# Patient Record
Sex: MALE | Race: WHITE | NOT HISPANIC OR LATINO | Employment: FULL TIME | URBAN - METROPOLITAN AREA
[De-identification: names, ages, dates, MRNs, and addresses within clinical notes are randomized per-mention and may not be internally consistent; named-entity substitution may affect disease eponyms.]

---

## 2024-07-26 ENCOUNTER — APPOINTMENT (OUTPATIENT)
Dept: GENERAL RADIOLOGY | Facility: HOSPITAL | Age: 54
DRG: 064 | End: 2024-07-26
Payer: COMMERCIAL

## 2024-07-26 ENCOUNTER — APPOINTMENT (OUTPATIENT)
Dept: CT IMAGING | Facility: HOSPITAL | Age: 54
DRG: 064 | End: 2024-07-26
Payer: COMMERCIAL

## 2024-07-26 ENCOUNTER — HOSPITAL ENCOUNTER (INPATIENT)
Facility: HOSPITAL | Age: 54
LOS: 11 days | Discharge: NURSING, REHAB OR LONG-TERM CARE | DRG: 064 | End: 2024-08-06
Attending: EMERGENCY MEDICINE | Admitting: HOSPITALIST
Payer: COMMERCIAL

## 2024-07-26 DIAGNOSIS — I61.0 NONTRAUMATIC SUBCORTICAL HEMORRHAGE OF RIGHT CEREBRAL HEMISPHERE: ICD-10-CM

## 2024-07-26 DIAGNOSIS — R41.4 HEMINEGLECT: ICD-10-CM

## 2024-07-26 DIAGNOSIS — R47.1 DYSARTHRIA: ICD-10-CM

## 2024-07-26 DIAGNOSIS — R53.1 ACUTE LEFT-SIDED WEAKNESS: ICD-10-CM

## 2024-07-26 DIAGNOSIS — R13.12 OROPHARYNGEAL DYSPHAGIA: ICD-10-CM

## 2024-07-26 DIAGNOSIS — I61.9 INTRAPARENCHYMAL HEMORRHAGE OF BRAIN: Primary | ICD-10-CM

## 2024-07-26 DIAGNOSIS — I16.1 HYPERTENSIVE EMERGENCY: ICD-10-CM

## 2024-07-26 DIAGNOSIS — R20.0 LEFT SIDED NUMBNESS: ICD-10-CM

## 2024-07-26 PROBLEM — I16.9 HYPERTENSIVE CRISIS: Status: ACTIVE | Noted: 2024-07-26

## 2024-07-26 LAB
ALT SERPL W P-5'-P-CCNC: 35 U/L (ref 1–41)
AMPHET+METHAMPHET UR QL: NEGATIVE
AMPHETAMINES UR QL: NEGATIVE
APTT PPP: 25.6 SECONDS (ref 22–39)
AST SERPL-CCNC: 28 U/L (ref 1–40)
BARBITURATES UR QL SCN: NEGATIVE
BASOPHILS # BLD AUTO: 0.04 10*3/MM3 (ref 0–0.2)
BASOPHILS NFR BLD AUTO: 0.6 % (ref 0–1.5)
BENZODIAZ UR QL SCN: NEGATIVE
BUN BLDA-MCNC: 17 MG/DL (ref 8–26)
BUPRENORPHINE SERPL-MCNC: NEGATIVE NG/ML
CA-I BLDA-SCNC: 1.1 MMOL/L (ref 1.2–1.32)
CANNABINOIDS SERPL QL: NEGATIVE
CHLORIDE BLDA-SCNC: 102 MMOL/L (ref 98–109)
CO2 BLDA-SCNC: 23 MMOL/L (ref 24–29)
COCAINE UR QL: NEGATIVE
CREAT BLDA-MCNC: 0.9 MG/DL (ref 0.6–1.3)
DEPRECATED RDW RBC AUTO: 41.4 FL (ref 37–54)
EGFRCR SERPLBLD CKD-EPI 2021: 102.1 ML/MIN/1.73
EOSINOPHIL # BLD AUTO: 0.1 10*3/MM3 (ref 0–0.4)
EOSINOPHIL NFR BLD AUTO: 1.5 % (ref 0.3–6.2)
ERYTHROCYTE [DISTWIDTH] IN BLOOD BY AUTOMATED COUNT: 12.7 % (ref 12.3–15.4)
FENTANYL UR-MCNC: NEGATIVE NG/ML
GLUCOSE BLDC GLUCOMTR-MCNC: 124 MG/DL (ref 70–130)
HCT VFR BLD AUTO: 43.2 % (ref 37.5–51)
HCT VFR BLDA CALC: 44 % (ref 38–51)
HGB BLD-MCNC: 15.3 G/DL (ref 13–17.7)
HGB BLDA-MCNC: 15 G/DL (ref 12–17)
HOLD SPECIMEN: NORMAL
IMM GRANULOCYTES # BLD AUTO: 0.01 10*3/MM3 (ref 0–0.05)
IMM GRANULOCYTES NFR BLD AUTO: 0.2 % (ref 0–0.5)
LYMPHOCYTES # BLD AUTO: 1.31 10*3/MM3 (ref 0.7–3.1)
LYMPHOCYTES NFR BLD AUTO: 20.2 % (ref 19.6–45.3)
MCH RBC QN AUTO: 31.2 PG (ref 26.6–33)
MCHC RBC AUTO-ENTMCNC: 35.4 G/DL (ref 31.5–35.7)
MCV RBC AUTO: 88 FL (ref 79–97)
METHADONE UR QL SCN: NEGATIVE
MONOCYTES # BLD AUTO: 0.56 10*3/MM3 (ref 0.1–0.9)
MONOCYTES NFR BLD AUTO: 8.6 % (ref 5–12)
NEUTROPHILS NFR BLD AUTO: 4.48 10*3/MM3 (ref 1.7–7)
NEUTROPHILS NFR BLD AUTO: 68.9 % (ref 42.7–76)
NRBC BLD AUTO-RTO: 0 /100 WBC (ref 0–0.2)
OPIATES UR QL: NEGATIVE
OXYCODONE UR QL SCN: NEGATIVE
PCP UR QL SCN: NEGATIVE
PLATELET # BLD AUTO: 191 10*3/MM3 (ref 140–450)
PMV BLD AUTO: 8.6 FL (ref 6–12)
POTASSIUM BLDA-SCNC: 3.4 MMOL/L (ref 3.5–4.9)
RBC # BLD AUTO: 4.91 10*6/MM3 (ref 4.14–5.8)
SODIUM BLD-SCNC: 137 MMOL/L (ref 138–146)
TRICYCLICS UR QL SCN: NEGATIVE
TROPONIN T SERPL HS-MCNC: 13 NG/L
WBC NRBC COR # BLD AUTO: 6.5 10*3/MM3 (ref 3.4–10.8)
WHOLE BLOOD HOLD COAG: NORMAL
WHOLE BLOOD HOLD SPECIMEN: NORMAL

## 2024-07-26 PROCEDURE — 25810000003 SODIUM CHLORIDE 0.9 % SOLUTION 250 ML FLEX CONT: Performed by: PHYSICIAN ASSISTANT

## 2024-07-26 PROCEDURE — 99284 EMERGENCY DEPT VISIT MOD MDM: CPT | Performed by: PHYSICIAN ASSISTANT

## 2024-07-26 PROCEDURE — 25510000001 IOPAMIDOL PER 1 ML: Performed by: EMERGENCY MEDICINE

## 2024-07-26 PROCEDURE — 4A03X5D MEASUREMENT OF ARTERIAL FLOW, INTRACRANIAL, EXTERNAL APPROACH: ICD-10-PCS | Performed by: HOSPITALIST

## 2024-07-26 PROCEDURE — 80307 DRUG TEST PRSMV CHEM ANLYZR: CPT | Performed by: NEUROLOGICAL SURGERY

## 2024-07-26 PROCEDURE — 80047 BASIC METABLC PNL IONIZED CA: CPT | Performed by: EMERGENCY MEDICINE

## 2024-07-26 PROCEDURE — 85730 THROMBOPLASTIN TIME PARTIAL: CPT | Performed by: EMERGENCY MEDICINE

## 2024-07-26 PROCEDURE — 83880 ASSAY OF NATRIURETIC PEPTIDE: CPT | Performed by: NURSE PRACTITIONER

## 2024-07-26 PROCEDURE — 70496 CT ANGIOGRAPHY HEAD: CPT

## 2024-07-26 PROCEDURE — 84484 ASSAY OF TROPONIN QUANT: CPT | Performed by: EMERGENCY MEDICINE

## 2024-07-26 PROCEDURE — 93005 ELECTROCARDIOGRAM TRACING: CPT | Performed by: EMERGENCY MEDICINE

## 2024-07-26 PROCEDURE — 71045 X-RAY EXAM CHEST 1 VIEW: CPT

## 2024-07-26 PROCEDURE — 99222 1ST HOSP IP/OBS MODERATE 55: CPT

## 2024-07-26 PROCEDURE — 99291 CRITICAL CARE FIRST HOUR: CPT | Performed by: INTERNAL MEDICINE

## 2024-07-26 PROCEDURE — 25010000002 NICARDIPINE 2.5 MG/ML SOLUTION 10 ML VIAL: Performed by: PHYSICIAN ASSISTANT

## 2024-07-26 PROCEDURE — 83930 ASSAY OF BLOOD OSMOLALITY: CPT | Performed by: NURSE PRACTITIONER

## 2024-07-26 PROCEDURE — 25010000002 HYDRALAZINE PER 20 MG: Performed by: INTERNAL MEDICINE

## 2024-07-26 PROCEDURE — 85014 HEMATOCRIT: CPT | Performed by: EMERGENCY MEDICINE

## 2024-07-26 PROCEDURE — 70450 CT HEAD/BRAIN W/O DYE: CPT

## 2024-07-26 PROCEDURE — 84460 ALANINE AMINO (ALT) (SGPT): CPT | Performed by: EMERGENCY MEDICINE

## 2024-07-26 PROCEDURE — 36415 COLL VENOUS BLD VENIPUNCTURE: CPT

## 2024-07-26 PROCEDURE — 99285 EMERGENCY DEPT VISIT HI MDM: CPT

## 2024-07-26 PROCEDURE — 80048 BASIC METABOLIC PNL TOTAL CA: CPT | Performed by: NURSE PRACTITIONER

## 2024-07-26 PROCEDURE — 85025 COMPLETE CBC W/AUTO DIFF WBC: CPT | Performed by: EMERGENCY MEDICINE

## 2024-07-26 PROCEDURE — 25010000002 NICARDIPINE 2.5 MG/ML SOLUTION 10 ML VIAL: Performed by: EMERGENCY MEDICINE

## 2024-07-26 PROCEDURE — 84295 ASSAY OF SERUM SODIUM: CPT | Performed by: NURSE PRACTITIONER

## 2024-07-26 PROCEDURE — 82948 REAGENT STRIP/BLOOD GLUCOSE: CPT

## 2024-07-26 PROCEDURE — 84450 TRANSFERASE (AST) (SGOT): CPT | Performed by: EMERGENCY MEDICINE

## 2024-07-26 PROCEDURE — 70498 CT ANGIOGRAPHY NECK: CPT

## 2024-07-26 PROCEDURE — 25810000003 SODIUM CHLORIDE 0.9 % SOLUTION 250 ML FLEX CONT: Performed by: EMERGENCY MEDICINE

## 2024-07-26 RX ORDER — SODIUM CHLORIDE 9 MG/ML
40 INJECTION, SOLUTION INTRAVENOUS AS NEEDED
Status: DISCONTINUED | OUTPATIENT
Start: 2024-07-26 | End: 2024-08-06 | Stop reason: HOSPADM

## 2024-07-26 RX ORDER — CLONIDINE 0.3 MG/24H
1 PATCH, EXTENDED RELEASE TRANSDERMAL WEEKLY
Status: DISCONTINUED | OUTPATIENT
Start: 2024-07-26 | End: 2024-07-28

## 2024-07-26 RX ORDER — FAMOTIDINE 20 MG/1
20 TABLET, FILM COATED ORAL
Status: DISCONTINUED | OUTPATIENT
Start: 2024-07-26 | End: 2024-07-27

## 2024-07-26 RX ORDER — HYDRALAZINE HYDROCHLORIDE 20 MG/ML
20 INJECTION INTRAMUSCULAR; INTRAVENOUS EVERY 6 HOURS PRN
Status: DISCONTINUED | OUTPATIENT
Start: 2024-07-26 | End: 2024-08-06 | Stop reason: HOSPADM

## 2024-07-26 RX ORDER — SODIUM CHLORIDE 0.9 % (FLUSH) 0.9 %
10 SYRINGE (ML) INJECTION EVERY 12 HOURS SCHEDULED
Status: DISCONTINUED | OUTPATIENT
Start: 2024-07-26 | End: 2024-07-27

## 2024-07-26 RX ORDER — SODIUM CHLORIDE 0.9 % (FLUSH) 0.9 %
10 SYRINGE (ML) INJECTION AS NEEDED
Status: DISCONTINUED | OUTPATIENT
Start: 2024-07-26 | End: 2024-07-27

## 2024-07-26 RX ORDER — LABETALOL HYDROCHLORIDE 5 MG/ML
10 INJECTION, SOLUTION INTRAVENOUS EVERY 4 HOURS PRN
Status: DISCONTINUED | OUTPATIENT
Start: 2024-07-26 | End: 2024-08-06 | Stop reason: HOSPADM

## 2024-07-26 RX ORDER — LABETALOL HYDROCHLORIDE 5 MG/ML
10 INJECTION, SOLUTION INTRAVENOUS ONCE
Status: COMPLETED | OUTPATIENT
Start: 2024-07-26 | End: 2024-07-26

## 2024-07-26 RX ORDER — LABETALOL HYDROCHLORIDE 5 MG/ML
INJECTION, SOLUTION INTRAVENOUS
Status: COMPLETED
Start: 2024-07-26 | End: 2024-07-26

## 2024-07-26 RX ORDER — 3% SODIUM CHLORIDE 3 G/100ML
40 INJECTION, SOLUTION INTRAVENOUS CONTINUOUS
Status: DISCONTINUED | OUTPATIENT
Start: 2024-07-27 | End: 2024-07-27

## 2024-07-26 RX ADMIN — IOPAMIDOL 75 ML: 755 INJECTION, SOLUTION INTRAVENOUS at 17:21

## 2024-07-26 RX ADMIN — NICARDIPINE HYDROCHLORIDE 5 MG/HR: 2.5 INJECTION, SOLUTION INTRAVENOUS at 17:22

## 2024-07-26 RX ADMIN — CLONIDINE 1 PATCH: 0.3 PATCH, EXTENDED RELEASE TRANSDERMAL at 18:42

## 2024-07-26 RX ADMIN — HYDRALAZINE HYDROCHLORIDE 20 MG: 20 INJECTION INTRAMUSCULAR; INTRAVENOUS at 18:01

## 2024-07-26 RX ADMIN — LABETALOL HYDROCHLORIDE 10 MG: 5 INJECTION, SOLUTION INTRAVENOUS at 17:29

## 2024-07-26 RX ADMIN — NITROGLYCERIN 2 INCH: 20 OINTMENT TOPICAL at 20:12

## 2024-07-26 RX ADMIN — NICARDIPINE HYDROCHLORIDE 15 MG/HR: 2.5 INJECTION, SOLUTION INTRAVENOUS at 21:01

## 2024-07-26 RX ADMIN — NICARDIPINE HYDROCHLORIDE 15 MG/HR: 2.5 INJECTION, SOLUTION INTRAVENOUS at 19:09

## 2024-07-26 RX ADMIN — Medication 10 ML: at 21:12

## 2024-07-26 RX ADMIN — Medication 10 MG: at 17:29

## 2024-07-26 NOTE — PLAN OF CARE
Discussed with stroke team   No role for surgery at present given deep seated and location .   Continue bp control and icu/ repeat scans per stroke team     Happy to provide full consult if needed or if changes

## 2024-07-26 NOTE — CONSULTS
Stroke Consult Note    Patient Name: Ron Foster   MRN: [id number]  Age: 53 y.o.  Sex: male  : 1970    Primary Care Physician: No primary care provider on file.  Referring Physician:  Gerald Foss MD    TIME STROKE TEAM CALLED: 1636 EST     TIME PATIENT SEEN: 1645 EST    Race:     Chief Complaint/Reason for Consultation: Left side weakness/ sensory neglect, LFD, Left visual field cut. Slurred Speech     HPI: Ron Galvan is a 52 yo male with PMH of migraines who presented to [facility] ED via EMS after the patient was driving his car home and began having severe left sided weakness and left facial droop. Medical history is deficient as the patient has difficulty communicating currently.  Per EMS he is not on any antiplatelet or anticoagulant medication.  BP at time of arrival 196/112.  NIH on my exam 17 due to a left facial droop, left homonymous hemianopsia with right gaze deviation, significant left-sided weakness to upper and lower extremities, and sensory neglect to upper and lower extremities.  Patient is slightly aphasic with severe dysarthria.  Patient has a tongue deviation to the left.  Emergent CT head revealed a 4 x 3 x 3.5 right basal ganglia hemorrhage.  CT angiogram without evidence of LVO.  Ulcerated plaque observed on left distal common carotid.     ICH 0.  Imaging results discussed with both Dr. Rojo and Inocencio.  No indication for neurosurgical intervention at this time.  Will manage medically.     Last Known Normal Date/Time:  EST     Review of Systems   Unable to perform ROS: Acuity of condition      No past medical history on file.  No past surgical history on file.  No family history on file.     Not on File  Prior to Admission medications    Not on File         BP: ()/()   Arterial Line BP: ()/()   Neurological Exam  Mental Status  Alert. Oriented to person, place and time. Oriented to person, place, and time. Severe dysarthria present. Expressive aphasia present. Able  to name objects. Follows two-step commands.    Cranial Nerves  CN II: Right normal visual field. Left homonymous hemianopsia.  CN III, IV, VI: Abnormal extraocular movements: Right gaze deviation. Normal lids and orbits bilaterally. Pupils equal round and reactive to light bilaterally.  CN V:  Right: Facial sensation is normal.  Left: Diminished sensation of the entire left side of the face.  CN VII:  Right: There is no facial weakness.  Left: There is central facial weakness.  CN XII: Tongue deviates to the left.    Motor  Normal muscle bulk throughout. No fasciculations present. Normal muscle tone. Strength is 5/5 in all four extremities except as noted.  No antigravity strength to left upper or left lower extremity. .    Sensory  Light touch abnormality: Severe neglect to sensation on left side. Pinprick abnormality: Patient withdraws to painful sensation on all extremities.     Coordination  Right: Finger-to-nose normal.Left: Finger-to-nose abnormality: Unable to assess due to weakness.    Gait    Not observed.      Physical Exam  Constitutional:       General: He is in acute distress.   HENT:      Head: Normocephalic and atraumatic.   Eyes:      General: Lids are normal.      Extraocular Movements: EOM normal     Pupils: Pupils are equal, round, and reactive to light.   Cardiovascular:      Rate and Rhythm: Normal rate.   Pulmonary:      Effort: No respiratory distress.      Comments: 95 % on 2L  Abdominal:      General: There is no distension.      Tenderness: There is no guarding.   Musculoskeletal:         General: No signs of injury.      Right lower leg: No edema.      Left lower leg: No edema.   Skin:     General: Skin is warm.      Findings: No bruising.   Neurological:      Mental Status: He is alert and oriented to person, place, and time.      Cranial Nerves: Cranial nerve deficit and dysarthria present.      Sensory: Sensory deficit present.      Motor: Weakness present.       Acute Stroke  Data    Thrombolytic Inclusion / Exclusion Criteria    Time: 16:51 EDT  Person Administering Scale: Saman Zarco PA-C    Inclusion Criteria  [x]   18 years of age or greater   []   Onset of symptoms < 4.5 hours before beginning treatment (stroke onset = time patient was last seen well or without symptoms).   []   Diagnosis of acute ischemic stroke causing measurable disabling deficit (Complete Hemianopia, Any Aphasia, Visual or Sensory Extinction, Any weakness limiting sustained effort against gravity)   []   Any remaining deficit considered potentially disabling in view of patient and practitioner   Exclusion criteria (Do not proceed with Alteplase if any are checked under exclusion criteria)  []   Onset unknown or GREATER than 4.5 hours   [x]   ICH on CT/MRI   []   CT demonstrates hypodensity representing acute or subacute infarct   []   Significant head trauma or prior stroke in the previous 3 months   []   Symptoms suggestive of subarachnoid hemorrhage   []   History of un-ruptured intracranial aneurysm GREATER than 10 mm   []   Recent intracranial or intraspinal surgery within the last 3 months   []   Arterial puncture at a non-compressible site in the previous 7 days   []   Active internal bleeding   []   Acute bleeding tendency   []   Platelet count LESS than 100,000 for known hematological diseases such as leukemia, thrombocytopenia or chronic cirrhosis   []   Current use of anticoagulant with INR GREATER than 1.7 or PT GREATER than 15 seconds, aPTT GREATER than 40 seconds   []   Heparin received within 48 hours, resulting in abnormally elevated aPTT GREATER than upper limit of normal   []   Current use of direct thrombin inhibitors or direct factor Xa inhibitors in the past 48 hours   []   Elevated blood pressure refractory to treatment (systolic GREATER than 185 mm/Hg or diastolic  GREATER than 110 mm/Hg   []   Suspected infective endocarditis and aortic arch dissection   []   Current use of therapeutic  treatment dose of low-molecular-weight heparin (LMWH) within the previous 24 hours   []   Structural GI malignancy or bleed   Relative exclusion for all patients  []   Only minor non-disabling symptoms   []   Pregnancy   []   Seizure at onset with postictal residual neurological impairments   []   Major surgery or previous trauma within past 14 days   []   History of previous spontaneous ICH, intracranial neoplasm, or AV malformation   []   Postpartum (within previous 14 days)   []   Recent GI or urinary tract hemorrhage (within previous 21 days)   []   Recent acute MI (within previous 3 months)   []   History of un-ruptured intracranial aneurysm LESS than 10 mm   []   History of ruptured intracranial aneurysm   []   Blood glucose LESS than 50 mg/dL (2.7 mmol/L)   []   Dural puncture within the last 7 days   []   Known GREATER than 10 cerebral microbleeds   Additional exclusions for patients with symptoms onset between 3 and 4.5 hours.  []   Age > 80.   []   On any anticoagulants regardless of INR  >>> Warfarin (Coumadin), Heparin, Enoxaparin (Lovenox), fondaparinux (Arixtra), bivalirudin (Angiomax), Argatroban, dabigatran (Pradaxa), rivaroxaban (Xarelto), or apixaban (Eliquis)   []   Severe stroke (NIHSS > 25).   []   History of BOTH diabetes and previous ischemic stroke.   []   The risks and benefits have been discussed with the patient or family related to the administration of IV thrombolytic therapy for stroke symptoms.   []   I have discussed and reviewed the patient's case and imaging with the attending prior to IV thrombolytic therapy.   N/A Time IV thrombolytic administered     Hospital Meds:  Scheduled-    Infusions-     PRNs-   sodium chloride    Functional Status Prior to Current Stroke/Bartow Score: 0  ICH Score:  0 Points (GCS 13 to 15)  0 Points (ICH volume < 30 cm3)  0 Points (Intraventricular Extension Absent)   0 Points (Infratentorial Origin - No )  0 Points (Age < 80 years)  The total ICH Score  "for this patient is 0 at 16:56 EDT on 24     NIH Stroke Scale  Time: 16:51 EDT  Person Administering Scale: Saman Zarco PA-C    1a  Level of consciousness: 0=alert; keenly responsive   1b. LOC questions:  0=Answers both questions correctly   1c. LOC commands: 0=Performs both tasks correctly   2.  Best Gaze: 1=partial gaze palsy   3.  Visual: 2=Complete hemianopia   4. Facial Palsy: 2=Partial paralysis (total or near total paralysis of the lower face)   5a.  Motor left arm: 3=No effort against gravity, limb falls   5b.  Motor right arm: 0=No drift, limb holds 90 (or 45) degrees for full 10 seconds   6a. motor left leg: 3=No effort against gravity, limb falls   6b  Motor right le=No drift, limb holds 90 (or 45) degrees for full 10 seconds   7. Limb Ataxia: 0=Absent   8.  Sensory: 2=Severe to total sensory loss; patient is not aware of being touched in face, arm, leg   9. Best Language:  1=Mild to moderate aphasia; some obvious loss of fluency or facility of comprehension without significant limitation on ideas expressed or form of expression.   10. Dysarthria: 2=Severe; patient speech is so slurred as to be unintelligible in the absence of or our of proportion to any dysphagia, or is mute/anarthric   11. Extinction and Inattention: 1=Visual, tactile, auditory, spatial or personal inattention or extinction to bilateral simultaneous stimulation in one of the sensory modalities    Total:   17     CBC w/diff          2024    17:02 2024    17:03   CBC w/Diff   WBC 6.50     RBC 4.91     Hemoglobin 15.3  15.0    Hematocrit 43.2  44    MCV 88.0     MCH 31.2     MCHC 35.4     RDW 12.7     Platelets 191     Neutrophil Rel % 68.9     Immature Granulocyte Rel % 0.2     Lymphocyte Rel % 20.2     Monocyte Rel % 8.6     Eosinophil Rel % 1.5     Basophil Rel % 0.6        Basic Metabolic Panel    Sodium No results found for: \"NA\"   Potassium No results found for: \"K\"   Chloride No results found for: \"CL\" " "  Bicarbonate No results found for: \"PLASMABICARB\"   BUN No results found for: \"BUN\"   Creatinine Creatinine   Date Value Ref Range Status   07/26/2024 0.90 0.60 - 1.30 mg/dL Final      Calcium No results found for: \"CALCIUM\"   Glucose      No components found for: \"GLUCOSE.*\"      XR Chest 1 View    Result Date: 7/26/2024  Impression: No active cardiopulmonary disease Electronically Signed: Bird Rubio  7/26/2024 5:51 PM EDT  Workstation ID: [id number]    CT Angiogram Head w AI Analysis of LVO    Result Date: 7/26/2024  Impression: 1. No evidence of intracranial large vessel arterial occlusion or aneurysm 2. Ulcerated plaque in the left distal common carotid and proximal internal carotid arteries that results in less than 50% luminal diameter stenosis 3. Patent right carotid and bilateral vertebral arteries without significant stenosis or dissection Electronically Signed: Bird Rubio  7/26/2024 5:39 PM EDT  Workstation ID: [id number]    CT Angiogram Neck    Result Date: 7/26/2024  Impression: 1. No evidence of intracranial large vessel arterial occlusion or aneurysm 2. Ulcerated plaque in the left distal common carotid and proximal internal carotid arteries that results in less than 50% luminal diameter stenosis 3. Patent right carotid and bilateral vertebral arteries without significant stenosis or dissection Electronically Signed: Bird Rubio  7/26/2024 5:39 PM EDT  Workstation ID: [id number]    CT Head Without Contrast Stroke Protocol    Result Date: 7/26/2024  Impression: 1. 4 x 3 x 3.5 cm right basal ganglia and periventricular white matter parenchymal hematoma with mass effect as described 2. No evidence of major vascular territory brain ischemia 3. Chronic small vessel ischemic changes Electronically Signed: Bird Rubio  7/26/2024 5:04 PM EDT  Workstation ID: [id number]      Results Reviewed:  I have personally reviewed current lab, radiology, and data and agree with results.    Assessment/Plan:  52 yo male " with PMH of migraines who presented to [facility] ED via EMS. NIH on my exam 17 due to a left facial droop, left homonymous hemianopsia with right gaze deviation, significant left-sided weakness to upper and lower extremities, and sensory neglect to upper and lower extremities.  Patient is slightly aphasic with severe dysarthria.  Patient has a tongue deviation to the left.  Emergent CT head revealed a 4 x 3 x 3.5 right basal ganglia hemorrhage.  CT angiogram without evidence of LVO.  Ulcerated plaque observed on left distal common carotid.     ICH 0.  Imaging results discussed with both Dr. Rojo and Inocencio.  No indication for neurosurgical intervention at this time.  Will manage medically.     Antiplatelet PTA: None  Anticoagulant PTA: None      # right basal ganglia hemorrhage  # Left side weakness/ sensory neglect, LFD, Left visual field cut. Slurred Speech   # HTN  -Hemorrhagic stroke order set in place  -Imaging discussed with Dr. Painter, no indication for current neurosurgical intervention at this time  -Neurochecks per protocol, repeat CT head with any significant neurological  -Repeat CT head in 6 hours per protocol  -PT/OT/SLP to evaluate  -ICU admission for close monitoring  -Strict blood pressure control, SBP less than 140.  Titration with Cardene as needed  -Head of bed 30 to 45 degrees  -A1c and lipid panel with a.m. labs  -DVT prophylaxis with mechanical sequential compression  -Avoid antiplatelet and anticoagulation at this time due to bleed  -TTE pending  -N.p.o. until patient passes dysphagia screening, healthy cardiac diet if screening is passed  -Neurosurgery consulted  -Neurology stroke continue to follow    Plan of care discussed with Dr. Foss, Dr. Rojo, Dr. Painter, and Kane Hoff PA-C.  Please call with any questions or concerns    Saman Zarco PA-C  July 26, 2024  16:51 EDT

## 2024-07-26 NOTE — CONSULTS
Consults    Referring Provider: Pipo MCDERMOTT    No care team member to display    Chief Complaint: Acute left-sided weakness dysarthria   Subjective .     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  Patient was brought in by ambulance.  Patient was worked up for stroke and noted to have a right-sided basal ganglia hemorrhage.  Patient was taken for stroke workup pretty immediately to the CT scanner.    At the time of my evaluation patient was able to tell me time and place but other history is difficult to obtain secondary to dysarthria and patient comprehension.    Upon arrival the patient was noted to have severe hypertension.  Patient been on Cardene for about 5 minutes prior to my examination and blood pressure was still 196/113.  I have discussed adding labetalol on with the pharmacist.    This is a nonsurgical hemorrhage    Review of Systems    History  History of present illness difficult to obtain secondary to patient's cognition   SMOKING STATUS: Non-smoker  Objective     Vital Signs   Heart Rate:  [92] 92  Resp:  [20] 20  BP: (196)/(112-118) 196/113  There is no height or weight on file to calculate BMI.    Physical Exam:     There is no height or weight on file to calculate BMI.    Patient is able to answer simple questions but slow to speak.  Patient is obviously showing extinction/neglect on his left side.  Patient also has no visual threat on the left.  Patient completely flaccid in left upper extremity but will withdraw from pain on the left lower extremity.  Right side is good strength and will follow commands.    Pupils are equally reactive to light tongue protrudes to the left side patient has a significant left-sided facial droop as well.  Hearing is intact.    Results Review:   I reviewed the patient's new imaging results and agree with the interpretation.  Discussed with stroke  team as well as Dr. Foss.  Patient has a nonsurgical basal ganglia hemorrhage on the right with some mild midline shift.  Hemorrhage measures approximately 4 x 3 x 3 cm    Assessment & Plan     Diagnosis:  Basal ganglia hemorrhage on the right  Hypertension    PLAN:    Patient needs strict blood pressure control to get his blood pressures less than 140.  Other than that neurosurgical recommendations are limited.  This is a nonsurgical bleed.    Patient will require ICU admission for neurologic observation.    Stroke workup per Vascular neurology    I discussed the patients findings and my recommendations with patient and consulting provider    Kane Hoff PA-C  07/26/24  17:31 EDT    Time:  60 minutes

## 2024-07-26 NOTE — LETTER
EMS Transport Request  For use at [facility], [place], [place], [place], and [place] only   Patient Name: Ron Cam : 1970   Weight:122 kg (268 lb 11.9 oz) Pick-up Location: [facility] BLS/ALS: BLS/ALS: BLS   Insurance: [employer] BLUE CROSS Auth End Date:    Pre-Cert #: D/C Summary complete:    Destination: Other [place]   Contact Precautions: None   Equipment (O2, Fluids, etc.): None   Arrive By Date/Time: 24 Stretcher/WC: Stretcher   CM Requesting: Dolly Akers RN Ext: [phone]   Notes/Medical Necessity: CVA, Impaired Functional mobility, balance, gait and endurance.     ______________________________________________________________________    *Only 2 patient bags OR 1 carry-on size bag are permitted.  Wheelchairs and walkers CANNOT transported with the patient. Acknowledge: Yes

## 2024-07-26 NOTE — H&P
"  CRITICAL CARE ADMISSION NOTE    Chief Complaint     Nontraumatic intracerebral hemorrhage    History of Present Illness     53-year-old male who denies a history of hypertension developed acute left-sided weakness and dysarthria on 7/26/2024 around 3:30 PM.  No prior similar episodes.    CT scan of the head in the emergency room revealed a basal ganglionic hemorrhage on the right.  Blood pressure was markedly elevated.  He has been started on a Cardene drip and has received some \"as needed\" antihypertensives in the ER and has been admitted to the intensive care unit    The patient is awake and alert.  Appears to respond to questions appropriately.  Indicates that he had hypertension as a child but not as an adult.  He is not supposed to be on any prescription medications.  Denies illicit drug use.  Denies regular alcohol use.  He is a non-smoker    Problem List, Surgical History, Family, Social History, and ROS     Nontraumatic intracerebral hemorrhage    Hypertensive crisis     No past surgical history on file.    No Known Allergies  No current facility-administered medications on file prior to encounter.     No current outpatient medications on file prior to encounter.     MEDICATION LIST AND ALLERGIES REVIEWED.    History reviewed. No pertinent family history.  Social History     Tobacco Use    Smoking status: Never    Smokeless tobacco: Never   Substance Use Topics    Alcohol use: Not Currently    Drug use: Not Currently     Social History     Social History Narrative            No children     FAMILY AND SOCIAL HISTORY REVIEWED.    Review of Systems  AS ABOVE OR ALL OTHER SYSTEMS REVIEWED AND ARE NEGATIVE.    Physical Exam and Clinical Information   /88 (BP Location: Right arm, Patient Position: Lying)   Pulse 94   Temp 97.9 °F (36.6 °C) (Oral)   Resp 14   SpO2 90%   Physical Exam:   GENERAL: Awake, no distress   HEENT: No evidence of trauma.  Sclera nonicteric pupils " "equal   LUNGS: Clear without wheezes or rhonchi   HEART: Regular rate and rhythm without murmurs   GI: Soft, nontender   EXTREMITIES: No clubbing, cyanosis, or edema   NEURO/PSYCH: Awake and alert.  Appears to respond to questions appropriately.  Dysarthria and left-sided facial droop.  Flaccid on the left with some withdrawal to pain in the left lower extremity.    Results from last 7 days   Lab Units 07/26/24  1703 07/26/24  1702   WBC 10*3/mm3  --  6.50   HEMOGLOBIN g/dL  --  15.3   HEMOGLOBIN, POC g/dL 15.0  --    PLATELETS 10*3/mm3  --  191     Results from last 7 days   Lab Units 07/26/24  1703   CREATININE mg/dL 0.90     CrCl cannot be calculated (Unknown ideal weight.).          No results found for: \"LACTATE\"     IMAGES: Chest x-ray reveals low lung volumes no consolidation or effusions    CT angiogram of the neck and head revealed no large vessel occlusion or aneurysm, a less than 50% stenosis of the left distal common carotid with ulcerated plaque.    CT scan of the head revealed a 4 x 3 x 3-1/2 cm right basal ganglionic and periventricular white matter parenchymal hematoma with mass effect    I reviewed the patient's results and images.     Assesment     Active Hospital Problems    Diagnosis     **Nontraumatic intracerebral hemorrhage     Hypertensive crisis      Plan/Recommendations     Admit to the intensive care unit  Blood pressure control  The patient is already been evaluated by neurosurgery  Vascular neurology consultation  Urine drug screen  Nonpharmacologic DVT prophylaxis  Ulcer prophylaxis    Critical Care time spent in direct patient care: 35 minutes (excluding procedure time, if applicable) including high complexity decision making to assess, manipulate, and support vital organ system failure in this individual who has impairment of one or more vital organ systems such that there is a high probability of imminent or life threatening deterioration in the patient’s condition.    SHALINI Farias " MD [facility]  Pulmonary and Critical Care Medicine     CC: No primary care provider on file.

## 2024-07-27 ENCOUNTER — APPOINTMENT (OUTPATIENT)
Dept: MRI IMAGING | Facility: HOSPITAL | Age: 54
DRG: 064 | End: 2024-07-27
Payer: COMMERCIAL

## 2024-07-27 ENCOUNTER — APPOINTMENT (OUTPATIENT)
Dept: CARDIOLOGY | Facility: HOSPITAL | Age: 54
DRG: 064 | End: 2024-07-27
Payer: COMMERCIAL

## 2024-07-27 LAB
ANION GAP SERPL CALCULATED.3IONS-SCNC: 10 MMOL/L (ref 5–15)
ANION GAP SERPL CALCULATED.3IONS-SCNC: 11 MMOL/L (ref 5–15)
ANION GAP SERPL CALCULATED.3IONS-SCNC: 13 MMOL/L (ref 5–15)
ASCENDING AORTA: 3.3 CM
BH CV ECHO MEAS - AO MAX PG: 8.1 MMHG
BH CV ECHO MEAS - AO MEAN PG: 4.5 MMHG
BH CV ECHO MEAS - AO ROOT AREA (BSA CORRECTED): 1.9 CM2
BH CV ECHO MEAS - AO ROOT DIAM: 4.2 CM
BH CV ECHO MEAS - AO V2 MAX: 142 CM/SEC
BH CV ECHO MEAS - AO V2 VTI: 25.9 CM
BH CV ECHO MEAS - AVA(I,D): 3.1 CM2
BH CV ECHO MEAS - EDV(CUBED): 262.1 ML
BH CV ECHO MEAS - EDV(MOD-SP2): 150 ML
BH CV ECHO MEAS - EDV(MOD-SP4): 155 ML
BH CV ECHO MEAS - EF(MOD-BP): 65 %
BH CV ECHO MEAS - EF(MOD-SP2): 73.1 %
BH CV ECHO MEAS - EF(MOD-SP4): 59 %
BH CV ECHO MEAS - ESV(CUBED): 91.1 ML
BH CV ECHO MEAS - ESV(MOD-SP2): 40.3 ML
BH CV ECHO MEAS - ESV(MOD-SP4): 63.6 ML
BH CV ECHO MEAS - FS: 29.7 %
BH CV ECHO MEAS - IVS/LVPW: 1 CM
BH CV ECHO MEAS - IVSD: 1 CM
BH CV ECHO MEAS - LA DIMENSION: 3.7 CM
BH CV ECHO MEAS - LAT PEAK E' VEL: 6.3 CM/SEC
BH CV ECHO MEAS - LV DIASTOLIC VOL/BSA (35-75): 68.6 CM2
BH CV ECHO MEAS - LV MASS(C)D: 275.6 GRAMS
BH CV ECHO MEAS - LV MAX PG: 5.4 MMHG
BH CV ECHO MEAS - LV MEAN PG: 3 MMHG
BH CV ECHO MEAS - LV SYSTOLIC VOL/BSA (12-30): 28.1 CM2
BH CV ECHO MEAS - LV V1 MAX: 116 CM/SEC
BH CV ECHO MEAS - LV V1 VTI: 19.2 CM
BH CV ECHO MEAS - LVIDD: 6.4 CM
BH CV ECHO MEAS - LVIDS: 4.5 CM
BH CV ECHO MEAS - LVOT AREA: 4.2 CM2
BH CV ECHO MEAS - LVOT DIAM: 2.3 CM
BH CV ECHO MEAS - LVPWD: 1 CM
BH CV ECHO MEAS - MED PEAK E' VEL: 6.9 CM/SEC
BH CV ECHO MEAS - MV A MAX VEL: 75.5 CM/SEC
BH CV ECHO MEAS - MV DEC SLOPE: 644 CM/SEC2
BH CV ECHO MEAS - MV DEC TIME: 0.21 SEC
BH CV ECHO MEAS - MV E MAX VEL: 92.8 CM/SEC
BH CV ECHO MEAS - MV E/A: 1.23
BH CV ECHO MEAS - MV MAX PG: 4.1 MMHG
BH CV ECHO MEAS - MV MEAN PG: 2 MMHG
BH CV ECHO MEAS - MV P1/2T: 46.4 MSEC
BH CV ECHO MEAS - MV V2 VTI: 22.9 CM
BH CV ECHO MEAS - MVA(P1/2T): 4.7 CM2
BH CV ECHO MEAS - MVA(VTI): 3.5 CM2
BH CV ECHO MEAS - PA ACC TIME: 0.16 SEC
BH CV ECHO MEAS - PA V2 MAX: 109 CM/SEC
BH CV ECHO MEAS - RAP SYSTOLE: 8 MMHG
BH CV ECHO MEAS - RVSP: 23 MMHG
BH CV ECHO MEAS - SV(LVOT): 79.8 ML
BH CV ECHO MEAS - SV(MOD-SP2): 109.7 ML
BH CV ECHO MEAS - SV(MOD-SP4): 91.4 ML
BH CV ECHO MEAS - SVI(LVOT): 35.3 ML/M2
BH CV ECHO MEAS - SVI(MOD-SP2): 48.5 ML/M2
BH CV ECHO MEAS - SVI(MOD-SP4): 40.4 ML/M2
BH CV ECHO MEAS - TAPSE (>1.6): 1.94 CM
BH CV ECHO MEAS - TR MAX PG: 14.6 MMHG
BH CV ECHO MEAS - TR MAX VEL: 191 CM/SEC
BH CV ECHO MEASUREMENTS AVERAGE E/E' RATIO: 14.06
BH CV ECHO SHUNT ASSESSMENT PERFORMED (HIDDEN SCRIPTING): 1
BH CV XLRA - RV BASE: 3.9 CM
BH CV XLRA - RV LENGTH: 6.4 CM
BH CV XLRA - RV MID: 2.8 CM
BH CV XLRA - TDI S': 17.6 CM/SEC
BUN SERPL-MCNC: 11 MG/DL (ref 6–20)
BUN SERPL-MCNC: 12 MG/DL (ref 6–20)
BUN SERPL-MCNC: 19 MG/DL (ref 6–20)
BUN/CREAT SERPL: 12.9 (ref 7–25)
BUN/CREAT SERPL: 14.8 (ref 7–25)
BUN/CREAT SERPL: 18.3 (ref 7–25)
CALCIUM SPEC-SCNC: 8.5 MG/DL (ref 8.6–10.5)
CALCIUM SPEC-SCNC: 8.7 MG/DL (ref 8.6–10.5)
CALCIUM SPEC-SCNC: 8.7 MG/DL (ref 8.6–10.5)
CHLORIDE SERPL-SCNC: 102 MMOL/L (ref 98–107)
CHLORIDE SERPL-SCNC: 105 MMOL/L (ref 98–107)
CHLORIDE SERPL-SCNC: 106 MMOL/L (ref 98–107)
CHOLEST SERPL-MCNC: 227 MG/DL (ref 0–200)
CO2 SERPL-SCNC: 23 MMOL/L (ref 22–29)
CO2 SERPL-SCNC: 23 MMOL/L (ref 22–29)
CO2 SERPL-SCNC: 24 MMOL/L (ref 22–29)
CREAT SERPL-MCNC: 0.81 MG/DL (ref 0.76–1.27)
CREAT SERPL-MCNC: 0.85 MG/DL (ref 0.76–1.27)
CREAT SERPL-MCNC: 1.04 MG/DL (ref 0.76–1.27)
EGFRCR SERPLBLD CKD-EPI 2021: 103.9 ML/MIN/1.73
EGFRCR SERPLBLD CKD-EPI 2021: 105.4 ML/MIN/1.73
EGFRCR SERPLBLD CKD-EPI 2021: 85.9 ML/MIN/1.73
GLUCOSE BLDC GLUCOMTR-MCNC: 118 MG/DL (ref 70–130)
GLUCOSE BLDC GLUCOMTR-MCNC: 126 MG/DL (ref 70–130)
GLUCOSE SERPL-MCNC: 122 MG/DL (ref 65–99)
GLUCOSE SERPL-MCNC: 127 MG/DL (ref 65–99)
GLUCOSE SERPL-MCNC: 129 MG/DL (ref 65–99)
HBA1C MFR BLD: 5.5 % (ref 4.8–5.6)
HDLC SERPL-MCNC: 58 MG/DL (ref 40–60)
LDLC SERPL CALC-MCNC: 140 MG/DL (ref 0–100)
LDLC/HDLC SERPL: 2.36 {RATIO}
LEFT ATRIUM VOLUME INDEX: 25 ML/M2
LV EF 2D ECHO EST: 65 %
NT-PROBNP SERPL-MCNC: 1437 PG/ML (ref 0–900)
OSMOLALITY SERPL: 289 MOSM/KG (ref 275–295)
OSMOLALITY SERPL: 290 MOSM/KG (ref 275–295)
OSMOLALITY SERPL: 291 MOSM/KG (ref 275–295)
OSMOLALITY SERPL: 292 MOSM/KG (ref 275–295)
OSMOLALITY SERPL: 293 MOSM/KG (ref 275–295)
OSMOLALITY UR: 658 MOSM/KG (ref 300–1100)
POTASSIUM SERPL-SCNC: 3.7 MMOL/L (ref 3.5–5.2)
POTASSIUM SERPL-SCNC: 4 MMOL/L (ref 3.5–5.2)
POTASSIUM SERPL-SCNC: 4.1 MMOL/L (ref 3.5–5.2)
SODIUM SERPL-SCNC: 138 MMOL/L (ref 136–145)
SODIUM SERPL-SCNC: 138 MMOL/L (ref 136–145)
SODIUM SERPL-SCNC: 139 MMOL/L (ref 136–145)
SODIUM SERPL-SCNC: 139 MMOL/L (ref 136–145)
SODIUM SERPL-SCNC: 140 MMOL/L (ref 136–145)
SODIUM SERPL-SCNC: 140 MMOL/L (ref 136–145)
TRIGL SERPL-MCNC: 161 MG/DL (ref 0–150)
VLDLC SERPL-MCNC: 29 MG/DL (ref 5–40)

## 2024-07-27 PROCEDURE — 80061 LIPID PANEL: CPT

## 2024-07-27 PROCEDURE — 82948 REAGENT STRIP/BLOOD GLUCOSE: CPT

## 2024-07-27 PROCEDURE — 05HY33Z INSERTION OF INFUSION DEVICE INTO UPPER VEIN, PERCUTANEOUS APPROACH: ICD-10-PCS | Performed by: HOSPITALIST

## 2024-07-27 PROCEDURE — 83036 HEMOGLOBIN GLYCOSYLATED A1C: CPT

## 2024-07-27 PROCEDURE — 25810000003 SODIUM CHLORIDE 0.9 % SOLUTION 250 ML FLEX CONT: Performed by: PHYSICIAN ASSISTANT

## 2024-07-27 PROCEDURE — 84295 ASSAY OF SERUM SODIUM: CPT | Performed by: NURSE PRACTITIONER

## 2024-07-27 PROCEDURE — 80048 BASIC METABOLIC PNL TOTAL CA: CPT | Performed by: INTERNAL MEDICINE

## 2024-07-27 PROCEDURE — 25810000003 SODIUM CHLORIDE 3 % SOLUTION: Performed by: NURSE PRACTITIONER

## 2024-07-27 PROCEDURE — 83935 ASSAY OF URINE OSMOLALITY: CPT | Performed by: NURSE PRACTITIONER

## 2024-07-27 PROCEDURE — 93306 TTE W/DOPPLER COMPLETE: CPT | Performed by: INTERNAL MEDICINE

## 2024-07-27 PROCEDURE — C1751 CATH, INF, PER/CENT/MIDLINE: HCPCS

## 2024-07-27 PROCEDURE — 92523 SPEECH SOUND LANG COMPREHEN: CPT

## 2024-07-27 PROCEDURE — C1894 INTRO/SHEATH, NON-LASER: HCPCS

## 2024-07-27 PROCEDURE — 93306 TTE W/DOPPLER COMPLETE: CPT

## 2024-07-27 PROCEDURE — 97530 THERAPEUTIC ACTIVITIES: CPT

## 2024-07-27 PROCEDURE — 25810000003 SODIUM CHLORIDE 3 % SOLUTION: Performed by: STUDENT IN AN ORGANIZED HEALTH CARE EDUCATION/TRAINING PROGRAM

## 2024-07-27 PROCEDURE — 83930 ASSAY OF BLOOD OSMOLALITY: CPT | Performed by: NURSE PRACTITIONER

## 2024-07-27 PROCEDURE — 92610 EVALUATE SWALLOWING FUNCTION: CPT

## 2024-07-27 PROCEDURE — 70551 MRI BRAIN STEM W/O DYE: CPT

## 2024-07-27 PROCEDURE — 97166 OT EVAL MOD COMPLEX 45 MIN: CPT

## 2024-07-27 PROCEDURE — 80048 BASIC METABOLIC PNL TOTAL CA: CPT | Performed by: NURSE PRACTITIONER

## 2024-07-27 PROCEDURE — 99233 SBSQ HOSP IP/OBS HIGH 50: CPT | Performed by: INTERNAL MEDICINE

## 2024-07-27 PROCEDURE — 97162 PT EVAL MOD COMPLEX 30 MIN: CPT

## 2024-07-27 PROCEDURE — 97535 SELF CARE MNGMENT TRAINING: CPT

## 2024-07-27 PROCEDURE — 25010000002 NICARDIPINE 2.5 MG/ML SOLUTION 10 ML VIAL: Performed by: PHYSICIAN ASSISTANT

## 2024-07-27 PROCEDURE — 99233 SBSQ HOSP IP/OBS HIGH 50: CPT | Performed by: STUDENT IN AN ORGANIZED HEALTH CARE EDUCATION/TRAINING PROGRAM

## 2024-07-27 RX ORDER — FAMOTIDINE 10 MG/ML
20 INJECTION, SOLUTION INTRAVENOUS EVERY 12 HOURS SCHEDULED
Status: DISCONTINUED | OUTPATIENT
Start: 2024-07-27 | End: 2024-07-28

## 2024-07-27 RX ORDER — IBUPROFEN 200 MG
200 TABLET ORAL EVERY 6 HOURS PRN
COMMUNITY
End: 2024-08-06 | Stop reason: HOSPADM

## 2024-07-27 RX ORDER — 3% SODIUM CHLORIDE 3 G/100ML
45 INJECTION, SOLUTION INTRAVENOUS CONTINUOUS
Status: DISCONTINUED | OUTPATIENT
Start: 2024-07-27 | End: 2024-07-29

## 2024-07-27 RX ORDER — AMLODIPINE BESYLATE 10 MG/1
10 TABLET ORAL
Status: DISCONTINUED | OUTPATIENT
Start: 2024-07-27 | End: 2024-08-01

## 2024-07-27 RX ORDER — SODIUM CHLORIDE 0.9 % (FLUSH) 0.9 %
20 SYRINGE (ML) INJECTION AS NEEDED
Status: DISCONTINUED | OUTPATIENT
Start: 2024-07-27 | End: 2024-07-29

## 2024-07-27 RX ORDER — SODIUM CHLORIDE 0.9 % (FLUSH) 0.9 %
10 SYRINGE (ML) INJECTION AS NEEDED
Status: DISCONTINUED | OUTPATIENT
Start: 2024-07-27 | End: 2024-08-06 | Stop reason: HOSPADM

## 2024-07-27 RX ORDER — SODIUM CHLORIDE 0.9 % (FLUSH) 0.9 %
10 SYRINGE (ML) INJECTION EVERY 12 HOURS SCHEDULED
Status: DISCONTINUED | OUTPATIENT
Start: 2024-07-27 | End: 2024-08-06 | Stop reason: HOSPADM

## 2024-07-27 RX ADMIN — NICARDIPINE HYDROCHLORIDE 15 MG/HR: 2.5 INJECTION, SOLUTION INTRAVENOUS at 22:36

## 2024-07-27 RX ADMIN — FAMOTIDINE 20 MG: 10 INJECTION, SOLUTION INTRAVENOUS at 08:19

## 2024-07-27 RX ADMIN — NITROGLYCERIN 2 INCH: 20 OINTMENT TOPICAL at 12:37

## 2024-07-27 RX ADMIN — Medication 10 ML: at 08:19

## 2024-07-27 RX ADMIN — SODIUM CHLORIDE 40 ML/HR: 3 INJECTION, SOLUTION INTRAVENOUS at 00:34

## 2024-07-27 RX ADMIN — NICARDIPINE HYDROCHLORIDE 12.5 MG/HR: 2.5 INJECTION, SOLUTION INTRAVENOUS at 17:38

## 2024-07-27 RX ADMIN — SODIUM CHLORIDE 45 ML/HR: 3 INJECTION, SOLUTION INTRAVENOUS at 21:02

## 2024-07-27 RX ADMIN — NITROGLYCERIN 2 INCH: 20 OINTMENT TOPICAL at 08:19

## 2024-07-27 RX ADMIN — AMLODIPINE BESYLATE 10 MG: 10 TABLET ORAL at 14:36

## 2024-07-27 RX ADMIN — FAMOTIDINE 20 MG: 10 INJECTION, SOLUTION INTRAVENOUS at 20:59

## 2024-07-27 RX ADMIN — NICARDIPINE HYDROCHLORIDE 5 MG/HR: 2.5 INJECTION, SOLUTION INTRAVENOUS at 00:00

## 2024-07-27 RX ADMIN — Medication 10 ML: at 12:37

## 2024-07-27 RX ADMIN — NICARDIPINE HYDROCHLORIDE 12.5 MG/HR: 2.5 INJECTION, SOLUTION INTRAVENOUS at 14:48

## 2024-07-27 RX ADMIN — NICARDIPINE HYDROCHLORIDE 5 MG/HR: 2.5 INJECTION, SOLUTION INTRAVENOUS at 06:01

## 2024-07-27 RX ADMIN — NITROGLYCERIN 2 INCH: 20 OINTMENT TOPICAL at 17:59

## 2024-07-27 RX ADMIN — NICARDIPINE HYDROCHLORIDE 15 MG/HR: 2.5 INJECTION, SOLUTION INTRAVENOUS at 20:58

## 2024-07-27 RX ADMIN — Medication 10 MG: at 22:36

## 2024-07-27 RX ADMIN — Medication 10 ML: at 21:04

## 2024-07-27 RX ADMIN — NICARDIPINE HYDROCHLORIDE 7.5 MG/HR: 2.5 INJECTION, SOLUTION INTRAVENOUS at 12:19

## 2024-07-27 RX ADMIN — NICARDIPINE HYDROCHLORIDE 15 MG/HR: 2.5 INJECTION, SOLUTION INTRAVENOUS at 19:08

## 2024-07-27 RX ADMIN — SODIUM CHLORIDE 40 ML/HR: 3 INJECTION, SOLUTION INTRAVENOUS at 12:19

## 2024-07-27 RX ADMIN — NICARDIPINE HYDROCHLORIDE 12.5 MG/HR: 2.5 INJECTION, SOLUTION INTRAVENOUS at 14:36

## 2024-07-27 NOTE — PROGRESS NOTES
"Nutrition Services    Patient Name:  Ron Cam  YOB: 1970  MRN: [id number]  Admit Date:  7/26/2024    Patient identified to be at nutrition risk per nurse admission screen based on indicator of \"difficulty chewing/swallowing\". At this time patient has regular textures/thin liquids PO diet ordered and does not currently meet screen criteria for nutrition risk. RD will continue to follow per protocol.    Electronically signed by:  Kaleigh Pulido MS,MARY,BRENDA  07/27/24 15:26 EDT  "

## 2024-07-27 NOTE — PLAN OF CARE
Reviewed CT at 0001. Mild increase in size of hemorrhage. No measuring 4.5 x 2.5 cmx. Patients neuro status unchanged per RN report.     No role for intervention at this time

## 2024-07-27 NOTE — THERAPY EVALUATION
Patient Name: Ron Cam  : 1970    MRN: [id number]                              Today's Date: 2024       Admit Date: 2024    Visit Dx:     ICD-10-CM ICD-9-CM   1. Intraparenchymal hemorrhage of brain  I61.9 431   2. Dysarthria  R47.1 784.51   3. Acute left-sided weakness  R53.1 728.87   4. Left sided numbness  R20.0 782.0   5. Hemineglect  R41.4 781.8   6. Hypertensive emergency  I16.1 401.9     Patient Active Problem List   Diagnosis    Nontraumatic intracerebral hemorrhage    Hypertensive crisis     History reviewed. No pertinent past medical history.  History reviewed. No pertinent surgical history.   General Information       [facility] Name 24 1015          OT Time and Intention    Document Type evaluation  -     Mode of Treatment occupational therapy  -[facility] Name 24 1015          General Information    Patient Profile Reviewed yes  -JR     Prior Level of Function independent:;gait;transfer;bed mobility;ADL's;home management  -     Existing Precautions/Restrictions fall;other (see comments)  L weakness, L yoel-inattention, R gaze preference, pushes to the L  -JR     Barriers to Rehab medically complex  -[facility] Name 24 1015          Living Environment    People in Home spouse  -[facility] Name 24 1015          Home Main Entrance    Number of Stairs, Main Entrance two  -JR       Row Name 24 1015          Stairs Within Home, Primary    Number of Stairs, Within Home, Primary --  second floor, able to stay on 1st floor  -[facility] Name 24 1015          Cognition    Orientation Status (Cognition) oriented x 4  -[facility] Name 24 1015          Safety Issues, Functional Mobility    Safety Issues Affecting Function (Mobility) awareness of need for assistance;insight into deficits/self-awareness;safety precaution awareness;safety precautions follow-through/compliance  -     Impairments Affecting Function (Mobility)  balance;coordination;endurance/activity tolerance;grasp;motor control;motor planning;muscle tone abnormal;visual/perceptual;strength;sensation/sensory awareness;range of motion (ROM);postural/trunk control  -               User Key  (r) = Recorded By, (t) = Taken By, (c) = Cosigned By      Initials Name Provider Type     Sultana Garner OT Occupational Therapist                     Mobility/ADL's       Row Name 07/27/24 1017          Bed Mobility    Bed Mobility supine-sit  -     Supine-Sit [place] (Bed Mobility) maximum assist (25% patient effort);2 person assist;verbal cues  -     Bed Mobility, Safety Issues decreased use of arms for pushing/pulling;decreased use of legs for bridging/pushing;impaired trunk control for bed mobility  -     Assistive Device (Bed Mobility) bed rails;draw sheet;head of bed elevated  -     Comment, (Bed Mobility) Pt required increased time and verbal cues for supine to sit on EOB. Pt with strong pushing to the L, required max verbal and tactile cues for midline posture.  -       Row Name 07/27/24 101          Transfers    Transfers bed-chair transfer  -[facility] Name 07/27/24 1017          Bed-Chair Transfer    Bed-Chair [place] (Transfers) maximum assist (25% patient effort);2 person assist;verbal cues  -     Assistive Device (Bed-Chair Transfers) other (see comments)  B UE support\  -     Comment, (Bed-Chair Transfer) Pt required max verbal/tactile cues as well as B knees blocked and constant cues for steps to the chair.  -       Row Name 07/27/24 1017          Sit-Stand Transfer    Sit-Stand [place] (Transfers) maximum assist (25% patient effort);2 person assist;verbal cues  -[facility] Name 07/27/24 [facility]          Functional Mobility    Functional Mobility- Ind. Level other (see comments)  B UE support  -[facility] Name 07/27/24 101          Activities of Daily Living    BADL Assessment/Intervention lower body dressing  -[facility]  Name 07/27/24 1017          Lower Body Dressing Assessment/Training    Sharp Level (Lower Body Dressing) don;socks;dependent (less than 25% patient effort)  -     Position (Lower Body Dressing) supine  -               User Key  (r) = Recorded By, (t) = Taken By, (c) = Cosigned By      Initials Name Provider Type    JR Sultana Garner, OT Occupational Therapist                   Obj/Interventions       Row Name 07/27/24 1019          Sensory Assessment (Somatosensory)    Sensory Assessment (Somatosensory) UE sensation intact  -     Sensory Assessment L UE light touch sensation/proprioception impaired this date,  -       Row Name 07/27/24 1019          Vision Assessment/Intervention    Visual Processing Deficit yoel-inattention/neglect, left  -     Vision Assessment Comment R gaze preference, impaired tracking to midline, L yoel-inattention, pt able to ID fingers in R visual field only this date  -       Row Name 07/27/24 1019          Range of Motion Comprehensive    General Range of Motion bilateral upper extremity ROM WFL  -[facility] Name 07/27/24 1019          Strength Comprehensive (MMT)    Comment, General Manual Muscle Testing (MMT) Assessment R UE 5/5, L UE 0/5  -       Row Name 07/27/24 1019          Motor Skills    Motor Skills coordination;muscle tone  -     Coordination fine motor deficit;gross motor deficit;left;upper extremity;severe impairment  -     Muscle Tone left;upper extremity(s);flaccidity  -       Row Name 07/27/24 1019          Balance    Balance Assessment sitting static balance  -     Static Sitting Balance maximum assist  -JR     Position, Sitting Balance unsupported  -JR     Static Standing Balance maximum assist;2-person assist  -JR     Position/Device Used, Standing Balance supported  -               User Key  (r) = Recorded By, (t) = Taken By, (c) = Cosigned By      Initials Name Provider Type    JR Sultana Garner, OT Occupational Therapist                    Goals/Plan       Row Name 07/27/24 1025          Bed Mobility Goal 1 (OT)    Activity/Assistive Device (Bed Mobility Goal 1, OT) bed mobility activities, all  -JR     [place] Level/Cues Needed (Bed Mobility Goal 1, OT) moderate assist (50-74% patient effort);verbal cues required  -JR     Time Frame (Bed Mobility Goal 1, OT) short term goal (STG);5 days  -JR     Progress/Outcomes (Bed Mobility Goal 1, OT) new goal  -       Row Name 07/27/24 1025          Strength Goal 1 (OT)    Strength Goal 1 (OT) Pt to increase L UE strength by 1/2 muscle grade to support ADL independence.  -JR     Time Frame (Strength Goal 1, OT) long term goal (LTG);by discharge  -JR     Progress/Outcome (Strength Goal 1, OT) new goal  -       Row Name 07/27/24 1025          Problem Specific Goal 1 (OT)    Problem Specific Goal 1 (OT) Pt to sit EOB x 3 minutes with min A to support ADL independence.  -JR     Time Frame (Problem Specific Goal 1, OT) long term goal (LTG);by discharge  -JR     Progress/Outcome (Problem Specific Goal 1, OT) new goal  -       Row Name 07/27/24 1025          Therapy Assessment/Plan (OT)    Planned Therapy Interventions (OT) activity tolerance training;adaptive equipment training;BADL retraining;cognitive/visual perception retraining;functional balance retraining;occupation/activity based interventions;ROM/therapeutic exercise;strengthening exercise;transfer/mobility retraining;patient/caregiver education/training;neuromuscular control/coordination retraining  -               User Key  (r) = Recorded By, (t) = Taken By, (c) = Cosigned By      Initials Name Provider Type    Sultana King, OT Occupational Therapist                   Clinical Impression       Row Name 07/27/24 1023          Pain Assessment    Pretreatment Pain Rating 0/10 - no pain  -JR     Posttreatment Pain Rating 0/10 - no pain  -JR     Additional Documentation Pain Scale: Word Pre/Post-Treatment (Group)  -       Row Name  "07/27/24 1023          Plan of Care Review    Plan of Care Reviewed With patient  -JR     Outcome Evaluation OT initial eval and expanded chart review completed. Pt presents with multiple comorbidities and decreased strength, balance, activity tolerance, coordination and vision limiting independence with ADL\"s and mobility from baseline status. Recommend continued skilled OT services and transfer to IRF at d/c.  -       Row Name 07/27/24 1023          Therapy Assessment/Plan (OT)    Patient/Family Therapy Goal Statement (OT) go home  -     Rehab Potential (OT) good, to achieve stated therapy goals  -     Criteria for Skilled Therapeutic Interventions Met (OT) yes;meets criteria;skilled treatment is necessary  -     Therapy Frequency (OT) daily  -JR     Predicted Duration of Therapy Intervention (OT) 10 days  -       Row Name 07/27/24 1023          Therapy Plan Review/Discharge Plan (OT)    Anticipated Discharge Disposition (OT) inpatient rehabilitation facility  -       Row Name 07/27/24 1023          Vital Signs    Pre Systolic BP Rehab 145  -JR     Pre Treatment Diastolic BP 85  -JR     Post Systolic BP Rehab 130  -JR     Post Treatment Diastolic BP 72  -JR     Pretreatment Heart Rate (beats/min) 98  -JR     Posttreatment Heart Rate (beats/min) 82  -JR     Pre SpO2 (%) 97  -JR     O2 Delivery Pre Treatment room air  -JR     Post SpO2 (%) 94  -JR     O2 Delivery Post Treatment room air  -JR     Pre Patient Position Supine  -JR     Intra Patient Position Standing  -JR     Post Patient Position Sitting  -       Row Name 07/27/24 1023          Positioning and Restraints    Pre-Treatment Position in bed  -JR     Post Treatment Position chair  -JR     In Chair notified nsg;reclined;call light within reach;encouraged to call for assist;exit alarm on;with family/caregiver;on mechanical lift sling;waffle cushion;LUE elevated  -JR               User Key  (r) = Recorded By, (t) = Taken By, (c) = Cosigned By "      Initials Name Provider Type    JR Sultana Garner, OT Occupational Therapist                   Outcome Measures       Row Name 07/27/24 1026          How much help from another is currently needed...    Putting on and taking off regular lower body clothing? 1  -JR     Bathing (including washing, rinsing, and drying) 2  -JR     Toileting (which includes using toilet bed pan or urinal) 1  -JR     Putting on and taking off regular upper body clothing 2  -JR     Taking care of personal grooming (such as brushing teeth) 2  -JR     Eating meals 2  -JR     AM-PAC 6 Clicks Score (OT) 10  -JR       Row Name 07/27/24 1003 07/27/24 0800       How much help from another person do you currently need...    Turning from your back to your side while in flat bed without using bedrails? 2  -LS 2  -MM    Moving from lying on back to sitting on the side of a flat bed without bedrails? 2  -LS 2  -MM    Moving to and from a bed to a chair (including a wheelchair)? 2  -LS 1  -MM    Standing up from a chair using your arms (e.g., wheelchair, bedside chair)? 2  -LS 1  -MM    Climbing 3-5 steps with a railing? 1  -LS 1  -MM    To walk in hospital room? 1  -LS 1  -MM    AM-PAC 6 Clicks Score (PT) 10  -LS 8  -MM    Highest Level of Mobility Goal 4 --> Transfer to chair/commode  -LS 3 --> Sit at edge of bed  -MM      Row Name 07/27/24 1026 07/27/24 1003       Modified [place] Scale    Pre-Stroke Modified [place] Scale -- 6 - Unable to determine (UTD) from the medical record documentation  -LS    Modified [place] Scale 4 - Moderately severe disability.  Unable to walk without assistance, and unable to attend to own bodily needs without assistance.  -JR 4 - Moderately severe disability.  Unable to walk without assistance, and unable to attend to own bodily needs without assistance.  -      Row Name 07/27/24 1026 07/27/24 1003       Functional Assessment    Outcome Measure Options AM-PAC 6 Clicks Daily Activity (OT);Modified [place]  -JR  AM-PAC 6 Clicks Basic Mobility (PT);Modified [place]  -LS              User Key  (r) = Recorded By, (t) = Taken By, (c) = Cosigned By      Initials Name Provider Type     Sultana Garner, OT Occupational Therapist    Yakelin Szymanski, PT Physical Therapist    Lora Moses, RN Registered Nurse                    Occupational Therapy Education       Title: PT OT SLP Therapies (In Progress)       Topic: Occupational Therapy (In Progress)       Point: ADL training (In Progress)       Description:   Instruct learner(s) on proper safety adaptation and remediation techniques during self care or transfers.   Instruct in proper use of assistive devices.                  Learning Progress Summary             Patient Acceptance, E, NR by  at 7/27/2024 0920    Comment: role of therapy                         Point: Home exercise program (In Progress)       Description:   Instruct learner(s) on appropriate technique for monitoring, assisting and/or progressing therapeutic exercises/activities.                  Learning Progress Summary             Patient Acceptance, E, NR by  at 7/27/2024 0920    Comment: role of therapy                         Point: Precautions (In Progress)       Description:   Instruct learner(s) on prescribed precautions during self-care and functional transfers.                  Learning Progress Summary             Patient Acceptance, E, NR by  at 7/27/2024 0920    Comment: role of therapy                         Point: Body mechanics (In Progress)       Description:   Instruct learner(s) on proper positioning and spine alignment during self-care, functional mobility activities and/or exercises.                  Learning Progress Summary             Patient Acceptance, E, NR by  at 7/27/2024 0920    Comment: role of therapy                                         User Key       Initials Effective Dates Name Provider Type [facility] 02/03/23 -  Sultana Garner OT Occupational  "Therapist OT                  OT Recommendation and Plan  Planned Therapy Interventions (OT): activity tolerance training, adaptive equipment training, BADL retraining, cognitive/visual perception retraining, functional balance retraining, occupation/activity based interventions, ROM/therapeutic exercise, strengthening exercise, transfer/mobility retraining, patient/caregiver education/training, neuromuscular control/coordination retraining  Therapy Frequency (OT): daily  Plan of Care Review  Plan of Care Reviewed With: patient  Outcome Evaluation: OT initial eval and expanded chart review completed. Pt presents with multiple comorbidities and decreased strength, balance, activity tolerance, coordination and vision limiting independence with ADL\"s and mobility from baseline status. Recommend continued skilled OT services and transfer to IRF at d/c.     Time Calculation:   Evaluation Complexity (OT)  Review Occupational Profile/Medical/Therapy History Complexity: expanded/moderate complexity  Assessment, Occupational Performance/Identification of Deficit Complexity: 3-5 performance deficits  Clinical Decision Making Complexity (OT): detailed assessment/moderate complexity  Overall Complexity of Evaluation (OT): moderate complexity     Time Calculation- OT       Row Name 07/27/24 1027             Time Calculation- OT    OT Start Time 0920  -JR      OT Received On 07/27/24  -JR      OT Goal Re-Cert Due Date 08/06/24  -JR         Timed Charges    45251 - OT Self Care/Mgmt Minutes 8  -JR         Untimed Charges    OT Eval/Re-eval Minutes 46  -JR         Total Minutes    Timed Charges Total Minutes 8  -JR      Untimed Charges Total Minutes 46  -JR       Total Minutes 54  -JR                User Key  (r) = Recorded By, (t) = Taken By, (c) = Cosigned By      Initials Name Provider Type    Sultana King OT Occupational Therapist                  Therapy Charges for Today       Code Description Service Date Service " Provider Modifiers Qty    51784296313 HC OT SELF CARE/MGMT/TRAIN EA 15 MIN 7/27/2024 Pascual, Sultana BRAND, OT GO 1    54423063181 HC OT EVAL MOD COMPLEXITY 4 7/27/2024 Pascual, Sultana BRAND OT GO 1                 Sultana GUNDERSON Pascual, OT  7/27/2024

## 2024-07-27 NOTE — PROGRESS NOTES
HOD# : 1    No events last night      Nontraumatic intracerebral hemorrhage    Hypertensive crisis      Temp:  [97.9 °F (36.6 °C)-98.3 °F (36.8 °C)] 98.3 °F (36.8 °C)  Heart Rate:  [] 79  Resp:  [14-20] 16  BP: ()/() 126/75  I/O last 3 completed shifts:  In: 1332.4 [I.V.:1332.4]  Out: 925 [Urine:925]  No intake/output data recorded.  Vital signs were reviewed and documented in the chart      EXAM   Patient is awake and with right sided plegia   Pupils symmetric   Awake and gcs 15 some stuttering speech    Left-sided facial weakness left-sided upper extremity plegia with left lower extremity 1 out of 5 strength      Ct review show expected slight enlargement of the deep seated lesion in the right thalamus. There is no mass effect     Assessment and plan   Right Basal ganglia hemorrhage   2.   HTN     Plan   Non surgical for now given location and risk and stability of exam -if he develops signs of clinical herniation or significant enlargement of hematoma, will reassess urgently  Monitor closely   Repeat scan in am   Wife was not present attempted to phone however no answer

## 2024-07-27 NOTE — PROGRESS NOTES
"Intensive Care Follow-up     Hospital:  LOS: 1 day   Mr. Ron Cam, 53 y.o. male is followed for:   Nontraumatic intracerebral hemorrhage        Subjective     53-year-old male who denies a history of hypertension developed acute left-sided weakness and dysarthria on 7/26/2024 around 3:30 PM.  No prior similar episodes.     CT scan of the head in the emergency room revealed a basal ganglionic hemorrhage on the right.  Blood pressure was markedly elevated.  He has been started on a Cardene drip and has received some \"as needed\" antihypertensives in the ER and has been admitted to the intensive care unit  Interval History:  The chart has been reviewed.  The patient is still having some garbled speech.  He did pass his swallow evaluation and has been taking oral nutrition.  He does remain on a Cardene drip for blood pressure control.    The patient's past medical, surgical and social history were reviewed and updated in Epic as appropriate.        Objective     Infusions:  niCARdipine, 5-15 mg/hr, Last Rate: 7.5 mg/hr (07/27/24 1329)  sodium chloride, 40 mL/hr, Last Rate: 40 mL/hr (07/27/24 1219)  sodium chloride, 45 mL/hr, Last Rate: 45 mL/hr (07/27/24 1327)      Medications:  amLODIPine, 10 mg, Oral, Q24H  cloNIDine, 1 patch, Transdermal, Weekly  famotidine, 20 mg, Intravenous, Q12H  nitroglycerin, 2 inch, Topical, TID - Nitrates  sodium chloride, 10 mL, Intravenous, Q12H  sodium chloride, 10 mL, Intravenous, Q12H        Vital Sign Min/Max for last 24 hours  Temp  Min: 97.8 °F (36.6 °C)  Max: 98.3 °F (36.8 °C)   BP  Min: 99/51  Max: 196/113   Pulse  Min: 71  Max: 118   Resp  Min: 14  Max: 20   SpO2  Min: 90 %  Max: 98 %   No data recorded       Input/Output for last 24 hour shift  07/26 0701 - 07/27 0700  In: 1332.4 [I.V.:1332.4]  Out: 1425 [Urine:1425]      Objective:  General Appearance:  Uncomfortable and in no acute distress.    Vital signs: (most recent): Blood pressure 135/80, pulse 87, temperature 97.8 °F " "(36.6 °C), temperature source Oral, resp. rate 16, height 182.9 cm (72.01\"), weight 104 kg (229 lb 4.5 oz), SpO2 95%.    HEENT: (Left-sided facial droop)    Heart: Normal rate.  Regular rhythm.  S1 normal and S2 normal.    Abdomen: Abdomen is soft and non-distended.  Bowel sounds are normal.   There is no abdominal tenderness.     Extremities: Decreased range of motion.  (Left upper extremity is flaccid.)  Neurological: Patient is alert.  (Expressive aphasia).    Pupils:  Pupils are equal, round, and reactive to light.  Pupils are equal.   Skin:  Warm.                Results from last 7 days   Lab Units 07/26/24  1703 07/26/24  1702   WBC 10*3/mm3  --  6.50   HEMOGLOBIN g/dL  --  15.3   HEMOGLOBIN, POC g/dL 15.0  --    PLATELETS 10*3/mm3  --  191     Results from last 7 days   Lab Units 07/27/24  1230 07/27/24  0843 07/27/24  0600 07/26/24  2354 07/26/24  2354 07/26/24  1703   SODIUM mmol/L 140 139 139   < > 138  138  --    POTASSIUM mmol/L  --  4.0  --   --  3.7  --    CO2 mmol/L  --  23.0  --   --  23.0  --    BUN mg/dL  --  12  --   --  19  --    CREATININE mg/dL  --  0.81  --   --  1.04 0.90   GLUCOSE mg/dL  --  122*  --   --  129*  --     < > = values in this interval not displayed.     Estimated Creatinine Clearance: 131.6 mL/min (by C-G formula based on SCr of 0.81 mg/dL).              I reviewed the patient's results and images.     Assessment & Plan   Impression        Nontraumatic intracerebral hemorrhage    Hypertensive crisis       Plan        Continue with close blood pressure control as before.  We will try to wean the Cardene.  Continue with clonidine and nitroglycerin for now.  Start Norvasc 10 mg by mouth daily.  We will add oral medications as needed today.  Continue with risk stratification.  He will remain in the intensive care unit today for close monitoring.    Plan of care and goals reviewed with mulitdisciplinary/antibiotic stewardship team during rounds.   I discussed the patient's findings " and my recommendations with patient, family, and nursing staff     High level of risk due to:  drug(s) requiring intensive monitoring for toxicity and parenteral controlled substances.        Philip Harper MD, [facility]  Pulmonology and Critical Care Medicine

## 2024-07-27 NOTE — THERAPY EVALUATION
Patient Name: Ron Cam  : 1970    MRN: [id number]                              Today's Date: 2024       Admit Date: 2024    Visit Dx:     ICD-10-CM ICD-9-CM   1. Intraparenchymal hemorrhage of brain  I61.9 431   2. Dysarthria  R47.1 784.51   3. Acute left-sided weakness  R53.1 728.87   4. Left sided numbness  R20.0 782.0   5. Hemineglect  R41.4 781.8   6. Hypertensive emergency  I16.1 401.9     Patient Active Problem List   Diagnosis    Nontraumatic intracerebral hemorrhage    Hypertensive crisis     History reviewed. No pertinent past medical history.  History reviewed. No pertinent surgical history.   General Information       Row Name 2446          Physical Therapy Time and Intention    Document Type evaluation  -     Mode of Treatment physical therapy  -       Row Name 2446          General Information    Patient Profile Reviewed yes  -LS     Prior Level of Function independent:;all household mobility;ADL's  -     Existing Precautions/Restrictions fall;other (see comments)  L weakness, L yoel-inattention, pushes to L, R gaze preference  -     Barriers to Rehab medically complex  -       Row Name 24 0946          Living Environment    People in Home spouse  -       Row Name 24 0946          Home Main Entrance    Number of Stairs, Main Entrance two  -       Row Name 2446          Stairs Within Home, Primary    Stairs, Within Home, Primary able to remain on 1st level  -       Row Name 2446          Cognition    Orientation Status (Cognition) oriented x 4  -LS       Row Name 2446          Safety Issues, Functional Mobility    Safety Issues Affecting Function (Mobility) awareness of need for assistance;insight into deficits/self-awareness  -     Impairments Affecting Function (Mobility) balance;coordination;endurance/activity tolerance;sensation/sensory awareness;strength;motor planning;motor control;postural/trunk  control  -               User Key  (r) = Recorded By, (t) = Taken By, (c) = Cosigned By      Initials Name Provider Type    LS Yakelin Araujo, PT Physical Therapist                   Mobility       Row Name 07/27/24 0952          Bed Mobility    Bed Mobility supine-sit  -LS     Supine-Sit [place] (Bed Mobility) maximum assist (25% patient effort);2 person assist  -     Assistive Device (Bed Mobility) head of bed elevated;bed rails;draw sheet  -LS     Comment, (Bed Mobility) Initial strong pushing to L; verbal/ tactile/ visual cues for achieving and maintaining midline posture.  -       Row Name 07/27/24 0952          Transfers    Comment, (Transfers) BUE support, blocking knees. Pt pushes to L, constant vc's for midline posture and appropriate advancement of LEs for pivot to recliner (towards pt's R side).  -       Row Name 07/27/24 0952          Bed-Chair Transfer    Bed-Chair [place] (Transfers) maximum assist (25% patient effort);2 person assist;verbal cues  -       Row Name 07/27/24 0952          Sit-Stand Transfer    Sit-Stand [place] (Transfers) maximum assist (25% patient effort);2 person assist;verbal cues  -       Row Name 07/27/24 0952          Gait/Stairs (Locomotion)    [place] Level (Gait) unable to assess  -               User Key  (r) = Recorded By, (t) = Taken By, (c) = Cosigned By      Initials Name Provider Type    LS Yakelin Araujo, PT Physical Therapist                   Obj/Interventions       Row Name 07/27/24 0955          Range of Motion Comprehensive    General Range of Motion bilateral lower extremity ROM WFL  -[facility] Name 07/27/24 0955          Strength Comprehensive (MMT)    General Manual Muscle Testing (MMT) Assessment lower extremity strength deficits identified  -     Comment, General Manual Muscle Testing (MMT) Assessment RLE grossly 4-/5; LLE grossly 1/5  -       Row Name 07/27/24 0955          Motor Skills    Motor Skills coordination   -LS     Coordination left;lower extremity;gross motor deficit;severe impairment  -       Row Name 07/27/24 0955          Balance    Balance Assessment sitting static balance;standing static balance  -LS     Static Sitting Balance maximum assist  -LS     Position, Sitting Balance sitting edge of bed  -LS     Static Standing Balance maximum assist;2-person assist;verbal cues  -LS     Position/Device Used, Standing Balance supported  -LS     Balance Interventions sitting;standing;weight shifting activity;trunk training exercise  -LS     Comment, Balance pushing towards L; verbal and tactile cues for achieving midline  -LS       Row Name 07/27/24 0955          Sensory Assessment (Somatosensory)    Sensory Assessment (Somatosensory) left LE  -LS     Left LE Sensory Assessment general sensation;light touch awareness;impaired  -LS               User Key  (r) = Recorded By, (t) = Taken By, (c) = Cosigned By      Initials Name Provider Type    LS Yakelin Araujo, PT Physical Therapist                   Goals/Plan       Row Name 07/27/24 1002          Bed Mobility Goal 1 (PT)    Activity/Assistive Device (Bed Mobility Goal 1, PT) sit to supine/supine to sit  -LS     [place] Level/Cues Needed (Bed Mobility Goal 1, PT) minimum assist (75% or more patient effort)  -LS     Time Frame (Bed Mobility Goal 1, PT) short term goal (STG);1 week  -LS     Progress/Outcomes (Bed Mobility Goal 1, PT) goal ongoing  -       Row Name 07/27/24 1002          Transfer Goal 1 (PT)    Activity/Assistive Device (Transfer Goal 1, PT) sit-to-stand/stand-to-sit  -LS     [place] Level/Cues Needed (Transfer Goal 1, PT) minimum assist (75% or more patient effort)  -LS     Time Frame (Transfer Goal 1, PT) 2 weeks;long term goal (LTG)  -       Row Name 07/27/24 1002          Gait Training Goal 1 (PT)    Activity/Assistive Device (Gait Training Goal 1, PT) gait (walking locomotion);assistive device use  -     [place] Level (Gait  Training Goal 1, PT) moderate assist (50-74% patient effort)  -LS     Distance (Gait Training Goal 1, PT) 10  -LS     Time Frame (Gait Training Goal 1, PT) 2 weeks;long term goal (LTG)  -       Row Name 07/27/24 1002          Therapy Assessment/Plan (PT)    Planned Therapy Interventions (PT) balance training;bed mobility training;gait training;home exercise program;transfer training;strengthening;patient/family education  -               User Key  (r) = Recorded By, (t) = Taken By, (c) = Cosigned By      Initials Name Provider Type    LS Yakelin Araujo, PT Physical Therapist                   Clinical Impression       Row Name 07/27/24 0958          Pain    Pretreatment Pain Rating 0/10 - no pain  -LS     Posttreatment Pain Rating 0/10 - no pain  -       Row Name 07/27/24 0958          Plan of Care Review    Plan of Care Reviewed With patient  -LS     Progress no change  -LS     Outcome Evaluation PT initial evaluation completed. Pt demonstrates L sided weakness, L inattention, and L sensory deficits, impacting independence and balance re: functional mobility and warranting further skilled PT services to promote PLOF. Limited today by pushing to L, but able to perform STS and pivot to recliner with max x2. Recommend IP rehab at d/c.  -       Row Name 07/27/24 0958          Therapy Assessment/Plan (PT)    Patient/Family Therapy Goals Statement (PT) return to PLOF  -LS     Rehab Potential (PT) good, to achieve stated therapy goals  -LS     Criteria for Skilled Interventions Met (PT) yes;skilled treatment is necessary  -LS     Therapy Frequency (PT) daily  -LS     Predicted Duration of Therapy Intervention (PT) 2 wks  -       Row Name 07/27/24 0958          Vital Signs    Pre Systolic BP Rehab 145  -LS     Pre Treatment Diastolic BP 85  -LS     Post Systolic BP Rehab 130  -LS     Post Treatment Diastolic BP 72  -LS     Pretreatment Heart Rate (beats/min) 93  -LS     Posttreatment Heart Rate (beats/min) 80   -LS     Pre SpO2 (%) 96  -LS     O2 Delivery Pre Treatment room air  -LS     Post SpO2 (%) 95  -LS     O2 Delivery Post Treatment room air  -LS     Pre Patient Position Supine  -LS     Intra Patient Position Standing  -LS     Post Patient Position Sitting  -LS       Row Name 07/27/24 0958          Positioning and Restraints    Pre-Treatment Position in bed  -LS     Post Treatment Position chair  -LS     In Chair notified nsg;reclined;call light within reach;encouraged to call for assist;with family/caregiver;waffle cushion;legs elevated;on mechanical lift sling;RUE elevated;LUE elevated  -LS               User Key  (r) = Recorded By, (t) = Taken By, (c) = Cosigned By      Initials Name Provider Type    Yakelin Szymanski, PT Physical Therapist                   Outcome Measures       Row Name 07/27/24 1003 07/27/24 0800       How much help from another person do you currently need...    Turning from your back to your side while in flat bed without using bedrails? 2  -LS 2  -MM    Moving from lying on back to sitting on the side of a flat bed without bedrails? 2  -LS 2  -MM    Moving to and from a bed to a chair (including a wheelchair)? 2  -LS 1  -MM    Standing up from a chair using your arms (e.g., wheelchair, bedside chair)? 2  -LS 1  -MM    Climbing 3-5 steps with a railing? 1  -LS 1  -MM    To walk in hospital room? 1  -LS 1  -MM    AM-PAC 6 Clicks Score (PT) 10  -LS 8  -MM    Highest Level of Mobility Goal 4 --> Transfer to chair/commode  -LS 3 --> Sit at edge of bed  -MM      Row Name 07/27/24 1003          Modified Jani Scale    Pre-Stroke Modified Jani Scale 6 - Unable to determine (UTD) from the medical record documentation  -LS     Modified [place] Scale 4 - Moderately severe disability.  Unable to walk without assistance, and unable to attend to own bodily needs without assistance.  -       Row Name 07/27/24 1003          Functional Assessment    Outcome Measure Options AM-PAC 6 Clicks Basic Mobility  (PT);Modified [place]  -               User Key  (r) = Recorded By, (t) = Taken By, (c) = Cosigned By      Initials Name Provider Type    LS Yakelin Araujo, PT Physical Therapist    Lora Moses, RN Registered Nurse                                 Physical Therapy Education       Title: PT OT SLP Therapies (In Progress)       Topic: Physical Therapy (In Progress)       Point: Mobility training (Done)       Learning Progress Summary             Patient Acceptance, E,D, VU,NR by LS at 7/27/2024 1005   Significant Other Acceptance, E,D, VU,NR by LS at 7/27/2024 1005                         Point: Home exercise program (Not Started)       Learner Progress:  Not documented in this visit.              Point: Body mechanics (Done)       Learning Progress Summary             Patient Acceptance, E,D, VU,NR by LS at 7/27/2024 1005   Significant Other Acceptance, E,D, VU,NR by LS at 7/27/2024 1005                         Point: Precautions (Done)       Learning Progress Summary             Patient Acceptance, E,D, VU,NR by LS at 7/27/2024 1005   Significant Other Acceptance, E,D, VU,NR by  at 7/27/2024 1005                                         User Key       Initials Effective Dates Name Provider Type Discipline     02/03/23 -  Yakelin Araujo, PT Physical Therapist PT                  PT Recommendation and Plan  Planned Therapy Interventions (PT): balance training, bed mobility training, gait training, home exercise program, transfer training, strengthening, patient/family education  Plan of Care Reviewed With: patient  Progress: no change  Outcome Evaluation: PT initial evaluation completed. Pt demonstrates L sided weakness, L inattention, and L sensory deficits, impacting independence and balance re: functional mobility and warranting further skilled PT services to promote PLOF. Limited today by pushing to L, but able to perform STS and pivot to recliner with max x2. Recommend IP rehab at d/c.     Time  Calculation:   PT Evaluation Complexity  History, PT Evaluation Complexity: 3 or more personal factors and/or comorbidities  Examination of Body Systems (PT Eval Complexity): total of 3 or more elements  Clinical Presentation (PT Evaluation Complexity): evolving  Clinical Decision Making (PT Evaluation Complexity): moderate complexity  Overall Complexity (PT Evaluation Complexity): moderate complexity     PT Charges       Row Name 07/27/24 1007             Time Calculation    Start Time 0916  -LS      PT Received On 07/27/24  -LS      PT Goal Re-Cert Due Date 08/06/24  -LS         Timed Charges    45209 - PT Therapeutic Activity Minutes 9  -LS         Untimed Charges    PT Eval/Re-eval Minutes 41  -LS         Total Minutes    Timed Charges Total Minutes 9  -LS      Untimed Charges Total Minutes 41  -LS       Total Minutes 50  -LS                User Key  (r) = Recorded By, (t) = Taken By, (c) = Cosigned By      Initials Name Provider Type    Yakelin Szymanski, PT Physical Therapist                  Therapy Charges for Today       Code Description Service Date Service Provider Modifiers Qty    10205640188 HC PT EVAL MOD COMPLEXITY 3 7/27/2024 Yakelin Araujo, PT GP 1    81831214796 HC PT THERAPEUTIC ACT EA 15 MIN 7/27/2024 Yakelin Araujo, PT GP 1            PT G-Codes  Outcome Measure Options: AM-PAC 6 Clicks Basic Mobility (PT), Modified Jani  AM-PAC 6 Clicks Score (PT): 10  Modified Jani Scale: 4 - Moderately severe disability.  Unable to walk without assistance, and unable to attend to own bodily needs without assistance.  PT Discharge Summary  Anticipated Discharge Disposition (PT): inpatient rehabilitation facility    Yakelin Araujo, PT  7/27/2024

## 2024-07-27 NOTE — PLAN OF CARE
"Goal Outcome Evaluation:  Plan of Care Reviewed With: patient           Outcome Evaluation: OT initial eval and expanded chart review completed. Pt presents with multiple comorbidities and decreased strength, balance, activity tolerance, coordination and vision limiting independence with ADL\"s and mobility from baseline status. Recommend continued skilled OT services and transfer to IRF at d/c.      Anticipated Discharge Disposition (OT): inpatient rehabilitation facility                        "

## 2024-07-27 NOTE — PLAN OF CARE
Problem: Adult Inpatient Plan of Care  Goal: Plan of Care Review  Outcome: Ongoing, Progressing  Flowsheets (Taken 7/27/2024 1247)  Progress: improving  Plan of Care Reviewed With:   patient   spouse   Goal Outcome Evaluation:  Plan of Care Reviewed With: patient, spouse        Progress: improving         Anticipated Discharge Disposition (SLP): inpatient rehabilitation facility    SLP Diagnosis: moderate-severe, dysarthria, cognitive-linguistic disorder (L neglect, cognition intact) (07/27/24 1200)     SLP Swallowing Diagnosis: mild, oral dysphagia, functional pharyngeal phase (07/27/24 1200)           SLP evaluation completed. Will address oral dysphagia, dysarthria, and L neglect. Please see note for further details and recommendations.     0

## 2024-07-27 NOTE — THERAPY EVALUATION
Acute Care - Speech Language Pathology   Swallow Initial Evaluation [facility]     Patient Name: Ron Cam  : 1970  MRN: [id number]  Today's Date: 2024               Admit Date: 2024    Visit Dx:     ICD-10-CM ICD-9-CM   1. Intraparenchymal hemorrhage of brain  I61.9 431   2. Dysarthria  R47.1 784.51   3. Acute left-sided weakness  R53.1 728.87   4. Left sided numbness  R20.0 782.0   5. Hemineglect  R41.4 781.8   6. Hypertensive emergency  I16.1 401.9     Patient Active Problem List   Diagnosis    Nontraumatic intracerebral hemorrhage    Hypertensive crisis     History reviewed. No pertinent past medical history.  History reviewed. No pertinent surgical history.    SLP Recommendation and Plan  SLP Swallowing Diagnosis: mild, oral dysphagia, functional pharyngeal phase (24)  SLP Diet Recommendation: regular textures, thin liquids (24)  Recommended Precautions and Strategies: upright posture during/after eating, check mouth frequently for oral residue/pocketing (24)  SLP Rec. for Method of Medication Administration: meds whole, with puree (24)     Monitor for Signs of Aspiration: notify SLP if any concerns (24)     Swallow Criteria for Skilled Therapeutic Interventions Met: demonstrates skilled criteria (24)  Anticipated Discharge Disposition (SLP): inpatient rehabilitation facility (24)  Rehab Potential/Prognosis, Swallowing: good, to achieve stated therapy goals (24)  Therapy Frequency (Swallow): at least, 5 days per week (24)  Predicted Duration Therapy Intervention (Days): until discharge (24)  Oral Care Recommendations: Oral Care BID/PRN, Toothbrush (24)                                        Plan of Care Reviewed With: patient, spouse  Progress: improving      SWALLOW EVALUATION (Last 72 Hours)       SLP Adult Swallow Evaluation       Row Name 24                    General Information    Current Method of Nutrition NPO  -AW        Prior Level of Function-Communication WFL  -AW        Prior Level of Function-Swallowing no diet consistency restrictions  -AW        Patient's Goals for Discharge patient did not state  -AW        Family Goals for Discharge family did not state  -AW           Oral Motor Structure and Function    Secretion Management WNL/WFL  -AW        Volitional Swallow WFL  -AW        Volitional Cough WFL  -AW           General Eating/Swallowing Observations    Respiratory Support Currently in Use room air  -AW        Eating/Swallowing Skills self-fed;fed by SLP  -AW        Positioning During Eating upright 90 degree;upright in bed  -AW        Utensils Used spoon;straw;cup  -AW        Consistencies Trialed regular textures;thin liquids;pureed;ice chips  -AW           Respiratory    Respiratory Status WFL  -AW           Clinical Swallow Eval    Oral Prep Phase impaired  -AW        Oral Transit WFL  -AW        Oral Residue WFL  -AW        Pharyngeal Phase WFL  -AW        Esophageal Phase unremarkable  -AW        Clinical Swallow Evaluation Summary CSE complete. Pt with left labial droop and anterior loss on left side with liquids by cup. Exh much better control with straw. No overt s/s with mulitple straw drinks of thin, bite of puree, or cracker. Took tongue and swept left side of mouth to remove food without any prompting. Pt may have regular diet and thin liquids. Use straw for better control and will work on lip seal in therapy. Contnue to check for pocketing, although pt did not show any signs of this during eval. Meds whole with puree for now.  -AW           Oral Prep Concerns    Oral Prep Concerns anterior loss  -AW        Anterior Loss thin  -AW           SLP Evaluation Clinical Impression    SLP Swallowing Diagnosis mild;oral dysphagia;functional pharyngeal phase  -AW        Functional Impact other  drinking liquids impacted due to lip seal  -AW         Rehab Potential/Prognosis, Swallowing good, to achieve stated therapy goals  -AW        Swallow Criteria for Skilled Therapeutic Interventions Met demonstrates skilled criteria  -AW           Recommendations    Therapy Frequency (Swallow) at least;5 days per week  -AW        SLP Diet Recommendation regular textures;thin liquids  -AW        Recommended Precautions and Strategies upright posture during/after eating;check mouth frequently for oral residue/pocketing  -AW        Oral Care Recommendations Oral Care BID/PRN;Toothbrush  -AW        SLP Rec. for Method of Medication Administration meds whole;with puree  -AW        Monitor for Signs of Aspiration notify SLP if any concerns  -AW           Swallow Goals (SLP)    Swallow STGs labial strengthening goal selection (SLP);diet tolerance goal selection (SLP);swallow compensatory strategies goal selection (SLP)  -AW        Swallow Compensatory Strategies Goal Selection (SLP) swallow compensatory strategies, SLP goal 1  -AW           (STG) Swallow Compensatory Strategies Goal 1 (SLP)    Activity (Swallow Compensatory Strategies/Techniques Goal 1, SLP) compensatory strategies;food/liquid placed on stronger right side  use tongue sweep to check for pocketing on L  -AW        [place]/Accuracy (Swallow Compensatory Strategies/Techniques Goal 1, SLP) independently (over 90% accuracy)  -AW        Time Frame (Swallow Compensatory Strategies/Techniques Goal 1, SLP) short term goal (STG);1 week  -AW                  User Key  (r) = Recorded By, (t) = Taken By, (c) = Cosigned By      Initials Name Effective Dates    AW Aneta Cedeno, MS CCC-SLP 02/03/23 -                     EDUCATION  The patient has been educated in the following areas:   Cognitive Impairment.        SLP GOALS       Row Name 07/27/24 1200             (LTG) Patient will demonstrate functional swallow for    Diet Texture (Demonstrate functional swallow) regular textures  -AW      Liquid viscosity  (Demonstrate functional swallow) thin liquids  -AW      [place] (Demonstrate functional swallow) independently (over 90% accuracy)  -AW      Time Frame (Demonstrate functional swallow) 1 week  -AW         (STG) Labial Strengthening Goal 1 (SLP)    Activity (Labial Strengthening Goal 1, SLP) increase labial tone  -AW      Increase Labial Tone labial resistance exercises;swallow trials  -AW      [place]/Accuracy (Labial Strengthening Goal 1, SLP) independently (over 90% accuracy)  -AW      Time Frame (Labial Strengthening Goal 1, SLP) short term goal (STG);1 week  -AW         (STG) Swallow Compensatory Strategies Goal 1 (SLP)    Activity (Swallow Compensatory Strategies/Techniques Goal 1, SLP) compensatory strategies;food/liquid placed on stronger right side  use tongue sweep to check for pocketing on L  -AW      [place]/Accuracy (Swallow Compensatory Strategies/Techniques Goal 1, SLP) independently (over 90% accuracy)  -AW      Time Frame (Swallow Compensatory Strategies/Techniques Goal 1, SLP) short term goal (STG);1 week  -AW         Patient will demonstrate functional speech skills for return to discharge environment    [place] with minimal cues  -AW      Time frame by discharge  -AW         SLP Diagnostic Treatment     Patient will participate in further assessment in the following areas higher-level cognitive-linguistic  -AW      Time Frame (Diagnostic) short term goal (STG);1 week  -AW         Articulation Goal 1 (SLP)    Improve Articulation Goal 1 (SLP) by over-articulating at phrase level;90%;with minimal cues (75-90%)  -AW      Time Frame (Articulation Goal 1, SLP) short term goal (STG);1 week  -AW         Right Hemisphere Function Goal 1 (SLP)    Improve Right Hemisphere Function Through Goal 1 (SLP) demonstrate awareness of communication partner in left visual field;complete visuo-spatial activities (visual closure, trail making, mazes;80%;with minimal cues (75-90%)  -AW      Time  Frame (Right Hemisphere Function Goal 1, SLP) 1 week  -                User Key  (r) = Recorded By, (t) = Taken By, (c) = Cosigned By      Initials Name Provider Type    Aneta Babb MS CCC-SLP Speech and Language Pathologist                         Time Calculation:    Time Calculation- SLP       Row Name 24 1248             Time Calculation- SLP    SLP Start Time 1138  -AW      SLP Stop Time 1248  -AW      SLP Time Calculation (min) 70 min  -AW      SLP Received On 24  -                User Key  (r) = Recorded By, (t) = Taken By, (c) = Cosigned By      Initials Name Provider Type    Aneta Babb, MS CCC-SLP Speech and Language Pathologist                             Aneta Cedeno MS CCC-SLP  2024   and [facility] - Speech Language Pathology Initial Evaluation  [facility]     Patient Name: Ron Cam  : 1970  MRN: [id number]  Today's Date: 2024               Admit Date: 2024     Visit Dx:    ICD-10-CM ICD-9-CM   1. Intraparenchymal hemorrhage of brain  I61.9 431   2. Dysarthria  R47.1 784.51   3. Acute left-sided weakness  R53.1 728.87   4. Left sided numbness  R20.0 782.0   5. Hemineglect  R41.4 781.8   6. Hypertensive emergency  I16.1 401.9     Patient Active Problem List   Diagnosis    Nontraumatic intracerebral hemorrhage    Hypertensive crisis     History reviewed. No pertinent past medical history.  History reviewed. No pertinent surgical history.    SLP Recommendation and Plan  SLP Diagnosis: moderate-severe, dysarthria, cognitive-linguistic disorder (L neglect, cognition intact) (24 1200)        Monitor for Signs of Aspiration: notify SLP if any concerns (24 1200)  Swallow Criteria for Skilled Therapeutic Interventions Met: demonstrates skilled criteria (24 1200)  SLC Criteria for Skilled Therapy Interventions Met: yes (24 1200)  Anticipated Discharge Disposition (SLP): inpatient rehabilitation facility (24 1200)      Therapy Frequency (Swallow): at least, 5 days per week (07/27/24 1200)  Therapy Frequency (SLP SLC): at least, 5 days per week (07/27/24 1200)  Predicted Duration Therapy Intervention (Days): until discharge (07/27/24 1200)  Oral Care Recommendations: Oral Care BID/PRN, Toothbrush (07/27/24 1200)                          Plan of Care Reviewed With: patient, spouse (07/27/24 1247)  Progress: improving (07/27/24 1247)      SLP EVALUATION (Last 72 Hours)       SLP SLC Evaluation       Row Name 07/27/24 1200                   Communication Assessment/Intervention    Document Type evaluation  -AW        Subjective Information no complaints  -AW        Patient Observations alert;cooperative  -AW        Patient/Family/Caregiver Comments/Observations wife and in laws present  -AW        Patient Effort good  -AW        Symptoms Noted During/After Treatment none  -AW        Oral Care teeth brushed - regular toothbrush  -AW           General Information    Patient Profile Reviewed yes  -AW        Pertinent History Of Current Problem R basal ganglia hemorrhage  -AW        Precautions/Limitations, Vision WFL  -AW        Precautions/Limitations, Hearing WFL  -AW        Prior Level of Function-Communication WFL  -AW        Plans/Goals Discussed with patient;family;agreed upon  -AW        Barriers to Rehab none identified  -AW        Patient's Goals for Discharge patient did not state  -AW        Family Goals for Discharge family did not state  -AW           Pain Scale: Numbers Pre/Post-Treatment    Pretreatment Pain Rating 0/10 - no pain  -AW        Posttreatment Pain Rating 0/10 - no pain  -AW           Comprehension Assessment/Intervention    Comprehension Assessment/Intervention Auditory Comprehension  -AW           Auditory Comprehension Assessment/Intervention    Auditory Comprehension (Communication) WFL  -AW        Able to Identify Objects/Pictures (Communication) WFL  -AW        Answers Questions (Communication)  yes/no;wh questions;personal;simple;WFL  -AW        Able to Follow Commands (Communication) multi-step;WFL  -AW        Narrative Discourse conversational level;WFL  -AW           Expression Assessment/Intervention    Expression Assessment/Intervention verbal expression  -AW           Verbal Expression Assessment/Intervention    Verbal Expression WFL  -AW        Automatic Speech (Communication) WFL  -AW        Repetition WFL  -AW        Spontaneous/Functional Words simple;WFL  -AW        Sentence Formulation simple;WFL  -AW        Conversational Discourse/Fluency WFL  -AW           Oral Motor Structure and Function    Oral Motor Structure and Function moderate impairment  -AW        Dentition Assessment natural, present and adequate  -AW        Mucosal Quality moist, healthy  -AW           Oral Musculature and Cranial Nerve Assessment    Oral Motor General Assessment oral labial or buccal impairment;lingual impairment  -AW        Oral Labial or Buccal Impairment, Detail, Cranial Nerve VII (Facial): left labial droop  -AW        Lingual Impairment, Detail. Cranial Nerves IX, XII (Glossopharyngeal and Hypoglossal) reduced strength left  -AW           Motor Speech Assessment/Intervention    Motor Speech Function moderate impairment  -AW        Characteristics Consistent with Dysarthria decreased articulation  -AW        Characteristics Consistent with Apraxia distortions  -AW        Speech intelligibility 60%;in connected speech  -AW           Cognitive Assessment Intervention- SLP    Cognitive Function (Cognition) moderate impairment  -AW        Orientation Status (Cognition) awareness of basic personal information;person;place;time;situation;WFL  -AW        Memory (Cognitive) simple;WFL  -AW        Attention (Cognitive) sustained;WFL  -AW        Right Hemisphere Function left neglect;moderate impairment;severe impairment  -AW           SLP Evaluation Clinical Impressions    SLP Diagnosis  moderate-severe;dysarthria;cognitive-linguistic disorder  L neglect, cognition intact  -AW        Rehab Potential/Prognosis good  -AW        SLC Criteria for Skilled Therapy Interventions Met yes  -AW        Functional Impact functional impact in social situations;functional impact in ADLs  -AW           Recommendations    Therapy Frequency (SLP SLC) at least;5 days per week  -AW        Predicted Duration Therapy Intervention (Days) until discharge  -AW        Anticipated Discharge Disposition (SLP) inpatient rehabilitation facility  -AW           Communication Treatment Objective and Progress Goals (SLP)    SLC LTGs Patient will demonstrate functional speech skills for return to discharge environment  -AW        Diagnostic Treatment Objective Diagnostic Treatment Objectives (Group)  -AW        Motor Speech/Dysarthria Treatment Objectives Motor Speech/Dysarthria Treatment Objectives (Group)  -AW        Cognitive Linguistic Treatment Objectives Cognitive Linguistic Treatment Objectives (Group)  -AW           Patient will demonstrate functional speech skills for return to discharge environment    [place] with minimal cues  -AW        Time frame by discharge  -AW           Diagnostic Treatment Objectives    Diagnostic Treatment Objective Selection Diagnostic Treatment Objectives  -AW           SLP Diagnostic Treatment     Time Frame (Diagnostic) short term goal (STG);1 week  -AW           Motor Speech/Dysarthria Treatment Objectives    Articulation Selection articulation, SLP goal 1  -AW           Articulation Goal 1 (SLP)    Improve Articulation Goal 1 (SLP) by over-articulating at phrase level;90%;with minimal cues (75-90%)  -AW        Time Frame (Articulation Goal 1, SLP) short term goal (STG);1 week  -AW           Cognitive Linguistic Treatment Objectives    Right Hemisphere Function Selection right hemisphere function, SLP goal 1  -AW           Right Hemisphere Function Goal 1 (SLP)    Improve Right  Hemisphere Function Through Goal 1 (SLP) demonstrate awareness of communication partner in left visual field;complete visuo-spatial activities (visual closure, trail making, mazes;80%;with minimal cues (75-90%)  -AW        Time Frame (Right Hemisphere Function Goal 1, SLP) 1 week  -AW                  User Key  (r) = Recorded By, (t) = Taken By, (c) = Cosigned By      Initials Name Effective Dates    AW Aneta Cedeno, MS CCC-SLP 02/03/23 -                        EDUCATION  The patient has been educated in the following areas:     Dysphagia (Swallowing Impairment).           SLP GOALS       Row Name 07/27/24 1200             (LTG) Patient will demonstrate functional swallow for    Diet Texture (Demonstrate functional swallow) regular textures  -AW      Liquid viscosity (Demonstrate functional swallow) thin liquids  -AW      [place] (Demonstrate functional swallow) independently (over 90% accuracy)  -AW      Time Frame (Demonstrate functional swallow) 1 week  -AW         (STG) Labial Strengthening Goal 1 (SLP)    Activity (Labial Strengthening Goal 1, SLP) increase labial tone  -AW      Increase Labial Tone labial resistance exercises;swallow trials  -AW      [place]/Accuracy (Labial Strengthening Goal 1, SLP) independently (over 90% accuracy)  -AW      Time Frame (Labial Strengthening Goal 1, SLP) short term goal (STG);1 week  -AW         (STG) Swallow Compensatory Strategies Goal 1 (SLP)    Activity (Swallow Compensatory Strategies/Techniques Goal 1, SLP) compensatory strategies;food/liquid placed on stronger right side  use tongue sweep to check for pocketing on L  -AW      [place]/Accuracy (Swallow Compensatory Strategies/Techniques Goal 1, SLP) independently (over 90% accuracy)  -AW      Time Frame (Swallow Compensatory Strategies/Techniques Goal 1, SLP) short term goal (STG);1 week  -AW         Patient will demonstrate functional speech skills for return to discharge environment    [place]  with minimal cues  -AW      Time frame by discharge  -AW         SLP Diagnostic Treatment     Patient will participate in further assessment in the following areas higher-level cognitive-linguistic  -AW      Time Frame (Diagnostic) short term goal (STG);1 week  -AW         Articulation Goal 1 (SLP)    Improve Articulation Goal 1 (SLP) by over-articulating at phrase level;90%;with minimal cues (75-90%)  -AW      Time Frame (Articulation Goal 1, SLP) short term goal (STG);1 week  -AW         Right Hemisphere Function Goal 1 (SLP)    Improve Right Hemisphere Function Through Goal 1 (SLP) demonstrate awareness of communication partner in left visual field;complete visuo-spatial activities (visual closure, trail making, mazes;80%;with minimal cues (75-90%)  -AW      Time Frame (Right Hemisphere Function Goal 1, SLP) 1 week  -AW                User Key  (r) = Recorded By, (t) = Taken By, (c) = Cosigned By      Initials Name Provider Type    Aneta Babb MS CCC-SLP Speech and Language Pathologist                              Time Calculation:      Time Calculation- SLP       Row Name 07/27/24 1248             Time Calculation- SLP    SLP Start Time 1138  -AW      SLP Stop Time 1248  -AW      SLP Time Calculation (min) 70 min  -AW      SLP Received On 07/27/24  -AW                User Key  (r) = Recorded By, (t) = Taken By, (c) = Cosigned By      Initials Name Provider Type    Aneta Babb MS CCC-SLP Speech and Language Pathologist                                   Aneta Cedeno MS CCC-SLP  7/27/2024

## 2024-07-27 NOTE — PROGRESS NOTES
Stroke Neurology Progress Note     Subjective     This patient was seen in follow-up for: right basal ganglia hemorrhage  Present for the encounter were: self, patient, patient's wife    Subjective:  My first encounter with the patient.  Interval increase in bleed size.  All questions and concerns were answered.    Objective      Temp:  [97.8 °F (36.6 °C)-98.3 °F (36.8 °C)] 97.8 °F (36.6 °C)  Heart Rate:  [] 71  Resp:  [14-20] 16  BP: ()/() 114/67            Objective    Physical Exam:  General Appearance: Alert  HEENT: anicteric sclera, no scleral injection  Lungs: respirations appear comfortable, no obvious increased work of breathing  Extremities: No cyanosis or fingernail clubbing   Skin: No rashes in exposed skin areas     Neurological Examination:   Mental status: Alert and oriented.  Severe dysarthria. Able to name and repeat.  Cranial Nerves: Visual fields intact. Extraocular movements intact with no nystagmus.  Right gaze deviation.  Left facial droop.  Sensory: Normal sensory exam to light touch.  Motor: Normal tone.  Left upper extremity neglect.  Strength:  LUE: 0/5 biceps, triceps,   LLE: Able to wiggle toes  RUE: 5/5 biceps, triceps,   RLE:  5/5 hip flexion/extension      Labs:    Lab Results   Component Value Date    HGBA1C 5.50 07/27/2024      Lab Results   Component Value Date    CHOL 227 (H) 07/27/2024    TRIG 161 (H) 07/27/2024    HDL 58 07/27/2024     (H) 07/27/2024       Lab Results   Component Value Date    WBC 6.50 07/26/2024    HGB 15.0 07/26/2024    HCT 44 07/26/2024    MCV 88.0 07/26/2024     07/26/2024     Lab Results   Component Value Date    GLUCOSE 122 (H) 07/27/2024    BUN 12 07/27/2024    CREATININE 0.81 07/27/2024    BCR 14.8 07/27/2024    CO2 23.0 07/27/2024    CALCIUM 8.7 07/27/2024    AST 28 07/26/2024    ALT 35 07/26/2024       Results from last 7 days   Lab Units 07/27/24  0843 07/27/24  0600 07/26/24  2354 07/26/24  1703 07/26/24  1702    SODIUM mmol/L 139 139 138  138  --   --    POTASSIUM mmol/L 4.0  --  3.7  --   --    CHLORIDE mmol/L 105  --  102  --   --    CO2 mmol/L 23.0  --  23.0  --   --    BUN mg/dL 12  --  19  --   --    CREATININE mg/dL 0.81  --  1.04 0.90  --    CALCIUM mg/dL 8.7  --  8.5*  --   --    ALT (SGPT) U/L  --   --   --   --  35   AST (SGOT) U/L  --   --   --   --  28   GLUCOSE mg/dL 122*  --  129*  --   --        Results Review:      All brain images and reports were personally reviewed and I agree with the interpretations except as noted below.    CT Head Without Contrast 7/26/2024  Impression: Interval increase in intraparenchymal hemorrhage centered in the right basal ganglia with mild increase in mass effect on the adjacent lateral ventricles and minimal change of the basilar and suprasellar cisterns.     CT Angiogram Head and Neck 7/26/2024  Impression: 1. No evidence of intracranial large vessel arterial occlusion or aneurysm 2. Ulcerated plaque in the left distal common carotid and proximal internal carotid arteries that results in less than 50% luminal diameter stenosis 3. Patent right carotid and bilateral vertebral arteries without significant stenosis or dissection.    CT Head Without Contrast 7/26/2024  Impression: 1. 4 x 3 x 3.5 cm right basal ganglia and periventricular white matter parenchymal hematoma with mass effect as described 2. No evidence of major vascular territory brain ischemia 3. Chronic small vessel ischemic changes.          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 on admission   # Extracranial atherosclerotic disease     -Repeat CT head 6/28 at 0500  -Hold antiplatelet medication   -Serum Na 140  -Continue 3% NS rate 45 cc/hour  -SBP < 140  -PT OT recommend IRF  -DVT prophylaxis: SCDs only given hemorrhage  -TTE pending  -Neurosurgery evaluated, non-surgical  -Follow-up in stroke clinic       Patient education: call 911 or present to  emergency department with any stroke symptom, including unilateral face, arm, or leg weakness, numbness, or paresthesias, unilateral facial droop, speech deficits, dizziness with nausea, vomiting, nystagmus, and incoordination, visual deficits, or severe onset headache.    Stroke neurology will follow.  Please call with questions.     Griselda Rojo MD  [facility] [place]  07/27/24  10:37 EDT

## 2024-07-27 NOTE — PLAN OF CARE
Goal Outcome Evaluation:         No neuro changes during shift. Alert & oriented. Dysarthria & slight expressive aphasia. Follows commands. Very little movement on left side. Absent sensation on left side. Left visual field cut/neglect. Pt worked with PT/OT. Passed bedside swallow exam per SLP. Remains on hypertonic fluids. Remains on cardene to keep SBP <140. MRI done. Pt's wife at bedside & updated throughout shift.                 Problem: Skin Injury Risk Increased  Goal: Skin Health and Integrity  Outcome: Ongoing, Progressing  Intervention: Optimize Skin Protection  Recent Flowsheet Documentation  Taken 7/27/2024 1800 by Lora Christopher RN  Pressure Reduction Techniques:   weight shift assistance provided   pressure points protected  Head of Bed (HOB) Positioning: HOB at 30 degrees  Pressure Reduction Devices:   specialty bed utilized   positioning supports utilized  Skin Protection:   adhesive use limited   incontinence pads utilized   tubing/devices free from skin contact   skin-to-device areas padded  Taken 7/27/2024 1600 by Lora Christopher RN  Pressure Reduction Techniques:   weight shift assistance provided   pressure points protected   heels elevated off bed  Head of Bed (HOB) Positioning: HOB at 30 degrees  Pressure Reduction Devices:   specialty bed utilized   positioning supports utilized  Skin Protection:   adhesive use limited   incontinence pads utilized   tubing/devices free from skin contact   skin-to-device areas padded  Taken 7/27/2024 1400 by Lora Christopher RN  Pressure Reduction Techniques:   frequent weight shift encouraged   pressure points protected   heels elevated off bed  Head of Bed (HOB) Positioning: HOB at 45 degrees  Pressure Reduction Devices:   specialty bed utilized   positioning supports utilized  Skin Protection:   adhesive use limited   incontinence pads utilized   tubing/devices free from skin contact   skin-to-device areas padded  Taken 7/27/2024 1200 by Ashwin  Lora GASTELUM RN  Pressure Reduction Techniques:   weight shift assistance provided   pressure points protected   heels elevated off bed  Head of Bed (HOB) Positioning: HOB at 30 degrees  Pressure Reduction Devices:   specialty bed utilized   positioning supports utilized  Skin Protection:   adhesive use limited   incontinence pads utilized   tubing/devices free from skin contact   skin-to-device areas padded  Taken 7/27/2024 1000 by Lora Christopher RN  Pressure Reduction Techniques:   weight shift assistance provided   pressure points protected   heels elevated off bed  Pressure Reduction Devices:   chair cushion utilized   feet on footrest/footstool   positioning supports utilized  Skin Protection:   adhesive use limited   incontinence pads utilized   tubing/devices free from skin contact   skin-to-device areas padded  Taken 7/27/2024 0800 by Lora Christopher RN  Pressure Reduction Techniques:   weight shift assistance provided   pressure points protected   heels elevated off bed  Head of Bed (HOB) Positioning: HOB at 30 degrees  Pressure Reduction Devices:   specialty bed utilized   positioning supports utilized  Skin Protection:   adhesive use limited   incontinence pads utilized   tubing/devices free from skin contact   skin-to-device areas padded     Problem: Adult Inpatient Plan of Care  Goal: Plan of Care Review  Outcome: Ongoing, Progressing  Goal: Patient-Specific Goal (Individualized)  Outcome: Ongoing, Progressing  Goal: Absence of Hospital-Acquired Illness or Injury  Outcome: Ongoing, Progressing  Intervention: Identify and Manage Fall Risk  Recent Flowsheet Documentation  Taken 7/27/2024 1800 by Lora Christopher RN  Safety Promotion/Fall Prevention: safety round/check completed  Taken 7/27/2024 1600 by Lora Christopher RN  Safety Promotion/Fall Prevention: safety round/check completed  Taken 7/27/2024 1400 by Lora Christopher RN  Safety Promotion/Fall Prevention: safety round/check completed  Taken  7/27/2024 1200 by Lora Christopher RN  Safety Promotion/Fall Prevention: safety round/check completed  Taken 7/27/2024 1000 by Lora Christopher RN  Safety Promotion/Fall Prevention: safety round/check completed  Taken 7/27/2024 0800 by Lora Christopher RN  Safety Promotion/Fall Prevention: safety round/check completed  Intervention: Prevent Skin Injury  Recent Flowsheet Documentation  Taken 7/27/2024 1800 by Lora Christopher RN  Body Position:   log-rolled   turned   weight shifting  Skin Protection:   adhesive use limited   incontinence pads utilized   tubing/devices free from skin contact   skin-to-device areas padded  Taken 7/27/2024 1600 by Lora Christopher RN  Body Position:   log-rolled   weight shifting  Skin Protection:   adhesive use limited   incontinence pads utilized   tubing/devices free from skin contact   skin-to-device areas padded  Taken 7/27/2024 1400 by Lora Christopher RN  Body Position:   weight shifting   position changed independently  Skin Protection:   adhesive use limited   incontinence pads utilized   tubing/devices free from skin contact   skin-to-device areas padded  Taken 7/27/2024 1200 by Lora Christopher RN  Body Position:   weight shifting   supine  Skin Protection:   adhesive use limited   incontinence pads utilized   tubing/devices free from skin contact   skin-to-device areas padded  Taken 7/27/2024 1000 by Lora Christopher RN  Body Position: (moved from bed to chair) other (see comments)  Skin Protection:   adhesive use limited   incontinence pads utilized   tubing/devices free from skin contact   skin-to-device areas padded  Taken 7/27/2024 0800 by Lora Christopher RN  Body Position:   log-rolled   turned   left  Skin Protection:   adhesive use limited   incontinence pads utilized   tubing/devices free from skin contact   skin-to-device areas padded  Intervention: Prevent and Manage VTE (Venous Thromboembolism) Risk  Recent Flowsheet Documentation  Taken 7/27/2024 1100  by Lora Christopher RN  Activity Management: back to bed  Taken 7/27/2024 1000 by Lora Christopher RN  Activity Management: (per PT/OT) up in chair  Taken 7/27/2024 0800 by Lora Christopher RN  VTE Prevention/Management: patient refused intervention  Goal: Optimal Comfort and Wellbeing  Outcome: Ongoing, Progressing  Intervention: Provide Person-Centered Care  Recent Flowsheet Documentation  Taken 7/27/2024 1800 by Lora Christopher RN  Trust Relationship/Rapport:   care explained   emotional support provided   questions answered  Taken 7/27/2024 1600 by Lora Christopher RN  Trust Relationship/Rapport:   care explained   emotional support provided   questions answered  Taken 7/27/2024 1400 by Lora Christopher RN  Trust Relationship/Rapport:   care explained   emotional support provided   questions answered  Taken 7/27/2024 1200 by Lora Christopher RN  Trust Relationship/Rapport:   care explained   emotional support provided   questions answered  Taken 7/27/2024 1000 by Lora Christopher RN  Trust Relationship/Rapport: care explained  Taken 7/27/2024 0800 by Lora Christopher RN  Trust Relationship/Rapport:   care explained   questions answered  Goal: Readiness for Transition of Care  Outcome: Ongoing, Progressing     Problem: Adjustment to Illness (Stroke, Hemorrhagic)  Goal: Optimal Coping  Outcome: Ongoing, Progressing  Intervention: Support Psychosocial Response to Stroke  Recent Flowsheet Documentation  Taken 7/27/2024 1800 by Lora Christopher RN  Family/Support System Care: support provided  Taken 7/27/2024 1600 by Lora Christopher RN  Family/Support System Care: support provided  Taken 7/27/2024 1400 by Lora Christopher RN  Family/Support System Care: support provided  Taken 7/27/2024 1200 by Lora Christopher RN  Family/Support System Care: support provided  Taken 7/27/2024 1000 by Lora Christopher RN  Family/Support System Care: support provided  Taken 7/27/2024 0800 by Lora Christopher,  RN  Family/Support System Care: support provided     Problem: Bowel Elimination Impaired (Stroke, Hemorrhagic)  Goal: Effective Bowel Elimination  Outcome: Ongoing, Progressing     Problem: Cerebral Tissue Perfusion (Stroke, Hemorrhagic)  Goal: Optimal Cerebral Tissue Perfusion  Outcome: Ongoing, Progressing     Problem: Cognitive Impairment (Stroke, Hemorrhagic)  Goal: Optimal Cognitive Function  Outcome: Ongoing, Progressing     Problem: Communication Impairment (Stroke, Hemorrhagic)  Goal: Effective Communication Skills  Outcome: Ongoing, Progressing     Problem: Functional Ability Impaired (Stroke, Hemorrhagic)  Goal: Optimal Functional Ability  Outcome: Ongoing, Progressing  Intervention: Optimize Functional Ability  Recent Flowsheet Documentation  Taken 7/27/2024 1100 by Lora Christopher RN  Activity Management: back to bed  Taken 7/27/2024 1000 by Lora Christopher RN  Activity Management: (per PT/OT) up in chair     Problem: Pain (Stroke, Hemorrhagic)  Goal: Acceptable Pain Control  Outcome: Ongoing, Progressing     Problem: Respiratory Compromise (Stroke, Hemorrhagic)  Goal: Effective Oxygenation and Ventilation  Outcome: Ongoing, Progressing  Intervention: Optimize Oxygenation and Ventilation  Recent Flowsheet Documentation  Taken 7/27/2024 1800 by Lora Christopher RN  Head of Bed (HOB) Positioning: HOB at 30 degrees  Taken 7/27/2024 1600 by Lora Christopher RN  Head of Bed (HOB) Positioning: HOB at 30 degrees  Taken 7/27/2024 1400 by Lora Christopher RN  Head of Bed (HOB) Positioning: HOB at 45 degrees  Taken 7/27/2024 1200 by Lora Christopher RN  Head of Bed (HOB) Positioning: HOB at 30 degrees  Taken 7/27/2024 0800 by Lora Christopher RN  Head of Bed (HOB) Positioning: HOB at 30 degrees     Problem: Sensorimotor Impairment (Stroke, Hemorrhagic)  Goal: Improved Sensorimotor Function  Outcome: Ongoing, Progressing  Intervention: Optimize Range of Motion, Motor Control and Function  Recent Flowsheet  Documentation  Taken 7/27/2024 1800 by Lora Christopher RN  Positioning/Transfer Devices: pillows  Taken 7/27/2024 1600 by Lora Christopher RN  Positioning/Transfer Devices: pillows  Taken 7/27/2024 1400 by Lora Christopher RN  Positioning/Transfer Devices: pillows  Taken 7/27/2024 1200 by Lora Christopher RN  Positioning/Transfer Devices: pillows  Taken 7/27/2024 1000 by Lora Christopher RN  Positioning/Transfer Devices: pillows  Taken 7/27/2024 0800 by Lora Christopher RN  Positioning/Transfer Devices:   pillows   wedge  Intervention: Optimize Sensory and Perceptual Ability  Recent Flowsheet Documentation  Taken 7/27/2024 1800 by Lora Christopher RN  Pressure Reduction Techniques:   weight shift assistance provided   pressure points protected  Pressure Reduction Devices:   specialty bed utilized   positioning supports utilized  Taken 7/27/2024 1600 by Lora Christopher RN  Pressure Reduction Techniques:   weight shift assistance provided   pressure points protected   heels elevated off bed  Pressure Reduction Devices:   specialty bed utilized   positioning supports utilized  Taken 7/27/2024 1400 by Lora Christopher RN  Pressure Reduction Techniques:   frequent weight shift encouraged   pressure points protected   heels elevated off bed  Pressure Reduction Devices:   specialty bed utilized   positioning supports utilized  Taken 7/27/2024 1200 by Lora Christopher RN  Pressure Reduction Techniques:   weight shift assistance provided   pressure points protected   heels elevated off bed  Pressure Reduction Devices:   specialty bed utilized   positioning supports utilized  Taken 7/27/2024 1000 by Lora Christopher RN  Pressure Reduction Techniques:   weight shift assistance provided   pressure points protected   heels elevated off bed  Pressure Reduction Devices:   chair cushion utilized   feet on footrest/footstool   positioning supports utilized  Taken 7/27/2024 0800 by Lora Christopher RN  Pressure  Reduction Techniques:   weight shift assistance provided   pressure points protected   heels elevated off bed  Pressure Reduction Devices:   specialty bed utilized   positioning supports utilized     Problem: Swallowing Impairment (Stroke, Hemorrhagic)  Goal: Optimal Eating and Swallowing Without Aspiration  Outcome: Ongoing, Progressing     Problem: Urinary Elimination Impaired (Stroke, Hemorrhagic)  Goal: Effective Urinary Elimination  Outcome: Ongoing, Progressing

## 2024-07-27 NOTE — PLAN OF CARE
Goal Outcome Evaluation:  Plan of Care Reviewed With: patient        Progress: no change  Outcome Evaluation: PT initial evaluation completed. Pt demonstrates L sided weakness, L inattention, and L sensory deficits, impacting independence and balance re: functional mobility and warranting further skilled PT services to promote PLOF. Limited today by pushing to L, but able to perform STS and pivot to recliner with max x2. Recommend IP rehab at d/c.      Anticipated Discharge Disposition (PT): inpatient rehabilitation facility

## 2024-07-28 ENCOUNTER — APPOINTMENT (OUTPATIENT)
Dept: CT IMAGING | Facility: HOSPITAL | Age: 54
DRG: 064 | End: 2024-07-28
Payer: COMMERCIAL

## 2024-07-28 LAB
ANION GAP SERPL CALCULATED.3IONS-SCNC: 11 MMOL/L (ref 5–15)
ANION GAP SERPL CALCULATED.3IONS-SCNC: 13 MMOL/L (ref 5–15)
BASOPHILS # BLD AUTO: 0.03 10*3/MM3 (ref 0–0.2)
BASOPHILS NFR BLD AUTO: 0.4 % (ref 0–1.5)
BUN SERPL-MCNC: 11 MG/DL (ref 6–20)
BUN SERPL-MCNC: 8 MG/DL (ref 6–20)
BUN/CREAT SERPL: 11.1 (ref 7–25)
BUN/CREAT SERPL: 9.3 (ref 7–25)
CALCIUM SPEC-SCNC: 8.2 MG/DL (ref 8.6–10.5)
CALCIUM SPEC-SCNC: 8.3 MG/DL (ref 8.6–10.5)
CHLORIDE SERPL-SCNC: 107 MMOL/L (ref 98–107)
CHLORIDE SERPL-SCNC: 107 MMOL/L (ref 98–107)
CO2 SERPL-SCNC: 19 MMOL/L (ref 22–29)
CO2 SERPL-SCNC: 21 MMOL/L (ref 22–29)
CREAT SERPL-MCNC: 0.86 MG/DL (ref 0.76–1.27)
CREAT SERPL-MCNC: 0.99 MG/DL (ref 0.76–1.27)
DEPRECATED RDW RBC AUTO: 46.5 FL (ref 37–54)
EGFRCR SERPLBLD CKD-EPI 2021: 103.5 ML/MIN/1.73
EGFRCR SERPLBLD CKD-EPI 2021: 91.1 ML/MIN/1.73
EOSINOPHIL # BLD AUTO: 0.06 10*3/MM3 (ref 0–0.4)
EOSINOPHIL NFR BLD AUTO: 0.7 % (ref 0.3–6.2)
ERYTHROCYTE [DISTWIDTH] IN BLOOD BY AUTOMATED COUNT: 13.7 % (ref 12.3–15.4)
GLUCOSE SERPL-MCNC: 122 MG/DL (ref 65–99)
GLUCOSE SERPL-MCNC: 134 MG/DL (ref 65–99)
HCT VFR BLD AUTO: 39.4 % (ref 37.5–51)
HGB BLD-MCNC: 13.1 G/DL (ref 13–17.7)
IMM GRANULOCYTES # BLD AUTO: 0.02 10*3/MM3 (ref 0–0.05)
IMM GRANULOCYTES NFR BLD AUTO: 0.2 % (ref 0–0.5)
LYMPHOCYTES # BLD AUTO: 1.34 10*3/MM3 (ref 0.7–3.1)
LYMPHOCYTES NFR BLD AUTO: 16.3 % (ref 19.6–45.3)
MCH RBC QN AUTO: 31 PG (ref 26.6–33)
MCHC RBC AUTO-ENTMCNC: 33.2 G/DL (ref 31.5–35.7)
MCV RBC AUTO: 93.4 FL (ref 79–97)
MONOCYTES # BLD AUTO: 0.65 10*3/MM3 (ref 0.1–0.9)
MONOCYTES NFR BLD AUTO: 7.9 % (ref 5–12)
NEUTROPHILS NFR BLD AUTO: 6.11 10*3/MM3 (ref 1.7–7)
NEUTROPHILS NFR BLD AUTO: 74.5 % (ref 42.7–76)
NRBC BLD AUTO-RTO: 0 /100 WBC (ref 0–0.2)
OSMOLALITY SERPL: 291 MOSM/KG (ref 275–295)
OSMOLALITY SERPL: 291 MOSM/KG (ref 275–295)
OSMOLALITY SERPL: 292 MOSM/KG (ref 275–295)
OSMOLALITY SERPL: 335 MOSM/KG (ref 275–295)
PLATELET # BLD AUTO: 172 10*3/MM3 (ref 140–450)
PMV BLD AUTO: 9.2 FL (ref 6–12)
POTASSIUM SERPL-SCNC: 3.9 MMOL/L (ref 3.5–5.2)
POTASSIUM SERPL-SCNC: 4 MMOL/L (ref 3.5–5.2)
RBC # BLD AUTO: 4.22 10*6/MM3 (ref 4.14–5.8)
SODIUM SERPL-SCNC: 138 MMOL/L (ref 136–145)
SODIUM SERPL-SCNC: 139 MMOL/L (ref 136–145)
SODIUM SERPL-SCNC: 139 MMOL/L (ref 136–145)
SODIUM SERPL-SCNC: 140 MMOL/L (ref 136–145)
SODIUM SERPL-SCNC: 167 MMOL/L (ref 136–145)
WBC NRBC COR # BLD AUTO: 8.21 10*3/MM3 (ref 3.4–10.8)

## 2024-07-28 PROCEDURE — 80048 BASIC METABOLIC PNL TOTAL CA: CPT | Performed by: INTERNAL MEDICINE

## 2024-07-28 PROCEDURE — 99232 SBSQ HOSP IP/OBS MODERATE 35: CPT | Performed by: INTERNAL MEDICINE

## 2024-07-28 PROCEDURE — 84295 ASSAY OF SERUM SODIUM: CPT | Performed by: NURSE PRACTITIONER

## 2024-07-28 PROCEDURE — 99231 SBSQ HOSP IP/OBS SF/LOW 25: CPT | Performed by: PHYSICIAN ASSISTANT

## 2024-07-28 PROCEDURE — 25010000002 NICARDIPINE 2.5 MG/ML SOLUTION 10 ML VIAL: Performed by: PHYSICIAN ASSISTANT

## 2024-07-28 PROCEDURE — 25810000003 SODIUM CHLORIDE 0.9 % SOLUTION 250 ML FLEX CONT: Performed by: INTERNAL MEDICINE

## 2024-07-28 PROCEDURE — 70450 CT HEAD/BRAIN W/O DYE: CPT

## 2024-07-28 PROCEDURE — 25010000002 HYDRALAZINE PER 20 MG: Performed by: INTERNAL MEDICINE

## 2024-07-28 PROCEDURE — 25810000003 SODIUM CHLORIDE 0.9 % SOLUTION 250 ML FLEX CONT: Performed by: PHYSICIAN ASSISTANT

## 2024-07-28 PROCEDURE — 99233 SBSQ HOSP IP/OBS HIGH 50: CPT | Performed by: STUDENT IN AN ORGANIZED HEALTH CARE EDUCATION/TRAINING PROGRAM

## 2024-07-28 PROCEDURE — 83930 ASSAY OF BLOOD OSMOLALITY: CPT | Performed by: NURSE PRACTITIONER

## 2024-07-28 PROCEDURE — 84295 ASSAY OF SERUM SODIUM: CPT | Performed by: INTERNAL MEDICINE

## 2024-07-28 PROCEDURE — 25810000003 SODIUM CHLORIDE 3 % SOLUTION: Performed by: STUDENT IN AN ORGANIZED HEALTH CARE EDUCATION/TRAINING PROGRAM

## 2024-07-28 PROCEDURE — 85025 COMPLETE CBC W/AUTO DIFF WBC: CPT | Performed by: INTERNAL MEDICINE

## 2024-07-28 PROCEDURE — 25010000002 NICARDIPINE 2.5 MG/ML SOLUTION 10 ML VIAL: Performed by: INTERNAL MEDICINE

## 2024-07-28 RX ORDER — HYDRALAZINE HYDROCHLORIDE 10 MG/1
10 TABLET, FILM COATED ORAL EVERY 8 HOURS SCHEDULED
Status: DISCONTINUED | OUTPATIENT
Start: 2024-07-28 | End: 2024-07-29

## 2024-07-28 RX ORDER — ATORVASTATIN CALCIUM 40 MG/1
80 TABLET, FILM COATED ORAL NIGHTLY
Status: DISCONTINUED | OUTPATIENT
Start: 2024-07-28 | End: 2024-08-01

## 2024-07-28 RX ORDER — CLONIDINE 0.3 MG/24H
1 PATCH, EXTENDED RELEASE TRANSDERMAL WEEKLY
Status: DISCONTINUED | OUTPATIENT
Start: 2024-07-28 | End: 2024-07-31

## 2024-07-28 RX ORDER — FAMOTIDINE 20 MG/1
20 TABLET, FILM COATED ORAL
Status: DISCONTINUED | OUTPATIENT
Start: 2024-07-28 | End: 2024-08-01

## 2024-07-28 RX ADMIN — AMLODIPINE BESYLATE 10 MG: 10 TABLET ORAL at 08:51

## 2024-07-28 RX ADMIN — SODIUM CHLORIDE 45 ML/HR: 3 INJECTION, SOLUTION INTRAVENOUS at 08:54

## 2024-07-28 RX ADMIN — FAMOTIDINE 20 MG: 10 INJECTION, SOLUTION INTRAVENOUS at 08:51

## 2024-07-28 RX ADMIN — NICARDIPINE HYDROCHLORIDE 10 MG/HR: 2.5 INJECTION, SOLUTION INTRAVENOUS at 05:16

## 2024-07-28 RX ADMIN — NICARDIPINE HYDROCHLORIDE 15 MG/HR: 2.5 INJECTION, SOLUTION INTRAVENOUS at 00:16

## 2024-07-28 RX ADMIN — FAMOTIDINE 20 MG: 20 TABLET, FILM COATED ORAL at 17:39

## 2024-07-28 RX ADMIN — NITROGLYCERIN 2 INCH: 20 OINTMENT TOPICAL at 17:30

## 2024-07-28 RX ADMIN — Medication 10 ML: at 08:51

## 2024-07-28 RX ADMIN — SODIUM CHLORIDE 12.5 MG/HR: 9 INJECTION, SOLUTION INTRAVENOUS at 23:45

## 2024-07-28 RX ADMIN — ATORVASTATIN CALCIUM 80 MG: 40 TABLET, FILM COATED ORAL at 20:03

## 2024-07-28 RX ADMIN — CLONIDINE 1 PATCH: 0.3 PATCH, EXTENDED RELEASE TRANSDERMAL at 09:00

## 2024-07-28 RX ADMIN — HYDRALAZINE HYDROCHLORIDE 10 MG: 10 TABLET ORAL at 22:01

## 2024-07-28 RX ADMIN — NITROGLYCERIN 2 INCH: 20 OINTMENT TOPICAL at 08:51

## 2024-07-28 RX ADMIN — HYDRALAZINE HYDROCHLORIDE 20 MG: 20 INJECTION INTRAMUSCULAR; INTRAVENOUS at 17:30

## 2024-07-28 RX ADMIN — Medication 10 ML: at 20:05

## 2024-07-28 RX ADMIN — SODIUM CHLORIDE 15 MG/HR: 9 INJECTION, SOLUTION INTRAVENOUS at 16:05

## 2024-07-28 RX ADMIN — Medication 10 MG: at 13:07

## 2024-07-28 RX ADMIN — HYDRALAZINE HYDROCHLORIDE 10 MG: 10 TABLET ORAL at 16:05

## 2024-07-28 RX ADMIN — NICARDIPINE HYDROCHLORIDE 10 MG/HR: 2.5 INJECTION, SOLUTION INTRAVENOUS at 02:12

## 2024-07-28 RX ADMIN — SODIUM CHLORIDE 45 ML/HR: 3 INJECTION, SOLUTION INTRAVENOUS at 20:24

## 2024-07-28 RX ADMIN — SODIUM CHLORIDE 15 MG/HR: 9 INJECTION, SOLUTION INTRAVENOUS at 19:53

## 2024-07-28 RX ADMIN — NICARDIPINE HYDROCHLORIDE 10 MG/HR: 2.5 INJECTION, SOLUTION INTRAVENOUS at 08:36

## 2024-07-28 RX ADMIN — NICARDIPINE HYDROCHLORIDE 15 MG/HR: 2.5 INJECTION, SOLUTION INTRAVENOUS at 10:49

## 2024-07-28 RX ADMIN — SODIUM CHLORIDE 15 MG/HR: 9 INJECTION, SOLUTION INTRAVENOUS at 12:47

## 2024-07-28 NOTE — PROGRESS NOTES
HOD# : 2    No events last night  Patient showing vast improvements.  Wife is at bedside noting that he is cutting up with the nurses.    Patient's gaze past midline has improved quite a bit but he still has a loss of visual field on his left side.  Patient still neglecting his left upper extremity but is able to move his left lower extremity with reasonable strength to command.  That is a big improvement.    CT of the head today is stable compared to the CT yesterday    Nontraumatic intracerebral hemorrhage    Hypertensive crisis      Temp:  [98.4 °F (36.9 °C)-99.3 °F (37.4 °C)] 98.6 °F (37 °C)  Heart Rate:  [] 87  Resp:  [16-20] 18  BP: (107-159)/() 133/79  I/O last 3 completed shifts:  In: 3808.4 [P.O.:600; I.V.:3208.4]  Out: 3625 [Urine:3625]  I/O this shift:  In: 2793.3 [P.O.:460; I.V.:2333.3]  Out: -   Vital signs were reviewed and documented in the chart      EXAM   Body mass index is 31.09 kg/m².      Patient appeared in good neurologic function with normal comprehension   Speech has improved.  Patient still with left-sided facial droop left upper extremity flaccid left lower extremity shows improvement of coordination and strength.    Right side is normal.  Patient following all with the conversation and speaking with reasonable articulation but still with some stuttered speech    DIAGNOSIS  Right thalamic hemorrhage  \Left-sided weakness      PLAN    Continue to monitor neurologic exam.  If declining reasonable to repeat CT scan but at this point hopefully he will not show any signs of rebleeding.    Hopefully no involvement with neurosurgery at this point.           detailed exam

## 2024-07-28 NOTE — PLAN OF CARE
Goal Outcome Evaluation:      NIHSS 20. No neuro changes this shift. Remains on room air. Has been on max dose of cardene all shift & prns given to keep SBP <140. Remains on hypertonic fluids. Pt up in chair for most of afternoon/evening. Poor appetite. Pt seems withdrawn today, frustrated with deficits. Wife at bedside & updated throughout shift.                  Problem: Skin Injury Risk Increased  Goal: Skin Health and Integrity  Outcome: Ongoing, Progressing  Intervention: Optimize Skin Protection  Recent Flowsheet Documentation  Taken 7/28/2024 1600 by Lora Christopher RN  Pressure Reduction Techniques:   weight shift assistance provided   pressure points protected  Pressure Reduction Devices:   chair cushion utilized   feet on footrest/footstool   positioning supports utilized  Skin Protection:   adhesive use limited   incontinence pads utilized   tubing/devices free from skin contact   skin-to-device areas padded  Taken 7/28/2024 1400 by Lora Christopher RN  Pressure Reduction Techniques:   frequent weight shift encouraged   weight shift assistance provided   pressure points protected   heels elevated off bed  Pressure Reduction Devices:   chair cushion utilized   positioning supports utilized   feet on footrest/footstool  Skin Protection:   adhesive use limited   incontinence pads utilized   tubing/devices free from skin contact   skin-to-device areas padded  Taken 7/28/2024 1200 by Lora Christopher RN  Pressure Reduction Techniques:   weight shift assistance provided   pressure points protected   heels elevated off bed  Head of Bed (HOB) Positioning: HOB at 30 degrees  Pressure Reduction Devices:   specialty bed utilized   positioning supports utilized   foam padding utilized  Skin Protection:   adhesive use limited   incontinence pads utilized   tubing/devices free from skin contact   skin-to-device areas padded  Taken 7/28/2024 1000 by Lora Christopher RN  Pressure Reduction Techniques:   frequent  weight shift encouraged   heels elevated off bed   pressure points protected  Head of Bed (HOB) Positioning: HOB at 20-30 degrees  Pressure Reduction Devices:   specialty bed utilized   positioning supports utilized   foam padding utilized  Skin Protection:   adhesive use limited   incontinence pads utilized   tubing/devices free from skin contact   skin-to-device areas padded  Taken 7/28/2024 0800 by Lora Christopher RN  Pressure Reduction Techniques:   weight shift assistance provided   pressure points protected   heels elevated off bed  Head of Bed (HOB) Positioning: HOB at 20-30 degrees  Pressure Reduction Devices:   specialty bed utilized   positioning supports utilized  Skin Protection:   adhesive use limited   incontinence pads utilized   tubing/devices free from skin contact   skin-to-device areas padded     Problem: Adult Inpatient Plan of Care  Goal: Plan of Care Review  Outcome: Ongoing, Progressing  Goal: Patient-Specific Goal (Individualized)  Outcome: Ongoing, Progressing  Goal: Absence of Hospital-Acquired Illness or Injury  Outcome: Ongoing, Progressing  Intervention: Identify and Manage Fall Risk  Recent Flowsheet Documentation  Taken 7/28/2024 1600 by Lora Christopher RN  Safety Promotion/Fall Prevention: safety round/check completed  Taken 7/28/2024 1400 by Lora Christopher RN  Safety Promotion/Fall Prevention: safety round/check completed  Taken 7/28/2024 1200 by Lora Christopher RN  Safety Promotion/Fall Prevention: safety round/check completed  Taken 7/28/2024 1000 by Lora Christopher RN  Safety Promotion/Fall Prevention: safety round/check completed  Taken 7/28/2024 0800 by Lora Christopher RN  Safety Promotion/Fall Prevention: safety round/check completed  Intervention: Prevent Skin Injury  Recent Flowsheet Documentation  Taken 7/28/2024 1600 by Lora Christopher RN  Body Position: weight shifting  Skin Protection:   adhesive use limited   incontinence pads utilized   tubing/devices  free from skin contact   skin-to-device areas padded  Taken 7/28/2024 1400 by Lora Christopher RN  Body Position: (moved from bed to chair) weight shifting  Skin Protection:   adhesive use limited   incontinence pads utilized   tubing/devices free from skin contact   skin-to-device areas padded  Taken 7/28/2024 1200 by Lora Christopher RN  Body Position:   turned   left  Skin Protection:   adhesive use limited   incontinence pads utilized   tubing/devices free from skin contact   skin-to-device areas padded  Taken 7/28/2024 1000 by Lora Christopher RN  Body Position:   position changed independently   weight shifting   patient/family refused  Skin Protection:   adhesive use limited   incontinence pads utilized   tubing/devices free from skin contact   skin-to-device areas padded  Taken 7/28/2024 0800 by Lora Christopher RN  Body Position:   turned   right  Skin Protection:   adhesive use limited   incontinence pads utilized   tubing/devices free from skin contact   skin-to-device areas padded  Intervention: Prevent and Manage VTE (Venous Thromboembolism) Risk  Recent Flowsheet Documentation  Taken 7/28/2024 1600 by Lora Christopher RN  Activity Management: up in chair  Taken 7/28/2024 1400 by Lora Christopher RN  Activity Management: up in chair  Taken 7/28/2024 0800 by Lora Christopher RN  Activity Management: activity encouraged  VTE Prevention/Management: patient refused intervention  Goal: Optimal Comfort and Wellbeing  Outcome: Ongoing, Progressing  Intervention: Provide Person-Centered Care  Recent Flowsheet Documentation  Taken 7/28/2024 1600 by Lora Christopher RN  Trust Relationship/Rapport:   care explained   questions answered   reassurance provided   thoughts/feelings acknowledged  Taken 7/28/2024 1400 by Lora Christopher RN  Trust Relationship/Rapport:   care explained   questions answered  Taken 7/28/2024 1200 by Lora Christopher RN  Trust Relationship/Rapport:   care explained   emotional  support provided   thoughts/feelings acknowledged  Taken 7/28/2024 1000 by Lora Christopher RN  Trust Relationship/Rapport:   care explained   emotional support provided   reassurance provided   thoughts/feelings acknowledged  Taken 7/28/2024 0800 by Lora Christopher RN  Trust Relationship/Rapport:   care explained   emotional support provided   thoughts/feelings acknowledged  Goal: Readiness for Transition of Care  Outcome: Ongoing, Progressing     Problem: Adjustment to Illness (Stroke, Hemorrhagic)  Goal: Optimal Coping  Outcome: Ongoing, Progressing  Intervention: Support Psychosocial Response to Stroke  Recent Flowsheet Documentation  Taken 7/28/2024 1600 by Lora Christopher, RN  Family/Support System Care: support provided  Taken 7/28/2024 1400 by Lora Christopher, RN  Family/Support System Care: support provided  Taken 7/28/2024 1200 by Lora Christopher, RN  Family/Support System Care: support provided  Taken 7/28/2024 1000 by Lora Christopher, RN  Family/Support System Care: support provided  Taken 7/28/2024 0800 by Lora Christopher, RN  Family/Support System Care: support provided     Problem: Bowel Elimination Impaired (Stroke, Hemorrhagic)  Goal: Effective Bowel Elimination  Outcome: Ongoing, Progressing     Problem: Cerebral Tissue Perfusion (Stroke, Hemorrhagic)  Goal: Optimal Cerebral Tissue Perfusion  Outcome: Ongoing, Progressing     Problem: Cognitive Impairment (Stroke, Hemorrhagic)  Goal: Optimal Cognitive Function  Outcome: Ongoing, Progressing     Problem: Communication Impairment (Stroke, Hemorrhagic)  Goal: Effective Communication Skills  Outcome: Ongoing, Progressing     Problem: Functional Ability Impaired (Stroke, Hemorrhagic)  Goal: Optimal Functional Ability  Outcome: Ongoing, Progressing  Intervention: Optimize Functional Ability  Recent Flowsheet Documentation  Taken 7/28/2024 1600 by Lora Christopher RN  Activity Management: up in chair  Taken 7/28/2024 1400 by Lora Christopher  RN  Activity Management: up in chair  Taken 7/28/2024 0800 by Lora Christopher RN  Activity Management: activity encouraged     Problem: Pain (Stroke, Hemorrhagic)  Goal: Acceptable Pain Control  Outcome: Ongoing, Progressing     Problem: Respiratory Compromise (Stroke, Hemorrhagic)  Goal: Effective Oxygenation and Ventilation  Outcome: Ongoing, Progressing  Intervention: Optimize Oxygenation and Ventilation  Recent Flowsheet Documentation  Taken 7/28/2024 1200 by Lora Christopher RN  Head of Bed (HOB) Positioning: HOB at 30 degrees  Taken 7/28/2024 1000 by Lora Christopher RN  Head of Bed (HOB) Positioning: HOB at 20-30 degrees  Taken 7/28/2024 0800 by Lora Christopher RN  Head of Bed (HOB) Positioning: HOB at 20-30 degrees     Problem: Sensorimotor Impairment (Stroke, Hemorrhagic)  Goal: Improved Sensorimotor Function  Outcome: Ongoing, Progressing  Intervention: Optimize Range of Motion, Motor Control and Function  Recent Flowsheet Documentation  Taken 7/28/2024 1600 by Lora Christopher RN  Positioning/Transfer Devices: pillows  Taken 7/28/2024 1400 by Lora Christopher RN  Positioning/Transfer Devices: pillows  Taken 7/28/2024 1200 by Lora Christopher RN  Positioning/Transfer Devices: pillows  Taken 7/28/2024 1000 by Lora Christopher RN  Positioning/Transfer Devices: pillows  Taken 7/28/2024 0800 by Lora Christopher RN  Positioning/Transfer Devices:   pillows   wedge  Intervention: Optimize Sensory and Perceptual Ability  Recent Flowsheet Documentation  Taken 7/28/2024 1600 by Lora Christopher RN  Pressure Reduction Techniques:   weight shift assistance provided   pressure points protected  Pressure Reduction Devices:   chair cushion utilized   feet on footrest/footstool   positioning supports utilized  Taken 7/28/2024 1400 by Lora Christopher RN  Pressure Reduction Techniques:   frequent weight shift encouraged   weight shift assistance provided   pressure points protected   heels elevated off  bed  Pressure Reduction Devices:   chair cushion utilized   positioning supports utilized   feet on footrest/footstool  Taken 7/28/2024 1200 by Lora Christopher RN  Pressure Reduction Techniques:   weight shift assistance provided   pressure points protected   heels elevated off bed  Pressure Reduction Devices:   specialty bed utilized   positioning supports utilized   foam padding utilized  Taken 7/28/2024 1000 by Lora Christopher RN  Pressure Reduction Techniques:   frequent weight shift encouraged   heels elevated off bed   pressure points protected  Pressure Reduction Devices:   specialty bed utilized   positioning supports utilized   foam padding utilized  Taken 7/28/2024 0800 by Lora Christopher RN  Pressure Reduction Techniques:   weight shift assistance provided   pressure points protected   heels elevated off bed  Pressure Reduction Devices:   specialty bed utilized   positioning supports utilized     Problem: Swallowing Impairment (Stroke, Hemorrhagic)  Goal: Optimal Eating and Swallowing Without Aspiration  Outcome: Ongoing, Progressing     Problem: Urinary Elimination Impaired (Stroke, Hemorrhagic)  Goal: Effective Urinary Elimination  Outcome: Ongoing, Progressing     Problem: Fall Injury Risk  Goal: Absence of Fall and Fall-Related Injury  Outcome: Ongoing, Progressing  Intervention: Identify and Manage Contributors  Recent Flowsheet Documentation  Taken 7/28/2024 0800 by Lora Christopher RN  Medication Review/Management: medications reviewed  Intervention: Promote Injury-Free Environment  Recent Flowsheet Documentation  Taken 7/28/2024 1600 by Lora Christopher RN  Safety Promotion/Fall Prevention: safety round/check completed  Taken 7/28/2024 1400 by Lora Christopher RN  Safety Promotion/Fall Prevention: safety round/check completed  Taken 7/28/2024 1200 by Lora Christopher RN  Safety Promotion/Fall Prevention: safety round/check completed  Taken 7/28/2024 1000 by Lora Christopher RN  Safety  Promotion/Fall Prevention: safety round/check completed  Taken 7/28/2024 0800 by Lora Christopher, RN  Safety Promotion/Fall Prevention: safety round/check completed

## 2024-07-28 NOTE — PROGRESS NOTES
"Intensive Care Follow-up     Hospital:  LOS: 2 days   Mr. Ron Cam, 53 y.o. male is followed for:   Nontraumatic intracerebral hemorrhage        Subjective     53-year-old male who denies a history of hypertension developed acute left-sided weakness and dysarthria on 7/26/2024 around 3:30 PM.  No prior similar episodes.     CT scan of the head in the emergency room revealed a basal ganglionic hemorrhage on the right.  Blood pressure was markedly elevated.  He has been started on a Cardene drip and has received some \"as needed\" antihypertensives in the ER and has been admitted to the intensive care unit  Interval History:  the chart has been reviewed.  The patient has done well overnight.  He does remain on quite a bit of Cardene for blood pressure control.    The patient's past medical, surgical and social history were reviewed and updated in Epic as appropriate.        Objective     Infusions:  niCARdipine, 5-15 mg/hr, Last Rate: 15 mg/hr (07/28/24 1247)  sodium chloride, 45 mL/hr, Last Rate: 45 mL/hr (07/28/24 0854)      Medications:  amLODIPine, 10 mg, Oral, Q24H  atorvastatin, 80 mg, Oral, Nightly  cloNIDine, 1 patch, Transdermal, Weekly  famotidine, 20 mg, Oral, BID AC  nitroglycerin, 2 inch, Topical, TID - Nitrates  sodium chloride, 10 mL, Intravenous, Q12H        Vital Sign Min/Max for last 24 hours  Temp  Min: 98.4 °F (36.9 °C)  Max: 99.3 °F (37.4 °C)   BP  Min: 107/68  Max: 159/91   Pulse  Min: 74  Max: 109   Resp  Min: 16  Max: 20   SpO2  Min: 89 %  Max: 98 %   No data recorded       Input/Output for last 24 hour shift  07/27 0701 - 07/28 0700  In: 2476 [P.O.:600; I.V.:1876]  Out: 2200 [Urine:2200]      Objective:  General Appearance:  Uncomfortable and in no acute distress.    Vital signs: (most recent): Blood pressure 133/79, pulse 87, temperature 98.6 °F (37 °C), temperature source Axillary, resp. rate 18, height 182.9 cm (72.01\"), weight 104 kg (229 lb 4.5 oz), SpO2 90%.    Heart: Normal rate.  " Regular rhythm.  S1 normal and S2 normal.    Abdomen: Abdomen is soft and non-distended.  Bowel sounds are normal.   There is no abdominal tenderness.     Extremities: Decreased range of motion.  (Left upper extremity is flaccid.)  Neurological: Patient is alert.  (Expressive aphasia).    Pupils:  Pupils are equal, round, and reactive to light.  Pupils are equal.   Skin:  Warm.                Results from last 7 days   Lab Units 07/28/24  0515 07/26/24  1703 07/26/24  1702   WBC 10*3/mm3 8.21  --  6.50   HEMOGLOBIN g/dL 13.1  --  15.3   HEMOGLOBIN, POC g/dL  --  15.0  --    PLATELETS 10*3/mm3 172  --  191     Results from last 7 days   Lab Units 07/28/24  1210 07/28/24  0515 07/28/24  0119 07/28/24  0016 07/27/24  1758 07/27/24  1230 07/27/24  0843   SODIUM mmol/L 140 139 138   < > 140   < > 139   POTASSIUM mmol/L  --  4.0  --   --  4.1  --  4.0   CO2 mmol/L  --  19.0*  --   --  24.0  --  23.0   BUN mg/dL  --  11  --   --  11  --  12   CREATININE mg/dL  --  0.99  --   --  0.85  --  0.81   GLUCOSE mg/dL  --  122*  --   --  127*  --  122*    < > = values in this interval not displayed.     Estimated Creatinine Clearance: 107.7 mL/min (by C-G formula based on SCr of 0.99 mg/dL).              I reviewed the patient's results and images.     Assessment & Plan   Impression        Nontraumatic intracerebral hemorrhage    Hypertensive crisis       Plan        Wean Cardene as able.  We will go ahead and enhance blood pressure control with further oral medications.  Continue with close neurological exams.  3% saline per neurology.  Maintain ICU status for now.    Plan of care and goals reviewed with mulitdisciplinary/antibiotic stewardship team during rounds.   I discussed the patient's findings and my recommendations with patient and nursing staff          Philip Harper MD, [facility]  Pulmonology and Critical Care Medicine

## 2024-07-28 NOTE — PLAN OF CARE
Problem: Skin Injury Risk Increased  Goal: Skin Health and Integrity  Outcome: Ongoing, Progressing  Intervention: Optimize Skin Protection  Description: Perform a full pressure injury risk assessment, as indicated by screening, upon admission to care unit.  Reassess skin (injury risk, full inspection) frequently (e.g., scheduled interval, with change in condition) to provide optimal early detection and prevention.  Maintain adequate tissue perfusion (e.g., encourage fluid balance; avoid crossing legs, constrictive clothing or devices) to promote tissue oxygenation.  Maintain head of bed at lowest degree of elevation tolerated, considering medical condition and other restrictions.  Avoid positioning onto an area that remains reddened.  Minimize incontinence and moisture (e.g., toileting schedule; moisture-wicking pad, diaper or incontinence collection device; skin moisture barrier).  Cleanse skin promptly and gently when soiled utilizing a pH-balanced cleanser.  Relieve and redistribute pressure (e.g., scheduled position changes, weight shifts, use of support surface, medical device repositioning, protective dressing application, use of positioning device, microclimate control, use of pressure-injury-monitor  Encourage increased activity, such as sitting in a chair at the bedside or early mobilization, when able to tolerate.  Recent Flowsheet Documentation  Taken 7/28/2024 0600 by Jayjay Sykes RN  Head of Bed (HOB) Positioning: HOB at 30 degrees  Taken 7/28/2024 0400 by Jayjay Sykes RN  Head of Bed (HOB) Positioning: HOB at 30 degrees  Taken 7/28/2024 0200 by Jayjay Sykes RN  Head of Bed (HOB) Positioning: HOB at 30 degrees  Taken 7/28/2024 0000 by Jayjay Sykes, BHARATHI  Pressure Reduction Techniques: weight shift assistance provided  Head of Bed (HOB) Positioning: HOB at 30 degrees  Pressure Reduction Devices: positioning supports utilized  Skin Protection: tubing/devices free from skin  contact  Taken 7/27/2024 2200 by Jayjay Sykes, RN  Head of Bed (HOB) Positioning: HOB at 30 degrees  Taken 7/27/2024 2000 by Jayjay Sykes RN  Pressure Reduction Techniques: weight shift assistance provided  Head of Bed (HOB) Positioning: HOB at 30 degrees  Pressure Reduction Devices: positioning supports utilized  Skin Protection: tubing/devices free from skin contact     Problem: Adult Inpatient Plan of Care  Goal: Plan of Care Review  Outcome: Ongoing, Progressing  Goal: Patient-Specific Goal (Individualized)  Outcome: Ongoing, Progressing  Goal: Absence of Hospital-Acquired Illness or Injury  Outcome: Ongoing, Progressing  Intervention: Identify and Manage Fall Risk  Description: Perform standard risk assessment on admission using a validated tool or comprehensive approach appropriate to the patient; reassess fall risk frequently, with change in status or transfer to another level of care.  Communicate fall injury risk to interprofessional healthcare team.  Determine need for increased observation, equipment and environmental modification, such as low bed, signage and supportive, nonskid footwear.  Adjust safety measures to individual developmental age, stage and identified risk factors.  Reinforce the importance of safety and physical activity with patient and family.  Perform regular intentional rounding to assess need for position change, pain assessment and personal needs, including assistance with toileting.  Recent Flowsheet Documentation  Taken 7/28/2024 0600 by Jayjay Sykes RN  Safety Promotion/Fall Prevention: safety round/check completed  Taken 7/28/2024 0400 by Jayjay Sykes RN  Safety Promotion/Fall Prevention: safety round/check completed  Taken 7/28/2024 0200 by Jayjay Sykes, RN  Safety Promotion/Fall Prevention: safety round/check completed  Taken 7/28/2024 0000 by Jayjay Sykes RN  Safety Promotion/Fall Prevention: safety round/check completed  Taken 7/27/2024 2200 by  Sykes, Pamula M, RN  Safety Promotion/Fall Prevention: safety round/check completed  Taken 7/27/2024 2000 by Jayjay Sykes RN  Safety Promotion/Fall Prevention: safety round/check completed  Intervention: Prevent Skin Injury  Description: Perform a screening for skin injury risk, such as pressure or moisture associated skin damage on admission and at regular intervals throughout hospital stay.  Keep all areas of skin (especially folds) clean and dry.  Maintain adequate skin hydration.  Relieve and redistribute pressure and protect bony prominences; implement measures based on patient-specific risk factors.  Match turning and repositioning schedule to clinical condition.  Encourage weight shift frequently; assist with reposition if unable to complete independently.  Float heels off bed; avoid pressure on the Achilles tendon.  Keep skin free from extended contact with medical devices.  Encourage functional activity and mobility, as early as tolerated.  Use aids (e.g., slide boards, mechanical lift) during transfer.  Recent Flowsheet Documentation  Taken 7/28/2024 0600 by Jayjay Sykes RN  Body Position:   weight shifting   tilted  Taken 7/28/2024 0400 by Jayjay Sykes RN  Body Position:   weight shifting   tilted  Taken 7/28/2024 0200 by Jayjay Sykes RN  Body Position:   weight shifting   tilted  Taken 7/28/2024 0000 by Jayjay Sykes RN  Body Position:   weight shifting   tilted  Skin Protection: tubing/devices free from skin contact  Taken 7/27/2024 2200 by Jayjay Sykes RN  Body Position:   weight shifting   tilted  Taken 7/27/2024 2000 by Jayjay Sykes RN  Body Position:   weight shifting   tilted  Skin Protection: tubing/devices free from skin contact  Intervention: Prevent and Manage VTE (Venous Thromboembolism) Risk  Description: Assess for VTE (venous thromboembolism) risk.  Encourage and assist with early ambulation.  Initiate and maintain compression or other therapy, as  indicated, based on identified risk in accordance with organizational protocol and provider order.  Encourage both active and passive leg exercises while in bed, if unable to ambulate.  Recent Flowsheet Documentation  Taken 7/28/2024 0600 by Jayjay Sykes RN  Activity Management: bedrest  Taken 7/28/2024 0400 by Jayjay Sykes, RN  Activity Management: bedrest  Taken 7/28/2024 0200 by Jayjay Sykes RN  Activity Management: bedrest  Taken 7/28/2024 0000 by Jayjay Sykes, RN  Activity Management: bedrest  VTE Prevention/Management: patient refused intervention  Taken 7/27/2024 2200 by Jayjay Sykes, RN  Activity Management: bedrest  Taken 7/27/2024 2000 by Jayjay Sykes RN  Activity Management: bedrest  VTE Prevention/Management: patient refused intervention  Goal: Optimal Comfort and Wellbeing  Outcome: Ongoing, Progressing  Intervention: Provide Person-Centered Care  Description: Use a family-focused approach to care.  Develop trust and rapport by proactively providing information, encouraging questions, addressing concerns and offering reassurance.  Acknowledge emotional response to hospitalization.  Recognize and utilize personal coping strategies.  Honor spiritual and cultural preferences.  Recent Flowsheet Documentation  Taken 7/27/2024 2000 by Jayjay Sykes, RN  Trust Relationship/Rapport:   care explained   emotional support provided   thoughts/feelings acknowledged   reassurance provided  Goal: Readiness for Transition of Care  Outcome: Ongoing, Progressing     Problem: Adjustment to Illness (Stroke, Hemorrhagic)  Goal: Optimal Coping  Outcome: Ongoing, Progressing  Intervention: Support Psychosocial Response to Stroke  Description: Acknowledge, normalize, validate intensity and complexity of response to the sudden impact of stroke, resulting impairments and uncertainty in recovery.  Encourage verbalization of feelings regarding current situation; provide active and empathic listening;  be sensitive to nonverbal communication.  Engage in early, proactive and ongoing discussion about goals of care and what matters most to them; facilitate shared decision-making.  Assess and monitor perspective on quality of life, personal and family wellbeing; consider palliative care approach to enhance symptom relief and quality of life.  Empower patient and support system to ask questions and be actively involved in decision-making.  Acknowledge and validate significance of lifestyle changes and expectations (e.g., roles, identity, rehabilitation, medication regimen, diet, exercise).  Recognize current coping strategies and assist in developing new strategies (e.g., mindfulness, relaxation, optimism).  Assess and monitor for signs and symptoms of poststroke depression and anxiety; consider brief psychologic intervention, such as motivational interviewing or problem-solving. Refer for comprehensive evaluation and treatment if symptoms persist.  Recent Flowsheet Documentation  Taken 7/27/2024 2000 by Jayjay Sykes, RN  Family/Support System Care: support provided     Problem: Bowel Elimination Impaired (Stroke, Hemorrhagic)  Goal: Effective Bowel Elimination  Outcome: Ongoing, Progressing     Problem: Cerebral Tissue Perfusion (Stroke, Hemorrhagic)  Goal: Optimal Cerebral Tissue Perfusion  Outcome: Ongoing, Progressing  Intervention: Protect and Optimize Cerebral Perfusion  Description: Closely monitor neurologic status to prevent or minimize further neurologic damage.  Implement hemodynamic, neurologic and cardiac monitoring to guide care, based on individual targeted parameters.  Anticipate treatment to mitigate coagulopathy due to current history of disease, as well as anticoagulant or antiplatelet use.  Reassess blood pressure frequently; manage hypertension or hypotension to attain recommended goal based on stroke treatment.  Anticipate fluid and pharmacologic therapy (e.g., osmotherapy, diuretic,  analgesic) to improve cerebral perfusion and intracranial pressure goals; titrate to target threshold and patient response; avoid fluid overload.  Manage fever; maintain normothermia to preserve cerebral metabolism.  Position head of bed to maintain cerebral perfusion; place head in midline position, as tolerated, to enhance venous flow.  Minimize activity that increases intrathoracic or intraabdominal pressure resulting in decreased venous outflow (e.g., hip flexion, Valsalva maneuver, coughing, vomiting).  Avoid hypoglycemia; maintain glycemic control.  Minimize stimulation; provide a quiet and calm environment.  Provide seizure surveillance; if seizure occurs, maintain safety, provide ordered therapy and postseizure care.  Anticipate procedure or surgery to restore blood flow and treat hemorrhage, such as aneurysm coiling, clipping, extraventricular drain (if subarachnoid hemorrhage), intraventricular catheter, extraventricular drain, craniotomy or craniectomy (if intr  Maintain external ventricular drain, when present (e.g., zeroed, closed/intermittent drainage, open/continuous drainage); monitor cerebrospinal fluid characteristics.  Anticipate treatment for vasospasm or delayed cerebral ischemia with subarachnoid hemorrhage (fluids, calcium channel blocker, vasopressor).  Recent Flowsheet Documentation  Taken 7/27/2024 2000 by Jayjay Sykes, RN  Cerebral Perfusion Promotion: blood pressure monitored     Problem: Cognitive Impairment (Stroke, Hemorrhagic)  Goal: Optimal Cognitive Function  Outcome: Ongoing, Progressing     Problem: Communication Impairment (Stroke, Hemorrhagic)  Goal: Effective Communication Skills  Outcome: Ongoing, Progressing     Problem: Functional Ability Impaired (Stroke, Hemorrhagic)  Goal: Optimal Functional Ability  Outcome: Ongoing, Progressing  Intervention: Optimize Functional Ability  Description: Evaluate and retrain in functional mobility and ADLs (activities of daily living)  based on ability and tolerance; provide level of assistance and supervision needed for safety.  Initiate sitting and standing when able; evaluate and address balance, postural control and fall risk.  Instruct in mobility and activities of daily living techniques with task-specific training individualized to patient needs and discharge disposition; promote highest level of independence and safety.  Pace, coordinate and organize care schedule and activity per patient preference, priorities and tolerance; train in energy-conservation techniques.  Provide repetitive, mobility-task training for gait disturbances; consider ankle foot orthosis or functional electrical stimulation if foot drop is present.  Consider multimodal therapeutic interventions, such as virtual reality, graded motor imagery/mirror therapy, robotic or treadmill training and FES (functional electrical stimulation).  Promote use of recommended adaptive equipment, assistive devices or orthoses, such as a cane or walker.  Maintain patient’s preferred routines and habits; respect privacy and personal space.  Provide a safe, barrier-free, uncluttered environment that promotes optimal level of function.  Recent Flowsheet Documentation  Taken 7/28/2024 0600 by Jayjay Sykes RN  Activity Management: bedrest  Taken 7/28/2024 0400 by Jayjay Sykes RN  Activity Management: bedrest  Taken 7/28/2024 0200 by Jayjay Sykes RN  Activity Management: bedrest  Taken 7/28/2024 0000 by Jayjay Sykes RN  Activity Management: bedrest  Taken 7/27/2024 2200 by Jayjay Sykes RN  Activity Management: bedrest  Taken 7/27/2024 2000 by Jayjay Sykes RN  Activity Management: bedrest     Problem: Pain (Stroke, Hemorrhagic)  Goal: Acceptable Pain Control  Outcome: Ongoing, Progressing     Problem: Respiratory Compromise (Stroke, Hemorrhagic)  Goal: Effective Oxygenation and Ventilation  Outcome: Ongoing, Progressing  Intervention: Optimize Oxygenation and  Ventilation  Description: Assess and monitor airway, breathing and circulation; maintain close surveillance for deterioration.  Maintain normal PaCO2 (partial pressure of carbon dioxide) to minimize risk of increased intracranial pressure and cerebral ischemia.  Maintain patent airway; position to minimize risk of obstruction, aspiration and ventilation-perfusion mismatch.  Provide oxygen therapy judiciously to avoid hyperoxemia; adjust to achieve oxygenation goal.  Encourage pulmonary hygiene and lung expansion therapy, such as deep breathing, suction and ambulation to minimize respiratory complication risk.  Consider inspiratory muscle training to decrease the risk of pulmonary complications.  Recognize risk for obstructive sleep apnea; anticipate the need for continuous pulse oximetry and noninvasive positive pressure ventilation.  Anticipate the need for intubation and mechanical ventilation for airway protection and respiratory support.  Recent Flowsheet Documentation  Taken 7/28/2024 0600 by Jayjay Sykes RN  Head of Bed (HOB) Positioning: HOB at 30 degrees  Taken 7/28/2024 0400 by Jayjay Sykes RN  Head of Bed (HOB) Positioning: HOB at 30 degrees  Taken 7/28/2024 0200 by Jayjay Sykes RN  Head of Bed (HOB) Positioning: HOB at 30 degrees  Taken 7/28/2024 0000 by Jayjay Sykes RN  Head of Bed (HOB) Positioning: HOB at 30 degrees  Taken 7/27/2024 2200 by Jayjay Sykes RN  Head of Bed (HOB) Positioning: HOB at 30 degrees  Taken 7/27/2024 2000 by Jayjay Sykes RN  Head of Bed (HOB) Positioning: HOB at 30 degrees     Problem: Sensorimotor Impairment (Stroke, Hemorrhagic)  Goal: Improved Sensorimotor Function  Outcome: Ongoing, Progressing  Intervention: Optimize Range of Motion, Motor Control and Function  Description: Evaluate passive and active range of motion, motor control, coordination and muscle tone.  Implement multimodal therapeutic positioning and ROM (range of motion) interventions  to minimize contracture risk and improve ROM (range of motion); ensure edema is managed.  Prevent and address shoulder pain and subluxation with interventions, such as careful positioning and handling techniques, ROM (range of motion) in protected ranges, electrical stimulation, functional orthosis and taping techniques.  Involve patient and family/caregiver in treatment planning and implementation.  Incorporate individualized, repetitive treatment activities that are meaningful and goal-directed.  Consider treatment interventions to improve motor control and function, such as constraint-induced motor therapy or modified version, virtual reality or F.E.S. (functional electrical stimulation).  If spasticity develops, quantify with a validated scale and implement interventions, such as positioning, range of motion and stretching.  Recent Flowsheet Documentation  Taken 7/28/2024 0600 by Jayjay Sykes RN  Positioning/Transfer Devices:   pillows   in use  Taken 7/28/2024 0400 by Jayjay Sykes RN  Positioning/Transfer Devices:   pillows   in use  Taken 7/28/2024 0200 by Jayjay Sykes RN  Positioning/Transfer Devices:   pillows   in use  Taken 7/28/2024 0000 by Jayjay Sykes RN  Positioning/Transfer Devices:   pillows   in use  Taken 7/27/2024 2200 by Jayjay Sykes RN  Positioning/Transfer Devices:   pillows   in use  Taken 7/27/2024 2000 by Jayjay Sykes RN  Positioning/Transfer Devices:   pillows   in use  Intervention: Optimize Sensory and Perceptual Ability  Description: Assess visual and perceptual status and implement intervention to address impairments.  Evaluate somatosensory status to determine type and extent of impairment; consider somatosensory retraining to improve sensory discrimination.  Provide interventions for hemispatial neglect, such as training in scanning of visual field.  Encourage use of vision to compensate for sensory loss, such as frequent visual scanning.  Identify and  address any hearing or visual acuity impairments.  Recent Flowsheet Documentation  Taken 7/28/2024 0000 by Jayjay Sykes, RN  Pressure Reduction Techniques: weight shift assistance provided  Pressure Reduction Devices: positioning supports utilized  Taken 7/27/2024 2000 by Jayjay Sykes, RN  Pressure Reduction Techniques: weight shift assistance provided  Pressure Reduction Devices: positioning supports utilized     Problem: Swallowing Impairment (Stroke, Hemorrhagic)  Goal: Optimal Eating and Swallowing Without Aspiration  Outcome: Ongoing, Progressing     Problem: Urinary Elimination Impaired (Stroke, Hemorrhagic)  Goal: Effective Urinary Elimination  Outcome: Ongoing, Progressing   Goal Outcome Evaluation:

## 2024-07-28 NOTE — PROGRESS NOTES
Stroke Neurology Progress Note     Subjective     This patient was seen in follow-up for: right basal ganglia hemorrhage  Present for the encounter were: self, patient, patient's wife    Subjective:  No acute events overnight.  CT head stable.  Patient continues on 3%.  Neurologic exam stable.    Objective      Temp:  [97.8 °F (36.6 °C)-99.3 °F (37.4 °C)] 98.6 °F (37 °C)  Heart Rate:  [] 99  Resp:  [16-20] 16  BP: (107-157)/() 144/75            Objective    Physical Exam:  General Appearance: Alert  HEENT: anicteric sclera, no scleral injection  Lungs: respirations appear comfortable, no obvious increased work of breathing  Extremities: No cyanosis or fingernail clubbing   Skin: No rashes in exposed skin areas     Neurological Examination:   Mental status: Alert and oriented.  Moderate dysarthria. Able to name and repeat.  Cranial Nerves: Visual fields intact. Extraocular movements intact with no nystagmus. Left facial droop.  Sensory: Decreased sensation to light touch in left upper and lower extremity.  Motor: Normal tone.  Left upper extremity neglect.  Strength:  LUE: 1/5 shoulder abduction, 0/5 biceps, 0/5 triceps, 0/5 wrist flexion and extension, 0/5  LLE: Able to wiggle toes, 1/5 foot flexion and extension  RUE: 5/5 biceps, triceps,     RLE:  5/5 hip flexion/extension      Labs:    Lab Results   Component Value Date    HGBA1C 5.50 07/27/2024      Lab Results   Component Value Date    CHOL 227 (H) 07/27/2024    TRIG 161 (H) 07/27/2024    HDL 58 07/27/2024     (H) 07/27/2024       Lab Results   Component Value Date    WBC 8.21 07/28/2024    HGB 13.1 07/28/2024    HCT 39.4 07/28/2024    MCV 93.4 07/28/2024     07/28/2024     Lab Results   Component Value Date    GLUCOSE 122 (H) 07/28/2024    BUN 11 07/28/2024    CREATININE 0.99 07/28/2024    BCR 11.1 07/28/2024    CO2 19.0 (L) 07/28/2024    CALCIUM 8.3 (L) 07/28/2024    AST 28 07/26/2024    ALT 35 07/26/2024       Results from  last 7 days   Lab Units 07/28/24  0515 07/28/24  0119 07/28/24  0016 07/27/24  1758 07/27/24  1230 07/27/24  0843 07/26/24  1703 07/26/24  1702   SODIUM mmol/L 139 138 167* 140   < > 139   < >  --    POTASSIUM mmol/L 4.0  --   --  4.1  --  4.0   < >  --    CHLORIDE mmol/L 107  --   --  106  --  105   < >  --    CO2 mmol/L 19.0*  --   --  24.0  --  23.0   < >  --    BUN mg/dL 11  --   --  11  --  12   < >  --    CREATININE mg/dL 0.99  --   --  0.85  --  0.81   < >  --    CALCIUM mg/dL 8.3*  --   --  8.7  --  8.7   < >  --    ALT (SGPT) U/L  --   --   --   --   --   --   --  35   AST (SGOT) U/L  --   --   --   --   --   --   --  28   GLUCOSE mg/dL 122*  --   --  127*  --  122*   < >  --     < > = values in this interval not displayed.       Results Review:      All brain images and reports were personally reviewed and I agree with the interpretations except as noted below.    CT head 7/28/2024  Impression: 1. Stable size of right basal ganglia intraparenchymal hemorrhage with increased surrounding vasogenic edema and slightly increased mass effect with 4 mm leftward shift of midline structures. 2. Stable mild chronic small vessel ischemic change and chronic lacunar infarct in the left basal ganglia.      CT Head Without Contrast 7/26/2024  Impression: Interval increase in intraparenchymal hemorrhage centered in the right basal ganglia with mild increase in mass effect on the adjacent lateral ventricles and minimal change of the basilar and suprasellar cisterns.     CT Angiogram Head and Neck 7/26/2024  Impression: 1. No evidence of intracranial large vessel arterial occlusion or aneurysm 2. Ulcerated plaque in the left distal common carotid and proximal internal carotid arteries that results in less than 50% luminal diameter stenosis 3. Patent right carotid and bilateral vertebral arteries without significant stenosis or dissection.    CT Head Without Contrast 7/26/2024  Impression: 1. 4 x 3 x 3.5 cm right basal  ganglia and periventricular white matter parenchymal hematoma with mass effect as described 2. No evidence of major vascular territory brain ischemia 3. Chronic small vessel ischemic changes.    Transthoracic echocardiogram 7/26/2024  Impression:    Left ventricular systolic function is normal. Estimated left ventricular EF = 65%    The cardiac valves are anatomically and functionally normal.    Saline test results are negative.          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 on admission      -Repeat CT head 6/29 at 0500  -Hold antiplatelet medication  -Serum Na 139  -Continue 3% NS rate 45 cc/hour, anticipate discontinue tomorrow if repeat CT head is stable  -SBP < 140  -PT OT recommend IRF  -DVT prophylaxis: SCD, plan to start enoxaparin 7/29  -Neurosurgery evaluated, non-surgical  -Follow-up in stroke clinic     # Extracranial atherosclerotic disease    -Eventually, we will need to treat extracranial atherosclerotic disease with aspirin but would wait at least 2 weeks post hemorrhage    -Continue atorvastatin 80 mg daily      Patient education: call 911 or present to emergency department with any stroke symptom, including unilateral face, arm, or leg weakness, numbness, or paresthesias, unilateral facial droop, speech deficits, dizziness with nausea, vomiting, nystagmus, and incoordination, visual deficits, or severe onset headache.    Stroke neurology will follow.  Please call with questions.     Griselda Rojo MD  [facility] [place] [facility]  07/28/24  11:13 EDT

## 2024-07-29 ENCOUNTER — APPOINTMENT (OUTPATIENT)
Dept: CT IMAGING | Facility: HOSPITAL | Age: 54
DRG: 064 | End: 2024-07-29
Payer: COMMERCIAL

## 2024-07-29 LAB
ANION GAP SERPL CALCULATED.3IONS-SCNC: 11 MMOL/L (ref 5–15)
ANION GAP SERPL CALCULATED.3IONS-SCNC: 9 MMOL/L (ref 5–15)
BUN SERPL-MCNC: 11 MG/DL (ref 6–20)
BUN SERPL-MCNC: 9 MG/DL (ref 6–20)
BUN/CREAT SERPL: 11.7 (ref 7–25)
BUN/CREAT SERPL: 9.6 (ref 7–25)
CALCIUM SPEC-SCNC: 8.1 MG/DL (ref 8.6–10.5)
CALCIUM SPEC-SCNC: 8.4 MG/DL (ref 8.6–10.5)
CHLORIDE SERPL-SCNC: 108 MMOL/L (ref 98–107)
CHLORIDE SERPL-SCNC: 110 MMOL/L (ref 98–107)
CO2 SERPL-SCNC: 21 MMOL/L (ref 22–29)
CO2 SERPL-SCNC: 21 MMOL/L (ref 22–29)
CREAT SERPL-MCNC: 0.94 MG/DL (ref 0.76–1.27)
CREAT SERPL-MCNC: 0.94 MG/DL (ref 0.76–1.27)
EGFRCR SERPLBLD CKD-EPI 2021: 96.9 ML/MIN/1.73
EGFRCR SERPLBLD CKD-EPI 2021: 96.9 ML/MIN/1.73
GLUCOSE SERPL-MCNC: 140 MG/DL (ref 65–99)
GLUCOSE SERPL-MCNC: 142 MG/DL (ref 65–99)
OSMOLALITY SERPL: 290 MOSM/KG (ref 275–295)
OSMOLALITY SERPL: 291 MOSM/KG (ref 275–295)
OSMOLALITY SERPL: 292 MOSM/KG (ref 275–295)
OSMOLALITY SERPL: 293 MOSM/KG (ref 275–295)
POTASSIUM SERPL-SCNC: 3.4 MMOL/L (ref 3.5–5.2)
POTASSIUM SERPL-SCNC: 3.6 MMOL/L (ref 3.5–5.2)
SODIUM SERPL-SCNC: 139 MMOL/L (ref 136–145)
SODIUM SERPL-SCNC: 139 MMOL/L (ref 136–145)
SODIUM SERPL-SCNC: 140 MMOL/L (ref 136–145)

## 2024-07-29 PROCEDURE — 84295 ASSAY OF SERUM SODIUM: CPT | Performed by: NURSE PRACTITIONER

## 2024-07-29 PROCEDURE — 97535 SELF CARE MNGMENT TRAINING: CPT

## 2024-07-29 PROCEDURE — 80048 BASIC METABOLIC PNL TOTAL CA: CPT | Performed by: INTERNAL MEDICINE

## 2024-07-29 PROCEDURE — 83930 ASSAY OF BLOOD OSMOLALITY: CPT | Performed by: NURSE PRACTITIONER

## 2024-07-29 PROCEDURE — 25010000002 ENOXAPARIN PER 10 MG

## 2024-07-29 PROCEDURE — 97530 THERAPEUTIC ACTIVITIES: CPT

## 2024-07-29 PROCEDURE — 70450 CT HEAD/BRAIN W/O DYE: CPT

## 2024-07-29 PROCEDURE — 25810000003 SODIUM CHLORIDE 0.9 % SOLUTION 250 ML FLEX CONT: Performed by: INTERNAL MEDICINE

## 2024-07-29 PROCEDURE — 99232 SBSQ HOSP IP/OBS MODERATE 35: CPT | Performed by: INTERNAL MEDICINE

## 2024-07-29 PROCEDURE — 25010000002 NICARDIPINE 2.5 MG/ML SOLUTION 10 ML VIAL: Performed by: INTERNAL MEDICINE

## 2024-07-29 PROCEDURE — 99233 SBSQ HOSP IP/OBS HIGH 50: CPT | Performed by: STUDENT IN AN ORGANIZED HEALTH CARE EDUCATION/TRAINING PROGRAM

## 2024-07-29 RX ORDER — POTASSIUM CHLORIDE 1.5 G/1.58G
40 POWDER, FOR SOLUTION ORAL EVERY 4 HOURS
Status: COMPLETED | OUTPATIENT
Start: 2024-07-29 | End: 2024-07-29

## 2024-07-29 RX ORDER — HYDRALAZINE HYDROCHLORIDE 25 MG/1
25 TABLET, FILM COATED ORAL EVERY 8 HOURS SCHEDULED
Status: DISCONTINUED | OUTPATIENT
Start: 2024-07-29 | End: 2024-07-30

## 2024-07-29 RX ORDER — ENOXAPARIN SODIUM 100 MG/ML
40 INJECTION SUBCUTANEOUS
Status: DISCONTINUED | OUTPATIENT
Start: 2024-07-29 | End: 2024-08-06 | Stop reason: HOSPADM

## 2024-07-29 RX ADMIN — SODIUM CHLORIDE 15 MG/HR: 9 INJECTION, SOLUTION INTRAVENOUS at 07:08

## 2024-07-29 RX ADMIN — NITROGLYCERIN 2 INCH: 20 OINTMENT TOPICAL at 09:13

## 2024-07-29 RX ADMIN — HYDRALAZINE HYDROCHLORIDE 10 MG: 10 TABLET ORAL at 05:20

## 2024-07-29 RX ADMIN — FAMOTIDINE 20 MG: 20 TABLET, FILM COATED ORAL at 17:39

## 2024-07-29 RX ADMIN — Medication 10 ML: at 20:40

## 2024-07-29 RX ADMIN — POTASSIUM CHLORIDE FOR ORAL SOLUTION 40 MEQ: 1.5 POWDER, FOR SOLUTION ORAL at 22:22

## 2024-07-29 RX ADMIN — SODIUM CHLORIDE 15 MG/HR: 9 INJECTION, SOLUTION INTRAVENOUS at 03:29

## 2024-07-29 RX ADMIN — Medication 12.5 MG: at 20:40

## 2024-07-29 RX ADMIN — SODIUM CHLORIDE 15 MG/HR: 9 INJECTION, SOLUTION INTRAVENOUS at 17:55

## 2024-07-29 RX ADMIN — Medication 10 MG: at 02:21

## 2024-07-29 RX ADMIN — SODIUM CHLORIDE 15 MG/HR: 9 INJECTION, SOLUTION INTRAVENOUS at 10:55

## 2024-07-29 RX ADMIN — AMLODIPINE BESYLATE 10 MG: 10 TABLET ORAL at 09:13

## 2024-07-29 RX ADMIN — ENOXAPARIN SODIUM 40 MG: 100 INJECTION SUBCUTANEOUS at 09:13

## 2024-07-29 RX ADMIN — ATORVASTATIN CALCIUM 80 MG: 40 TABLET, FILM COATED ORAL at 20:40

## 2024-07-29 RX ADMIN — POTASSIUM CHLORIDE FOR ORAL SOLUTION 40 MEQ: 1.5 POWDER, FOR SOLUTION ORAL at 20:39

## 2024-07-29 RX ADMIN — Medication 12.5 MG: at 10:54

## 2024-07-29 RX ADMIN — SODIUM CHLORIDE 12.5 MG/HR: 9 INJECTION, SOLUTION INTRAVENOUS at 14:14

## 2024-07-29 RX ADMIN — Medication 10 ML: at 09:15

## 2024-07-29 RX ADMIN — FAMOTIDINE 20 MG: 20 TABLET, FILM COATED ORAL at 09:13

## 2024-07-29 RX ADMIN — HYDRALAZINE HYDROCHLORIDE 25 MG: 25 TABLET ORAL at 14:14

## 2024-07-29 RX ADMIN — HYDRALAZINE HYDROCHLORIDE 25 MG: 25 TABLET ORAL at 22:22

## 2024-07-29 NOTE — PLAN OF CARE
Goal Outcome Evaluation:  Plan of Care Reviewed With: patient, spouse        Progress: improving  Outcome Evaluation: Pt is Max Ax2 for STS t/f and Dep for LB ADL performance. Pt presents w/ significant deficits in L sided weakness, neglect, and proprioceptive deficits requiring MAX VCs for attention/awareness to L side throughout. Recommend cont skilled IPOT POC to promote return to PLOF. Recommend pt DC to IP rehab.      Anticipated Discharge Disposition (OT): inpatient rehabilitation facility

## 2024-07-29 NOTE — THERAPY TREATMENT NOTE
Patient Name: Ron Cam  : 1970    MRN: [id number]                              Today's Date: 2024       Admit Date: 2024    Visit Dx:     ICD-10-CM ICD-9-CM   1. Intraparenchymal hemorrhage of brain  I61.9 431   2. Dysarthria  R47.1 784.51   3. Acute left-sided weakness  R53.1 728.87   4. Left sided numbness  R20.0 782.0   5. Hemineglect  R41.4 781.8   6. Hypertensive emergency  I16.1 401.9   7. Nontraumatic subcortical hemorrhage of right cerebral hemisphere  I61.0 431     Patient Active Problem List   Diagnosis    Nontraumatic intracerebral hemorrhage    Hypertensive crisis     History reviewed. No pertinent past medical history.  History reviewed. No pertinent surgical history.   General Information       Row Name 24 1237          OT Time and Intention    Document Type therapy note (daily note)  -CS     Mode of Treatment occupational therapy;co-treatment  -CS       Row Name 24 1237          General Information    Existing Precautions/Restrictions fall;other (see comments)  L sided weakness/neglect/proprioception deficits  -CS     Barriers to Rehab medically complex;cognitive status;visual deficit  -CS       Row Name 24 1237          Cognition    Orientation Status (Cognition) oriented x 4  -CS       Row Name 24 1237          Safety Issues, Functional Mobility    Impairments Affecting Function (Mobility) balance;coordination;endurance/activity tolerance;grasp;motor control;motor planning;muscle tone abnormal;visual/perceptual;strength;sensation/sensory awareness;range of motion (ROM);postural/trunk control;cognition  -CS     Cognitive Impairments, Mobility Safety/Performance attention;insight into deficits/self-awareness;sequencing abilities;awareness, need for assistance;safety precaution follow-through;safety precaution awareness  -CS               User Key  (r) = Recorded By, (t) = Taken By, (c) = Cosigned By      Initials Name Provider Type    CS Odalis Foss,  OT Occupational Therapist                     Mobility/ADL's       Row Name 07/29/24 1238          Bed Mobility    Bed Mobility supine-sit;sit-supine  -CS     Supine-Sit [place] (Bed Mobility) maximum assist (25% patient effort);2 person assist;verbal cues  -CS     Sit-Supine [place] (Bed Mobility) maximum assist (25% patient effort);2 person assist;verbal cues  -CS     Assistive Device (Bed Mobility) bed rails;draw sheet  -CS       Row Name 07/29/24 1238          Transfers    Transfers bed-chair transfer;sit-stand transfer;stand-sit transfer  -CS       Row Name 07/29/24 1238          Bed-Chair Transfer    Bed-Chair [place] (Transfers) dependent (less than 25% patient effort);2 person assist;verbal cues  -CS     Assistive Device (Bed-Chair Transfers) lift device  -CS     Comment, (Bed-Chair Transfer) BUE support w/ noted B knee buckling and significant L lateral lean  -CS       Row Name 07/29/24 1238          Sit-Stand Transfer    Sit-Stand [place] (Transfers) maximum assist (25% patient effort);2 person assist;verbal cues  -CS       Row Name 07/29/24 1238          Stand-Sit Transfer    Stand-Sit [place] (Transfers) maximum assist (25% patient effort);2 person assist;verbal cues  -CS       Row Name 07/29/24 1238          Activities of Daily Living    BADL Assessment/Intervention grooming  -CS       Row Name 07/29/24 1238          Lower Body Dressing Assessment/Training    [place] Level (Lower Body Dressing) don;socks;dependent (less than 25% patient effort)  -CS     Position (Lower Body Dressing) supine  -CS       Row Name 07/29/24 1238          Grooming Assessment/Training    [place] Level (Grooming) wash face, hands;set up  -CS     Position (Grooming) supported sitting  -CS               User Key  (r) = Recorded By, (t) = Taken By, (c) = Cosigned By      Initials Name Provider Type    CS Odalis Foss OT Occupational Therapist                   Obj/Interventions       Row Name  07/29/24 1238          Balance    Balance Assessment sitting static balance;sitting dynamic balance;standing static balance;standing dynamic balance  -CS     Static Sitting Balance maximum assist  -CS     Dynamic Sitting Balance maximum assist  -CS     Position, Sitting Balance sitting edge of bed  -CS     Static Standing Balance maximum assist;2-person assist  -CS     Dynamic Standing Balance maximum assist;2-person assist  -CS     Position/Device Used, Standing Balance supported  -CS     Balance Interventions sitting;standing;static;dynamic;dynamic reaching;weight shifting activity;occupation based/functional task  -CS     Comment, Balance significant L lateral lean in sitting, progressed to brief moments of Min A for RUE weight shifting/weight bearing  -CS               User Key  (r) = Recorded By, (t) = Taken By, (c) = Cosigned By      Initials Name Provider Type    CS Odalis Foss OT Occupational Therapist                   Goals/Plan    No documentation.                  Clinical Impression       Row Name 07/29/24 1239          Pain Assessment    Pain Intervention(s) Repositioned;Ambulation/increased activity  -     Additional Documentation Pain Scale: FACES Pre/Post-Treatment (Group)  -       Row Name 07/29/24 1239          Pain Scale: FACES Pre/Post-Treatment    Pain: FACES Scale, Pretreatment 2-->hurts little bit  -CS     Posttreatment Pain Rating 2-->hurts little bit  -CS     Pain Location - back  -CS     Pre/Posttreatment Pain Comment tolerated  -       Row Name 07/29/24 1239          Plan of Care Review    Plan of Care Reviewed With patient;spouse  -     Progress improving  -     Outcome Evaluation Pt is Max Ax2 for STS t/f and Dep for LB ADL performance. Pt presents w/ significant deficits in L sided weakness, neglect, and proprioceptive deficits requiring MAX VCs for attention/awareness to L side throughout. Recommend cont skilled IPOT POC to promote return to PLOF. Recommend pt DC to IP  rehab.  -CS       Row Name 07/29/24 1239          Therapy Plan Review/Discharge Plan (OT)    Anticipated Discharge Disposition (OT) inpatient rehabilitation facility  -CS       Row Name 07/29/24 1239          Vital Signs    Pre Systolic BP Rehab 136  -CS     Pre Treatment Diastolic BP 80  -CS     Post Systolic BP Rehab 137  -CS     Post Treatment Diastolic BP 78  -CS     Pretreatment Heart Rate (beats/min) 98  -CS     Posttreatment Heart Rate (beats/min) 96  -CS     Pre SpO2 (%) 93  -CS     O2 Delivery Pre Treatment room air  -CS     Post SpO2 (%) 92  -CS     O2 Delivery Post Treatment room air  -CS     Pre Patient Position Supine  -CS     Intra Patient Position Standing  -CS     Post Patient Position Sitting  -CS       Row Name 07/29/24 1239          Positioning and Restraints    Pre-Treatment Position in bed  -CS     Post Treatment Position chair  -CS     In Chair notified nsg;reclined;call light within reach;encouraged to call for assist;exit alarm on;RUE elevated;LUE elevated;waffle cushion;on mechanical lift sling;legs elevated;heels elevated;with family/caregiver  -CS               User Key  (r) = Recorded By, (t) = Taken By, (c) = Cosigned By      Initials Name Provider Type    CS Odalis Foss, OT Occupational Therapist                   Outcome Measures       Row Name 07/29/24 1241          How much help from another is currently needed...    Putting on and taking off regular lower body clothing? 1  -CS     Bathing (including washing, rinsing, and drying) 2  -CS     Toileting (which includes using toilet bed pan or urinal) 1  -CS     Putting on and taking off regular upper body clothing 2  -CS     Taking care of personal grooming (such as brushing teeth) 2  -CS     Eating meals 2  -CS     AM-PAC 6 Clicks Score (OT) 10  -CS       Row Name 07/29/24 0800          How much help from another person do you currently need...    Turning from your back to your side while in flat bed without using bedrails? 2  -OS      Moving from lying on back to sitting on the side of a flat bed without bedrails? 2  -OS     Moving to and from a bed to a chair (including a wheelchair)? 2  -OS     Standing up from a chair using your arms (e.g., wheelchair, bedside chair)? 2  -OS     Climbing 3-5 steps with a railing? 1  -OS     To walk in hospital room? 1  -OS     AM-PAC 6 Clicks Score (PT) 10  -OS     Highest Level of Mobility Goal 4 --> Transfer to chair/commode  -OS       Row Name 07/29/24 1241          Modified Newberry Scale    Modified Jani Scale 4 - Moderately severe disability.  Unable to walk without assistance, and unable to attend to own bodily needs without assistance.  -CS       Row Name 07/29/24 1241          Functional Assessment    Outcome Measure Options AM-PAC 6 Clicks Daily Activity (OT)  -               User Key  (r) = Recorded By, (t) = Taken By, (c) = Cosigned By      Initials Name Provider Type    OS Nilton Lucio RN Registered Nurse    Odalis Estrada OT Occupational Therapist                    Occupational Therapy Education       Title: PT OT SLP Therapies (In Progress)       Topic: Occupational Therapy (In Progress)       Point: ADL training (In Progress)       Description:   Instruct learner(s) on proper safety adaptation and remediation techniques during self care or transfers.   Instruct in proper use of assistive devices.                  Learning Progress Summary             Patient Acceptance, E, NR by AIDAN at 7/29/2024 1242    Acceptance, E, NR by  at 7/27/2024 0920    Comment: role of therapy                         Point: Home exercise program (In Progress)       Description:   Instruct learner(s) on appropriate technique for monitoring, assisting and/or progressing therapeutic exercises/activities.                  Learning Progress Summary             Patient Acceptance, E, NR by AIDAN at 7/29/2024 1242    Acceptance, E, NR by  at 7/27/2024 0920    Comment: role of therapy                          Point: Precautions (In Progress)       Description:   Instruct learner(s) on prescribed precautions during self-care and functional transfers.                  Learning Progress Summary             Patient Acceptance, E, NR by  at 7/29/2024 1242    Acceptance, E, NR by  at 7/27/2024 0920    Comment: role of therapy                         Point: Body mechanics (In Progress)       Description:   Instruct learner(s) on proper positioning and spine alignment during self-care, functional mobility activities and/or exercises.                  Learning Progress Summary             Patient Acceptance, E, NR by  at 7/29/2024 1242    Acceptance, E, NR by  at 7/27/2024 0920    Comment: role of therapy                                         User Key       Initials Effective Dates Name Provider Type Discipline     02/03/23 -  Sultana Garner, OT Occupational Therapist OT     09/02/21 -  Odalis Foss, OT Occupational Therapist OT                  OT Recommendation and Plan     Plan of Care Review  Plan of Care Reviewed With: patient, spouse  Progress: improving  Outcome Evaluation: Pt is Max Ax2 for STS t/f and Dep for LB ADL performance. Pt presents w/ significant deficits in L sided weakness, neglect, and proprioceptive deficits requiring MAX VCs for attention/awareness to L side throughout. Recommend cont skilled IPOT POC to promote return to PLOF. Recommend pt DC to IP rehab.     Time Calculation:         Time Calculation- OT       Row Name 07/29/24 1242             Time Calculation- OT    OT Start Time 0925  -CS      OT Received On 07/29/24  -CS      OT Goal Re-Cert Due Date 08/06/24  -CS         Timed Charges    18916 -  OT Neuromuscular Reeducation Minutes 4  -CS      40729 - OT Therapeutic Activity Minutes 5  -CS      23489 - OT Self Care/Mgmt Minutes 6  -CS         Total Minutes    Timed Charges Total Minutes 15  -CS       Total Minutes 15  -CS                User Key  (r) = Recorded By, (t) = Taken By,  (c) = Cosigned By      Initials Name Provider Type    CS Odalis Foss, KEE Occupational Therapist                  Therapy Charges for Today       Code Description Service Date Service Provider Modifiers Qty    [id number] HC OT SELF CARE/MGMT/TRAIN EA 15 MIN 7/29/2024 Odalis Foss OT GO 1                 Odalis Foss OT  7/29/2024

## 2024-07-29 NOTE — PAYOR COMM NOTE
"Sherine Cam (53 y.o. Male)       Date of Birth   1970    Social Security Number       Address   [address] [place] [address]    Home Phone   [phone]    MRN   [id number]       Gnosticism   None    Marital Status                               Admission Date   7/26/24    Admission Type   Emergency    Admitting Provider   Trevor Rosa MD    Attending Provider   Philip Harper MD    Department, Room/Bed   [address] ICU, N229/1       Discharge Date       Discharge Disposition       Discharge Destination                                 Attending Provider: Philip Harper MD    Allergies: No Known Allergies    Isolation: None   Infection: None   Code Status: Not on file    Ht: 182.9 cm (72.01\")   Wt: 115 kg (253 lb 8.5 oz)    Admission Cmt: None   Principal Problem: Nontraumatic intracerebral hemorrhage [I61.9]                   Active Insurance as of 7/26/2024       Primary Coverage       Payor Plan Insurance Group Employer/Plan Group    [employer] BLUE CROSS [employer] BLUE CROSS BLUE [employer] PPO [id number]       Payor Plan Address Payor Plan Phone Number Payor Plan Fax Number Effective Dates    [address] [phone]  11/1/2023 - None Entered    [place] [id number]         Subscriber Name Subscriber Birth Date Member ID       SHERINE CAM 1970 [id number]                     Emergency Contacts        (Rel.) Home Phone Work Phone Mobile Phone    WALKER CAM (Spouse) [phone] -- [phone]                 History & Physical        Trevor Rosa MD at 07/26/24 1813            CRITICAL CARE ADMISSION NOTE    Chief Complaint     Nontraumatic intracerebral hemorrhage    History of Present Illness     53-year-old male who denies a history of hypertension developed acute left-sided weakness and dysarthria on 7/26/2024 around 3:30 PM.  No prior similar episodes.    CT scan of the head in the emergency room revealed a basal " "ganglionic hemorrhage on the right.  Blood pressure was markedly elevated.  He has been started on a Cardene drip and has received some \"as needed\" antihypertensives in the ER and has been admitted to the intensive care unit    The patient is awake and alert.  Appears to respond to questions appropriately.  Indicates that he had hypertension as a child but not as an adult.  He is not supposed to be on any prescription medications.  Denies illicit drug use.  Denies regular alcohol use.  He is a non-smoker    Problem List, Surgical History, Family, Social History, and ROS     Nontraumatic intracerebral hemorrhage    Hypertensive crisis     No past surgical history on file.    No Known Allergies  No current facility-administered medications on file prior to encounter.     No current outpatient medications on file prior to encounter.     MEDICATION LIST AND ALLERGIES REVIEWED.    History reviewed. No pertinent family history.  Social History     Tobacco Use    Smoking status: Never    Smokeless tobacco: Never   Substance Use Topics    Alcohol use: Not Currently    Drug use: Not Currently     Social History     Social History Narrative            No children     FAMILY AND SOCIAL HISTORY REVIEWED.    Review of Systems  AS ABOVE OR ALL OTHER SYSTEMS REVIEWED AND ARE NEGATIVE.    Physical Exam and Clinical Information   /88 (BP Location: Right arm, Patient Position: Lying)   Pulse 94   Temp 97.9 °F (36.6 °C) (Oral)   Resp 14   SpO2 90%   Physical Exam:   GENERAL: Awake, no distress   HEENT: No evidence of trauma.  Sclera nonicteric pupils equal   LUNGS: Clear without wheezes or rhonchi   HEART: Regular rate and rhythm without murmurs   GI: Soft, nontender   EXTREMITIES: No clubbing, cyanosis, or edema   NEURO/PSYCH: Awake and alert.  Appears to respond to questions appropriately.  Dysarthria and left-sided facial droop.  Flaccid on the left with some withdrawal to pain in the left lower " "extremity.    Results from last 7 days   Lab Units 07/26/24  1703 07/26/24  1702   WBC 10*3/mm3  --  6.50   HEMOGLOBIN g/dL  --  15.3   HEMOGLOBIN, POC g/dL 15.0  --    PLATELETS 10*3/mm3  --  191     Results from last 7 days   Lab Units 07/26/24  1703   CREATININE mg/dL 0.90     CrCl cannot be calculated (Unknown ideal weight.).          No results found for: \"LACTATE\"     IMAGES: Chest x-ray reveals low lung volumes no consolidation or effusions    CT angiogram of the neck and head revealed no large vessel occlusion or aneurysm, a less than 50% stenosis of the left distal common carotid with ulcerated plaque.    CT scan of the head revealed a 4 x 3 x 3-1/2 cm right basal ganglionic and periventricular white matter parenchymal hematoma with mass effect    I reviewed the patient's results and images.     Assesment     Active Hospital Problems    Diagnosis     **Nontraumatic intracerebral hemorrhage     Hypertensive crisis      Plan/Recommendations     Admit to the intensive care unit  Blood pressure control  The patient is already been evaluated by neurosurgery  Vascular neurology consultation  Urine drug screen  Nonpharmacologic DVT prophylaxis  Ulcer prophylaxis    Critical Care time spent in direct patient care: 35 minutes (excluding procedure time, if applicable) including high complexity decision making to assess, manipulate, and support vital organ system failure in this individual who has impairment of one or more vital organ systems such that there is a high probability of imminent or life threatening deterioration in the patient’s condition.    SHALINI Rosa MD  Pulmonary and Critical Care Medicine     CC: No primary care provider on file.    Electronically signed by Trevor Rosa MD at 07/26/24 4049       Lab Results (all)       Procedure Component Value Units Date/Time    Basic Metabolic Panel [[id number]]  (Abnormal) Collected: 07/29/24 0344    Specimen: Blood Updated: 07/29/24 0412     " Glucose 142 mg/dL      BUN 9 mg/dL      Creatinine 0.94 mg/dL      Sodium 140 mmol/L      Potassium 3.4 mmol/L      Chloride 110 mmol/L      CO2 21.0 mmol/L      Calcium 8.1 mg/dL      BUN/Creatinine Ratio 9.6     Anion Gap 9.0 mmol/L      eGFR 96.9 mL/min/1.73     Narrative:      GFR Normal >60  Chronic Kidney Disease <60  Kidney Failure <15      Osmolality, Serum [[id number]]  (Normal) Collected: 07/29/24 0344    Specimen: Blood Updated: 07/29/24 0412     Osmolality 293 mOsm/kg     Osmolality, Serum [[id number]]  (Normal) Collected: 07/29/24 0003    Specimen: Blood Updated: 07/29/24 0034     Osmolality 291 mOsm/kg     Sodium [[id number]]  (Normal) Collected: 07/29/24 0003    Specimen: Blood Updated: 07/29/24 0025     Sodium 139 mmol/L     Basic Metabolic Panel [[id number]]  (Abnormal) Collected: 07/28/24 1736    Specimen: Blood Updated: 07/28/24 1913     Glucose 134 mg/dL      BUN 8 mg/dL      Creatinine 0.86 mg/dL      Sodium 139 mmol/L      Potassium 3.9 mmol/L      Comment: Slight hemolysis detected by analyzer. Result may be falsely elevated.        Chloride 107 mmol/L      CO2 21.0 mmol/L      Calcium 8.2 mg/dL      BUN/Creatinine Ratio 9.3     Anion Gap 11.0 mmol/L      eGFR 103.5 mL/min/1.73     Narrative:      GFR Normal >60  Chronic Kidney Disease <60  Kidney Failure <15      Osmolality, Serum [[id number]]  (Normal) Collected: 07/28/24 1736    Specimen: Blood Updated: 07/28/24 1859     Osmolality 291 mOsm/kg     Sodium [[id number]]  (Normal) Collected: 07/28/24 1210    Specimen: Blood Updated: 07/28/24 1302     Sodium 140 mmol/L     Osmolality, Serum [[id number]]  (Normal) Collected: 07/28/24 1210    Specimen: Blood Updated: 07/28/24 1251     Osmolality 292 mOsm/kg     Osmolality, Serum [[id number]]  (Normal) Collected: 07/28/24 0515    Specimen: Blood Updated: 07/28/24 0632     Osmolality 291 mOsm/kg     Basic Metabolic Panel [[id number]]  (Abnormal) Collected: 07/28/24 0515    Specimen: Blood Updated: 07/28/24  0631     Glucose 122 mg/dL      BUN 11 mg/dL      Creatinine 0.99 mg/dL      Sodium 139 mmol/L      Potassium 4.0 mmol/L      Comment: Slight hemolysis detected by analyzer. Result may be falsely elevated.        Chloride 107 mmol/L      CO2 19.0 mmol/L      Calcium 8.3 mg/dL      BUN/Creatinine Ratio 11.1     Anion Gap 13.0 mmol/L      eGFR 91.1 mL/min/1.73     Narrative:      GFR Normal >60  Chronic Kidney Disease <60  Kidney Failure <15      CBC & Differential [[id number]]  (Abnormal) Collected: 07/28/24 0515    Specimen: Blood Updated: 07/28/24 0535    Narrative:      The following orders were created for panel order CBC & Differential.  Procedure                               Abnormality         Status                     ---------                               -----------         ------                     CBC Auto Differential[[id number]]        Abnormal            Final result                 Please view results for these tests on the individual orders.    CBC Auto Differential [[id number]]  (Abnormal) Collected: 07/28/24 0515    Specimen: Blood Updated: 07/28/24 0535     WBC 8.21 10*3/mm3      RBC 4.22 10*6/mm3      Hemoglobin 13.1 g/dL      Hematocrit 39.4 %      MCV 93.4 fL      MCH 31.0 pg      MCHC 33.2 g/dL      RDW 13.7 %      RDW-SD 46.5 fl      MPV 9.2 fL      Platelets 172 10*3/mm3      Neutrophil % 74.5 %      Lymphocyte % 16.3 %      Monocyte % 7.9 %      Eosinophil % 0.7 %      Basophil % 0.4 %      Immature Grans % 0.2 %      Neutrophils, Absolute 6.11 10*3/mm3      Lymphocytes, Absolute 1.34 10*3/mm3      Monocytes, Absolute 0.65 10*3/mm3      Eosinophils, Absolute 0.06 10*3/mm3      Basophils, Absolute 0.03 10*3/mm3      Immature Grans, Absolute 0.02 10*3/mm3      nRBC 0.0 /100 WBC     Sodium [[id number]]  (Normal) Collected: 07/28/24 0119    Specimen: Blood Updated: 07/28/24 0155     Sodium 138 mmol/L     Osmolality, Serum [[id number]]  (Abnormal) Collected: 07/28/24 0016    Specimen: Blood  Updated: 07/28/24 0124     Osmolality 335 mOsm/kg     Sodium [[id number]]  (Abnormal) Collected: 07/28/24 0016    Specimen: Blood Updated: 07/28/24 0113     Sodium 167 mmol/L     Basic Metabolic Panel [[id number]]  (Abnormal) Collected: 07/27/24 1758    Specimen: Blood Updated: 07/27/24 1838     Glucose 127 mg/dL      BUN 11 mg/dL      Creatinine 0.85 mg/dL      Sodium 140 mmol/L      Potassium 4.1 mmol/L      Comment: Slight hemolysis detected by analyzer. Result may be falsely elevated.        Chloride 106 mmol/L      CO2 24.0 mmol/L      Calcium 8.7 mg/dL      BUN/Creatinine Ratio 12.9     Anion Gap 10.0 mmol/L      eGFR 103.9 mL/min/1.73     Narrative:      GFR Normal >60  Chronic Kidney Disease <60  Kidney Failure <15      Osmolality, Serum [[id number]]  (Normal) Collected: 07/27/24 1758    Specimen: Blood Updated: 07/27/24 1826     Osmolality 293 mOsm/kg     Sodium [[id number]]  (Normal) Collected: 07/27/24 1230    Specimen: Blood Updated: 07/27/24 1301     Sodium 140 mmol/L     Osmolality, Serum [[id number]]  (Normal) Collected: 07/27/24 1230    Specimen: Blood Updated: 07/27/24 1255     Osmolality 289 mOsm/kg     Osmolality, Urine - Urine, Clean Catch [[id number]]  (Normal) Collected: 07/27/24 0243    Specimen: Urine, Clean Catch Updated: 07/27/24 0957     Osmolality, Urine 658 mOsm/kg     Osmolality, Serum [[id number]]  (Normal) Collected: 07/27/24 0843    Specimen: Blood Updated: 07/27/24 0950     Osmolality 290 mOsm/kg     Basic Metabolic Panel [[id number]]  (Abnormal) Collected: 07/27/24 0843    Specimen: Blood Updated: 07/27/24 0911     Glucose 122 mg/dL      BUN 12 mg/dL      Creatinine 0.81 mg/dL      Sodium 139 mmol/L      Potassium 4.0 mmol/L      Comment: Slight hemolysis detected by analyzer. Result may be falsely elevated.        Chloride 105 mmol/L      CO2 23.0 mmol/L      Calcium 8.7 mg/dL      BUN/Creatinine Ratio 14.8     Anion Gap 11.0 mmol/L      eGFR 105.4 mL/min/1.73     Narrative:      GFR  Normal >60  Chronic Kidney Disease <60  Kidney Failure <15      Osmolality, Serum [[id number]]  (Normal) Collected: 07/27/24 0600    Specimen: Blood Updated: 07/27/24 0852     Osmolality 291 mOsm/kg     Osmolality, Serum [[id number]]  (Normal) Collected: 07/26/24 2354    Specimen: Blood Updated: 07/27/24 0848     Osmolality 292 mOsm/kg     Lipid Panel [[id number]]  (Abnormal) Collected: 07/27/24 0600    Specimen: Blood Updated: 07/27/24 0709     Total Cholesterol 227 mg/dL      Triglycerides 161 mg/dL      HDL Cholesterol 58 mg/dL      LDL Cholesterol  140 mg/dL      VLDL Cholesterol 29 mg/dL      LDL/HDL Ratio 2.36    Narrative:      Cholesterol Reference Ranges  (U.S. Department of Health and Human Services ATP III Classifications)    Desirable          <200 mg/dL  Borderline High    200-239 mg/dL  High Risk          >240 mg/dL      Triglyceride Reference Ranges  (U.S. Department of Health and Human Services ATP III Classifications)    Normal           <150 mg/dL  Borderline High  150-199 mg/dL  High             200-499 mg/dL  Very High        >500 mg/dL    HDL Reference Ranges  (U.S. Department of Health and Human Services ATP III Classifications)    Low     <40 mg/dl (major risk factor for CHD)  High    >60 mg/dl ('negative' risk factor for CHD)        LDL Reference Ranges  (U.S. Department of Health and Human Services ATP III Classifications)    Optimal          <100 mg/dL  Near Optimal     100-129 mg/dL  Borderline High  130-159 mg/dL  High             160-189 mg/dL  Very High        >189 mg/dL    Sodium [[id number]]  (Normal) Collected: 07/27/24 0600    Specimen: Blood Updated: 07/27/24 0709     Sodium 139 mmol/L     Hemoglobin A1c [[id number]]  (Normal) Collected: 07/27/24 0600    Specimen: Blood Updated: 07/27/24 0650     Hemoglobin A1C 5.50 %     Narrative:      Hemoglobin A1C Ranges:    Increased Risk for Diabetes  5.7% to 6.4%  Diabetes                     >= 6.5%  Diabetic Goal                < 7.0%    POC  Glucose Once [[id number]]  (Normal) Collected: 07/27/24 0539    Specimen: Blood Updated: 07/27/24 0548     Glucose 118 mg/dL     Basic Metabolic Panel [[id number]]  (Abnormal) Collected: 07/26/24 2354    Specimen: Blood Updated: 07/27/24 0025     Glucose 129 mg/dL      BUN 19 mg/dL      Creatinine 1.04 mg/dL      Sodium 138 mmol/L      Potassium 3.7 mmol/L      Comment: Slight hemolysis detected by analyzer. Result may be falsely elevated.        Chloride 102 mmol/L      CO2 23.0 mmol/L      Calcium 8.5 mg/dL      BUN/Creatinine Ratio 18.3     Anion Gap 13.0 mmol/L      eGFR 85.9 mL/min/1.73     Narrative:      GFR Normal >60  Chronic Kidney Disease <60  Kidney Failure <15      BNP [[id number]]  (Abnormal) Collected: 07/26/24 2354    Specimen: Blood Updated: 07/27/24 0023     proBNP 1,437.0 pg/mL     Narrative:      This assay is used as an aid in the diagnosis of individuals suspected of having heart failure. It can be used as an aid in the diagnosis of acute decompensated heart failure (ADHF) in patients presenting with signs and symptoms of ADHF to the emergency department (ED). In addition, NT-proBNP of <300 pg/mL indicates ADHF is not likely.    Age Range Result Interpretation  NT-proBNP Concentration (pg/mL:      <50             Positive            >450                   Gray                 300-450                    Negative             <300    50-75           Positive            >900                  Gray                300-900                  Negative            <300      >75             Positive            >1800                  Gray                300-1800                  Negative            <300    Sodium [[id number]]  (Normal) Collected: 07/26/24 2354    Specimen: Blood Updated: 07/27/24 0018     Sodium 138 mmol/L     POC Glucose Once [[id number]]  (Normal) Collected: 07/26/24 2343    Specimen: Blood Updated: 07/27/24 0001     Glucose 126 mg/dL     Fentanyl, Urine - Urine, Clean Catch [[id number]]   (Normal) Collected: 07/26/24 1950    Specimen: Urine, Clean Catch Updated: 07/26/24 2029     Fentanyl, Urine Negative    Narrative:      Negative Threshold:      Fentanyl 5 ng/mL     The normal value for the drug tested is negative. This report includes final unconfirmed screening results to be used for medical treatment purposes only. Unconfirmed results must not be used for non-medical purposes such as employment or legal testing. Clinical consideration should be applied to any drug of abuse test, particularly when unconfirmed results are used.           Urine Drug Screen - Urine, Clean Catch [[id number]]  (Normal) Collected: 07/26/24 1950    Specimen: Urine, Clean Catch Updated: 07/26/24 2009     THC, Screen, Urine Negative     Phencyclidine (PCP), Urine Negative     Cocaine Screen, Urine Negative     Methamphetamine, Ur Negative     Opiate Screen Negative     Amphetamine Screen, Urine Negative     Benzodiazepine Screen, Urine Negative     Tricyclic Antidepressants Screen Negative     Methadone Screen, Urine Negative     Barbiturates Screen, Urine Negative     Oxycodone Screen, Urine Negative     Buprenorphine, Screen, Urine Negative    Narrative:      Cutoff For Drugs Screened:    Amphetamines               500 ng/ml  Barbiturates               200 ng/ml  Benzodiazepines            150 ng/ml  Cocaine                    150 ng/ml  Methadone                  200 ng/ml  Opiates                    100 ng/ml  Phencyclidine               25 ng/ml  THC                         50 ng/ml  Methamphetamine            500 ng/ml  Tricyclic Antidepressants  300 ng/ml  Oxycodone                  100 ng/ml  Buprenorphine               10 ng/ml    The normal value for all drugs tested is negative. This report includes unconfirmed screening results, with the cutoff values listed, to be used for medical treatment purposes only.  Unconfirmed results must not be used for non-medical purposes such as employment or legal testing.   Clinical consideration should be applied to any drug of abuse test, particularly when unconfirmed results are used.      AST [[id number]]  (Normal) Collected: 07/26/24 1702    Specimen: Blood Updated: 07/26/24 1816     AST (SGOT) 28 U/L     Single High Sensitivity Troponin T [[id number]]  (Normal) Collected: 07/26/24 1702    Specimen: Blood Updated: 07/26/24 1743     HS Troponin T 13 ng/L     Narrative:      High Sensitive Troponin T Reference Range:  <14.0 ng/L- Negative Female for AMI  <22.0 ng/L- Negative Male for AMI  >=14 - Abnormal Female indicating possible myocardial injury.  >=22 - Abnormal Male indicating possible myocardial injury.   Clinicians would have to utilize clinical acumen, EKG, Troponin, and serial changes to determine if it is an Acute Myocardial Infarction or myocardial injury due to an underlying chronic condition.         ALT [[id number]]  (Normal) Collected: 07/26/24 1702    Specimen: Blood Updated: 07/26/24 1743     ALT (SGPT) 35 U/L     aPTT [[id number]]  (Normal) Collected: 07/26/24 1702    Specimen: Blood Updated: 07/26/24 1733     PTT 25.6 seconds     Narrative:      PTT = The equivalent PTT values for the therapeutic range of heparin levels at 0.3 to 0.5 U/ml are 60 to 70 seconds.    POC CHEM 8 [[id number]]  (Abnormal) Collected: 07/26/24 1703    Specimen: Blood Updated: 07/26/24 1715     Glucose 124 mg/dL      BUN 17 mg/dL      Creatinine 0.90 mg/dL      Sodium 137 mmol/L      POC Potassium 3.4 mmol/L      Chloride 102 mmol/L      Total CO2 23 mmol/L      Hemoglobin 15.0 g/dL      Comment: Serial Number: [id number]:  [id number]        Hematocrit 44 %      Ionized Calcium 1.10 mmol/L      eGFR 102.1 mL/min/1.73     [place] Draw [[id number]] Collected: 07/26/24 1702    Specimen: Blood Updated: 07/26/24 1715    Narrative:      The following orders were created for panel order [place] Draw.  Procedure                               Abnormality         Status                     ---------                                -----------         ------                     Green Top (Gel)[[id number]]                                  Final result               Lavender Top[[id number]]                                     Final result               Gold Top - SST[[id number]]                                   Final result               Gray Top[[id number]]                                         Final result               Light Blue Top[[id number]]                                   Final result                 Please view results for these tests on the individual orders.    Green Top (Gel) [[id number]] Collected: 07/26/24 1702    Specimen: Blood Updated: 07/26/24 1715     Extra Tube Hold for add-ons.     Comment: Auto resulted.       Lavender Top [[id number]] Collected: 07/26/24 1702    Specimen: Blood Updated: 07/26/24 1715     Extra Tube hold for add-on     Comment: Auto resulted       Gold Top - SST [[id number]] Collected: 07/26/24 1702    Specimen: Blood Updated: 07/26/24 1715     Extra Tube Hold for add-ons.     Comment: Auto resulted.       Gray Top [[id number]] Collected: 07/26/24 1702    Specimen: Blood Updated: 07/26/24 1715     Extra Tube Hold for add-ons.     Comment: Auto resulted.       Light Blue Top [[id number]] Collected: 07/26/24 1702    Specimen: Blood Updated: 07/26/24 1715     Extra Tube Hold for add-ons.     Comment: Auto resulted       CBC & Differential [[id number]]  (Normal) Collected: 07/26/24 1702    Specimen: Blood Updated: 07/26/24 1713    Narrative:      The following orders were created for panel order CBC & Differential.  Procedure                               Abnormality         Status                     ---------                               -----------         ------                     CBC Auto Differential[[id number]]        Normal              Final result                 Please view results for these tests on the individual orders.    CBC Auto Differential [[id number]]  (Normal) Collected:  07/26/24 1702    Specimen: Blood Updated: 07/26/24 1713     WBC 6.50 10*3/mm3      RBC 4.91 10*6/mm3      Hemoglobin 15.3 g/dL      Hematocrit 43.2 %      MCV 88.0 fL      MCH 31.2 pg      MCHC 35.4 g/dL      RDW 12.7 %      RDW-SD 41.4 fl      MPV 8.6 fL      Platelets 191 10*3/mm3      Neutrophil % 68.9 %      Lymphocyte % 20.2 %      Monocyte % 8.6 %      Eosinophil % 1.5 %      Basophil % 0.6 %      Immature Grans % 0.2 %      Neutrophils, Absolute 4.48 10*3/mm3      Lymphocytes, Absolute 1.31 10*3/mm3      Monocytes, Absolute 0.56 10*3/mm3      Eosinophils, Absolute 0.10 10*3/mm3      Basophils, Absolute 0.04 10*3/mm3      Immature Grans, Absolute 0.01 10*3/mm3      nRBC 0.0 /100 WBC           Imaging Results (All)       Procedure Component Value Units Date/Time    CT Head Without Contrast [[id number]] Collected: 07/29/24 0532     Updated: 07/29/24 0537    Narrative:      CT HEAD WO CONTRAST    Date of Exam: 7/29/2024 4:28 AM EDT    Indication: stability. Intracranial hemorrhage    Comparison: None available.    Technique: Axial CT images were obtained of the head without contrast administration.  Automated exposure control and iterative construction methods were used.      Findings:  Stable right basal ganglia hemorrhage measuring 51 x 29 mm in the axial plane. Stable surrounding region of cerebral edema. There is persistent mass effect on the right cerebral hemisphere with 4 mm leftward shift of midline structures. Stable patchy   white matter hypoattenuation otherwise. No new hemorrhage. No new hypoattenuating lesions. Stable chronic lacunar infarct in the right central liz. Orbits, calvarium, paranasal sinuses, mastoids and superficial soft tissues appear unremarkable.      Impression:      Impression:  1.Stable right basal ganglia hemorrhage with surrounding edema and mass effect.  2.Stable mild chronic small vessel ischemic change and chronic lacunar infarct in the right central  liz.        Electronically Signed: Tony Infante MD    7/29/2024 5:34 AM EDT    Workstation ID: [id number]    CT Head Without Contrast [[id number]] Collected: 07/28/24 0749     Updated: 07/28/24 0756    Narrative:      CT HEAD WO CONTRAST    Date of Exam: 7/28/2024 5:00 AM EDT    Indication: Intraparenchymal hemorrhage.    Comparison: 7/26/2024.    Technique: Axial CT images were obtained of the head without contrast administration.  Automated exposure control and iterative construction methods were used.      Findings:  There is a stable intraparenchymal hemorrhage in the lateral aspect of the right basal ganglia measuring up to 4.9 x 3.2 cm in the axial plane with surrounding vasogenic edema. The degree of edema appears increased from comparison CT. There is mass   effect with leftward shift of midline structures up to 4 mm, previously 2 mm. There is a chronic lacunar infarct in the left basal ganglia extending to the corona radiata. There is stable underlying mild patchy white matter hypoattenuation otherwise. No   new hemorrhage. No new hypoattenuating lesions in the brain. Orbits are unremarkable. The calvarium is intact. Superficial soft tissues are unremarkable. The sinuses and mastoids are clear.      Impression:      Impression:  1.Stable size of right basal ganglia intraparenchymal hemorrhage with increased surrounding vasogenic edema and slightly increased mass effect with 4 mm leftward shift of midline structures.  2.Stable mild chronic small vessel ischemic change and chronic lacunar infarct in the left basal ganglia.        Electronically Signed: Tony Infante MD    7/28/2024 7:52 AM EDT    Workstation ID: [id number]    MRI Brain Without Contrast [[id number]] Collected: 07/27/24 1709     Updated: 07/27/24 1716    Narrative:      MRI BRAIN WO CONTRAST    Date of Exam: 7/27/2024 4:48 PM EDT    Indication: right basal ganglia hemorrhage.     Comparison: 7/26/2024    Technique:  Routine  multiplanar/multisequence sequence images of the brain were obtained without contrast administration.    Findings: Stable right basal ganglia hemorrhage with surrounding edema. No underlying infarct. No evidence of a mass but this could be obscured by blood and edema. The hemorrhage measures 4.7 cm x 3 cm which is essentially unchanged compared with the   prior study. Localized surrounding mass effect with effacement of the right lateral ventricle and third ventricle but no midline shift. Extensive chronic small vessel/microangiopathic ischemic changes. Old pontine infarct. No extra-axial mass or   collection.    Orbital and parable soft tissues are normal. The paranasal sinuses are clear. The mastoid air cells are aerated.      Impression:      Stable right basal ganglia hemorrhage.        Electronically Signed: Roberto Mackey MD    7/27/2024 5:13 PM EDT    Workstation ID: [id number]    CT Head Without Contrast [[id number]] Collected: 07/26/24 2257     Updated: 07/26/24 2308    Narrative:      CT HEAD WO CONTRAST    Date of Exam: 7/26/2024 10:52 PM EDT    Indication: Stroke, follow up  ICH 6 hour post scan.    Comparison: CT same day    Technique: Axial CT images were obtained of the head without contrast administration.  Automated exposure control and iterative construction methods were used.      Findings:  Study is limited by motion.    Interval increase in size of parenchymal hemorrhage measuring approximately 2.5 x 4.5 cm (image 35 axial series) and 4 cm (image 54 coronal imaging). Previous measurements on similar slice selection measured 2.0 x 3.6 x 3.2 cm. Associated vasogenic edema   and sulcal effacement noted. Mass effect on the adjacent lateral ventricles noted. No definite additional areas of acute hemorrhage noted. There is some calcification within the left basal ganglia.    Approximately 4 mm right to left midline shift. Mild mass effect on the basilar and suprasellar cisterns.    White matter changes  compatible small vessel ischemic disease in this age group noted    Bones and soft tissues appear stable. Sinuses are patent.      Impression:      Impression:  Interval increase in intraparenchymal hemorrhage centered in the right basal ganglia with mild increase in mass effect on the adjacent lateral ventricles and minimal change of the basilar and suprasellar cisterns.    Findings called to the stroke coordinator at the time of interpretation        Electronically Signed: Saman Momin MD    7/26/2024 11:05 PM EDT    Workstation ID: [id number]    XR Chest 1 View [[id number]] Collected: 07/26/24 1750     Updated: 07/26/24 1754    Narrative:      XR CHEST 1 VW    Date of Exam: 7/26/2024 5:29 PM EDT    Indication: Acute Stroke Protocol (onset < 12 hrs)    Comparison: None available.    Findings:  Incomplete inspiration. Heart size and pulmonary vasculature within normal limits. Lungs clear. Costophrenic angles sharp      Impression:      Impression:  No active cardiopulmonary disease      Electronically Signed: Bird Rubio    7/26/2024 5:51 PM EDT    Workstation ID: [id number]    CT Angiogram Head w AI Analysis of LVO [[id number]] Collected: 07/26/24 1726     Updated: 07/26/24 1742    Narrative:      CT ANGIOGRAM NECK, CT ANGIOGRAM HEAD W AI ANALYSIS OF LVO    Date of Exam: 7/26/2024 4:56 PM EDT    Indication: Neuro Deficit, acute, Stroke suspected  Neuro deficit, acute stroke suspected.    Comparison: None available.    Technique: CTA of the neck was performed before and after the uneventful intravenous administration of 75 cc Isovue-370. Reconstructed coronal and sagittal images were also obtained. In addition, a 3-D volume rendered image was created for   interpretation. Automated exposure control and iterative reconstruction methods were used.    Findings:  Vascular: Normal arch anatomy. Right common carotid artery patent without stenosis. Cervical and intracranial right internal carotid artery patent without  significant stenosis or dissection. Mostly noncalcified plaque present in the distal left common   and proximal left internal carotid artery, containing a fairly large plaque ulcer. Plaque results in less than 50% luminal diameter stenosis of the origin of the internal carotid artery. Remainder of the cervical and intracranial left internal carotid   artery are patent without significant stenosis or dissection. Both vertebral arteries have subclavian origins. Both vertebral arteries are patent without significant stenosis or dissection. V4 segment of the right vertebral artery distal to the PICA   takeoff is relatively small, representing anatomic variation. Proximal anterior, middle, and posterior cerebral arteries are patent. Peripheral cerebral branches are grossly symmetric. No aneurysms are demonstrated    Extravascular: No contrast-enhancing intracranial abnormality. Right basal ganglia and periventricular white matter parenchymal hematoma noted, with mass effect as discussed in the report for head CT performed earlier. No acute soft tissue neck   abnormality. No acute abnormality of the upper thorax      Impression:      Impression:    1. No evidence of intracranial large vessel arterial occlusion or aneurysm  2. Ulcerated plaque in the left distal common carotid and proximal internal carotid arteries that results in less than 50% luminal diameter stenosis  3. Patent right carotid and bilateral vertebral arteries without significant stenosis or dissection      Electronically Signed: Bird Rubio    7/26/2024 5:39 PM EDT    Workstation ID: [id number]    CT Angiogram Neck [[id number]] Collected: 07/26/24 1726     Updated: 07/26/24 1742    Narrative:      CT ANGIOGRAM NECK, CT ANGIOGRAM HEAD W AI ANALYSIS OF LVO    Date of Exam: 7/26/2024 4:56 PM EDT    Indication: Neuro Deficit, acute, Stroke suspected  Neuro deficit, acute stroke suspected.    Comparison: None available.    Technique: CTA of the neck was  performed before and after the uneventful intravenous administration of 75 cc Isovue-370. Reconstructed coronal and sagittal images were also obtained. In addition, a 3-D volume rendered image was created for   interpretation. Automated exposure control and iterative reconstruction methods were used.    Findings:  Vascular: Normal arch anatomy. Right common carotid artery patent without stenosis. Cervical and intracranial right internal carotid artery patent without significant stenosis or dissection. Mostly noncalcified plaque present in the distal left common   and proximal left internal carotid artery, containing a fairly large plaque ulcer. Plaque results in less than 50% luminal diameter stenosis of the origin of the internal carotid artery. Remainder of the cervical and intracranial left internal carotid   artery are patent without significant stenosis or dissection. Both vertebral arteries have subclavian origins. Both vertebral arteries are patent without significant stenosis or dissection. V4 segment of the right vertebral artery distal to the PICA   takeoff is relatively small, representing anatomic variation. Proximal anterior, middle, and posterior cerebral arteries are patent. Peripheral cerebral branches are grossly symmetric. No aneurysms are demonstrated    Extravascular: No contrast-enhancing intracranial abnormality. Right basal ganglia and periventricular white matter parenchymal hematoma noted, with mass effect as discussed in the report for head CT performed earlier. No acute soft tissue neck   abnormality. No acute abnormality of the upper thorax      Impression:      Impression:    1. No evidence of intracranial large vessel arterial occlusion or aneurysm  2. Ulcerated plaque in the left distal common carotid and proximal internal carotid arteries that results in less than 50% luminal diameter stenosis  3. Patent right carotid and bilateral vertebral arteries without significant stenosis or  dissection      Electronically Signed: Bird Brandtnes    7/26/2024 5:39 PM EDT    Workstation ID: [id number]    CT Head Without Contrast Stroke Protocol [[id number]] Collected: 07/26/24 1657     Updated: 07/26/24 1722    Narrative:      CT HEAD WO CONTRAST STROKE PROTOCOL    Date of Exam: 7/26/2024 4:50 PM EDT    Indication: Neuro deficit, acute, stroke suspected  Neuro Deficit, acute, Stroke suspected.    Comparison: None available.    Technique: Axial CT images were obtained of the head without contrast administration.  Reconstructed coronal images were also obtained. Automated exposure control and iterative construction methods were used.    Scan Time: 4:55 p.m.  Results discussed with the stroke team navigator at 5:04 p.m.      Findings:  There is an acute intraparenchymal hematoma in the right basal ganglia and periventricular white matter. The hematoma measures 4 x 3 cm in axial dimension and 3.5 cm in craniocaudal dimension, and is associated with mild vasogenic edema. There is some   mass effect on the right lateral ventricle, and there is 4 mm right to left shift. There are scattered low-attenuation foci in the supratentorial white matter and basal ganglia that are likely chronic microvascular ischemic changes. There is an old   pontine lacunar infarct. There is no loss of gray-white differentiation.      Impression:      Impression:    1. 4 x 3 x 3.5 cm right basal ganglia and periventricular white matter parenchymal hematoma with mass effect as described  2. No evidence of major vascular territory brain ischemia  3. Chronic small vessel ischemic changes        Electronically Signed: Bird Joanne    7/26/2024 5:04 PM EDT    Workstation ID: [id number]             Physician Progress Notes (all)        Philip Harper MD at 07/29/24 1134            Intensive Care Follow-up     Hospital:  LOS: 3 days   Mr. Ron Cam, 53 y.o. male is followed for:   Nontraumatic intracerebral hemorrhage     Subjective  "  Subjective     53-year-old male who denies a history of hypertension developed acute left-sided weakness and dysarthria on 7/26/2024 around 3:30 PM.  No prior similar episodes.     CT scan of the head in the emergency room revealed a basal ganglionic hemorrhage on the right.  Blood pressure was markedly elevated.  He has been started on a Cardene drip and has received some \"as needed\" antihypertensives in the ER and has been admitted to the intensive care unit  Interval History:  Chart is been reviewed.  The patient has remained afebrile overnight.  He continues to have accelerated hypertension and is requiring Cardene infusion.  No report of headache or visual change.  Remains weak in the left side.    The patient's past medical, surgical and social history were reviewed and updated in Epic as appropriate.     Objective   Objective     Infusions:  niCARdipine, 5-15 mg/hr, Last Rate: 15 mg/hr (07/29/24 1055)      Medications:  amLODIPine, 10 mg, Oral, Q24H  atorvastatin, 80 mg, Oral, Nightly  cloNIDine, 1 patch, Transdermal, Weekly  enoxaparin, 40 mg, Subcutaneous, Q24H  famotidine, 20 mg, Oral, BID AC  hydrALAZINE, 25 mg, Oral, Q8H  metoprolol tartrate, 12.5 mg, Oral, Q12H  sodium chloride, 10 mL, Intravenous, Q12H        Vital Sign Min/Max for last 24 hours  Temp  Min: 98.4 °F (36.9 °C)  Max: 100 °F (37.8 °C)   BP  Min: 121/106  Max: 159/91   Pulse  Min: 85  Max: 114   Resp  Min: 16  Max: 18   SpO2  Min: 89 %  Max: 97 %   Flow (L/min)  Min: 2  Max: 2       Input/Output for last 24 hour shift  07/28 0701 - 07/29 0700  In: 5570.3 [P.O.:922; I.V.:4648.3]  Out: 1800 [Urine:1800]      Objective:  General Appearance:  Uncomfortable and in no acute distress.    Vital signs: (most recent): Blood pressure 137/78, pulse 98, temperature 98.8 °F (37.1 °C), temperature source Oral, resp. rate 18, height 182.9 cm (72.01\"), weight 115 kg (253 lb 8.5 oz), SpO2 92%.    HEENT: Normal HEENT exam.    Heart: Normal rate.  Regular " rhythm.  S1 normal and S2 normal.    Abdomen: Abdomen is soft and non-distended.  Bowel sounds are normal.   There is no abdominal tenderness.     Extremities: Decreased range of motion.  (Left upper extremity is flaccid.)  Neurological: Patient is alert.  (Mild expressive aphasia).    Pupils:  Pupils are equal, round, and reactive to light.  Pupils are equal.   Skin:  Warm.                Results from last 7 days   Lab Units 07/28/24  0515 07/26/24  1703 07/26/24  1702   WBC 10*3/mm3 8.21  --  6.50   HEMOGLOBIN g/dL 13.1  --  15.3   HEMOGLOBIN, POC g/dL  --  15.0  --    PLATELETS 10*3/mm3 172  --  191     Results from last 7 days   Lab Units 07/29/24  0344 07/29/24  0003 07/28/24  1736 07/28/24  1210 07/28/24  0515   SODIUM mmol/L 140 139 139   < > 139   POTASSIUM mmol/L 3.4*  --  3.9  --  4.0   CO2 mmol/L 21.0*  --  21.0*  --  19.0*   BUN mg/dL 9  --  8  --  11   CREATININE mg/dL 0.94  --  0.86  --  0.99   GLUCOSE mg/dL 142*  --  134*  --  122*    < > = values in this interval not displayed.     Estimated Creatinine Clearance: 119 mL/min (by C-G formula based on SCr of 0.94 mg/dL).            I reviewed the patient's results and images.     Assessment & Plan   Impression        Nontraumatic intracerebral hemorrhage    Hypertensive crisis       Plan        Wean Cardene as able.  Will go ahead and increase the dose of hydralazine and we will start Lopressor twice daily.  Stop nitroglycerin paste for now.  We will titrate these upwards as necessary to maintain blood pressure control.  Continue with physical and Occupational Therapy.  The patient is going to require rehabilitation.  3% saline has been turned off.  If we are able to get him off of his Cardene drip, we will plan to transition him to telemetry.    Plan of care and goals reviewed with mulitdisciplinary/antibiotic stewardship team during rounds.   I discussed the patient's findings and my recommendations with patient and nursing staff         Philip  MD Ler[place], [facility]  Pulmonology and Critical Care Medicine       Electronically signed by Philip Harper MD at 07/29/24 7916       Griselda Rojo MD at 07/29/24 0623          Stroke Neurology Progress Note     Subjective     This patient was seen in follow-up for: right basal ganglia hemorrhage  Present for the encounter were: self, patient, patient's wife    Subjective:  No acute events overnight.  CT head stable.  3% NS discontinued.  Neurologic exam stable.    Objective      Temp:  [98.4 °F (36.9 °C)-100 °F (37.8 °C)] 99.4 °F (37.4 °C)  Heart Rate:  [] 88  Resp:  [16-20] 18  BP: (121-159)/() 134/78            Objective    Physical Exam:  General Appearance: Alert  HEENT: anicteric sclera, no scleral injection  Lungs: respirations appear comfortable, no obvious increased work of breathing  Extremities: No cyanosis or fingernail clubbing   Skin: No rashes in exposed skin areas     Neurological Examination:   Mental status: Alert and oriented.  Moderate dysarthria. Able to name and repeat.  Cranial Nerves: Visual fields intact. Extraocular movements intact with no nystagmus. Left facial droop.  Sensory: Decreased sensation to light touch in left upper and lower extremity.  Motor: Normal tone.  Left upper extremity neglect.  Strength:  LUE: 1/5 shoulder abduction, 0/5 biceps, 0/5 triceps, 0/5 wrist flexion and extension, 0/5   LLE: Able to wiggle toes, 1/5 foot flexion and extension  RUE: 5/5 biceps, triceps,     RLE:  5/5 hip flexion/extension      Labs:    Lab Results   Component Value Date    HGBA1C 5.50 07/27/2024      Lab Results   Component Value Date    CHOL 227 (H) 07/27/2024    TRIG 161 (H) 07/27/2024    HDL 58 07/27/2024     (H) 07/27/2024       Lab Results   Component Value Date    WBC 8.21 07/28/2024    HGB 13.1 07/28/2024    HCT 39.4 07/28/2024    MCV 93.4 07/28/2024     07/28/2024     Lab Results   Component Value Date    GLUCOSE 142 (H) 07/29/2024    BUN 9  07/29/2024    CREATININE 0.94 07/29/2024    BCR 9.6 07/29/2024    CO2 21.0 (L) 07/29/2024    CALCIUM 8.1 (L) 07/29/2024    AST 28 07/26/2024    ALT 35 07/26/2024       Results from last 7 days   Lab Units 07/29/24  0344 07/29/24  0003 07/28/24  1736 07/28/24  1210 07/28/24  0515 07/26/24  1703 07/26/24  1702   SODIUM mmol/L 140 139 139   < > 139   < >  --    POTASSIUM mmol/L 3.4*  --  3.9  --  4.0   < >  --    CHLORIDE mmol/L 110*  --  107  --  107   < >  --    CO2 mmol/L 21.0*  --  21.0*  --  19.0*   < >  --    BUN mg/dL 9  --  8  --  11   < >  --    CREATININE mg/dL 0.94  --  0.86  --  0.99   < >  --    CALCIUM mg/dL 8.1*  --  8.2*  --  8.3*   < >  --    ALT (SGPT) U/L  --   --   --   --   --   --  35   AST (SGOT) U/L  --   --   --   --   --   --  28   GLUCOSE mg/dL 142*  --  134*  --  122*   < >  --     < > = values in this interval not displayed.       Results Review:      All brain images and reports were personally reviewed and I agree with the interpretations except as noted below.    CT head 7/29/2024  Impression: 1. Stable right basal ganglia hemorrhage with surrounding edema and mass effect. 2. Stable mild chronic small vessel ischemic change and chronic lacunar infarct in the right central liz.    CT head 7/28/2024  Impression: 1. Stable size of right basal ganglia intraparenchymal hemorrhage with increased surrounding vasogenic edema and slightly increased mass effect with 4 mm leftward shift of midline structures. 2. Stable mild chronic small vessel ischemic change and chronic lacunar infarct in the left basal ganglia.      CT Head Without Contrast 7/26/2024  Impression: Interval increase in intraparenchymal hemorrhage centered in the right basal ganglia with mild increase in mass effect on the adjacent lateral ventricles and minimal change of the basilar and suprasellar cisterns.     CT Angiogram Head and Neck 7/26/2024  Impression: 1. No evidence of intracranial large vessel arterial occlusion or  aneurysm 2. Ulcerated plaque in the left distal common carotid and proximal internal carotid arteries that results in less than 50% luminal diameter stenosis 3. Patent right carotid and bilateral vertebral arteries without significant stenosis or dissection.    CT Head Without Contrast 7/26/2024  Impression: 1. 4 x 3 x 3.5 cm right basal ganglia and periventricular white matter parenchymal hematoma with mass effect as described 2. No evidence of major vascular territory brain ischemia 3. Chronic small vessel ischemic changes.    Transthoracic echocardiogram 7/26/2024  Impression:    Left ventricular systolic function is normal. Estimated left ventricular EF = 65%    The cardiac valves are anatomically and functionally normal.    Saline test results are negative.         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 on admission      -Repeat CT head as needed for neurochange  -Hold antiplatelet medication  -Serum Na 140  -Discontinue 3% NS   -SBP < 140  -PT OT recommend IRF  -DVT prophylaxis: SCD, enoxaparin   -Neurosurgery evaluated, non-surgical  -Follow-up in stroke clinic     # Extracranial atherosclerotic disease    -Eventually, we will need to treat extracranial atherosclerotic disease with aspirin but would wait at least 2 weeks post hemorrhage (8/9/2024)    -Continue atorvastatin 80 mg daily      Patient education: call 911 or present to emergency department with any stroke symptom, including unilateral face, arm, or leg weakness, numbness, or paresthesias, unilateral facial droop, speech deficits, dizziness with nausea, vomiting, nystagmus, and incoordination, visual deficits, or severe onset headache.    Stroke neurology will follow.  Anticipate soon discharge to IRF.  Please call with questions.     Griselda Rojo MD  [facility] [place] STROKE NEURO  07/29/24  06:24 EDT        Electronically signed by Griselda Rojo MD at 07/29/24 0820       Philip Harper  "MD at 07/28/24 1443            Intensive Care Follow-up     Hospital:  LOS: 2 days   Mr. Ron Cam, 53 y.o. male is followed for:   Nontraumatic intracerebral hemorrhage     Subjective   Subjective     53-year-old male who denies a history of hypertension developed acute left-sided weakness and dysarthria on 7/26/2024 around 3:30 PM.  No prior similar episodes.     CT scan of the head in the emergency room revealed a basal ganglionic hemorrhage on the right.  Blood pressure was markedly elevated.  He has been started on a Cardene drip and has received some \"as needed\" antihypertensives in the ER and has been admitted to the intensive care unit  Interval History:  the chart has been reviewed.  The patient has done well overnight.  He does remain on quite a bit of Cardene for blood pressure control.    The patient's past medical, surgical and social history were reviewed and updated in Epic as appropriate.     Objective   Objective     Infusions:  niCARdipine, 5-15 mg/hr, Last Rate: 15 mg/hr (07/28/24 1247)  sodium chloride, 45 mL/hr, Last Rate: 45 mL/hr (07/28/24 0854)      Medications:  amLODIPine, 10 mg, Oral, Q24H  atorvastatin, 80 mg, Oral, Nightly  cloNIDine, 1 patch, Transdermal, Weekly  famotidine, 20 mg, Oral, BID AC  nitroglycerin, 2 inch, Topical, TID - Nitrates  sodium chloride, 10 mL, Intravenous, Q12H        Vital Sign Min/Max for last 24 hours  Temp  Min: 98.4 °F (36.9 °C)  Max: 99.3 °F (37.4 °C)   BP  Min: 107/68  Max: 159/91   Pulse  Min: 74  Max: 109   Resp  Min: 16  Max: 20   SpO2  Min: 89 %  Max: 98 %   No data recorded       Input/Output for last 24 hour shift  07/27 0701 - 07/28 0700  In: 2476 [P.O.:600; I.V.:1876]  Out: 2200 [Urine:2200]      Objective:  General Appearance:  Uncomfortable and in no acute distress.    Vital signs: (most recent): Blood pressure 133/79, pulse 87, temperature 98.6 °F (37 °C), temperature source Axillary, resp. rate 18, height 182.9 cm (72.01\"), weight 104 kg " (229 lb 4.5 oz), SpO2 90%.    Heart: Normal rate.  Regular rhythm.  S1 normal and S2 normal.    Abdomen: Abdomen is soft and non-distended.  Bowel sounds are normal.   There is no abdominal tenderness.     Extremities: Decreased range of motion.  (Left upper extremity is flaccid.)  Neurological: Patient is alert.  (Expressive aphasia).    Pupils:  Pupils are equal, round, and reactive to light.  Pupils are equal.   Skin:  Warm.                Results from last 7 days   Lab Units 07/28/24  0515 07/26/24  1703 07/26/24  1702   WBC 10*3/mm3 8.21  --  6.50   HEMOGLOBIN g/dL 13.1  --  15.3   HEMOGLOBIN, POC g/dL  --  15.0  --    PLATELETS 10*3/mm3 172  --  191     Results from last 7 days   Lab Units 07/28/24  1210 07/28/24  0515 07/28/24  0119 07/28/24  0016 07/27/24  1758 07/27/24  1230 07/27/24  0843   SODIUM mmol/L 140 139 138   < > 140   < > 139   POTASSIUM mmol/L  --  4.0  --   --  4.1  --  4.0   CO2 mmol/L  --  19.0*  --   --  24.0  --  23.0   BUN mg/dL  --  11  --   --  11  --  12   CREATININE mg/dL  --  0.99  --   --  0.85  --  0.81   GLUCOSE mg/dL  --  122*  --   --  127*  --  122*    < > = values in this interval not displayed.     Estimated Creatinine Clearance: 107.7 mL/min (by C-G formula based on SCr of 0.99 mg/dL).              I reviewed the patient's results and images.     Assessment & Plan   Impression        Nontraumatic intracerebral hemorrhage    Hypertensive crisis       Plan        Wean Cardene as able.  We will go ahead and enhance blood pressure control with further oral medications.  Continue with close neurological exams.  3% saline per neurology.  Maintain ICU status for now.    Plan of care and goals reviewed with mulitdisciplinary/antibiotic stewardship team during rounds.   I discussed the patient's findings and my recommendations with patient and nursing staff          Philip Harper MD, [facility]  Pulmonology and Critical Care Medicine       Electronically signed by Philip Harper  MD at 07/28/24 1447       Kane Hoff PA-C at 07/28/24 1355          HOD# : 2    No events last night  Patient showing vast improvements.  Wife is at bedside noting that he is cutting up with the nurses.    Patient's gaze past midline has improved quite a bit but he still has a loss of visual field on his left side.  Patient still neglecting his left upper extremity but is able to move his left lower extremity with reasonable strength to command.  That is a big improvement.    CT of the head today is stable compared to the CT yesterday    Nontraumatic intracerebral hemorrhage    Hypertensive crisis      Temp:  [98.4 °F (36.9 °C)-99.3 °F (37.4 °C)] 98.6 °F (37 °C)  Heart Rate:  [] 87  Resp:  [16-20] 18  BP: (107-159)/() 133/79  I/O last 3 completed shifts:  In: 3808.4 [P.O.:600; I.V.:3208.4]  Out: 3625 [Urine:3625]  I/O this shift:  In: 2793.3 [P.O.:460; I.V.:2333.3]  Out: -   Vital signs were reviewed and documented in the chart      EXAM   Body mass index is 31.09 kg/m².      Patient appeared in good neurologic function with normal comprehension   Speech has improved.  Patient still with left-sided facial droop left upper extremity flaccid left lower extremity shows improvement of coordination and strength.    Right side is normal.  Patient following all with the conversation and speaking with reasonable articulation but still with some stuttered speech    DIAGNOSIS  Right thalamic hemorrhage  \Left-sided weakness      PLAN    Continue to monitor neurologic exam.  If declining reasonable to repeat CT scan but at this point hopefully he will not show any signs of rebleeding.    Hopefully no involvement with neurosurgery at this point.            Electronically signed by Kane Hoff PA-C at 07/28/24 8426       Griselda Rojo MD at 07/28/24 1112          Stroke Neurology Progress Note     Subjective     This patient was seen in follow-up for: right basal ganglia hemorrhage  Present for the  encounter were: self, patient, patient's wife    Subjective:  No acute events overnight.  CT head stable.  Patient continues on 3%.  Neurologic exam stable.    Objective      Temp:  [97.8 °F (36.6 °C)-99.3 °F (37.4 °C)] 98.6 °F (37 °C)  Heart Rate:  [] 99  Resp:  [16-20] 16  BP: (107-157)/() 144/75            Objective    Physical Exam:  General Appearance: Alert  HEENT: anicteric sclera, no scleral injection  Lungs: respirations appear comfortable, no obvious increased work of breathing  Extremities: No cyanosis or fingernail clubbing   Skin: No rashes in exposed skin areas     Neurological Examination:   Mental status: Alert and oriented.  Moderate dysarthria. Able to name and repeat.  Cranial Nerves: Visual fields intact. Extraocular movements intact with no nystagmus. Left facial droop.  Sensory: Decreased sensation to light touch in left upper and lower extremity.  Motor: Normal tone.  Left upper extremity neglect.  Strength:  LUE: 1/5 shoulder abduction, 0/5 biceps, 0/5 triceps, 0/5 wrist flexion and extension, 0/5  LLE: Able to wiggle toes, 1/5 foot flexion and extension  RUE: 5/5 biceps, triceps,     RLE:  5/5 hip flexion/extension      Labs:    Lab Results   Component Value Date    HGBA1C 5.50 07/27/2024      Lab Results   Component Value Date    CHOL 227 (H) 07/27/2024    TRIG 161 (H) 07/27/2024    HDL 58 07/27/2024     (H) 07/27/2024       Lab Results   Component Value Date    WBC 8.21 07/28/2024    HGB 13.1 07/28/2024    HCT 39.4 07/28/2024    MCV 93.4 07/28/2024     07/28/2024     Lab Results   Component Value Date    GLUCOSE 122 (H) 07/28/2024    BUN 11 07/28/2024    CREATININE 0.99 07/28/2024    BCR 11.1 07/28/2024    CO2 19.0 (L) 07/28/2024    CALCIUM 8.3 (L) 07/28/2024    AST 28 07/26/2024    ALT 35 07/26/2024       Results from last 7 days   Lab Units 07/28/24  0515 07/28/24  0119 07/28/24  0016 07/27/24  1758 07/27/24  1230 07/27/24  0843 07/26/24  1703  07/26/24  1702   SODIUM mmol/L 139 138 167* 140   < > 139   < >  --    POTASSIUM mmol/L 4.0  --   --  4.1  --  4.0   < >  --    CHLORIDE mmol/L 107  --   --  106  --  105   < >  --    CO2 mmol/L 19.0*  --   --  24.0  --  23.0   < >  --    BUN mg/dL 11  --   --  11  --  12   < >  --    CREATININE mg/dL 0.99  --   --  0.85  --  0.81   < >  --    CALCIUM mg/dL 8.3*  --   --  8.7  --  8.7   < >  --    ALT (SGPT) U/L  --   --   --   --   --   --   --  35   AST (SGOT) U/L  --   --   --   --   --   --   --  28   GLUCOSE mg/dL 122*  --   --  127*  --  122*   < >  --     < > = values in this interval not displayed.       Results Review:      All brain images and reports were personally reviewed and I agree with the interpretations except as noted below.    CT head 7/28/2024  Impression: 1. Stable size of right basal ganglia intraparenchymal hemorrhage with increased surrounding vasogenic edema and slightly increased mass effect with 4 mm leftward shift of midline structures. 2. Stable mild chronic small vessel ischemic change and chronic lacunar infarct in the left basal ganglia.      CT Head Without Contrast 7/26/2024  Impression: Interval increase in intraparenchymal hemorrhage centered in the right basal ganglia with mild increase in mass effect on the adjacent lateral ventricles and minimal change of the basilar and suprasellar cisterns.     CT Angiogram Head and Neck 7/26/2024  Impression: 1. No evidence of intracranial large vessel arterial occlusion or aneurysm 2. Ulcerated plaque in the left distal common carotid and proximal internal carotid arteries that results in less than 50% luminal diameter stenosis 3. Patent right carotid and bilateral vertebral arteries without significant stenosis or dissection.    CT Head Without Contrast 7/26/2024  Impression: 1. 4 x 3 x 3.5 cm right basal ganglia and periventricular white matter parenchymal hematoma with mass effect as described 2. No evidence of major vascular territory  brain ischemia 3. Chronic small vessel ischemic changes.    Transthoracic echocardiogram 7/26/2024  Impression:    Left ventricular systolic function is normal. Estimated left ventricular EF = 65%    The cardiac valves are anatomically and functionally normal.    Saline test results are negative.         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 on admission      -Repeat CT head 6/29 at 0500  -Hold antiplatelet medication  -Serum Na 139  -Continue 3% NS rate 45 cc/hour, anticipate discontinue tomorrow if repeat CT head is stable  -SBP < 140  -PT OT recommend IRF  -DVT prophylaxis: SCD, plan to start enoxaparin 7/29  -Neurosurgery evaluated, non-surgical  -Follow-up in stroke clinic     # Extracranial atherosclerotic disease    -Eventually, we will need to treat extracranial atherosclerotic disease with aspirin but would wait at least 2 weeks post hemorrhage    -Continue atorvastatin 80 mg daily      Patient education: call 911 or present to emergency department with any stroke symptom, including unilateral face, arm, or leg weakness, numbness, or paresthesias, unilateral facial droop, speech deficits, dizziness with nausea, vomiting, nystagmus, and incoordination, visual deficits, or severe onset headache.    Stroke neurology will follow.  Please call with questions.     Griselda Rojo MD  [facility] [place] STROKE NEURO  07/28/24  11:13 EDT        Electronically signed by Griselda Rojo MD at 07/28/24 1121       Philip Harper MD at 07/27/24 1401            Intensive Care Follow-up     Hospital:  LOS: 1 day   Mr. Ron Cam, 53 y.o. male is followed for:   Nontraumatic intracerebral hemorrhage     Subjective   Subjective     53-year-old male who denies a history of hypertension developed acute left-sided weakness and dysarthria on 7/26/2024 around 3:30 PM.  No prior similar episodes.     CT scan of the head in the emergency room revealed a basal ganglionic  "hemorrhage on the right.  Blood pressure was markedly elevated.  He has been started on a Cardene drip and has received some \"as needed\" antihypertensives in the ER and has been admitted to the intensive care unit  Interval History:  The chart has been reviewed.  The patient is still having some garbled speech.  He did pass his swallow evaluation and has been taking oral nutrition.  He does remain on a Cardene drip for blood pressure control.    The patient's past medical, surgical and social history were reviewed and updated in Epic as appropriate.     Objective   Objective     Infusions:  niCARdipine, 5-15 mg/hr, Last Rate: 7.5 mg/hr (07/27/24 1329)  sodium chloride, 40 mL/hr, Last Rate: 40 mL/hr (07/27/24 1219)  sodium chloride, 45 mL/hr, Last Rate: 45 mL/hr (07/27/24 1327)      Medications:  amLODIPine, 10 mg, Oral, Q24H  cloNIDine, 1 patch, Transdermal, Weekly  famotidine, 20 mg, Intravenous, Q12H  nitroglycerin, 2 inch, Topical, TID - Nitrates  sodium chloride, 10 mL, Intravenous, Q12H  sodium chloride, 10 mL, Intravenous, Q12H        Vital Sign Min/Max for last 24 hours  Temp  Min: 97.8 °F (36.6 °C)  Max: 98.3 °F (36.8 °C)   BP  Min: 99/51  Max: 196/113   Pulse  Min: 71  Max: 118   Resp  Min: 14  Max: 20   SpO2  Min: 90 %  Max: 98 %   No data recorded       Input/Output for last 24 hour shift  07/26 0701 - 07/27 0700  In: 1332.4 [I.V.:1332.4]  Out: 1425 [Urine:1425]      Objective:  General Appearance:  Uncomfortable and in no acute distress.    Vital signs: (most recent): Blood pressure 135/80, pulse 87, temperature 97.8 °F (36.6 °C), temperature source Oral, resp. rate 16, height 182.9 cm (72.01\"), weight 104 kg (229 lb 4.5 oz), SpO2 95%.    HEENT: (Left-sided facial droop)    Heart: Normal rate.  Regular rhythm.  S1 normal and S2 normal.    Abdomen: Abdomen is soft and non-distended.  Bowel sounds are normal.   There is no abdominal tenderness.     Extremities: Decreased range of motion.  (Left upper " extremity is flaccid.)  Neurological: Patient is alert.  (Expressive aphasia).    Pupils:  Pupils are equal, round, and reactive to light.  Pupils are equal.   Skin:  Warm.                Results from last 7 days   Lab Units 07/26/24  1703 07/26/24  1702   WBC 10*3/mm3  --  6.50   HEMOGLOBIN g/dL  --  15.3   HEMOGLOBIN, POC g/dL 15.0  --    PLATELETS 10*3/mm3  --  191     Results from last 7 days   Lab Units 07/27/24  1230 07/27/24  0843 07/27/24  0600 07/26/24  2354 07/26/24  2354 07/26/24  1703   SODIUM mmol/L 140 139 139   < > 138  138  --    POTASSIUM mmol/L  --  4.0  --   --  3.7  --    CO2 mmol/L  --  23.0  --   --  23.0  --    BUN mg/dL  --  12  --   --  19  --    CREATININE mg/dL  --  0.81  --   --  1.04 0.90   GLUCOSE mg/dL  --  122*  --   --  129*  --     < > = values in this interval not displayed.     Estimated Creatinine Clearance: 131.6 mL/min (by C-G formula based on SCr of 0.81 mg/dL).              I reviewed the patient's results and images.     Assessment & Plan   Impression        Nontraumatic intracerebral hemorrhage    Hypertensive crisis       Plan        Continue with close blood pressure control as before.  We will try to wean the Cardene.  Continue with clonidine and nitroglycerin for now.  Start Norvasc 10 mg by mouth daily.  We will add oral medications as needed today.  Continue with risk stratification.  He will remain in the intensive care unit today for close monitoring.    Plan of care and goals reviewed with mulitdisciplinary/antibiotic stewardship team during rounds.   I discussed the patient's findings and my recommendations with patient, family, and nursing staff     High level of risk due to:  drug(s) requiring intensive monitoring for toxicity and parenteral controlled substances.        Philip Harper MD, [facility]  Pulmonology and Critical Care Medicine       Electronically signed by Philip Harper MD at 07/27/24 9227       Griselda Rojo MD at 07/27/24 1037           Stroke Neurology Progress Note     Subjective     This patient was seen in follow-up for: right basal ganglia hemorrhage  Present for the encounter were: self, patient, patient's wife    Subjective:  My first encounter with the patient.  Interval increase in bleed size.  All questions and concerns were answered.    Objective      Temp:  [97.8 °F (36.6 °C)-98.3 °F (36.8 °C)] 97.8 °F (36.6 °C)  Heart Rate:  [] 71  Resp:  [14-20] 16  BP: ()/() 114/67            Objective    Physical Exam:  General Appearance: Alert  HEENT: anicteric sclera, no scleral injection  Lungs: respirations appear comfortable, no obvious increased work of breathing  Extremities: No cyanosis or fingernail clubbing   Skin: No rashes in exposed skin areas     Neurological Examination:   Mental status: Alert and oriented.  Severe dysarthria. Able to name and repeat.  Cranial Nerves: Visual fields intact. Extraocular movements intact with no nystagmus.  Right gaze deviation.  Left facial droop.  Sensory: Normal sensory exam to light touch.  Motor: Normal tone.  Left upper extremity neglect.  Strength:  LUE: 0/5 biceps, triceps,   LLE: Able to wiggle toes  RUE: 5/5 biceps, triceps,   RLE:  5/5 hip flexion/extension      Labs:    Lab Results   Component Value Date    HGBA1C 5.50 07/27/2024      Lab Results   Component Value Date    CHOL 227 (H) 07/27/2024    TRIG 161 (H) 07/27/2024    HDL 58 07/27/2024     (H) 07/27/2024       Lab Results   Component Value Date    WBC 6.50 07/26/2024    HGB 15.0 07/26/2024    HCT 44 07/26/2024    MCV 88.0 07/26/2024     07/26/2024     Lab Results   Component Value Date    GLUCOSE 122 (H) 07/27/2024    BUN 12 07/27/2024    CREATININE 0.81 07/27/2024    BCR 14.8 07/27/2024    CO2 23.0 07/27/2024    CALCIUM 8.7 07/27/2024    AST 28 07/26/2024    ALT 35 07/26/2024       Results from last 7 days   Lab Units 07/27/24  0843 07/27/24  0600 07/26/24  2354 07/26/24  1703 07/26/24  1702    SODIUM mmol/L 139 139 138  138  --   --    POTASSIUM mmol/L 4.0  --  3.7  --   --    CHLORIDE mmol/L 105  --  102  --   --    CO2 mmol/L 23.0  --  23.0  --   --    BUN mg/dL 12  --  19  --   --    CREATININE mg/dL 0.81  --  1.04 0.90  --    CALCIUM mg/dL 8.7  --  8.5*  --   --    ALT (SGPT) U/L  --   --   --   --  35   AST (SGOT) U/L  --   --   --   --  28   GLUCOSE mg/dL 122*  --  129*  --   --        Results Review:      All brain images and reports were personally reviewed and I agree with the interpretations except as noted below.    CT Head Without Contrast 7/26/2024  Impression: Interval increase in intraparenchymal hemorrhage centered in the right basal ganglia with mild increase in mass effect on the adjacent lateral ventricles and minimal change of the basilar and suprasellar cisterns.     CT Angiogram Head and Neck 7/26/2024  Impression: 1. No evidence of intracranial large vessel arterial occlusion or aneurysm 2. Ulcerated plaque in the left distal common carotid and proximal internal carotid arteries that results in less than 50% luminal diameter stenosis 3. Patent right carotid and bilateral vertebral arteries without significant stenosis or dissection.    CT Head Without Contrast 7/26/2024  Impression: 1. 4 x 3 x 3.5 cm right basal ganglia and periventricular white matter parenchymal hematoma with mass effect as described 2. No evidence of major vascular territory brain ischemia 3. Chronic small vessel ischemic changes.         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 on admission   # Extracranial atherosclerotic disease     -Repeat CT head 6/28 at 0500  -Hold antiplatelet medication   -Serum Na 140  -Continue 3% NS rate 45 cc/hour  -SBP < 140  -PT OT recommend IRF  -DVT prophylaxis: SCDs only given hemorrhage  -TTE pending  -Neurosurgery evaluated, non-surgical  -Follow-up in stroke clinic       Patient education: call 911 or present to emergency  department with any stroke symptom, including unilateral face, arm, or leg weakness, numbness, or paresthesias, unilateral facial droop, speech deficits, dizziness with nausea, vomiting, nystagmus, and incoordination, visual deficits, or severe onset headache.    Stroke neurology will follow.  Please call with questions.     Griselda Rojo MD  [facility] [place] STROKE NEURO  07/27/24  10:37 EDT        Electronically signed by Griselda Rojo MD at 07/27/24 1316       Isac Painter MD at 07/27/24 0885          HOD# : 1    No events last night      Nontraumatic intracerebral hemorrhage    Hypertensive crisis      Temp:  [97.9 °F (36.6 °C)-98.3 °F (36.8 °C)] 98.3 °F (36.8 °C)  Heart Rate:  [] 79  Resp:  [14-20] 16  BP: ()/() 126/75  I/O last 3 completed shifts:  In: 1332.4 [I.V.:1332.4]  Out: 925 [Urine:925]  No intake/output data recorded.  Vital signs were reviewed and documented in the chart      EXAM   Patient is awake and with right sided plegia   Pupils symmetric   Awake and gcs 15 some stuttering speech    Left-sided facial weakness left-sided upper extremity plegia with left lower extremity 1 out of 5 strength      Ct review show expected slight enlargement of the deep seated lesion in the right thalamus. There is no mass effect     Assessment and plan   Right Basal ganglia hemorrhage   2.   HTN     Plan   Non surgical for now given location and risk and stability of exam -if he develops signs of clinical herniation or significant enlargement of hematoma, will reassess urgently  Monitor closely   Repeat scan in am   Wife was not present attempted to phone however no answer              Electronically signed by Isac Painter MD at 07/27/24 1144          Consult Notes (all)        Kane Hoff PA-C at 07/26/24 2033       Attestation signed by Isac Painter MD at 07/27/24 0810    I have reviewed this documentation and agree.    I reviewed yesterday with both the stroke team and  EB the treatment plan and agree with non surgical care. The                  Consults    Referring Provider: Pipo MCDERMOTT    No care team member to display    Chief Complaint: Acute left-sided weakness dysarthria   Subjective .     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  Patient was brought in by ambulance.  Patient was worked up for stroke and noted to have a right-sided basal ganglia hemorrhage.  Patient was taken for stroke workup pretty immediately to the CT scanner.    At the time of my evaluation patient was able to tell me time and place but other history is difficult to obtain secondary to dysarthria and patient comprehension.    Upon arrival the patient was noted to have severe hypertension.  Patient been on Cardene for about 5 minutes prior to my examination and blood pressure was still 196/113.  I have discussed adding labetalol on with the pharmacist.    This is a nonsurgical hemorrhage    Review of Systems    History  History of present illness difficult to obtain secondary to patient's cognition   SMOKING STATUS: Non-smoker  Objective     Vital Signs   Heart Rate:  [92] 92  Resp:  [20] 20  BP: (196)/(112-118) 196/113  There is no height or weight on file to calculate BMI.    Physical Exam:     There is no height or weight on file to calculate BMI.    Patient is able to answer simple questions but slow to speak.  Patient is obviously showing extinction/neglect on his left side.  Patient also has no visual threat on the left.  Patient completely flaccid in left upper extremity but will withdraw from pain on the left lower extremity.  Right side is good strength and will follow commands.    Pupils are equally reactive to light tongue protrudes to the left side patient has a significant left-sided facial droop as well.  Hearing is intact.    Results Review:   I reviewed the patient's new  imaging results and agree with the interpretation.  Discussed with stroke team as well as Dr. Foss.  Patient has a nonsurgical basal ganglia hemorrhage on the right with some mild midline shift.  Hemorrhage measures approximately 4 x 3 x 3 cm    Assessment & Plan     Diagnosis:  Basal ganglia hemorrhage on the right  Hypertension    PLAN:    Patient needs strict blood pressure control to get his blood pressures less than 140.  Other than that neurosurgical recommendations are limited.  This is a nonsurgical bleed.    Patient will require ICU admission for neurologic observation.    Stroke workup per Vascular neurology    I discussed the patients findings and my recommendations with patient and consulting provider    Kane Hoff PA-C  24  17:31 EDT    Time:  60 minutes            Electronically signed by Isac Painter MD at 24 0834       Saman Zarco PA-C at 24 1651        Consult Orders    1. Inpatient Neurology Consult Stroke [[id number]] ordered by Gerald Foss MD at 24 1650              Attestation signed by Griselda Rojo MD at 24 1352    Please see my documentation from 24.                Stroke Consult Note    Patient Name: Ron Foster   MRN: [id number]  Age: 53 y.o.  Sex: male  : 1970    Primary Care Physician: No primary care provider on file.  Referring Physician:  Gerald Foss MD    TIME STROKE TEAM CALLED: 1636 EST     TIME PATIENT SEEN: 1645 EST    Race:     Chief Complaint/Reason for Consultation: Left side weakness/ sensory neglect, LFD, Left visual field cut. Slurred Speech     HPI: Ron Galvan is a 54 yo male with PMH of migraines who presented to [facility] ED via EMS after the patient was driving his car home and began having severe left sided weakness and left facial droop. Medical history is deficient as the patient has difficulty communicating currently.  Per EMS he is not on any antiplatelet or anticoagulant  medication.  BP at time of arrival 196/112.  NIH on my exam 17 due to a left facial droop, left homonymous hemianopsia with right gaze deviation, significant left-sided weakness to upper and lower extremities, and sensory neglect to upper and lower extremities.  Patient is slightly aphasic with severe dysarthria.  Patient has a tongue deviation to the left.  Emergent CT head revealed a 4 x 3 x 3.5 right basal ganglia hemorrhage.  CT angiogram without evidence of LVO.  Ulcerated plaque observed on left distal common carotid.     ICH 0.  Imaging results discussed with both Dr. Rojo and Inocencio.  No indication for neurosurgical intervention at this time.  Will manage medically.     Last Known Normal Date/Time: 1545 EST     Review of Systems   Unable to perform ROS: Acuity of condition      No past medical history on file.  No past surgical history on file.  No family history on file.     Not on File  Prior to Admission medications    Not on File         BP: ()/()   Arterial Line BP: ()/()   Neurological Exam  Mental Status  Alert. Oriented to person, place and time. Oriented to person, place, and time. Severe dysarthria present. Expressive aphasia present. Able to name objects. Follows two-step commands.    Cranial Nerves  CN II: Right normal visual field. Left homonymous hemianopsia.  CN III, IV, VI: Abnormal extraocular movements: Right gaze deviation. Normal lids and orbits bilaterally. Pupils equal round and reactive to light bilaterally.  CN V:  Right: Facial sensation is normal.  Left: Diminished sensation of the entire left side of the face.  CN VII:  Right: There is no facial weakness.  Left: There is central facial weakness.  CN XII: Tongue deviates to the left.    Motor  Normal muscle bulk throughout. No fasciculations present. Normal muscle tone. Strength is 5/5 in all four extremities except as noted.  No antigravity strength to left upper or left lower extremity. .    Sensory  Light touch abnormality:  Severe neglect to sensation on left side. Pinprick abnormality: Patient withdraws to painful sensation on all extremities.     Coordination  Right: Finger-to-nose normal.Left: Finger-to-nose abnormality: Unable to assess due to weakness.    Gait    Not observed.      Physical Exam  Constitutional:       General: He is in acute distress.   HENT:      Head: Normocephalic and atraumatic.   Eyes:      General: Lids are normal.      Extraocular Movements: EOM normal     Pupils: Pupils are equal, round, and reactive to light.   Cardiovascular:      Rate and Rhythm: Normal rate.   Pulmonary:      Effort: No respiratory distress.      Comments: 95 % on 2L  Abdominal:      General: There is no distension.      Tenderness: There is no guarding.   Musculoskeletal:         General: No signs of injury.      Right lower leg: No edema.      Left lower leg: No edema.   Skin:     General: Skin is warm.      Findings: No bruising.   Neurological:      Mental Status: He is alert and oriented to person, place, and time.      Cranial Nerves: Cranial nerve deficit and dysarthria present.      Sensory: Sensory deficit present.      Motor: Weakness present.       Acute Stroke Data    Thrombolytic Inclusion / Exclusion Criteria    Time: 16:51 EDT  Person Administering Scale: Saman Zarco PA-C    Inclusion Criteria  [x]   18 years of age or greater   []   Onset of symptoms < 4.5 hours before beginning treatment (stroke onset = time patient was last seen well or without symptoms).   []   Diagnosis of acute ischemic stroke causing measurable disabling deficit (Complete Hemianopia, Any Aphasia, Visual or Sensory Extinction, Any weakness limiting sustained effort against gravity)   []   Any remaining deficit considered potentially disabling in view of patient and practitioner   Exclusion criteria (Do not proceed with Alteplase if any are checked under exclusion criteria)  []   Onset unknown or GREATER than 4.5 hours   [x]   ICH on CT/MRI   []    CT demonstrates hypodensity representing acute or subacute infarct   []   Significant head trauma or prior stroke in the previous 3 months   []   Symptoms suggestive of subarachnoid hemorrhage   []   History of un-ruptured intracranial aneurysm GREATER than 10 mm   []   Recent intracranial or intraspinal surgery within the last 3 months   []   Arterial puncture at a non-compressible site in the previous 7 days   []   Active internal bleeding   []   Acute bleeding tendency   []   Platelet count LESS than 100,000 for known hematological diseases such as leukemia, thrombocytopenia or chronic cirrhosis   []   Current use of anticoagulant with INR GREATER than 1.7 or PT GREATER than 15 seconds, aPTT GREATER than 40 seconds   []   Heparin received within 48 hours, resulting in abnormally elevated aPTT GREATER than upper limit of normal   []   Current use of direct thrombin inhibitors or direct factor Xa inhibitors in the past 48 hours   []   Elevated blood pressure refractory to treatment (systolic GREATER than 185 mm/Hg or diastolic  GREATER than 110 mm/Hg   []   Suspected infective endocarditis and aortic arch dissection   []   Current use of therapeutic treatment dose of low-molecular-weight heparin (LMWH) within the previous 24 hours   []   Structural GI malignancy or bleed   Relative exclusion for all patients  []   Only minor non-disabling symptoms   []   Pregnancy   []   Seizure at onset with postictal residual neurological impairments   []   Major surgery or previous trauma within past 14 days   []   History of previous spontaneous ICH, intracranial neoplasm, or AV malformation   []   Postpartum (within previous 14 days)   []   Recent GI or urinary tract hemorrhage (within previous 21 days)   []   Recent acute MI (within previous 3 months)   []   History of un-ruptured intracranial aneurysm LESS than 10 mm   []   History of ruptured intracranial aneurysm   []   Blood glucose LESS than 50 mg/dL (2.7 mmol/L)   []    Dural puncture within the last 7 days   []   Known GREATER than 10 cerebral microbleeds   Additional exclusions for patients with symptoms onset between 3 and 4.5 hours.  []   Age > 80.   []   On any anticoagulants regardless of INR  >>> Warfarin (Coumadin), Heparin, Enoxaparin (Lovenox), fondaparinux (Arixtra), bivalirudin (Angiomax), Argatroban, dabigatran (Pradaxa), rivaroxaban (Xarelto), or apixaban (Eliquis)   []   Severe stroke (NIHSS > 25).   []   History of BOTH diabetes and previous ischemic stroke.   []   The risks and benefits have been discussed with the patient or family related to the administration of IV thrombolytic therapy for stroke symptoms.   []   I have discussed and reviewed the patient's case and imaging with the attending prior to IV thrombolytic therapy.   N/A Time IV thrombolytic administered     Hospital Meds:  Scheduled-    Infusions-     PRNs-   sodium chloride    Functional Status Prior to Current Stroke/Moody Score: 0  ICH Score:  0 Points (GCS 13 to 15)  0 Points (ICH volume < 30 cm3)  0 Points (Intraventricular Extension Absent)   0 Points (Infratentorial Origin - No )  0 Points (Age < 80 years)  The total ICH Score for this patient is 0 at 16:56 EDT on 24     NIH Stroke Scale  Time: 16:51 EDT  Person Administering Scale: Saman Zarco PA-C    1a  Level of consciousness: 0=alert; keenly responsive   1b. LOC questions:  0=Answers both questions correctly   1c. LOC commands: 0=Performs both tasks correctly   2.  Best Gaze: 1=partial gaze palsy   3.  Visual: 2=Complete hemianopia   4. Facial Palsy: 2=Partial paralysis (total or near total paralysis of the lower face)   5a.  Motor left arm: 3=No effort against gravity, limb falls   5b.  Motor right arm: 0=No drift, limb holds 90 (or 45) degrees for full 10 seconds   6a. motor left leg: 3=No effort against gravity, limb falls   6b  Motor right le=No drift, limb holds 90 (or 45) degrees for full 10 seconds   7. Limb Ataxia:  "0=Absent   8.  Sensory: 2=Severe to total sensory loss; patient is not aware of being touched in face, arm, leg   9. Best Language:  1=Mild to moderate aphasia; some obvious loss of fluency or facility of comprehension without significant limitation on ideas expressed or form of expression.   10. Dysarthria: 2=Severe; patient speech is so slurred as to be unintelligible in the absence of or our of proportion to any dysphagia, or is mute/anarthric   11. Extinction and Inattention: 1=Visual, tactile, auditory, spatial or personal inattention or extinction to bilateral simultaneous stimulation in one of the sensory modalities    Total:   17     CBC w/diff          7/26/2024    17:02 7/26/2024    17:03   CBC w/Diff   WBC 6.50     RBC 4.91     Hemoglobin 15.3  15.0    Hematocrit 43.2  44    MCV 88.0     MCH 31.2     MCHC 35.4     RDW 12.7     Platelets 191     Neutrophil Rel % 68.9     Immature Granulocyte Rel % 0.2     Lymphocyte Rel % 20.2     Monocyte Rel % 8.6     Eosinophil Rel % 1.5     Basophil Rel % 0.6        Basic Metabolic Panel    Sodium No results found for: \"NA\"   Potassium No results found for: \"K\"   Chloride No results found for: \"CL\"   Bicarbonate No results found for: \"PLASMABICARB\"   BUN No results found for: \"BUN\"   Creatinine Creatinine   Date Value Ref Range Status   07/26/2024 0.90 0.60 - 1.30 mg/dL Final      Calcium No results found for: \"CALCIUM\"   Glucose      No components found for: \"GLUCOSE.*\"      XR Chest 1 View    Result Date: 7/26/2024  Impression: No active cardiopulmonary disease Electronically Signed: Bird Rubio  7/26/2024 5:51 PM EDT  Workstation ID: [id number]    CT Angiogram Head w AI Analysis of LVO    Result Date: 7/26/2024  Impression: 1. No evidence of intracranial large vessel arterial occlusion or aneurysm 2. Ulcerated plaque in the left distal common carotid and proximal internal carotid arteries that results in less than 50% luminal diameter stenosis 3. Patent right " carotid and bilateral vertebral arteries without significant stenosis or dissection Electronically Signed: iBrd Rubio  7/26/2024 5:39 PM EDT  Workstation ID: [id number]    CT Angiogram Neck    Result Date: 7/26/2024  Impression: 1. No evidence of intracranial large vessel arterial occlusion or aneurysm 2. Ulcerated plaque in the left distal common carotid and proximal internal carotid arteries that results in less than 50% luminal diameter stenosis 3. Patent right carotid and bilateral vertebral arteries without significant stenosis or dissection Electronically Signed: Bird Brandtnes  7/26/2024 5:39 PM EDT  Workstation ID: [id number]    CT Head Without Contrast Stroke Protocol    Result Date: 7/26/2024  Impression: 1. 4 x 3 x 3.5 cm right basal ganglia and periventricular white matter parenchymal hematoma with mass effect as described 2. No evidence of major vascular territory brain ischemia 3. Chronic small vessel ischemic changes Electronically Signed: Bird Brandtnes  7/26/2024 5:04 PM EDT  Workstation ID: [id number]      Results Reviewed:  I have personally reviewed current lab, radiology, and data and agree with results.    Assessment/Plan:  52 yo male with PMH of migraines who presented to [facility] ED via EMS. NIH on my exam 17 due to a left facial droop, left homonymous hemianopsia with right gaze deviation, significant left-sided weakness to upper and lower extremities, and sensory neglect to upper and lower extremities.  Patient is slightly aphasic with severe dysarthria.  Patient has a tongue deviation to the left.  Emergent CT head revealed a 4 x 3 x 3.5 right basal ganglia hemorrhage.  CT angiogram without evidence of LVO.  Ulcerated plaque observed on left distal common carotid.     ICH 0.  Imaging results discussed with both Dr. Rojo and Inocencio.  No indication for neurosurgical intervention at this time.  Will manage medically.     Antiplatelet PTA: None  Anticoagulant PTA: None      # right basal ganglia  hemorrhage  # Left side weakness/ sensory neglect, LFD, Left visual field cut. Slurred Speech   # HTN  -Hemorrhagic stroke order set in place  -Imaging discussed with Dr. Painter, no indication for current neurosurgical intervention at this time  -Neurochecks per protocol, repeat CT head with any significant neurological  -Repeat CT head in 6 hours per protocol  -PT/OT/SLP to evaluate  -ICU admission for close monitoring  -Strict blood pressure control, SBP less than 140.  Titration with Cardene as needed  -Head of bed 30 to 45 degrees  -A1c and lipid panel with a.m. labs  -DVT prophylaxis with mechanical sequential compression  -Avoid antiplatelet and anticoagulation at this time due to bleed  -TTE pending  -N.p.o. until patient passes dysphagia screening, healthy cardiac diet if screening is passed  -Neurosurgery consulted  -Neurology stroke continue to follow    Plan of care discussed with Dr. Foss, Dr. Rojo, Dr. Painter, and Kane Hoff PA-C.  Please call with any questions or concerns    Saman Zarco PA-C  July 26, 2024  16:51 EDT     Electronically signed by Griselda Rojo MD at 07/28/24 3123

## 2024-07-29 NOTE — PLAN OF CARE
Goal Outcome Evaluation:  Plan of Care Reviewed With: patient, spouse        Progress: improving  Outcome Evaluation: Pt continues to require maxAx2 for mobility this date. In addition pt demonstrates difficulty determining midline and demonstrates a significant L sided trunk lean this date during sitting and standing. D/c recommendation of IP rehab remains appropriate at this time.      Anticipated Discharge Disposition (PT): inpatient rehabilitation facility

## 2024-07-29 NOTE — THERAPY TREATMENT NOTE
Patient Name: Ron Cam  : 1970    MRN: [id number]                              Today's Date: 2024       Admit Date: 2024    Visit Dx:     ICD-10-CM ICD-9-CM   1. Intraparenchymal hemorrhage of brain  I61.9 431   2. Dysarthria  R47.1 784.51   3. Acute left-sided weakness  R53.1 728.87   4. Left sided numbness  R20.0 782.0   5. Hemineglect  R41.4 781.8   6. Hypertensive emergency  I16.1 401.9   7. Nontraumatic subcortical hemorrhage of right cerebral hemisphere  I61.0 431     Patient Active Problem List   Diagnosis    Nontraumatic intracerebral hemorrhage    Hypertensive crisis     History reviewed. No pertinent past medical history.  History reviewed. No pertinent surgical history.   General Information       Row Name 24 1439          Physical Therapy Time and Intention    Document Type therapy note (daily note)  -     Mode of Treatment physical therapy  -       Row Name 24 1439          General Information    Existing Precautions/Restrictions fall;other (see comments)  L sided perceptual deficits, L sided inattention, L sided weakness  -     Barriers to Rehab medically complex;previous functional deficit;visual deficit  -       Row Name 24 1439          Cognition    Orientation Status (Cognition) oriented x 4  -       Row Name 24 1439          Safety Issues, Functional Mobility    Safety Issues Affecting Function (Mobility) awareness of need for assistance;impulsivity;insight into deficits/self-awareness  -     Impairments Affecting Function (Mobility) balance;coordination;endurance/activity tolerance;grasp;motor control;motor planning;muscle tone abnormal;postural/trunk control;sensation/sensory awareness;strength;visual/perceptual;range of motion (ROM)  -     Cognitive Impairments, Mobility Safety/Performance --  -               User Key  (r) = Recorded By, (t) = Taken By, (c) = Cosigned By      Initials Name Provider Type    AC Bel Gates, PT  Physical Therapist                   Mobility       Row Name 07/29/24 1528          Bed Mobility    Bed Mobility supine-sit  -AC     Supine-Sit [place] (Bed Mobility) maximum assist (25% patient effort);2 person assist  -AC     Comment, (Bed Mobility) Pt transitioned to sitting EOB with VC and maxAx2 this date. Pt required max cues for midline positoining while sitting EOB and maxA for static seated balance. External cues were utilized to promote upright sitting however pt unable to find midline due to significant perceptual deficits.  -       Row Name 07/29/24 1528          Bed-Chair Transfer    Bed-Chair [place] (Transfers) dependent (less than 25% patient effort)  -     Assistive Device (Bed-Chair Transfers) lift device  -       Row Name 07/29/24 1528          Sit-Stand Transfer    Sit-Stand [place] (Transfers) maximum assist (25% patient effort);2 person assist  -AC     Comment, (Sit-Stand Transfer) Pt completed 2 STS transfers this date with maxAx2. Pt demonstrated significant lean to L side in standing and was unable to complete weight shift.  -       Row Name 07/29/24 1528          Gait/Stairs (Locomotion)    Dimock Level (Gait) unable to assess  -               User Key  (r) = Recorded By, (t) = Taken By, (c) = Cosigned By      Initials Name Provider Type    AC Bel Gates PT Physical Therapist                   Obj/Interventions       Row Name 07/29/24 1441          Balance    Balance Assessment sitting static balance;sitting dynamic balance;sit to stand dynamic balance;standing static balance;standing dynamic balance  -     Static Sitting Balance verbal cues;maximum assist  -AC     Dynamic Sitting Balance maximum assist;verbal cues  -     Position, Sitting Balance sitting edge of bed  -     Sit to Stand Dynamic Balance maximum assist;2-person assist  -AC     Static Standing Balance maximum assist;2-person assist  -AC     Dynamic Standing Balance maximum assist;2-person  assist  -AC     Position/Device Used, Standing Balance supported  -     Balance Interventions sitting;standing;sit to stand;static;dynamic  -     Comment, Balance Significant L trunk lean noted in sitting and standing  -               User Key  (r) = Recorded By, (t) = Taken By, (c) = Cosigned By      Initials Name Provider Type    AC Bel Gates, PT Physical Therapist                   Goals/Plan    No documentation.                  Clinical Impression       Row Name 07/29/24 1444          Pain    Pretreatment Pain Rating 0/10 - no pain  -AC     Posttreatment Pain Rating 0/10 - no pain  -     Pain Intervention(s) Repositioned  -       Row Name 07/29/24 1444          Plan of Care Review    Plan of Care Reviewed With patient;spouse  -     Progress improving  -     Outcome Evaluation Pt continues to require maxAx2 for mobility this date. In addition pt demonstrates difficulty determining midline and demonstrates a significant L sided trunk lean this date during sitting and standing. D/c recommendation of IP rehab remains appropriate at this time.  -       Row Name 07/29/24 1444          Therapy Assessment/Plan (PT)    Rehab Potential (PT) good, to achieve stated therapy goals  -     Criteria for Skilled Interventions Met (PT) yes  -AC     Therapy Frequency (PT) daily  -     Predicted Duration of Therapy Intervention (PT) 10 days  -       Row Name 07/29/24 1444          Vital Signs    Post Systolic BP Rehab 142  -AC     Post Treatment Diastolic BP 86  -AC     Posttreatment Heart Rate (beats/min) 98  -AC     O2 Delivery Pre Treatment room air  -AC     O2 Delivery Intra Treatment room air  -AC     Post SpO2 (%) 93  -AC     O2 Delivery Post Treatment room air  -AC     Pre Patient Position Supine  -AC     Intra Patient Position Standing  -AC     Post Patient Position Sitting  -       Row Name 07/29/24 1444          Positioning and Restraints    Pre-Treatment Position in bed  -AC     Post Treatment  Position chair  -AC     In Chair waffle cushion;legs elevated;call light within reach;encouraged to call for assist;exit alarm on;with family/caregiver;notified nsg;sitting  -AC               User Key  (r) = Recorded By, (t) = Taken By, (c) = Cosigned By      Initials Name Provider Type    AC Bel Gates PT Physical Therapist                   Outcome Measures       Row Name 07/29/24 1450 07/29/24 0800       How much help from another person do you currently need...    Turning from your back to your side while in flat bed without using bedrails? 3  -AC 2  -OS    Moving from lying on back to sitting on the side of a flat bed without bedrails? 2  -AC 2  -OS    Moving to and from a bed to a chair (including a wheelchair)? 1  -AC 2  -OS    Standing up from a chair using your arms (e.g., wheelchair, bedside chair)? 2  -AC 2  -OS    Climbing 3-5 steps with a railing? 1  -AC 1  -OS    To walk in hospital room? 1  -AC 1  -OS    AM-PAC 6 Clicks Score (PT) 10  -AC 10  -OS    Highest Level of Mobility Goal 4 --> Transfer to chair/commode  -AC 4 --> Transfer to chair/commode  -OS      Row Name 07/29/24 1241          Modified Jani Scale    Modified Jani Scale 4 - Moderately severe disability.  Unable to walk without assistance, and unable to attend to own bodily needs without assistance.  -CS       Row Name 07/29/24 1450 07/29/24 1241       Functional Assessment    Outcome Measure Options AM-PAC 6 Clicks Basic Mobility (PT)  -AC AM-PAC 6 Clicks Daily Activity (OT)  -CS              User Key  (r) = Recorded By, (t) = Taken By, (c) = Cosigned By      Initials Name Provider Type    OS Nilton Lucio RN Registered Nurse    Odalis Estrada, OT Occupational Therapist    Bel Mena PT Physical Therapist                                 Physical Therapy Education       Title: PT OT SLP Therapies (In Progress)       Topic: Physical Therapy (Done)       Point: Mobility training (Done)       Learning Progress Summary              Patient Acceptance, E, VU,NR by AC at 7/29/2024 1453    Acceptance, E,D, VU,NR by LS at 7/27/2024 1005   Family Acceptance, E, VU,NR by AC at 7/29/2024 1453   Significant Other Acceptance, E,D, VU,NR by LS at 7/27/2024 1005                         Point: Home exercise program (Done)       Learning Progress Summary             Patient Acceptance, E, VU,NR by AC at 7/29/2024 1453   Family Acceptance, E, VU,NR by AC at 7/29/2024 1453                         Point: Body mechanics (Done)       Learning Progress Summary             Patient Acceptance, E, VU,NR by AC at 7/29/2024 1453    Acceptance, E,D, VU,NR by LS at 7/27/2024 1005   Family Acceptance, E, VU,NR by AC at 7/29/2024 1453   Significant Other Acceptance, E,D, VU,NR by LS at 7/27/2024 1005                         Point: Precautions (Done)       Learning Progress Summary             Patient Acceptance, E, VU,NR by AC at 7/29/2024 1453    Acceptance, E,D, VU,NR by LS at 7/27/2024 1005   Family Acceptance, E, VU,NR by AC at 7/29/2024 1453   Significant Other Acceptance, E,D, VU,NR by LS at 7/27/2024 1005                                         User Key       Initials Effective Dates Name Provider Type Discipline     02/03/23 -  Yakelin Araujo, PT Physical Therapist PT     07/11/24 -  Bel Gates, PT Physical Therapist PT                  PT Recommendation and Plan     Plan of Care Reviewed With: patient, spouse  Progress: improving  Outcome Evaluation: Pt continues to require maxAx2 for mobility this date. In addition pt demonstrates difficulty determining midline and demonstrates a significant L sided trunk lean this date during sitting and standing. D/c recommendation of IP rehab remains appropriate at this time.     Time Calculation:         PT Charges       Row Name 07/29/24 1454             Time Calculation    Start Time 0945  -AC      Stop Time 1000  -AC      Time Calculation (min) 15 min  -AC      PT Received On 07/29/24  -AC         Time  Calculation- PT    Total Timed Code Minutes- PT 15 minute(s)  -AC         Timed Charges    92894 - PT Therapeutic Activity Minutes 15  -AC         Total Minutes    Timed Charges Total Minutes 15  -AC       Total Minutes 15  -AC                User Key  (r) = Recorded By, (t) = Taken By, (c) = Cosigned By      Initials Name Provider Type    AC Bel Gates, PT Physical Therapist                  Therapy Charges for Today       Code Description Service Date Service Provider Modifiers Qty    [id number] HC PT THERAPEUTIC ACT EA 15 MIN 7/29/2024 Bel Gates PT GP 1            PT G-Codes  Outcome Measure Options: AM-PAC 6 Clicks Basic Mobility (PT)  AM-PAC 6 Clicks Score (PT): 10  AM-PAC 6 Clicks Score (OT): 10  Modified Roundup Scale: 4 - Moderately severe disability.  Unable to walk without assistance, and unable to attend to own bodily needs without assistance.  PT Discharge Summary  Anticipated Discharge Disposition (PT): inpatient rehabilitation facility    Bel Gates PT  7/29/2024

## 2024-07-29 NOTE — PROGRESS NOTES
Stroke Neurology Progress Note     Subjective     This patient was seen in follow-up for: right basal ganglia hemorrhage  Present for the encounter were: self, patient, patient's wife    Subjective:  No acute events overnight.  CT head stable.  3% NS discontinued.  Neurologic exam stable.    Objective      Temp:  [98.4 °F (36.9 °C)-100 °F (37.8 °C)] 99.4 °F (37.4 °C)  Heart Rate:  [] 88  Resp:  [16-20] 18  BP: (121-159)/() 134/78            Objective    Physical Exam:  General Appearance: Alert  HEENT: anicteric sclera, no scleral injection  Lungs: respirations appear comfortable, no obvious increased work of breathing  Extremities: No cyanosis or fingernail clubbing   Skin: No rashes in exposed skin areas     Neurological Examination:   Mental status: Alert and oriented.  Moderate dysarthria. Able to name and repeat.  Cranial Nerves: Visual fields intact. Extraocular movements intact with no nystagmus. Left facial droop.  Sensory: Decreased sensation to light touch in left upper and lower extremity.  Motor: Normal tone.  Left upper extremity neglect.  Strength:  LUE: 1/5 shoulder abduction, 0/5 biceps, 0/5 triceps, 0/5 wrist flexion and extension, 0/5   LLE: Able to wiggle toes, 1/5 foot flexion and extension  RUE: 5/5 biceps, triceps,     RLE:  5/5 hip flexion/extension      Labs:    Lab Results   Component Value Date    HGBA1C 5.50 07/27/2024      Lab Results   Component Value Date    CHOL 227 (H) 07/27/2024    TRIG 161 (H) 07/27/2024    HDL 58 07/27/2024     (H) 07/27/2024       Lab Results   Component Value Date    WBC 8.21 07/28/2024    HGB 13.1 07/28/2024    HCT 39.4 07/28/2024    MCV 93.4 07/28/2024     07/28/2024     Lab Results   Component Value Date    GLUCOSE 142 (H) 07/29/2024    BUN 9 07/29/2024    CREATININE 0.94 07/29/2024    BCR 9.6 07/29/2024    CO2 21.0 (L) 07/29/2024    CALCIUM 8.1 (L) 07/29/2024    AST 28 07/26/2024    ALT 35 07/26/2024       Results from last 7  days   Lab Units 07/29/24  0344 07/29/24  0003 07/28/24  1736 07/28/24  1210 07/28/24  0515 07/26/24  1703 07/26/24  1702   SODIUM mmol/L 140 139 139   < > 139   < >  --    POTASSIUM mmol/L 3.4*  --  3.9  --  4.0   < >  --    CHLORIDE mmol/L 110*  --  107  --  107   < >  --    CO2 mmol/L 21.0*  --  21.0*  --  19.0*   < >  --    BUN mg/dL 9  --  8  --  11   < >  --    CREATININE mg/dL 0.94  --  0.86  --  0.99   < >  --    CALCIUM mg/dL 8.1*  --  8.2*  --  8.3*   < >  --    ALT (SGPT) U/L  --   --   --   --   --   --  35   AST (SGOT) U/L  --   --   --   --   --   --  28   GLUCOSE mg/dL 142*  --  134*  --  122*   < >  --     < > = values in this interval not displayed.       Results Review:      All brain images and reports were personally reviewed and I agree with the interpretations except as noted below.    CT head 7/29/2024  Impression: 1. Stable right basal ganglia hemorrhage with surrounding edema and mass effect. 2. Stable mild chronic small vessel ischemic change and chronic lacunar infarct in the right central liz.    CT head 7/28/2024  Impression: 1. Stable size of right basal ganglia intraparenchymal hemorrhage with increased surrounding vasogenic edema and slightly increased mass effect with 4 mm leftward shift of midline structures. 2. Stable mild chronic small vessel ischemic change and chronic lacunar infarct in the left basal ganglia.      CT Head Without Contrast 7/26/2024  Impression: Interval increase in intraparenchymal hemorrhage centered in the right basal ganglia with mild increase in mass effect on the adjacent lateral ventricles and minimal change of the basilar and suprasellar cisterns.     CT Angiogram Head and Neck 7/26/2024  Impression: 1. No evidence of intracranial large vessel arterial occlusion or aneurysm 2. Ulcerated plaque in the left distal common carotid and proximal internal carotid arteries that results in less than 50% luminal diameter stenosis 3. Patent right carotid and  bilateral vertebral arteries without significant stenosis or dissection.    CT Head Without Contrast 7/26/2024  Impression: 1. 4 x 3 x 3.5 cm right basal ganglia and periventricular white matter parenchymal hematoma with mass effect as described 2. No evidence of major vascular territory brain ischemia 3. Chronic small vessel ischemic changes.    Transthoracic echocardiogram 7/26/2024  Impression:    Left ventricular systolic function is normal. Estimated left ventricular EF = 65%    The cardiac valves are anatomically and functionally normal.    Saline test results are negative.          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 on admission      -Repeat CT head as needed for neurochange  -Hold antiplatelet medication  -Serum Na 140  -Discontinue 3% NS   -SBP < 140  -PT OT recommend IRF  -DVT prophylaxis: SCD, enoxaparin   -Neurosurgery evaluated, non-surgical  -Follow-up in stroke clinic     # Extracranial atherosclerotic disease    -Eventually, we will need to treat extracranial atherosclerotic disease with aspirin but would wait at least 2 weeks post hemorrhage (8/9/2024)    -Continue atorvastatin 80 mg daily      Patient education: call 911 or present to emergency department with any stroke symptom, including unilateral face, arm, or leg weakness, numbness, or paresthesias, unilateral facial droop, speech deficits, dizziness with nausea, vomiting, nystagmus, and incoordination, visual deficits, or severe onset headache.    Stroke neurology will follow.  Anticipate soon discharge to IRF.  Please call with questions.     Griselda Rojo MD  [facility] STROKE NEURO  07/29/24  06:24 EDT

## 2024-07-29 NOTE — CONSULTS
Diabetes Education    Patient Name:  Ron Cam  YOB: 1970  MRN: [id number]  Admit Date:  7/26/2024    Order criteria not met for diabetes education consult. Current A1c is 5.5, noted during chart review. Pt has no history of DM and no home meds for DM. Thank you.      Electronically signed by:  Sarah Novoa RN  07/29/24 09:45 EDT

## 2024-07-29 NOTE — PROGRESS NOTES
"Intensive Care Follow-up     Hospital:  LOS: 3 days   Mr. Ron Cam, 53 y.o. male is followed for:   Nontraumatic intracerebral hemorrhage        Subjective     53-year-old male who denies a history of hypertension developed acute left-sided weakness and dysarthria on 7/26/2024 around 3:30 PM.  No prior similar episodes.     CT scan of the head in the emergency room revealed a basal ganglionic hemorrhage on the right.  Blood pressure was markedly elevated.  He has been started on a Cardene drip and has received some \"as needed\" antihypertensives in the ER and has been admitted to the intensive care unit  Interval History:  Chart is been reviewed.  The patient has remained afebrile overnight.  He continues to have accelerated hypertension and is requiring Cardene infusion.  No report of headache or visual change.  Remains weak in the left side.    The patient's past medical, surgical and social history were reviewed and updated in Epic as appropriate.        Objective     Infusions:  niCARdipine, 5-15 mg/hr, Last Rate: 15 mg/hr (07/29/24 1055)      Medications:  amLODIPine, 10 mg, Oral, Q24H  atorvastatin, 80 mg, Oral, Nightly  cloNIDine, 1 patch, Transdermal, Weekly  enoxaparin, 40 mg, Subcutaneous, Q24H  famotidine, 20 mg, Oral, BID AC  hydrALAZINE, 25 mg, Oral, Q8H  metoprolol tartrate, 12.5 mg, Oral, Q12H  sodium chloride, 10 mL, Intravenous, Q12H        Vital Sign Min/Max for last 24 hours  Temp  Min: 98.4 °F (36.9 °C)  Max: 100 °F (37.8 °C)   BP  Min: 121/106  Max: 159/91   Pulse  Min: 85  Max: 114   Resp  Min: 16  Max: 18   SpO2  Min: 89 %  Max: 97 %   Flow (L/min)  Min: 2  Max: 2       Input/Output for last 24 hour shift  07/28 0701 - 07/29 0700  In: 5570.3 [P.O.:922; I.V.:4648.3]  Out: 1800 [Urine:1800]      Objective:  General Appearance:  Uncomfortable and in no acute distress.    Vital signs: (most recent): Blood pressure 137/78, pulse 98, temperature 98.8 °F (37.1 °C), temperature source Oral, resp. " "rate 18, height 182.9 cm (72.01\"), weight 115 kg (253 lb 8.5 oz), SpO2 92%.    HEENT: Normal HEENT exam.    Heart: Normal rate.  Regular rhythm.  S1 normal and S2 normal.    Abdomen: Abdomen is soft and non-distended.  Bowel sounds are normal.   There is no abdominal tenderness.     Extremities: Decreased range of motion.  (Left upper extremity is flaccid.)  Neurological: Patient is alert.  (Mild expressive aphasia).    Pupils:  Pupils are equal, round, and reactive to light.  Pupils are equal.   Skin:  Warm.                Results from last 7 days   Lab Units 07/28/24  0515 07/26/24  1703 07/26/24  1702   WBC 10*3/mm3 8.21  --  6.50   HEMOGLOBIN g/dL 13.1  --  15.3   HEMOGLOBIN, POC g/dL  --  15.0  --    PLATELETS 10*3/mm3 172  --  191     Results from last 7 days   Lab Units 07/29/24  0344 07/29/24  0003 07/28/24  1736 07/28/24  1210 07/28/24  0515   SODIUM mmol/L 140 139 139   < > 139   POTASSIUM mmol/L 3.4*  --  3.9  --  4.0   CO2 mmol/L 21.0*  --  21.0*  --  19.0*   BUN mg/dL 9  --  8  --  11   CREATININE mg/dL 0.94  --  0.86  --  0.99   GLUCOSE mg/dL 142*  --  134*  --  122*    < > = values in this interval not displayed.     Estimated Creatinine Clearance: 119 mL/min (by C-G formula based on SCr of 0.94 mg/dL).            I reviewed the patient's results and images.     Assessment & Plan   Impression        Nontraumatic intracerebral hemorrhage    Hypertensive crisis       Plan        Wean Cardene as able.  Will go ahead and increase the dose of hydralazine and we will start Lopressor twice daily.  Stop nitroglycerin paste for now.  We will titrate these upwards as necessary to maintain blood pressure control.  Continue with physical and Occupational Therapy.  The patient is going to require rehabilitation.  3% saline has been turned off.  If we are able to get him off of his Cardene drip, we will plan to transition him to telemetry.    Plan of care and goals reviewed with mulitdisciplinary/antibiotic " stewardship team during rounds.   I discussed the patient's findings and my recommendations with patient and nursing staff         Philip Harper MD, [facility]  Pulmonology and Critical Care Medicine

## 2024-07-29 NOTE — CASE MANAGEMENT/SOCIAL WORK
Discharge Planning Assessment  [facility]     Patient Name: Ron Cam  MRN: [id number]  Today's Date: 7/29/2024    Admit Date: 7/26/2024    Plan: [facility] acute   Discharge Needs Assessment       Row Name 07/29/24 1158       Living Environment    People in Home spouse    Name(s) of People in Home Cholette, spouse    Potentially Unsafe Housing Conditions unable to assess    In the past 12 months has the electric, gas, oil, or water company threatened to shut off services in your home? No    Primary Care Provided by self    Provides Primary Care For no one    Family Caregiver if Needed spouse    Family Caregiver Names Cholette, spouse    Quality of Family Relationships helpful;involved;supportive       Resource/Environmental Concerns    Resource/Environmental Concerns none    Transportation Concerns none       Transportation Needs    In the past 12 months, has lack of transportation kept you from medical appointments or from getting medications? no    In the past 12 months, has lack of transportation kept you from meetings, work, or from getting things needed for daily living? No       Food Insecurity    Within the past 12 months, you worried that your food would run out before you got the money to buy more. Never true    Within the past 12 months, the food you bought just didn't last and you didn't have money to get more. Never true       Transition Planning    Patient/Family Anticipates Transition to inpatient rehabilitation facility    Patient/Family Anticipated Services at Transition     Transportation Anticipated family or friend will provide       Discharge Needs Assessment    Equipment Currently Used at Home none    Concerns to be Addressed discharge planning                   Discharge Plan       Row Name 07/29/24 1200       Plan    Plan [facility] acute    Patient/Family in Agreement with Plan yes    Plan Comments Spoke with patient's spouse at bedside to initiate discharge planning, patient sleeping.   Confirmed their residence in [place]; patient has no PCP; insurance is [employer].  Patient is reported to be independent with ADLs and mobility; no DME or HH.  Discharge goal is acute rehab at [facility], per family choice.  Referral given to April at [facility].  Spouse given the [facility] hub number to assist with choice of PCP.   CM will continue to follow.                  Continued Care and Services - Admitted Since 7/26/2024    No active coordination exists for this encounter.       Expected Discharge Date and Time       Expected Discharge Date Expected Discharge Time    Jul 31, 2024            Demographic Summary       Row Name 07/29/24 1149       General Information    Referral Source admission list    Reason for Consult discharge planning    Preferred Language English       Contact Information    Permission Granted to Share Info With                    Functional Status       Row Name 07/29/24 1157       Functional Status    Usual Activity Tolerance good       Physical Activity    On average, how many days per week do you engage in moderate to strenuous exercise (like a brisk walk)? 0 days    On average, how many minutes do you engage in exercise at this level? 0 min    Number of minutes of exercise per week 0       Functional Status, IADL    Medications independent    Meal Preparation independent    Housekeeping independent    Laundry independent    Shopping independent                   Psychosocial    No documentation.                  Abuse/Neglect    No documentation.                  Legal    No documentation.                  Substance Abuse    No documentation.                  Patient Forms    No documentation.                     Ilsa Chatterjee RN

## 2024-07-30 ENCOUNTER — APPOINTMENT (OUTPATIENT)
Dept: CT IMAGING | Facility: HOSPITAL | Age: 54
DRG: 064 | End: 2024-07-30
Payer: COMMERCIAL

## 2024-07-30 LAB
ANION GAP SERPL CALCULATED.3IONS-SCNC: 14 MMOL/L (ref 5–15)
BASOPHILS # BLD AUTO: 0.05 10*3/MM3 (ref 0–0.2)
BASOPHILS NFR BLD AUTO: 0.5 % (ref 0–1.5)
BUN SERPL-MCNC: 13 MG/DL (ref 6–20)
BUN/CREAT SERPL: 14.1 (ref 7–25)
CALCIUM SPEC-SCNC: 8.5 MG/DL (ref 8.6–10.5)
CHLORIDE SERPL-SCNC: 105 MMOL/L (ref 98–107)
CO2 SERPL-SCNC: 19 MMOL/L (ref 22–29)
CREAT SERPL-MCNC: 0.92 MG/DL (ref 0.76–1.27)
DEPRECATED RDW RBC AUTO: 44.9 FL (ref 37–54)
EGFRCR SERPLBLD CKD-EPI 2021: 99.5 ML/MIN/1.73
EOSINOPHIL # BLD AUTO: 0.16 10*3/MM3 (ref 0–0.4)
EOSINOPHIL NFR BLD AUTO: 1.6 % (ref 0.3–6.2)
ERYTHROCYTE [DISTWIDTH] IN BLOOD BY AUTOMATED COUNT: 13.2 % (ref 12.3–15.4)
GLUCOSE SERPL-MCNC: 119 MG/DL (ref 65–99)
HCT VFR BLD AUTO: 37.1 % (ref 37.5–51)
HGB BLD-MCNC: 12.5 G/DL (ref 13–17.7)
IMM GRANULOCYTES # BLD AUTO: 0.04 10*3/MM3 (ref 0–0.05)
IMM GRANULOCYTES NFR BLD AUTO: 0.4 % (ref 0–0.5)
LYMPHOCYTES # BLD AUTO: 1.33 10*3/MM3 (ref 0.7–3.1)
LYMPHOCYTES NFR BLD AUTO: 13 % (ref 19.6–45.3)
MCH RBC QN AUTO: 31.3 PG (ref 26.6–33)
MCHC RBC AUTO-ENTMCNC: 33.7 G/DL (ref 31.5–35.7)
MCV RBC AUTO: 93 FL (ref 79–97)
MONOCYTES # BLD AUTO: 0.99 10*3/MM3 (ref 0.1–0.9)
MONOCYTES NFR BLD AUTO: 9.7 % (ref 5–12)
NEUTROPHILS NFR BLD AUTO: 7.66 10*3/MM3 (ref 1.7–7)
NEUTROPHILS NFR BLD AUTO: 74.8 % (ref 42.7–76)
NRBC BLD AUTO-RTO: 0 /100 WBC (ref 0–0.2)
OSMOLALITY SERPL: 287 MOSM/KG (ref 275–295)
OSMOLALITY SERPL: 288 MOSM/KG (ref 275–295)
PLATELET # BLD AUTO: 167 10*3/MM3 (ref 140–450)
PMV BLD AUTO: 9.3 FL (ref 6–12)
POTASSIUM SERPL-SCNC: 3.8 MMOL/L (ref 3.5–5.2)
POTASSIUM SERPL-SCNC: 3.8 MMOL/L (ref 3.5–5.2)
RBC # BLD AUTO: 3.99 10*6/MM3 (ref 4.14–5.8)
SODIUM SERPL-SCNC: 138 MMOL/L (ref 136–145)
SODIUM SERPL-SCNC: 138 MMOL/L (ref 136–145)
WBC NRBC COR # BLD AUTO: 10.23 10*3/MM3 (ref 3.4–10.8)

## 2024-07-30 PROCEDURE — 25010000002 HYDRALAZINE PER 20 MG: Performed by: INTERNAL MEDICINE

## 2024-07-30 PROCEDURE — 25010000002 NICARDIPINE 2.5 MG/ML SOLUTION 10 ML VIAL: Performed by: INTERNAL MEDICINE

## 2024-07-30 PROCEDURE — 83930 ASSAY OF BLOOD OSMOLALITY: CPT | Performed by: NURSE PRACTITIONER

## 2024-07-30 PROCEDURE — 84132 ASSAY OF SERUM POTASSIUM: CPT | Performed by: INTERNAL MEDICINE

## 2024-07-30 PROCEDURE — 80048 BASIC METABOLIC PNL TOTAL CA: CPT | Performed by: INTERNAL MEDICINE

## 2024-07-30 PROCEDURE — 70450 CT HEAD/BRAIN W/O DYE: CPT

## 2024-07-30 PROCEDURE — 84295 ASSAY OF SERUM SODIUM: CPT | Performed by: INTERNAL MEDICINE

## 2024-07-30 PROCEDURE — 25810000003 SODIUM CHLORIDE 0.9 % SOLUTION 250 ML FLEX CONT: Performed by: INTERNAL MEDICINE

## 2024-07-30 PROCEDURE — 93005 ELECTROCARDIOGRAM TRACING: CPT | Performed by: INTERNAL MEDICINE

## 2024-07-30 PROCEDURE — 93010 ELECTROCARDIOGRAM REPORT: CPT | Performed by: INTERNAL MEDICINE

## 2024-07-30 PROCEDURE — 99232 SBSQ HOSP IP/OBS MODERATE 35: CPT | Performed by: INTERNAL MEDICINE

## 2024-07-30 PROCEDURE — 85025 COMPLETE CBC W/AUTO DIFF WBC: CPT | Performed by: INTERNAL MEDICINE

## 2024-07-30 PROCEDURE — 25010000002 ENOXAPARIN PER 10 MG

## 2024-07-30 PROCEDURE — 25010000002 HYDRALAZINE PER 20 MG

## 2024-07-30 PROCEDURE — 99232 SBSQ HOSP IP/OBS MODERATE 35: CPT | Performed by: NURSE PRACTITIONER

## 2024-07-30 RX ORDER — POLYETHYLENE GLYCOL 3350 17 G/17G
17 POWDER, FOR SOLUTION ORAL DAILY PRN
Status: DISCONTINUED | OUTPATIENT
Start: 2024-07-30 | End: 2024-08-01

## 2024-07-30 RX ORDER — BISACODYL 5 MG/1
5 TABLET, DELAYED RELEASE ORAL DAILY PRN
Status: DISCONTINUED | OUTPATIENT
Start: 2024-07-30 | End: 2024-08-01

## 2024-07-30 RX ORDER — BISACODYL 10 MG
10 SUPPOSITORY, RECTAL RECTAL DAILY PRN
Status: DISCONTINUED | OUTPATIENT
Start: 2024-07-30 | End: 2024-08-01

## 2024-07-30 RX ORDER — HYDRALAZINE HYDROCHLORIDE 50 MG/1
100 TABLET, FILM COATED ORAL EVERY 8 HOURS SCHEDULED
Status: DISCONTINUED | OUTPATIENT
Start: 2024-07-30 | End: 2024-08-01

## 2024-07-30 RX ORDER — HYDRALAZINE HYDROCHLORIDE 50 MG/1
50 TABLET, FILM COATED ORAL EVERY 8 HOURS SCHEDULED
Status: DISCONTINUED | OUTPATIENT
Start: 2024-07-30 | End: 2024-07-30

## 2024-07-30 RX ORDER — HYDRALAZINE HYDROCHLORIDE 20 MG/ML
20 INJECTION INTRAMUSCULAR; INTRAVENOUS ONCE
Status: COMPLETED | OUTPATIENT
Start: 2024-07-30 | End: 2024-07-30

## 2024-07-30 RX ORDER — AMOXICILLIN 250 MG
2 CAPSULE ORAL 2 TIMES DAILY
Status: DISCONTINUED | OUTPATIENT
Start: 2024-07-30 | End: 2024-08-01

## 2024-07-30 RX ADMIN — ATORVASTATIN CALCIUM 80 MG: 40 TABLET, FILM COATED ORAL at 20:15

## 2024-07-30 RX ADMIN — FAMOTIDINE 20 MG: 20 TABLET, FILM COATED ORAL at 18:34

## 2024-07-30 RX ADMIN — SENNOSIDES AND DOCUSATE SODIUM 2 TABLET: 50; 8.6 TABLET ORAL at 20:15

## 2024-07-30 RX ADMIN — ENOXAPARIN SODIUM 40 MG: 100 INJECTION SUBCUTANEOUS at 08:02

## 2024-07-30 RX ADMIN — HYDRALAZINE HYDROCHLORIDE 50 MG: 50 TABLET ORAL at 10:15

## 2024-07-30 RX ADMIN — AMLODIPINE BESYLATE 10 MG: 10 TABLET ORAL at 10:15

## 2024-07-30 RX ADMIN — METOPROLOL TARTRATE 25 MG: 25 TABLET, FILM COATED ORAL at 20:15

## 2024-07-30 RX ADMIN — Medication 10 MG: at 03:25

## 2024-07-30 RX ADMIN — SODIUM CHLORIDE 12.5 MG/HR: 9 INJECTION, SOLUTION INTRAVENOUS at 10:15

## 2024-07-30 RX ADMIN — SODIUM CHLORIDE 7.5 MG/HR: 9 INJECTION, SOLUTION INTRAVENOUS at 17:06

## 2024-07-30 RX ADMIN — SENNOSIDES AND DOCUSATE SODIUM 2 TABLET: 50; 8.6 TABLET ORAL at 08:02

## 2024-07-30 RX ADMIN — HYDRALAZINE HYDROCHLORIDE 100 MG: 50 TABLET ORAL at 21:13

## 2024-07-30 RX ADMIN — SODIUM CHLORIDE 15 MG/HR: 9 INJECTION, SOLUTION INTRAVENOUS at 02:37

## 2024-07-30 RX ADMIN — BISACODYL 5 MG: 5 TABLET, COATED ORAL at 05:45

## 2024-07-30 RX ADMIN — METOPROLOL TARTRATE 25 MG: 25 TABLET, FILM COATED ORAL at 10:15

## 2024-07-30 RX ADMIN — SODIUM CHLORIDE 15 MG/HR: 9 INJECTION, SOLUTION INTRAVENOUS at 21:13

## 2024-07-30 RX ADMIN — HYDRALAZINE HYDROCHLORIDE 20 MG: 20 INJECTION INTRAMUSCULAR; INTRAVENOUS at 18:34

## 2024-07-30 RX ADMIN — SODIUM CHLORIDE 15 MG/HR: 9 INJECTION, SOLUTION INTRAVENOUS at 06:45

## 2024-07-30 RX ADMIN — HYDRALAZINE HYDROCHLORIDE 25 MG: 25 TABLET ORAL at 05:12

## 2024-07-30 RX ADMIN — FAMOTIDINE 20 MG: 20 TABLET, FILM COATED ORAL at 08:02

## 2024-07-30 RX ADMIN — Medication 10 ML: at 08:02

## 2024-07-30 RX ADMIN — Medication 10 ML: at 20:16

## 2024-07-30 RX ADMIN — HYDRALAZINE HYDROCHLORIDE 20 MG: 20 INJECTION INTRAMUSCULAR; INTRAVENOUS at 20:34

## 2024-07-30 NOTE — PROGRESS NOTES
Stroke Neurology Progress Note     Subjective     This patient was seen in follow-up for: right basal ganglia hemorrhage  Present for the encounter were: self, patient, patient's wife    Subjective:  Sitting up in bed eating breakfast.  Continues with LFD, left-sided hemiparesis, left tactile neglect.    Objective      Temp:  [98.4 °F (36.9 °C)-99.8 °F (37.7 °C)] 98.4 °F (36.9 °C)  Heart Rate:  [] 84  Resp:  [14-18] 18  BP: (111-151)/(64-98) 135/85            Objective    Physical Exam  Constitutional:       General: He is not in acute distress.  HENT:      Head: Normocephalic and atraumatic.   Eyes:      Extraocular Movements: Extraocular movements intact.      Pupils: Pupils are equal, round, and reactive to light.   Cardiovascular:      Rate and Rhythm: Normal rate and regular rhythm.   Pulmonary:      Effort: Pulmonary effort is normal. No respiratory distress.   Musculoskeletal:         General: Swelling present.      Cervical back: Normal range of motion and neck supple.      Comments: Edema noted in LUE   Skin:     General: Skin is warm and dry.   Neurological:      Mental Status: He is alert.      Cranial Nerves: Cranial nerve deficit present.      Sensory: Sensory deficit present.      Motor: Weakness present.      Comments: Alert and oriented to person, knows he is in the hospital, moderate dysarthria, LFD, does not blink to visual threat on the left peripheral fields, left-sided hemiparesis (able to wiggle toes on the left foot), LUE tactile and visual neglect           Labs:    Lab Results   Component Value Date    HGBA1C 5.50 07/27/2024      Lab Results   Component Value Date    CHOL 227 (H) 07/27/2024    TRIG 161 (H) 07/27/2024    HDL 58 07/27/2024     (H) 07/27/2024       Lab Results   Component Value Date    WBC 10.23 07/30/2024    HGB 12.5 (L) 07/30/2024    HCT 37.1 (L) 07/30/2024    MCV 93.0 07/30/2024     07/30/2024     Lab Results   Component Value Date    GLUCOSE 140 (H)  07/29/2024    BUN 11 07/29/2024    CREATININE 0.94 07/29/2024    BCR 11.7 07/29/2024    CO2 21.0 (L) 07/29/2024    CALCIUM 8.4 (L) 07/29/2024    AST 28 07/26/2024    ALT 35 07/26/2024       Results from last 7 days   Lab Units 07/30/24  0505 07/30/24  0047 07/29/24  1738 07/29/24  1408 07/29/24  0344 07/29/24  0003 07/28/24  1736 07/26/24  1703 07/26/24  1702   SODIUM mmol/L  --  138 140  139 140 140   < > 139   < >  --    POTASSIUM mmol/L 3.8  --  3.6  --  3.4*  --  3.9   < >  --    CHLORIDE mmol/L  --   --  108*  --  110*  --  107   < >  --    CO2 mmol/L  --   --  21.0*  --  21.0*  --  21.0*   < >  --    BUN mg/dL  --   --  11  --  9  --  8   < >  --    CREATININE mg/dL  --   --  0.94  --  0.94  --  0.86   < >  --    CALCIUM mg/dL  --   --  8.4*  --  8.1*  --  8.2*   < >  --    ALT (SGPT) U/L  --   --   --   --   --   --   --   --  35   AST (SGOT) U/L  --   --   --   --   --   --   --   --  28   GLUCOSE mg/dL  --   --  140*  --  142*  --  134*   < >  --     < > = values in this interval not displayed.       Results Review:      All brain images and reports were personally reviewed and I agree with the interpretations except as noted below.    CT head 7/29/2024  Impression: 1. Stable right basal ganglia hemorrhage with surrounding edema and mass effect. 2. Stable mild chronic small vessel ischemic change and chronic lacunar infarct in the right central liz.    CT head 7/28/2024  Impression: 1. Stable size of right basal ganglia intraparenchymal hemorrhage with increased surrounding vasogenic edema and slightly increased mass effect with 4 mm leftward shift of midline structures. 2. Stable mild chronic small vessel ischemic change and chronic lacunar infarct in the left basal ganglia.        CT Head Without Contrast 7/26/2024  Impression: Interval increase in intraparenchymal hemorrhage centered in the right basal ganglia with mild increase in mass effect on the adjacent lateral ventricles and minimal change of  the basilar and suprasellar cisterns.     CT Angiogram Head and Neck 7/26/2024  Impression: 1. No evidence of intracranial large vessel arterial occlusion or aneurysm 2. Ulcerated plaque in the left distal common carotid and proximal internal carotid arteries that results in less than 50% luminal diameter stenosis 3. Patent right carotid and bilateral vertebral arteries without significant stenosis or dissection.    CT Head Without Contrast 7/26/2024  Impression: 1. 4 x 3 x 3.5 cm right basal ganglia and periventricular white matter parenchymal hematoma with mass effect as described 2. No evidence of major vascular territory brain ischemia 3. Chronic small vessel ischemic changes.      Transthoracic echocardiogram 7/26/2024  Impression:    Left ventricular systolic function is normal. Estimated left ventricular EF = 65%    The cardiac valves are anatomically and functionally normal.    Saline test results are negative.          Assessment/Plan     Assessment:    # Right basal ganglia hemorrhage:   -Likely hypertensive in etiology. /112 on admission   -CTH in the am  -Repeat CT head as needed for any change in neurologic exam  -Hold antiplatelet medication  -Serum Na 138  -Neuro exam is stable  -SBP < 140.  Currently maxed on Cardene.  Intensivist adding additional oral agents.  Management per primary team.  -PT OT recommend IRF  -DVT prophylaxis: SCD, enoxaparin   -Neurosurgery evaluated, non-surgical  -Follow-up in stroke clinic in 4 weeks after DC  -Recommend keeping the patient in ICU for now for close blood pressure monitoring    # Extracranial atherosclerotic disease  -Eventually, we will need to treat extracranial atherosclerotic disease with aspirin but would wait at least 2 weeks post hemorrhage (8/9/2024)    -Continue atorvastatin 80 mg daily ()      Patient education: call 911 or present to emergency department with any stroke symptom, including unilateral face, arm, or leg weakness, numbness,  or paresthesias, unilateral facial droop, speech deficits, dizziness with nausea, vomiting, nystagmus, and incoordination, visual deficits, or severe onset headache.    Plan discussed with Dr. Williamson, the patient, and nursing staff.  Stroke neurology will continue to follow.  Please call with any questions or concerns.    REENA Baker, AGACNP-BC  [facility] [place]  07/30/24  07:12 EDT

## 2024-07-30 NOTE — PLAN OF CARE
Goal Outcome Evaluation:    NIHSS 17. No neuro changes on shift. CT in AM.    Blood pressure medications increased. See GIOVANNI Zhang still infusing.    Rehab hopefully soon.

## 2024-07-30 NOTE — PROGRESS NOTES
"Intensive Care Follow-up     Hospital:  LOS: 4 days   Mr. Ron Cam, 53 y.o. male is followed for:   Nontraumatic intracerebral hemorrhage        Subjective     53-year-old male who denies a history of hypertension developed acute left-sided weakness and dysarthria on 7/26/2024 around 3:30 PM.  No prior similar episodes.     CT scan of the head in the emergency room revealed a basal ganglionic hemorrhage on the right.  Blood pressure was markedly elevated.  He has been started on a Cardene drip and has received some \"as needed\" antihypertensives in the ER and has been admitted to the intensive care unit  Interval History:  The chart is been reviewed.  The patient remains with mild expressive aphasia.  He is currently on maximum Cardene despite oral medications.  Blood pressure goal remains 140 mmHg and lower.    The patient's past medical, surgical and social history were reviewed and updated in Epic as appropriate.        Objective     Infusions:  niCARdipine, 5-15 mg/hr, Last Rate: 5 mg/hr (07/30/24 1112)      Medications:  amLODIPine, 10 mg, Oral, Q24H  atorvastatin, 80 mg, Oral, Nightly  cloNIDine, 1 patch, Transdermal, Weekly  enoxaparin, 40 mg, Subcutaneous, Q24H  famotidine, 20 mg, Oral, BID AC  hydrALAZINE, 50 mg, Oral, Q8H  metoprolol tartrate, 25 mg, Oral, Q12H  senna-docusate sodium, 2 tablet, Oral, BID  sodium chloride, 10 mL, Intravenous, Q12H        Vital Sign Min/Max for last 24 hours  Temp  Min: 98.1 °F (36.7 °C)  Max: 99.8 °F (37.7 °C)   BP  Min: 111/68  Max: 153/94   Pulse  Min: 76  Max: 106   Resp  Min: 14  Max: 18   SpO2  Min: 90 %  Max: 96 %   Flow (L/min)  Min: 2  Max: 2       Input/Output for last 24 hour shift  07/29 0701 - 07/30 0700  In: 2907 [P.O.:1175; I.V.:1732]  Out: 1550 [Urine:1550]      Objective:  General Appearance:  Uncomfortable, in no acute distress and well-appearing.    Vital signs: (most recent): Blood pressure 119/68, pulse 76, temperature 98.1 °F (36.7 °C), temperature " "source Axillary, resp. rate 18, height 182.9 cm (72.01\"), weight 120 kg (264 lb 8.8 oz), SpO2 94%.    HEENT: Normal HEENT exam.    Heart: Normal rate.  Regular rhythm.  S1 normal and S2 normal.    Abdomen: Abdomen is soft and non-distended.  Bowel sounds are normal.   There is no abdominal tenderness.     Extremities: Decreased range of motion.  (Left upper extremity weak.  Approximately 2 out of 5 strength.)  Neurological: Patient is alert.  (Mild expressive aphasia).    Pupils:  Pupils are equal, round, and reactive to light.  Pupils are equal.   Skin:  Warm.                Results from last 7 days   Lab Units 07/30/24  0505 07/28/24  0515 07/26/24  1703 07/26/24  1702   WBC 10*3/mm3 10.23 8.21  --  6.50   HEMOGLOBIN g/dL 12.5* 13.1  --  15.3   HEMOGLOBIN, POC g/dL  --   --  15.0  --    PLATELETS 10*3/mm3 167 172  --  191     Results from last 7 days   Lab Units 07/30/24  0505 07/30/24  0047 07/29/24  1738 07/29/24  1408 07/29/24  0344   SODIUM mmol/L 138 138 140  139   < > 140   POTASSIUM mmol/L 3.8  3.8  --  3.6  --  3.4*   CO2 mmol/L 19.0*  --  21.0*  --  21.0*   BUN mg/dL 13  --  11  --  9   CREATININE mg/dL 0.92  --  0.94  --  0.94   GLUCOSE mg/dL 119*  --  140*  --  142*    < > = values in this interval not displayed.     Estimated Creatinine Clearance: 124.2 mL/min (by C-G formula based on SCr of 0.92 mg/dL).          I reviewed the patient's results and images.     Assessment & Plan   Impression        Nontraumatic intracerebral hemorrhage    Hypertensive crisis       Plan        Continue to wean Cardene.  We will go ahead and increase his oral coverage including increasing the dose of hydralazine as well as Lopressor.  We may need to add further medicines later today.  Another consideration would be switching from transcutaneous to oral clonidine.  He will be transition to the floor when blood pressure is under better control.  Continue with physical and Occupational Therapy.  Eventual rehab placement " for recovery.    Plan of care and goals reviewed with mulitdisciplinary/antibiotic stewardship team during rounds.   I discussed the patient's findings and my recommendations with patient and nursing staff       Philip Harper MD, [facility]  Pulmonology and Critical Care Medicine

## 2024-07-30 NOTE — PROGRESS NOTES
HOD# : 4    No events last night  Resting quietly when I entered the room but awoke easily to my voice.  Patient is tracking across midline but still showing signs of visual deficit on the left as well as neglect of the left upper extremity.  Left upper extremity still flaccid with prominent left facial droop.     Speech still slurred but he is able to communicate effectively    Nontraumatic intracerebral hemorrhage    Hypertensive crisis      Temp:  [98.1 °F (36.7 °C)-99.8 °F (37.7 °C)] 98.1 °F (36.7 °C)  Heart Rate:  [] 81  Resp:  [14-18] 18  BP: (111-153)/(64-98) 137/77  I/O last 3 completed shifts:  In: 4517 [P.O.:1175; I.V.:3342]  Out: 2975 [Urine:2975]  I/O this shift:  In: 625 [P.O.:355; I.V.:270]  Out: -   Vital signs were reviewed and documented in the chart      EXAM   Body mass index is 35.88 kg/m².      Left upper extremity flaccid left-sided facial droop  Extinction/neglect of the left upper extremity  Moves right side easily to command needs some encouragement to move his left leg  Speech is slurred but is able to communicate name and place    DIAGNOSIS  ICH      PLAN    Continue to monitor.  Hopefully no intervention will be required with neurosurgery.  Patient will continue on with vascular neurology at this point.  CT scan shows improvement of the acute blood but does show swelling and evolving hemorrhage.    Patient will need follow-up outpatient with neurosurgery in about 1 months time

## 2024-07-31 LAB
ANION GAP SERPL CALCULATED.3IONS-SCNC: 14 MMOL/L (ref 5–15)
BASOPHILS # BLD AUTO: 0.03 10*3/MM3 (ref 0–0.2)
BASOPHILS NFR BLD AUTO: 0.3 % (ref 0–1.5)
BUN SERPL-MCNC: 17 MG/DL (ref 6–20)
BUN/CREAT SERPL: 20 (ref 7–25)
CALCIUM SPEC-SCNC: 8.7 MG/DL (ref 8.6–10.5)
CHLORIDE SERPL-SCNC: 98 MMOL/L (ref 98–107)
CO2 SERPL-SCNC: 21 MMOL/L (ref 22–29)
CREAT SERPL-MCNC: 0.85 MG/DL (ref 0.76–1.27)
DEPRECATED RDW RBC AUTO: 42 FL (ref 37–54)
EGFRCR SERPLBLD CKD-EPI 2021: 103.9 ML/MIN/1.73
EOSINOPHIL # BLD AUTO: 0.02 10*3/MM3 (ref 0–0.4)
EOSINOPHIL NFR BLD AUTO: 0.2 % (ref 0.3–6.2)
ERYTHROCYTE [DISTWIDTH] IN BLOOD BY AUTOMATED COUNT: 12.8 % (ref 12.3–15.4)
GLUCOSE BLDC GLUCOMTR-MCNC: 133 MG/DL (ref 70–130)
GLUCOSE SERPL-MCNC: 155 MG/DL (ref 65–99)
HCT VFR BLD AUTO: 38.3 % (ref 37.5–51)
HGB BLD-MCNC: 13.5 G/DL (ref 13–17.7)
IMM GRANULOCYTES # BLD AUTO: 0.03 10*3/MM3 (ref 0–0.05)
IMM GRANULOCYTES NFR BLD AUTO: 0.3 % (ref 0–0.5)
LYMPHOCYTES # BLD AUTO: 0.71 10*3/MM3 (ref 0.7–3.1)
LYMPHOCYTES NFR BLD AUTO: 6.6 % (ref 19.6–45.3)
MAGNESIUM SERPL-MCNC: 1.9 MG/DL (ref 1.6–2.6)
MCH RBC QN AUTO: 31.6 PG (ref 26.6–33)
MCHC RBC AUTO-ENTMCNC: 35.2 G/DL (ref 31.5–35.7)
MCV RBC AUTO: 89.7 FL (ref 79–97)
MONOCYTES # BLD AUTO: 0.85 10*3/MM3 (ref 0.1–0.9)
MONOCYTES NFR BLD AUTO: 7.9 % (ref 5–12)
NEUTROPHILS NFR BLD AUTO: 84.7 % (ref 42.7–76)
NEUTROPHILS NFR BLD AUTO: 9.11 10*3/MM3 (ref 1.7–7)
NRBC BLD AUTO-RTO: 0 /100 WBC (ref 0–0.2)
PLATELET # BLD AUTO: 197 10*3/MM3 (ref 140–450)
PMV BLD AUTO: 9.7 FL (ref 6–12)
POTASSIUM SERPL-SCNC: 3.9 MMOL/L (ref 3.5–5.2)
QT INTERVAL: 392 MS
QTC INTERVAL: 469 MS
RBC # BLD AUTO: 4.27 10*6/MM3 (ref 4.14–5.8)
SODIUM SERPL-SCNC: 129 MMOL/L (ref 136–145)
SODIUM SERPL-SCNC: 133 MMOL/L (ref 136–145)
WBC NRBC COR # BLD AUTO: 10.75 10*3/MM3 (ref 3.4–10.8)

## 2024-07-31 PROCEDURE — 80048 BASIC METABOLIC PNL TOTAL CA: CPT | Performed by: INTERNAL MEDICINE

## 2024-07-31 PROCEDURE — 99232 SBSQ HOSP IP/OBS MODERATE 35: CPT | Performed by: INTERNAL MEDICINE

## 2024-07-31 PROCEDURE — 84295 ASSAY OF SERUM SODIUM: CPT

## 2024-07-31 PROCEDURE — 83735 ASSAY OF MAGNESIUM: CPT | Performed by: INTERNAL MEDICINE

## 2024-07-31 PROCEDURE — 99232 SBSQ HOSP IP/OBS MODERATE 35: CPT

## 2024-07-31 PROCEDURE — 82948 REAGENT STRIP/BLOOD GLUCOSE: CPT

## 2024-07-31 PROCEDURE — 25810000003 SODIUM CHLORIDE 0.9 % SOLUTION 250 ML FLEX CONT: Performed by: INTERNAL MEDICINE

## 2024-07-31 PROCEDURE — 25010000002 ENOXAPARIN PER 10 MG

## 2024-07-31 PROCEDURE — 25010000002 ONDANSETRON PER 1 MG

## 2024-07-31 PROCEDURE — 85025 COMPLETE CBC W/AUTO DIFF WBC: CPT | Performed by: INTERNAL MEDICINE

## 2024-07-31 PROCEDURE — 25010000002 NICARDIPINE 2.5 MG/ML SOLUTION 10 ML VIAL: Performed by: INTERNAL MEDICINE

## 2024-07-31 RX ORDER — METOPROLOL TARTRATE 50 MG/1
50 TABLET, FILM COATED ORAL EVERY 12 HOURS SCHEDULED
Status: DISCONTINUED | OUTPATIENT
Start: 2024-07-31 | End: 2024-08-01

## 2024-07-31 RX ORDER — ONDANSETRON 2 MG/ML
4 INJECTION INTRAMUSCULAR; INTRAVENOUS EVERY 6 HOURS PRN
Status: DISCONTINUED | OUTPATIENT
Start: 2024-07-31 | End: 2024-08-06 | Stop reason: HOSPADM

## 2024-07-31 RX ORDER — LISINOPRIL 5 MG/1
5 TABLET ORAL
Status: DISCONTINUED | OUTPATIENT
Start: 2024-07-31 | End: 2024-08-01

## 2024-07-31 RX ORDER — MAGNESIUM CARB/ALUMINUM HYDROX 105-160MG
296 TABLET,CHEWABLE ORAL ONCE
Status: COMPLETED | OUTPATIENT
Start: 2024-07-31 | End: 2024-07-31

## 2024-07-31 RX ORDER — CALCIUM CARBONATE 500 MG/1
2 TABLET, CHEWABLE ORAL 3 TIMES DAILY PRN
Status: DISCONTINUED | OUTPATIENT
Start: 2024-07-31 | End: 2024-08-01

## 2024-07-31 RX ORDER — MECLIZINE HYDROCHLORIDE 25 MG/1
25 TABLET ORAL ONCE
Status: COMPLETED | OUTPATIENT
Start: 2024-07-31 | End: 2024-07-31

## 2024-07-31 RX ORDER — SCOLOPAMINE TRANSDERMAL SYSTEM 1 MG/1
1 PATCH, EXTENDED RELEASE TRANSDERMAL ONCE
Status: COMPLETED | OUTPATIENT
Start: 2024-07-31 | End: 2024-08-03

## 2024-07-31 RX ADMIN — SODIUM CHLORIDE 12.5 MG/HR: 9 INJECTION, SOLUTION INTRAVENOUS at 12:35

## 2024-07-31 RX ADMIN — LISINOPRIL 5 MG: 5 TABLET ORAL at 10:04

## 2024-07-31 RX ADMIN — Medication 10 ML: at 20:51

## 2024-07-31 RX ADMIN — MECLIZINE HYDROCHLORIDE 25 MG: 25 TABLET ORAL at 02:34

## 2024-07-31 RX ADMIN — AMLODIPINE BESYLATE 10 MG: 10 TABLET ORAL at 08:39

## 2024-07-31 RX ADMIN — SENNOSIDES AND DOCUSATE SODIUM 2 TABLET: 50; 8.6 TABLET ORAL at 08:39

## 2024-07-31 RX ADMIN — POLYETHYLENE GLYCOL 3350 17 G: 17 POWDER, FOR SOLUTION ORAL at 02:34

## 2024-07-31 RX ADMIN — HYDRALAZINE HYDROCHLORIDE 100 MG: 50 TABLET ORAL at 05:21

## 2024-07-31 RX ADMIN — SODIUM CHLORIDE 15 MG/HR: 9 INJECTION, SOLUTION INTRAVENOUS at 01:09

## 2024-07-31 RX ADMIN — SODIUM CHLORIDE 15 MG/HR: 9 INJECTION, SOLUTION INTRAVENOUS at 22:20

## 2024-07-31 RX ADMIN — CLONIDINE HYDROCHLORIDE 0.3 MG: 0.2 TABLET ORAL at 20:50

## 2024-07-31 RX ADMIN — ENOXAPARIN SODIUM 40 MG: 100 INJECTION SUBCUTANEOUS at 08:39

## 2024-07-31 RX ADMIN — FAMOTIDINE 20 MG: 20 TABLET, FILM COATED ORAL at 18:04

## 2024-07-31 RX ADMIN — SODIUM CHLORIDE 15 MG/HR: 9 INJECTION, SOLUTION INTRAVENOUS at 04:32

## 2024-07-31 RX ADMIN — HYDRALAZINE HYDROCHLORIDE 100 MG: 50 TABLET ORAL at 14:24

## 2024-07-31 RX ADMIN — CLONIDINE HYDROCHLORIDE 0.3 MG: 0.2 TABLET ORAL at 10:03

## 2024-07-31 RX ADMIN — Medication 10 ML: at 08:40

## 2024-07-31 RX ADMIN — CALCIUM CARBONATE (ANTACID) CHEW TAB 500 MG 2 TABLET: 500 CHEW TAB at 22:19

## 2024-07-31 RX ADMIN — BISACODYL 10 MG: 10 SUPPOSITORY RECTAL at 08:39

## 2024-07-31 RX ADMIN — HYDRALAZINE HYDROCHLORIDE 100 MG: 50 TABLET ORAL at 20:52

## 2024-07-31 RX ADMIN — SODIUM CHLORIDE 15 MG/HR: 9 INJECTION, SOLUTION INTRAVENOUS at 08:21

## 2024-07-31 RX ADMIN — METOPROLOL TARTRATE 50 MG: 50 TABLET, FILM COATED ORAL at 20:51

## 2024-07-31 RX ADMIN — ATORVASTATIN CALCIUM 80 MG: 40 TABLET, FILM COATED ORAL at 20:50

## 2024-07-31 RX ADMIN — FAMOTIDINE 20 MG: 20 TABLET, FILM COATED ORAL at 08:39

## 2024-07-31 RX ADMIN — Medication 10 MG: at 05:48

## 2024-07-31 RX ADMIN — Medication 10 MG: at 11:48

## 2024-07-31 RX ADMIN — SODIUM CHLORIDE 15 MG/HR: 9 INJECTION, SOLUTION INTRAVENOUS at 18:17

## 2024-07-31 RX ADMIN — METOPROLOL TARTRATE 25 MG: 25 TABLET, FILM COATED ORAL at 08:40

## 2024-07-31 RX ADMIN — MAGNESIUM CITRATE 145 ML: 1.75 LIQUID ORAL at 11:43

## 2024-07-31 RX ADMIN — SCOPOLAMINE 1 PATCH: 1.5 PATCH, EXTENDED RELEASE TRANSDERMAL at 05:14

## 2024-07-31 RX ADMIN — METOPROLOL TARTRATE 25 MG: 25 TABLET, FILM COATED ORAL at 10:04

## 2024-07-31 RX ADMIN — ONDANSETRON 4 MG: 2 INJECTION INTRAMUSCULAR; INTRAVENOUS at 11:43

## 2024-07-31 NOTE — PAYOR COMM NOTE
"[facility]   phone: [phone]  fax: [phone]    Clinical update    Sherine Cam (53 y.o. Male)       Date of Birth   1970    Social Security Number       Address   [address] [place] [address]    Home Phone   [phone]    MRN   [id number]       Roman [employer]   None    Marital Status                               Admission Date   7/26/24    Admission Type   Emergency    Admitting Provider   Trevor Rosa MD    Attending Provider   Philip Harper MD    Department, Room/Bed   [facility] 2B ICU, N229/1       Discharge Date       Discharge Disposition       Discharge Destination                                 Attending Provider: Philip Harper MD    Allergies: No Known Allergies    Isolation: None   Infection: None   Code Status: Not on file    Ht: 182.9 cm (72.01\")   Wt: 122 kg (268 lb 11.9 oz)    Admission Cmt: None   Principal Problem: Nontraumatic intracerebral hemorrhage [I61.9]                   Active Insurance as of 7/26/2024       Primary Coverage       Payor Plan Insurance Group Employer/Plan Group    [employer] BLUE [employer] BLUE SHIELD PPO [id number]       Payor Plan Address Payor Plan Phone Number Payor Plan Fax Number Effective Dates    [address] [phone]  11/1/2023 - None Entered    [place] [address]         Subscriber Name Subscriber Birth Date Member ID       SHERINE CAM 1970 [id number]                     Emergency Contacts        (Rel.) Home Phone Work Phone Mobile Phone    WALKER CAM (Spouse) [phone] -- [phone]              Lab Results (last 24 hours)       Procedure Component Value Units Date/Time    Basic Metabolic Panel [[id number]]  (Abnormal) Collected: 07/31/24 0529    Specimen: Blood Updated: 07/31/24 0646     Glucose 155 mg/dL      BUN 17 mg/dL      Creatinine 0.85 mg/dL      Sodium 133 mmol/L      Potassium 3.9 mmol/L      Comment: Slight hemolysis detected by " analyzer. Result may be falsely elevated.        Chloride 98 mmol/L      CO2 21.0 mmol/L      Calcium 8.7 mg/dL      BUN/Creatinine Ratio 20.0     Anion Gap 14.0 mmol/L      eGFR 103.9 mL/min/1.73     Narrative:      GFR Normal >60  Chronic Kidney Disease <60  Kidney Failure <15      Magnesium [[id number]]  (Normal) Collected: 07/31/24 0529    Specimen: Blood Updated: 07/31/24 0646     Magnesium 1.9 mg/dL     CBC & Differential [[id number]]  (Abnormal) Collected: 07/31/24 0529    Specimen: Blood Updated: 07/31/24 0609    Narrative:      The following orders were created for panel order CBC & Differential.  Procedure                               Abnormality         Status                     ---------                               -----------         ------                     CBC Auto Differential[[id number]]        Abnormal            Final result                 Please view results for these tests on the individual orders.    CBC Auto Differential [[id number]]  (Abnormal) Collected: 07/31/24 0529    Specimen: Blood Updated: 07/31/24 0609     WBC 10.75 10*3/mm3      RBC 4.27 10*6/mm3      Hemoglobin 13.5 g/dL      Hematocrit 38.3 %      MCV 89.7 fL      MCH 31.6 pg      MCHC 35.2 g/dL      RDW 12.8 %      RDW-SD 42.0 fl      MPV 9.7 fL      Platelets 197 10*3/mm3      Neutrophil % 84.7 %      Lymphocyte % 6.6 %      Monocyte % 7.9 %      Eosinophil % 0.2 %      Basophil % 0.3 %      Immature Grans % 0.3 %      Neutrophils, Absolute 9.11 10*3/mm3      Lymphocytes, Absolute 0.71 10*3/mm3      Monocytes, Absolute 0.85 10*3/mm3      Eosinophils, Absolute 0.02 10*3/mm3      Basophils, Absolute 0.03 10*3/mm3      Immature Grans, Absolute 0.03 10*3/mm3      nRBC 0.0 /100 WBC     POC Glucose Once [[id number]]  (Abnormal) Collected: 07/31/24 0005    Specimen: Blood Updated: 07/31/24 0007     Glucose 133 mg/dL           Imaging Results (Last 24 Hours)       Procedure Component Value Units Date/Time    CT Head Without Contrast  [[id number]] Collected: 07/30/24 2351     Updated: 07/30/24 2356    Narrative:      CT HEAD WO CONTRAST    Date of Exam: 7/30/2024 11:43 PM EDT    Indication: Stroke follow up.    Comparison: 7/29/2024.    Technique: Axial CT images were obtained of the head without contrast administration.  Automated exposure control and iterative construction methods were used.      Findings:  There is redemonstration of the right basal ganglia/right temporal lobe intraparenchymal hemorrhage. The hyperdense portion of the hematoma measures 53 x 30 mm, not significantly changed. There is a surrounding region of cerebral edema that extends into   the right temporal lobe, right basal ganglia and right frontoparietal lobes. There is stable mass effect with up to 4 mm leftward shift of midline. There is partial effacement of the right lateral ventricle. No acute hydrocephalus or trapped ventricle.   No new hemorrhage. Mild stable patchy white matter hypoattenuation otherwise. The cerebellum is unremarkable. Brainstem appears intact. Orbits are within normal limits. The calvarium appears intact. The sinuses and mastoids appear clear.      Impression:      Impression:  1.Stable right basal ganglia/right temporal lobe intraparenchymal hemorrhage with surrounding cerebral edema and mass effect. No new hemorrhage.  2.Mild chronic small vessel ischemic change.        Electronically Signed: Tony Infante MD    7/30/2024 11:53 PM EDT    Workstation ID: [id number]             Physician Progress Notes (last 24 hours)        Debbie Rios APRN at 07/31/24 0918          Stroke Neurology Progress Note     Subjective     This patient was seen in follow-up for: right basal ganglia hemorrhage    Subjective:  Patient had episode of dizziness, nausea, and diaphoresis while having BM; symptoms have currently resolved.  SBP is currently in the 130s, on nicardipine.  Nursing attempting to wean.  Patient states he is ready to go to rehab to get  stronger.      Objective      Temp:  [97.9 °F (36.6 °C)-98.1 °F (36.7 °C)] 97.9 °F (36.6 °C)  Heart Rate:  [] 87  Resp:  [16-20] 20  BP: (119-156)/(67-99) 141/80            Objective    Physical Exam  Vitals and nursing note reviewed.   Constitutional:       General: He is not in acute distress.     Appearance: He is not ill-appearing.   HENT:      Head: Normocephalic and atraumatic.      Mouth/Throat:      Mouth: Mucous membranes are moist.   Eyes:      Extraocular Movements: Extraocular movements intact.      Pupils: Pupils are equal, round, and reactive to light.   Cardiovascular:      Rate and Rhythm: Normal rate and regular rhythm.   Pulmonary:      Effort: Pulmonary effort is normal. No respiratory distress.      Comments: On room air  Musculoskeletal:      Right lower leg: No edema.      Left lower leg: No edema.   Skin:     General: Skin is warm and dry.   Neurological:      Mental Status: He is alert and oriented to person, place, and time.      Cranial Nerves: Cranial nerve deficit and dysarthria present.      Sensory: Sensory deficit present.      Motor: Weakness present.   Psychiatric:         Attention and Perception: Attention normal.         Mood and Affect: Affect is flat.         Speech: Speech is slurred.         Behavior: Behavior is withdrawn. Behavior is cooperative.         Cognition and Memory: Cognition and memory normal.         Neurological Exam  Mental Status  Alert. Oriented to person, place, time and situation. Oriented to person, place, and time. Memory is normal. Moderate dysarthria present. Language is fluent with no aphasia. Attention and concentration are normal.    Cranial Nerves  CN II: Right normal visual field. Left homonymous hemianopsia.  CN III, IV, VI: Extraocular movements intact bilaterally. Pupils equal round and reactive to light bilaterally.  CN V:  Left: Diminished sensation of the entire left side of the face.  CN VII:  Left: There is central facial  weakness.  CN VIII: Hearing appears to be intact bilaterally.  CN XI:  Right: Sternocleidomastoid strength is normal.  Left: Absent.  CN XII: Tongue midline without atrophy or fasciculations.    Motor  Normal muscle bulk throughout. Decreased muscle tone.  LUE with 0/5 strength  LLE with 2/5 strength  RUE/RLE with 5/5 strength.    Sensory  Light touch abnormality: Left upper and lower extremity.  Left-sided hemispatial neglect: Extinction to double simultaneous stimulation of the left arm and leg.    Coordination  Right: No obvious dysmetria noted. No obvious dysmetria noted.Left: Unable to test 2/2 weakness. Unable to test 2/2 weakness.    Gait    Not observed.      Results Review:      All brain images and reports were personally reviewed and I agree with the interpretations except as noted below.    CT head 7/29/2024  Impression: 1. Stable right basal ganglia hemorrhage with surrounding edema and mass effect. 2. Stable mild chronic small vessel ischemic change and chronic lacunar infarct in the right central liz.    CT head 7/28/2024  Impression: 1. Stable size of right basal ganglia intraparenchymal hemorrhage with increased surrounding vasogenic edema and slightly increased mass effect with 4 mm leftward shift of midline structures. 2. Stable mild chronic small vessel ischemic change and chronic lacunar infarct in the left basal ganglia.        CT Head Without Contrast 7/26/2024  Impression: Interval increase in intraparenchymal hemorrhage centered in the right basal ganglia with mild increase in mass effect on the adjacent lateral ventricles and minimal change of the basilar and suprasellar cisterns.     CT Angiogram Head and Neck 7/26/2024  Impression: 1. No evidence of intracranial large vessel arterial occlusion or aneurysm 2. Ulcerated plaque in the left distal common carotid and proximal internal carotid arteries that results in less than 50% luminal diameter stenosis 3. Patent right carotid and  bilateral vertebral arteries without significant stenosis or dissection.    CT Head Without Contrast 7/26/2024  Impression: 1. 4 x 3 x 3.5 cm right basal ganglia and periventricular white matter parenchymal hematoma with mass effect as described 2. No evidence of major vascular territory brain ischemia 3. Chronic small vessel ischemic changes.    CT Head Without Contrast    Result Date: 7/30/2024  Impression: 1.Stable right basal ganglia/right temporal lobe intraparenchymal hemorrhage with surrounding cerebral edema and mass effect. No new hemorrhage. 2.Mild chronic small vessel ischemic change. Electronically Signed: Tony Infante MD  7/30/2024 11:53 PM EDT  Workstation ID: [id number]     Transthoracic echocardiogram 7/26/2024  Impression:    Left ventricular systolic function is normal. Estimated left ventricular EF = 65%    The cardiac valves are anatomically and functionally normal.    Saline test results are negative.      A1c 5.50    WBC 10.75  H/H 13.5/38.5  Platelets 197  Glucose 155  Cretinine 0.85, BUN 17  Na+ 133       Assessment/Plan     Assessment:    This is a 54 yo male with PMH of migraines who presented to [facility] ED via EMS on 7/26/2024 with complaints of left sided weakness, sensory loss, dysarthria and visual disturbance;  NIH noted to be 17.  CT head without contrast revealed a 4 x 3 x 3.5 right basal ganglia hemorrhage.  CT angiogram without evidence of LVO.  Ulcerated plaque observed within the left distal common carotid.  He was not a candidate for IV thrombolytic therapy given presence of ICH and was not deemed a candidate for neurosurgical intervention.  He was admitted to the neurological ICU for further monitoring and work-up.     Antiplatelet PTA: None  Anticoagulation PTA: None    # Right basal ganglia hemorrhage, etiology likely hypertensive  -Hemorrhagic stroke order set is currently in place  -ICH score 0   -/112 on admission   -Neurosurgery evaluated, non-surgical  -CTH last  evening stable appearance of hemorrhage with insignificant edema  -Hold all antiplatelet medications  -Serum Na 133, recheck at 1500  -PT OT recommend IRF, CM following for DC needs  -OK for enoxaparin for DVT prophylaxis   -Follow-up in stroke clinic in 4 weeks after DC    # Extracranial atherosclerotic disease  -Eventually, we will need to treat extracranial atherosclerotic disease with aspirin but would wait at least 2 weeks post-hemorrhage (8/9/2024)      # Essential hypertension, new diagnosis  -SBP < 140.  Wean Cardene as tolerated.    -Oral antihypertensive medications added 7/30.    -Management per primary team.    # Hyperlipidemia  -LDL on admission 140, goal <70 for secondary stroke prevention  -Continue atorvastatin 80 mg daily ()    Plan discussed with Dr. Williamson, the patient, patient's family, and primary team.  Stroke neurology will continue to follow.  OK to be transferred to floor when clear from ICU team. Please call with any questions or concerns.    REENA Ibrahim, AGACNP-[facility] STROKE NEURO  07/31/24  09:18 EDT        Electronically signed by Debbie Rios APRN at 07/31/24 1038       Kane Hoff PA-C at 07/30/24 1305       Attestation signed by Isac Painter MD at 07/30/24 1545    I have reviewed this documentation and agree.                  HOD# : 4    No events last night  Resting quietly when I entered the room but awoke easily to my voice.  Patient is tracking across midline but still showing signs of visual deficit on the left as well as neglect of the left upper extremity.  Left upper extremity still flaccid with prominent left facial droop.     Speech still slurred but he is able to communicate effectively    Nontraumatic intracerebral hemorrhage    Hypertensive crisis      Temp:  [98.1 °F (36.7 °C)-99.8 °F (37.7 °C)] 98.1 °F (36.7 °C)  Heart Rate:  [] 81  Resp:  [14-18] 18  BP: (111-153)/(64-98) 137/77  I/O last 3 completed  "shifts:  In: 4517 [P.O.:1175; I.V.:3342]  Out: 2975 [Urine:2975]  I/O this shift:  In: 625 [P.O.:355; I.V.:270]  Out: -   Vital signs were reviewed and documented in the chart      EXAM   Body mass index is 35.88 kg/m².      Left upper extremity flaccid left-sided facial droop  Extinction/neglect of the left upper extremity  Moves right side easily to command needs some encouragement to move his left leg  Speech is slurred but is able to communicate name and place    DIAGNOSIS  ICH      PLAN    Continue to monitor.  Hopefully no intervention will be required with neurosurgery.  Patient will continue on with vascular neurology at this point.  CT scan shows improvement of the acute blood but does show swelling and evolving hemorrhage.    Patient will need follow-up outpatient with neurosurgery in about 1 months time            Electronically signed by Isac Painter MD at 07/30/24 9890       Philip Harper MD at 07/30/24 1141            Intensive Care Follow-up     Hospital:  LOS: 4 days   Mr. Ron Cam, 53 y.o. male is followed for:   Nontraumatic intracerebral hemorrhage     Subjective   Subjective     53-year-old male who denies a history of hypertension developed acute left-sided weakness and dysarthria on 7/26/2024 around 3:30 PM.  No prior similar episodes.     CT scan of the head in the emergency room revealed a basal ganglionic hemorrhage on the right.  Blood pressure was markedly elevated.  He has been started on a Cardene drip and has received some \"as needed\" antihypertensives in the ER and has been admitted to the intensive care unit  Interval History:  The chart is been reviewed.  The patient remains with mild expressive aphasia.  He is currently on maximum Cardene despite oral medications.  Blood pressure goal remains 140 mmHg and lower.    The patient's past medical, surgical and social history were reviewed and updated in Epic as appropriate.     Objective   Objective " "    Infusions:  niCARdipine, 5-15 mg/hr, Last Rate: 5 mg/hr (07/30/24 1112)      Medications:  amLODIPine, 10 mg, Oral, Q24H  atorvastatin, 80 mg, Oral, Nightly  cloNIDine, 1 patch, Transdermal, Weekly  enoxaparin, 40 mg, Subcutaneous, Q24H  famotidine, 20 mg, Oral, BID AC  hydrALAZINE, 50 mg, Oral, Q8H  metoprolol tartrate, 25 mg, Oral, Q12H  senna-docusate sodium, 2 tablet, Oral, BID  sodium chloride, 10 mL, Intravenous, Q12H        Vital Sign Min/Max for last 24 hours  Temp  Min: 98.1 °F (36.7 °C)  Max: 99.8 °F (37.7 °C)   BP  Min: 111/68  Max: 153/94   Pulse  Min: 76  Max: 106   Resp  Min: 14  Max: 18   SpO2  Min: 90 %  Max: 96 %   Flow (L/min)  Min: 2  Max: 2       Input/Output for last 24 hour shift  07/29 0701 - 07/30 0700  In: 2907 [P.O.:1175; I.V.:1732]  Out: 1550 [Urine:1550]      Objective:  General Appearance:  Uncomfortable, in no acute distress and well-appearing.    Vital signs: (most recent): Blood pressure 119/68, pulse 76, temperature 98.1 °F (36.7 °C), temperature source Axillary, resp. rate 18, height 182.9 cm (72.01\"), weight 120 kg (264 lb 8.8 oz), SpO2 94%.    HEENT: Normal HEENT exam.    Heart: Normal rate.  Regular rhythm.  S1 normal and S2 normal.    Abdomen: Abdomen is soft and non-distended.  Bowel sounds are normal.   There is no abdominal tenderness.     Extremities: Decreased range of motion.  (Left upper extremity weak.  Approximately 2 out of 5 strength.)  Neurological: Patient is alert.  (Mild expressive aphasia).    Pupils:  Pupils are equal, round, and reactive to light.  Pupils are equal.   Skin:  Warm.                Results from last 7 days   Lab Units 07/30/24  0505 07/28/24  0515 07/26/24  1703 07/26/24  1702   WBC 10*3/mm3 10.23 8.21  --  6.50   HEMOGLOBIN g/dL 12.5* 13.1  --  15.3   HEMOGLOBIN, POC g/dL  --   --  15.0  --    PLATELETS 10*3/mm3 167 172  --  191     Results from last 7 days   Lab Units 07/30/24  0505 07/30/24  0047 07/29/24  1738 07/29/24  1408 07/29/24  0344 "   SODIUM mmol/L 138 138 140  139   < > 140   POTASSIUM mmol/L 3.8  3.8  --  3.6  --  3.4*   CO2 mmol/L 19.0*  --  21.0*  --  21.0*   BUN mg/dL 13  --  11  --  9   CREATININE mg/dL 0.92  --  0.94  --  0.94   GLUCOSE mg/dL 119*  --  140*  --  142*    < > = values in this interval not displayed.     Estimated Creatinine Clearance: 124.2 mL/min (by C-G formula based on SCr of 0.92 mg/dL).          I reviewed the patient's results and images.     Assessment & Plan   Impression        Nontraumatic intracerebral hemorrhage    Hypertensive crisis       Plan        Continue to wean Cardene.  We will go ahead and increase his oral coverage including increasing the dose of hydralazine as well as Lopressor.  We may need to add further medicines later today.  Another consideration would be switching from transcutaneous to oral clonidine.  He will be transition to the floor when blood pressure is under better control.  Continue with physical and Occupational Therapy.  Eventual rehab placement for recovery.    Plan of care and goals reviewed with mulitdisciplinary/antibiotic stewardship team during rounds.   I discussed the patient's findings and my recommendations with patient and nursing staff       Philip Harper MD, [facility]  Pulmonology and Critical Care Medicine       Electronically signed by Philip Harper MD at 07/30/24 1145          Consult Notes (last 24 hours)        Miranda Parsons APRN at 07/31/24 0244          Primary RN called to notify that patient has a new onsets of dizziness that started initially when he was trying to have bowel movements, associated with nausea, diaphoresis and feeling hot.  Stat CT head without contrast was ordered, and did not show any changes from prior CT head, stable right basal ganglia bleed not, no new hemorrhage or any other abnormality.  Patient was seen and examined in his room, wife and primary RN present during the encounter therapy.  No new neurological symptoms per  my exam patient has left hemiplegic and Zaheer sensory deficit, neglect, bilateral left hemianopsia, left facial droop.  No nystagmus, mild right gaze preference, however able to cross midline.  Patient tells me that his dizziness very prominent with moving his head which it sounds peripheral however it could correlate to a new problem within his posterior circulation.  Discussed patient's case with Dr. Delacruz, recommended to order scopolamine patch for the patient for his dizziness and nausea.  And do MRI without contrast thin slices within the brainstem to evaluate for any posterior circulation issues.  As patient has cortical signs, that does not match with his soft cortical bleed.  Patient's blood pressure were elevated earlier this shift patient received multiple doses of hydralazine which could be associated with possible headache and nausea, and some tachycardia.    ROS: Patient reports dizziness, nausea, intermittent diaphoresis and feeling hot.  Negative for chest pain, shortness of air, no fever.    Objective:  Vitals:    07/31/24 0115   BP: 139/87   Pulse: 82   Resp:    Temp:    SpO2: 92%     General no acute distress  Respiratory patient is breathing comfortable on 2 L nasal cannula satting above 92%  Cards patient is normal sinus rhythm on the monitor heart rate 80-92.  MSK: Moves right side upper and lower extremity spontaneously, all extremity warm and well-perfused.  Neuro: Patient alert and oriented x 3, follow commands, left hemiparesis, neglect, sensory deficits, HH, right gaze preference can cross midline, no nystagmus.  Interval:  (return from CT)  1a. Level of Consciousness: 1-->Not alert, but arousable by minor stimulation to obey, answer, or respond  1b. LOC Questions: 0-->Answers both questions correctly  1c. LOC Commands: 0-->Performs both tasks correctly  2. Best Gaze: 1-->Partial gaze palsy, gaze is abnormal in one or both eyes, but forced deviation or total gaze paresis is not present  3.  Visual: 1-->Partial hemianopia  4. Facial Palsy: 2-->Partial paralysis (total or near-total paralysis of lower face)  5a. Motor Arm, Left: 3-->No effort against gravity, limb falls  5b. Motor Arm, Right: 0-->No drift, limb holds 90 (or 45) degrees for full 10 secs  6a. Motor Leg, Left: 3-->No effort against gravity, leg falls to bed immediately  6b. Motor Leg, Right: 0-->No drift, leg holds 30 degree position for full 5 secs  7. Limb Ataxia: 0-->Absent  8. Sensory: 2-->Severe to total sensory loss, patient is not aware of being touched in the face, arm, and leg  9. Best Language: 0-->No aphasia, normal  10. Dysarthria: 2-->Severe dysarthria, patients speech is so slurred as to be unintelligible in the absence of or out of proportion to any dysphasia, or is mute/anarthric  11. Extinction and Inattention (formerly Neglect): 2-->Profound yoel-inattention/extinction more than 1 modality    Total (NIH Stroke Scale): 17   CT Head Without Contrast    Result Date: 7/30/2024  Impression: 1.Stable right basal ganglia/right temporal lobe intraparenchymal hemorrhage with surrounding cerebral edema and mass effect. No new hemorrhage. 2.Mild chronic small vessel ischemic change. Electronically Signed: Tony Infante MD  7/30/2024 11:53 PM EDT  Workstation ID: [id number]    CT Head Without Contrast    Result Date: 7/29/2024  Impression: 1.Stable right basal ganglia hemorrhage with surrounding edema and mass effect. 2.Stable mild chronic small vessel ischemic change and chronic lacunar infarct in the right central liz. Electronically Signed: Tony Infante MD  7/29/2024 5:34 AM EDT  Workstation ID: [id number]   MRI Brain Without Contrast    Result Date: 7/27/2024  MRI BRAIN WO CONTRAST Date of Exam: 7/27/2024 4:48 PM EDT Indication: right basal ganglia hemorrhage.  Comparison: 7/26/2024 Technique:  Routine multiplanar/multisequence sequence images of the brain were obtained without contrast administration. Findings: Stable right basal  ganglia hemorrhage with surrounding edema. No underlying infarct. No evidence of a mass but this could be obscured by blood and edema. The hemorrhage measures 4.7 cm x 3 cm which is essentially unchanged compared with the prior study. Localized surrounding mass effect with effacement of the right lateral ventricle and third ventricle but no midline shift. Extensive chronic small vessel/microangiopathic ischemic changes. Old pontine infarct. No extra-axial mass or collection. Orbital and parable soft tissues are normal. The paranasal sinuses are clear. The mastoid air cells are aerated.     Impression: Stable right basal ganglia hemorrhage. Electronically Signed: Roberto Mackey MD  7/27/2024 5:13 PM EDT  Workstation ID: [id number]        Assessment and plan                  7/30/24 7/29/24       right basal ganglia hemorrhage  Chronic right pontine infarct  # Left side weakness/ sensory neglect, LFD, Left visual field cut. Slurred Speech   # HTN  -Continue hemorrhagic order set.  -Repeat CT head stable bleed right basal ganglia no new bleeds, no new abnormality.  -Per Dr. Delacruz recommendation order scopolamine patch, repeat MRI to rule out any new posterior circulation.  Scheduled at 8 AM, however patient noted to have old pontine infarct which could correlate to his symptoms as a stroke recrudescence.  -Systolic blood pressure of less than 140 Cardene drip to maintain goal.  -Will order scopolamine patch for dizziness and nausea.  -Neurology stroke will continue to monitor.        Electronically signed by Miranda Parsons APRN at 07/31/24 [id number]

## 2024-07-31 NOTE — CASE MANAGEMENT/SOCIAL WORK
Continued Stay Note  [facility]     Patient Name: Ron Cam  MRN: [id number]  Today's Date: 7/31/2024    Admit Date: 7/26/2024    Plan: [facility] acute   Discharge Plan       Row Name 07/31/24 1242       Plan    Plan [facility] acute    Plan Comments Discussed patient in MDR. Patient remains on Cardene gtt, plan to wean as able and adjust other BP meds.  Discharge plan is [facility]; April has referral and is following in [facility].   will continue to follow.           Final Discharge Disposition Code 62 - inpatient rehab facility                   Discharge Codes    No documentation.                       Ilsa Chatterjee RN

## 2024-07-31 NOTE — PROGRESS NOTES
HOD# : 5    No events last night  Patient nauseous today.  Patient has not had a bowel movement since he has been hospitalized at least not 1 of significance.    Patient is normally regular goer.    Neurologic exam but maintains this pain status.  Right upper extremity with good strength right lower extremity with good strength a lot of coaching required on left lower extremity left upper extremity flaccid.    Left-sided facial droop still prominent    Nontraumatic intracerebral hemorrhage    Hypertensive crisis      Temp:  [97.8 °F (36.6 °C)-98.1 °F (36.7 °C)] 97.8 °F (36.6 °C)  Heart Rate:  [] 90  Resp:  [16-20] 20  BP: (120-163)/(72-99) 155/91  I/O last 3 completed shifts:  In: 4103.1 [P.O.:917; I.V.:3186.1]  Out: 2650 [Urine:2650]  I/O this shift:  In: 263.5 [I.V.:263.5]  Out: -   Vital signs were reviewed and documented in the chart      EXAM   Body mass index is 36.45 kg/m².    Pupils equal reactive to light   left-sided facial droop   left upper extremity flaccid        DIAGNOSIS  ICH      PLAN    Need to work on his bowel management.  Patient is having nausea I would assume from that because his CT scan has been stable x 3.    If he shows signs of neurologic deterioration CT scan of the head would be reasonable

## 2024-07-31 NOTE — PROGRESS NOTES
"Intensive Care Follow-up     Hospital:  LOS: 5 days   Mr. Ron Cam, 53 y.o. male is followed for:   Nontraumatic intracerebral hemorrhage        Subjective     53-year-old male who denies a history of hypertension developed acute left-sided weakness and dysarthria on 7/26/2024 around 3:30 PM.  No prior similar episodes.     CT scan of the head in the emergency room revealed a basal ganglionic hemorrhage on the right.  Blood pressure was markedly elevated.  He has been started on a Cardene drip and has received some \"as needed\" antihypertensives in the ER and has been admitted to the intensive care unit  Interval History:  The chart has been reviewed.  The patient is easily understandable but still is having some problems with expressive aphasia.  Left side remains very weak.  He has had a small bowel movement.  Blood pressure remains largely uncontrolled even with Cardene.    The patient's past medical, surgical and social history were reviewed and updated in Epic as appropriate.        Objective     Infusions:  niCARdipine, 5-15 mg/hr, Last Rate: 15 mg/hr (07/31/24 0821)      Medications:  amLODIPine, 10 mg, Oral, Q24H  atorvastatin, 80 mg, Oral, Nightly  cloNIDine, 0.3 mg, Oral, Q12H  enoxaparin, 40 mg, Subcutaneous, Q24H  famotidine, 20 mg, Oral, BID AC  hydrALAZINE, 100 mg, Oral, Q8H  lisinopril, 5 mg, Oral, Q24H  metoprolol tartrate, 50 mg, Oral, Q12H  Scopolamine, 1 patch, Transdermal, Once  senna-docusate sodium, 2 tablet, Oral, BID  sodium chloride, 10 mL, Intravenous, Q12H        Vital Sign Min/Max for last 24 hours  Temp  Min: 97.8 °F (36.6 °C)  Max: 98.1 °F (36.7 °C)   BP  Min: 120/75  Max: 163/89   Pulse  Min: 74  Max: 101   Resp  Min: 16  Max: 20   SpO2  Min: 89 %  Max: 96 %   Flow (L/min)  Min: 2  Max: 3       Input/Output for last 24 hour shift  07/30 0701 - 07/31 0700  In: 2371.1 [P.O.:917; I.V.:1454.1]  Out: 2000 [Urine:2000]      Objective:  General Appearance:  Uncomfortable, in no acute " "distress, well-appearing and not in pain.    Vital signs: (most recent): Blood pressure 155/91, pulse 90, temperature 97.8 °F (36.6 °C), temperature source Oral, resp. rate 20, height 182.9 cm (72.01\"), weight 122 kg (268 lb 11.9 oz), SpO2 94%.    HEENT: Normal HEENT exam.    Lungs:  Normal effort.  He is not in respiratory distress.    Heart: Normal rate.  Regular rhythm.  S1 normal and S2 normal.    Abdomen: Abdomen is soft and non-distended.  Bowel sounds are normal.   There is no abdominal tenderness.     Extremities: Decreased range of motion.  (Left upper extremity weak.  Approximately 2 out of 5 strength.)  Neurological: Patient is alert.  (Mild expressive aphasia).    Pupils:  Pupils are equal, round, and reactive to light.  Pupils are equal.   Skin:  Warm.                Results from last 7 days   Lab Units 07/31/24  0529 07/30/24  0505 07/28/24  0515   WBC 10*3/mm3 10.75 10.23 8.21   HEMOGLOBIN g/dL 13.5 12.5* 13.1   PLATELETS 10*3/mm3 197 167 172     Results from last 7 days   Lab Units 07/31/24  0529 07/30/24  0505 07/30/24  0047 07/29/24  1738   SODIUM mmol/L 133* 138 138 140  139   POTASSIUM mmol/L 3.9 3.8  3.8  --  3.6   CO2 mmol/L 21.0* 19.0*  --  21.0*   BUN mg/dL 17 13  --  11   CREATININE mg/dL 0.85 0.92  --  0.94   MAGNESIUM mg/dL 1.9  --   --   --    GLUCOSE mg/dL 155* 119*  --  140*     Estimated Creatinine Clearance: 135.6 mL/min (by C-G formula based on SCr of 0.85 mg/dL).            I reviewed the patient's results and images.     Assessment & Plan   Impression        Nontraumatic intracerebral hemorrhage    Hypertensive crisis       Plan        Wean Cardene as able.  To that end, we will go ahead and switch his clonidine to oral 3 times daily, increase Lopressor, and start lisinopril.  We will titrate as necessary.  Goal is still less than 140 mmHg systolic.  Bowel regimen as before.  Continue with occupational and physical therapy.  Transition to the floor when able.    Plan of care and " goals reviewed with mulitdisciplinary/antibiotic stewardship team during rounds.   I discussed the patient's findings and my recommendations with patient, family, and nursing staff         Philip Harper MD, [facility]  Pulmonology and Critical Care Medicine

## 2024-07-31 NOTE — PROGRESS NOTES
Stroke Neurology Progress Note     Subjective     This patient was seen in follow-up for: right basal ganglia hemorrhage    Subjective:  Patient had episode of dizziness, nausea, and diaphoresis while having BM; symptoms have currently resolved.  SBP is currently in the 130s, on nicardipine.  Nursing attempting to wean.  Patient states he is ready to go to rehab to get stronger.      Objective      Temp:  [97.9 °F (36.6 °C)-98.1 °F (36.7 °C)] 97.9 °F (36.6 °C)  Heart Rate:  [] 87  Resp:  [16-20] 20  BP: (119-156)/(67-99) 141/80            Objective    Physical Exam  Vitals and nursing note reviewed.   Constitutional:       General: He is not in acute distress.     Appearance: He is not ill-appearing.   HENT:      Head: Normocephalic and atraumatic.      Mouth/Throat:      Mouth: Mucous membranes are moist.   Eyes:      Extraocular Movements: Extraocular movements intact.      Pupils: Pupils are equal, round, and reactive to light.   Cardiovascular:      Rate and Rhythm: Normal rate and regular rhythm.   Pulmonary:      Effort: Pulmonary effort is normal. No respiratory distress.      Comments: On room air  Musculoskeletal:      Right lower leg: No edema.      Left lower leg: No edema.   Skin:     General: Skin is warm and dry.   Neurological:      Mental Status: He is alert and oriented to person, place, and time.      Cranial Nerves: Cranial nerve deficit and dysarthria present.      Sensory: Sensory deficit present.      Motor: Weakness present.   Psychiatric:         Attention and Perception: Attention normal.         Mood and Affect: Affect is flat.         Speech: Speech is slurred.         Behavior: Behavior is withdrawn. Behavior is cooperative.         Cognition and Memory: Cognition and memory normal.         Neurological Exam  Mental Status  Alert. Oriented to person, place, time and situation. Oriented to person, place, and time. Memory is normal. Moderate dysarthria present. Language is fluent with  no aphasia. Attention and concentration are normal.    Cranial Nerves  CN II: Right normal visual field. Left homonymous hemianopsia.  CN III, IV, VI: Extraocular movements intact bilaterally. Pupils equal round and reactive to light bilaterally.  CN V:  Left: Diminished sensation of the entire left side of the face.  CN VII:  Left: There is central facial weakness.  CN VIII: Hearing appears to be intact bilaterally.  CN XI:  Right: Sternocleidomastoid strength is normal.  Left: Absent.  CN XII: Tongue midline without atrophy or fasciculations.    Motor  Normal muscle bulk throughout. Decreased muscle tone.  LUE with 0/5 strength  LLE with 2/5 strength  RUE/RLE with 5/5 strength.    Sensory  Light touch abnormality: Left upper and lower extremity.  Left-sided hemispatial neglect: Extinction to double simultaneous stimulation of the left arm and leg.    Coordination  Right: No obvious dysmetria noted. No obvious dysmetria noted.Left: Unable to test 2/2 weakness. Unable to test 2/2 weakness.    Gait    Not observed.      Results Review:      All brain images and reports were personally reviewed and I agree with the interpretations except as noted below.    CT head 7/29/2024  Impression: 1. Stable right basal ganglia hemorrhage with surrounding edema and mass effect. 2. Stable mild chronic small vessel ischemic change and chronic lacunar infarct in the right central liz.    CT head 7/28/2024  Impression: 1. Stable size of right basal ganglia intraparenchymal hemorrhage with increased surrounding vasogenic edema and slightly increased mass effect with 4 mm leftward shift of midline structures. 2. Stable mild chronic small vessel ischemic change and chronic lacunar infarct in the left basal ganglia.        CT Head Without Contrast 7/26/2024  Impression: Interval increase in intraparenchymal hemorrhage centered in the right basal ganglia with mild increase in mass effect on the adjacent lateral ventricles and minimal  change of the basilar and suprasellar cisterns.     CT Angiogram Head and Neck 7/26/2024  Impression: 1. No evidence of intracranial large vessel arterial occlusion or aneurysm 2. Ulcerated plaque in the left distal common carotid and proximal internal carotid arteries that results in less than 50% luminal diameter stenosis 3. Patent right carotid and bilateral vertebral arteries without significant stenosis or dissection.    CT Head Without Contrast 7/26/2024  Impression: 1. 4 x 3 x 3.5 cm right basal ganglia and periventricular white matter parenchymal hematoma with mass effect as described 2. No evidence of major vascular territory brain ischemia 3. Chronic small vessel ischemic changes.    CT Head Without Contrast    Result Date: 7/30/2024  Impression: 1.Stable right basal ganglia/right temporal lobe intraparenchymal hemorrhage with surrounding cerebral edema and mass effect. No new hemorrhage. 2.Mild chronic small vessel ischemic change. Electronically Signed: Tony Infante MD  7/30/2024 11:53 PM EDT  Workstation ID: [id number]     Transthoracic echocardiogram 7/26/2024  Impression:    Left ventricular systolic function is normal. Estimated left ventricular EF = 65%    The cardiac valves are anatomically and functionally normal.    Saline test results are negative.      A1c 5.50    WBC 10.75  H/H 13.5/38.5  Platelets 197  Glucose 155  Cretinine 0.85, BUN 17  Na+ 133        Assessment/Plan     Assessment:    This is a 54 yo male with PMH of migraines who presented to [facility] ED via EMS on 7/26/2024 with complaints of left sided weakness, sensory loss, dysarthria and visual disturbance;  NIH noted to be 17.  CT head without contrast revealed a 4 x 3 x 3.5 right basal ganglia hemorrhage.  CT angiogram without evidence of LVO.  Ulcerated plaque observed within the left distal common carotid.  He was not a candidate for IV thrombolytic therapy given presence of ICH and was not deemed a candidate for neurosurgical  intervention.  He was admitted to the neurological ICU for further monitoring and work-up.     Antiplatelet PTA: None  Anticoagulation PTA: None    # Right basal ganglia hemorrhage, etiology likely hypertensive  -Hemorrhagic stroke order set is currently in place  -ICH score 0   -/112 on admission   -Neurosurgery evaluated, non-surgical  -CTH last evening stable appearance of hemorrhage with insignificant edema  -Hold all antiplatelet medications  -Serum Na 133, recheck at 1500  -PT OT recommend IRF, CM following for DC needs  -OK for enoxaparin for DVT prophylaxis   -Follow-up in stroke clinic in 4 weeks after DC    # Extracranial atherosclerotic disease  -Eventually, we will need to treat extracranial atherosclerotic disease with aspirin but would wait at least 2 weeks post-hemorrhage (8/9/2024)      # Essential hypertension, new diagnosis  -SBP < 140.  Wean Cardene as tolerated.    -Oral antihypertensive medications added 7/30.    -Management per primary team.    # Hyperlipidemia  -LDL on admission 140, goal <70 for secondary stroke prevention  -Continue atorvastatin 80 mg daily ()    Plan discussed with Dr. Williamson, the patient, patient's family, and primary team.  Stroke neurology will continue to follow.  OK to be transferred to floor when clear from ICU team. Please call with any questions or concerns.    REENA Ibrahim, AGACNP-[employer] STROKE NEURO  07/31/24  09:18 EDT

## 2024-07-31 NOTE — CONSULTS
Primary RN called to notify that patient has a new onsets of dizziness that started initially when he was trying to have bowel movements, associated with nausea, diaphoresis and feeling hot.  Stat CT head without contrast was ordered, and did not show any changes from prior CT head, stable right basal ganglia bleed not, no new hemorrhage or any other abnormality.  Patient was seen and examined in his room, wife and primary RN present during the encounter therapy.  No new neurological symptoms per my exam patient has left hemiplegic and Zaheer sensory deficit, neglect, bilateral left hemianopsia, left facial droop.  No nystagmus, mild right gaze preference, however able to cross midline.  Patient tells me that his dizziness very prominent with moving his head which it sounds peripheral however it could correlate to a new problem within his posterior circulation.  Discussed patient's case with Dr. Delacruz, recommended to order scopolamine patch for the patient for his dizziness and nausea.  And do MRI without contrast thin slices within the brainstem to evaluate for any posterior circulation issues.  As patient has cortical signs, that does not match with his soft cortical bleed.  Patient's blood pressure were elevated earlier this shift patient received multiple doses of hydralazine which could be associated with possible headache and nausea, and some tachycardia.    ROS: Patient reports dizziness, nausea, intermittent diaphoresis and feeling hot.  Negative for chest pain, shortness of air, no fever.    Objective:  Vitals:    07/31/24 0115   BP: 139/87   Pulse: 82   Resp:    Temp:    SpO2: 92%     General no acute distress  Respiratory patient is breathing comfortable on 2 L nasal cannula satting above 92%  Cards patient is normal sinus rhythm on the monitor heart rate 80-92.  MSK: Moves right side upper and lower extremity spontaneously, all extremity warm and well-perfused.  Neuro: Patient alert and oriented x 3,  follow commands, left hemiparesis, neglect, sensory deficits, HH, right gaze preference can cross midline, no nystagmus.  Interval:  (return from CT)  1a. Level of Consciousness: 1-->Not alert, but arousable by minor stimulation to obey, answer, or respond  1b. LOC Questions: 0-->Answers both questions correctly  1c. LOC Commands: 0-->Performs both tasks correctly  2. Best Gaze: 1-->Partial gaze palsy, gaze is abnormal in one or both eyes, but forced deviation or total gaze paresis is not present  3. Visual: 1-->Partial hemianopia  4. Facial Palsy: 2-->Partial paralysis (total or near-total paralysis of lower face)  5a. Motor Arm, Left: 3-->No effort against gravity, limb falls  5b. Motor Arm, Right: 0-->No drift, limb holds 90 (or 45) degrees for full 10 secs  6a. Motor Leg, Left: 3-->No effort against gravity, leg falls to bed immediately  6b. Motor Leg, Right: 0-->No drift, leg holds 30 degree position for full 5 secs  7. Limb Ataxia: 0-->Absent  8. Sensory: 2-->Severe to total sensory loss, patient is not aware of being touched in the face, arm, and leg  9. Best Language: 0-->No aphasia, normal  10. Dysarthria: 2-->Severe dysarthria, patients speech is so slurred as to be unintelligible in the absence of or out of proportion to any dysphasia, or is mute/anarthric  11. Extinction and Inattention (formerly Neglect): 2-->Profound yoel-inattention/extinction more than 1 modality    Total (NIH Stroke Scale): 17   CT Head Without Contrast    Result Date: 7/30/2024  Impression: 1.Stable right basal ganglia/right temporal lobe intraparenchymal hemorrhage with surrounding cerebral edema and mass effect. No new hemorrhage. 2.Mild chronic small vessel ischemic change. Electronically Signed: Tony Infante MD  7/30/2024 11:53 PM EDT  Workstation ID: [id number]    CT Head Without Contrast    Result Date: 7/29/2024  Impression: 1.Stable right basal ganglia hemorrhage with surrounding edema and mass effect. 2.Stable mild chronic  small vessel ischemic change and chronic lacunar infarct in the right central liz. Electronically Signed: Tony Infante MD  7/29/2024 5:34 AM EDT  Workstation ID: [id number]   MRI Brain Without Contrast    Result Date: 7/27/2024  MRI BRAIN WO CONTRAST Date of Exam: 7/27/2024 4:48 PM EDT Indication: right basal ganglia hemorrhage.  Comparison: 7/26/2024 Technique:  Routine multiplanar/multisequence sequence images of the brain were obtained without contrast administration. Findings: Stable right basal ganglia hemorrhage with surrounding edema. No underlying infarct. No evidence of a mass but this could be obscured by blood and edema. The hemorrhage measures 4.7 cm x 3 cm which is essentially unchanged compared with the prior study. Localized surrounding mass effect with effacement of the right lateral ventricle and third ventricle but no midline shift. Extensive chronic small vessel/microangiopathic ischemic changes. Old pontine infarct. No extra-axial mass or collection. Orbital and parable soft tissues are normal. The paranasal sinuses are clear. The mastoid air cells are aerated.     Impression: Stable right basal ganglia hemorrhage. Electronically Signed: Roberto Mackey MD  7/27/2024 5:13 PM EDT  Workstation ID: [id number]        Assessment and plan                  7/30/24 7/29/24       right basal ganglia hemorrhage  Chronic right pontine infarct  # Left side weakness/ sensory neglect, LFD, Left visual field cut. Slurred Speech   # HTN  -Continue hemorrhagic order set.  -Repeat CT head stable bleed right basal ganglia no new bleeds, no new abnormality.  -Per Dr. Delacruz recommendation order scopolamine patch, repeat MRI to rule out any new posterior circulation.  Scheduled at 8 AM, however patient noted to have old pontine infarct which could correlate to his symptoms as a stroke recrudescence.  -Systolic blood pressure of less than 140 Cardene drip to maintain goal.  -Will order  scopolamine patch for dizziness and nausea.  -Neurology stroke will continue to monitor.

## 2024-08-01 ENCOUNTER — APPOINTMENT (OUTPATIENT)
Dept: CT IMAGING | Facility: HOSPITAL | Age: 54
DRG: 064 | End: 2024-08-01
Payer: COMMERCIAL

## 2024-08-01 ENCOUNTER — APPOINTMENT (OUTPATIENT)
Dept: GENERAL RADIOLOGY | Facility: HOSPITAL | Age: 54
DRG: 064 | End: 2024-08-01
Payer: COMMERCIAL

## 2024-08-01 ENCOUNTER — APPOINTMENT (OUTPATIENT)
Dept: NEUROLOGY | Facility: HOSPITAL | Age: 54
DRG: 064 | End: 2024-08-01
Payer: COMMERCIAL

## 2024-08-01 LAB
ALBUMIN SERPL-MCNC: 3.7 G/DL (ref 3.5–5.2)
ALBUMIN/GLOB SERPL: 1.2 G/DL
ALP SERPL-CCNC: 64 U/L (ref 39–117)
ALT SERPL W P-5'-P-CCNC: 28 U/L (ref 1–41)
ANION GAP SERPL CALCULATED.3IONS-SCNC: 11 MMOL/L (ref 5–15)
ARTERIAL PATENCY WRIST A: ABNORMAL
AST SERPL-CCNC: 16 U/L (ref 1–40)
ATMOSPHERIC PRESS: ABNORMAL MM[HG]
BASE EXCESS BLDA CALC-SCNC: 4.2 MMOL/L (ref 0–2)
BDY SITE: ABNORMAL
BILIRUB SERPL-MCNC: 0.5 MG/DL (ref 0–1.2)
BILIRUB UR QL STRIP: NEGATIVE
BODY TEMPERATURE: 37
BUN SERPL-MCNC: 19 MG/DL (ref 6–20)
BUN/CREAT SERPL: 20.4 (ref 7–25)
CALCIUM SPEC-SCNC: 9.1 MG/DL (ref 8.6–10.5)
CHLORIDE SERPL-SCNC: 98 MMOL/L (ref 98–107)
CLARITY UR: CLEAR
CO2 BLDA-SCNC: 28.5 MMOL/L (ref 22–33)
CO2 SERPL-SCNC: 26 MMOL/L (ref 22–29)
COHGB MFR BLD: 1.2 % (ref 0–2)
COLOR UR: YELLOW
CREAT SERPL-MCNC: 0.93 MG/DL (ref 0.76–1.27)
D-LACTATE SERPL-SCNC: 1.3 MMOL/L (ref 0.5–2)
DEPRECATED RDW RBC AUTO: 41.8 FL (ref 37–54)
EGFRCR SERPLBLD CKD-EPI 2021: 98.2 ML/MIN/1.73
EPAP: 0
ERYTHROCYTE [DISTWIDTH] IN BLOOD BY AUTOMATED COUNT: 12.7 % (ref 12.3–15.4)
GLOBULIN UR ELPH-MCNC: 3 GM/DL
GLUCOSE BLDC GLUCOMTR-MCNC: 109 MG/DL (ref 70–130)
GLUCOSE SERPL-MCNC: 146 MG/DL (ref 65–99)
GLUCOSE UR STRIP-MCNC: NEGATIVE MG/DL
HCO3 BLDA-SCNC: 27.4 MMOL/L (ref 20–26)
HCT VFR BLD AUTO: 39.7 % (ref 37.5–51)
HCT VFR BLD CALC: 41.3 % (ref 38–51)
HGB BLD-MCNC: 13.9 G/DL (ref 13–17.7)
HGB BLDA-MCNC: 13.5 G/DL (ref 13.5–17.5)
HGB UR QL STRIP.AUTO: NEGATIVE
INHALED O2 CONCENTRATION: 30 %
IPAP: 0
KETONES UR QL STRIP: NEGATIVE
LEUKOCYTE ESTERASE UR QL STRIP.AUTO: NEGATIVE
MAGNESIUM SERPL-MCNC: 2.2 MG/DL (ref 1.6–2.6)
MCH RBC QN AUTO: 31.3 PG (ref 26.6–33)
MCHC RBC AUTO-ENTMCNC: 35 G/DL (ref 31.5–35.7)
MCV RBC AUTO: 89.4 FL (ref 79–97)
METHGB BLD QL: -0.1 % (ref 0–1.5)
MODALITY: ABNORMAL
NITRITE UR QL STRIP: NEGATIVE
OXYHGB MFR BLDV: 99 % (ref 94–99)
PAW @ PEAK INSP FLOW SETTING VENT: 0 CMH2O
PCO2 BLDA: 35.5 MM HG (ref 35–45)
PCO2 TEMP ADJ BLD: 35.5 MM HG (ref 35–48)
PH BLDA: 7.5 PH UNITS (ref 7.35–7.45)
PH UR STRIP.AUTO: 5.5 [PH] (ref 5–8)
PH, TEMP CORRECTED: 7.5 PH UNITS
PHOSPHATE SERPL-MCNC: 3.9 MG/DL (ref 2.5–4.5)
PLATELET # BLD AUTO: 262 10*3/MM3 (ref 140–450)
PMV BLD AUTO: 9.3 FL (ref 6–12)
PO2 BLDA: 146 MM HG (ref 83–108)
PO2 TEMP ADJ BLD: 146 MM HG (ref 83–108)
POTASSIUM SERPL-SCNC: 3.8 MMOL/L (ref 3.5–5.2)
PROCALCITONIN SERPL-MCNC: 0.1 NG/ML (ref 0–0.25)
PROT SERPL-MCNC: 6.7 G/DL (ref 6–8.5)
PROT UR QL STRIP: NEGATIVE
RBC # BLD AUTO: 4.44 10*6/MM3 (ref 4.14–5.8)
SODIUM SERPL-SCNC: 135 MMOL/L (ref 136–145)
SP GR UR STRIP: 1.04 (ref 1–1.03)
TOTAL RATE: 0 BREATHS/MINUTE
UROBILINOGEN UR QL STRIP: ABNORMAL
WBC NRBC COR # BLD AUTO: 14.02 10*3/MM3 (ref 3.4–10.8)

## 2024-08-01 PROCEDURE — 99232 SBSQ HOSP IP/OBS MODERATE 35: CPT

## 2024-08-01 PROCEDURE — 95819 EEG AWAKE AND ASLEEP: CPT

## 2024-08-01 PROCEDURE — 25010000002 PROCHLORPERAZINE 10 MG/2ML SOLUTION: Performed by: INTERNAL MEDICINE

## 2024-08-01 PROCEDURE — 74018 RADEX ABDOMEN 1 VIEW: CPT

## 2024-08-01 PROCEDURE — 83050 HGB METHEMOGLOBIN QUAN: CPT

## 2024-08-01 PROCEDURE — 25810000003 LACTATED RINGERS PER 1000 ML: Performed by: STUDENT IN AN ORGANIZED HEALTH CARE EDUCATION/TRAINING PROGRAM

## 2024-08-01 PROCEDURE — 25010000002 ONDANSETRON PER 1 MG

## 2024-08-01 PROCEDURE — 87040 BLOOD CULTURE FOR BACTERIA: CPT | Performed by: INTERNAL MEDICINE

## 2024-08-01 PROCEDURE — 80053 COMPREHEN METABOLIC PANEL: CPT | Performed by: INTERNAL MEDICINE

## 2024-08-01 PROCEDURE — 25010000002 ENOXAPARIN PER 10 MG

## 2024-08-01 PROCEDURE — 85027 COMPLETE CBC AUTOMATED: CPT | Performed by: INTERNAL MEDICINE

## 2024-08-01 PROCEDURE — 25510000001 IOPAMIDOL 61 % SOLUTION: Performed by: STUDENT IN AN ORGANIZED HEALTH CARE EDUCATION/TRAINING PROGRAM

## 2024-08-01 PROCEDURE — 95819 EEG AWAKE AND ASLEEP: CPT | Performed by: PSYCHIATRY & NEUROLOGY

## 2024-08-01 PROCEDURE — 97530 THERAPEUTIC ACTIVITIES: CPT

## 2024-08-01 PROCEDURE — 70450 CT HEAD/BRAIN W/O DYE: CPT

## 2024-08-01 PROCEDURE — 82805 BLOOD GASES W/O2 SATURATION: CPT

## 2024-08-01 PROCEDURE — 83735 ASSAY OF MAGNESIUM: CPT | Performed by: SURGERY

## 2024-08-01 PROCEDURE — 83605 ASSAY OF LACTIC ACID: CPT | Performed by: INTERNAL MEDICINE

## 2024-08-01 PROCEDURE — 71045 X-RAY EXAM CHEST 1 VIEW: CPT

## 2024-08-01 PROCEDURE — 82948 REAGENT STRIP/BLOOD GLUCOSE: CPT

## 2024-08-01 PROCEDURE — 36600 WITHDRAWAL OF ARTERIAL BLOOD: CPT

## 2024-08-01 PROCEDURE — 84145 PROCALCITONIN (PCT): CPT | Performed by: STUDENT IN AN ORGANIZED HEALTH CARE EDUCATION/TRAINING PROGRAM

## 2024-08-01 PROCEDURE — 25010000002 METOCLOPRAMIDE PER 10 MG: Performed by: SURGERY

## 2024-08-01 PROCEDURE — 92526 ORAL FUNCTION THERAPY: CPT | Performed by: SPEECH-LANGUAGE PATHOLOGIST

## 2024-08-01 PROCEDURE — 74177 CT ABD & PELVIS W/CONTRAST: CPT

## 2024-08-01 PROCEDURE — 84100 ASSAY OF PHOSPHORUS: CPT | Performed by: SURGERY

## 2024-08-01 PROCEDURE — 99232 SBSQ HOSP IP/OBS MODERATE 35: CPT | Performed by: STUDENT IN AN ORGANIZED HEALTH CARE EDUCATION/TRAINING PROGRAM

## 2024-08-01 PROCEDURE — 25010000002 CEFTRIAXONE PER 250 MG: Performed by: STUDENT IN AN ORGANIZED HEALTH CARE EDUCATION/TRAINING PROGRAM

## 2024-08-01 PROCEDURE — 81003 URINALYSIS AUTO W/O SCOPE: CPT | Performed by: STUDENT IN AN ORGANIZED HEALTH CARE EDUCATION/TRAINING PROGRAM

## 2024-08-01 PROCEDURE — 82375 ASSAY CARBOXYHB QUANT: CPT

## 2024-08-01 PROCEDURE — 92507 TX SP LANG VOICE COMM INDIV: CPT | Performed by: SPEECH-LANGUAGE PATHOLOGIST

## 2024-08-01 RX ORDER — SODIUM CHLORIDE, SODIUM LACTATE, POTASSIUM CHLORIDE, CALCIUM CHLORIDE 600; 310; 30; 20 MG/100ML; MG/100ML; MG/100ML; MG/100ML
75 INJECTION, SOLUTION INTRAVENOUS CONTINUOUS
Status: ACTIVE | OUTPATIENT
Start: 2024-08-01 | End: 2024-08-01

## 2024-08-01 RX ORDER — PROCHLORPERAZINE EDISYLATE 5 MG/ML
5 INJECTION INTRAMUSCULAR; INTRAVENOUS EVERY 6 HOURS PRN
Status: DISCONTINUED | OUTPATIENT
Start: 2024-08-01 | End: 2024-08-06 | Stop reason: HOSPADM

## 2024-08-01 RX ORDER — L.ACID,PARA/B.BIFIDUM/S.THERM 8B CELL
1 CAPSULE ORAL DAILY
Status: DISCONTINUED | OUTPATIENT
Start: 2024-08-01 | End: 2024-08-06 | Stop reason: HOSPADM

## 2024-08-01 RX ORDER — CALCIUM CARBONATE 500 MG/1
2 TABLET, CHEWABLE ORAL 3 TIMES DAILY PRN
Status: DISCONTINUED | OUTPATIENT
Start: 2024-08-01 | End: 2024-08-02

## 2024-08-01 RX ORDER — DOCUSATE SODIUM 50 MG/5 ML
100 LIQUID (ML) ORAL 2 TIMES DAILY
Status: DISCONTINUED | OUTPATIENT
Start: 2024-08-01 | End: 2024-08-02

## 2024-08-01 RX ORDER — METOCLOPRAMIDE HYDROCHLORIDE 5 MG/ML
10 INJECTION INTRAMUSCULAR; INTRAVENOUS EVERY 6 HOURS
Status: DISCONTINUED | OUTPATIENT
Start: 2024-08-01 | End: 2024-08-06 | Stop reason: HOSPADM

## 2024-08-01 RX ORDER — AMLODIPINE BESYLATE 10 MG/1
10 TABLET ORAL
Status: DISCONTINUED | OUTPATIENT
Start: 2024-08-02 | End: 2024-08-02

## 2024-08-01 RX ORDER — FAMOTIDINE 20 MG/1
20 TABLET, FILM COATED ORAL
Status: DISCONTINUED | OUTPATIENT
Start: 2024-08-01 | End: 2024-08-02

## 2024-08-01 RX ORDER — ATORVASTATIN CALCIUM 40 MG/1
80 TABLET, FILM COATED ORAL NIGHTLY
Status: DISCONTINUED | OUTPATIENT
Start: 2024-08-01 | End: 2024-08-02

## 2024-08-01 RX ORDER — LIDOCAINE HYDROCHLORIDE 20 MG/ML
JELLY TOPICAL ONCE
Status: COMPLETED | OUTPATIENT
Start: 2024-08-01 | End: 2024-08-01

## 2024-08-01 RX ORDER — BISACODYL 10 MG
10 SUPPOSITORY, RECTAL RECTAL DAILY
Status: DISCONTINUED | OUTPATIENT
Start: 2024-08-01 | End: 2024-08-02

## 2024-08-01 RX ORDER — HYDRALAZINE HYDROCHLORIDE 50 MG/1
100 TABLET, FILM COATED ORAL EVERY 8 HOURS SCHEDULED
Status: DISCONTINUED | OUTPATIENT
Start: 2024-08-01 | End: 2024-08-02

## 2024-08-01 RX ORDER — LISINOPRIL 5 MG/1
5 TABLET ORAL
Status: DISCONTINUED | OUTPATIENT
Start: 2024-08-02 | End: 2024-08-02

## 2024-08-01 RX ORDER — METOPROLOL TARTRATE 50 MG/1
50 TABLET, FILM COATED ORAL EVERY 12 HOURS SCHEDULED
Status: DISCONTINUED | OUTPATIENT
Start: 2024-08-01 | End: 2024-08-02

## 2024-08-01 RX ORDER — DOCUSATE SODIUM 100 MG/1
100 CAPSULE, LIQUID FILLED ORAL 2 TIMES DAILY
Status: DISCONTINUED | OUTPATIENT
Start: 2024-08-01 | End: 2024-08-01

## 2024-08-01 RX ORDER — BISACODYL 5 MG/1
10 TABLET, DELAYED RELEASE ORAL DAILY
Status: DISCONTINUED | OUTPATIENT
Start: 2024-08-01 | End: 2024-08-01

## 2024-08-01 RX ORDER — SENNOSIDES A AND B 8.6 MG/1
2 TABLET, FILM COATED ORAL NIGHTLY
Status: DISCONTINUED | OUTPATIENT
Start: 2024-08-01 | End: 2024-08-02

## 2024-08-01 RX ADMIN — SODIUM CHLORIDE, POTASSIUM CHLORIDE, SODIUM LACTATE AND CALCIUM CHLORIDE 75 ML/HR: 600; 310; 30; 20 INJECTION, SOLUTION INTRAVENOUS at 13:06

## 2024-08-01 RX ADMIN — PROCHLORPERAZINE EDISYLATE 5 MG: 5 INJECTION INTRAMUSCULAR; INTRAVENOUS at 02:50

## 2024-08-01 RX ADMIN — ONDANSETRON 4 MG: 2 INJECTION INTRAMUSCULAR; INTRAVENOUS at 01:43

## 2024-08-01 RX ADMIN — SENNOSIDES 2 TABLET: 8.6 TABLET, FILM COATED ORAL at 20:32

## 2024-08-01 RX ADMIN — AMLODIPINE BESYLATE 10 MG: 10 TABLET ORAL at 09:47

## 2024-08-01 RX ADMIN — FAMOTIDINE 20 MG: 20 TABLET, FILM COATED ORAL at 16:31

## 2024-08-01 RX ADMIN — LISINOPRIL 5 MG: 5 TABLET ORAL at 09:45

## 2024-08-01 RX ADMIN — DOXYCYCLINE 100 MG: 100 INJECTION, POWDER, LYOPHILIZED, FOR SOLUTION INTRAVENOUS at 15:49

## 2024-08-01 RX ADMIN — Medication 10 MG: at 02:51

## 2024-08-01 RX ADMIN — CLONIDINE HYDROCHLORIDE 0.3 MG: 0.2 TABLET ORAL at 09:45

## 2024-08-01 RX ADMIN — METOCLOPRAMIDE 10 MG: 5 INJECTION, SOLUTION INTRAMUSCULAR; INTRAVENOUS at 20:32

## 2024-08-01 RX ADMIN — BISACODYL 10 MG: 10 SUPPOSITORY RECTAL at 02:51

## 2024-08-01 RX ADMIN — CLONIDINE HYDROCHLORIDE 0.3 MG: 0.2 TABLET ORAL at 20:31

## 2024-08-01 RX ADMIN — METOCLOPRAMIDE 10 MG: 5 INJECTION, SOLUTION INTRAMUSCULAR; INTRAVENOUS at 09:45

## 2024-08-01 RX ADMIN — METOPROLOL TARTRATE 50 MG: 50 TABLET, FILM COATED ORAL at 09:47

## 2024-08-01 RX ADMIN — IOPAMIDOL 90 ML: 612 INJECTION, SOLUTION INTRAVENOUS at 08:26

## 2024-08-01 RX ADMIN — ATORVASTATIN CALCIUM 80 MG: 40 TABLET, FILM COATED ORAL at 20:31

## 2024-08-01 RX ADMIN — SODIUM CHLORIDE 2000 MG: 900 INJECTION INTRAVENOUS at 15:49

## 2024-08-01 RX ADMIN — DOCUSATE SODIUM 100 MG: 50 LIQUID ORAL at 20:32

## 2024-08-01 RX ADMIN — HYDRALAZINE HYDROCHLORIDE 100 MG: 50 TABLET ORAL at 14:16

## 2024-08-01 RX ADMIN — LIDOCAINE HYDROCHLORIDE: 20 JELLY TOPICAL at 04:40

## 2024-08-01 RX ADMIN — HYDRALAZINE HYDROCHLORIDE 100 MG: 50 TABLET ORAL at 20:31

## 2024-08-01 RX ADMIN — METOPROLOL TARTRATE 50 MG: 50 TABLET ORAL at 20:32

## 2024-08-01 RX ADMIN — FAMOTIDINE 20 MG: 20 TABLET, FILM COATED ORAL at 09:47

## 2024-08-01 RX ADMIN — METOCLOPRAMIDE 10 MG: 5 INJECTION, SOLUTION INTRAMUSCULAR; INTRAVENOUS at 14:16

## 2024-08-01 RX ADMIN — ENOXAPARIN SODIUM 40 MG: 100 INJECTION SUBCUTANEOUS at 09:59

## 2024-08-01 RX ADMIN — Medication 1 CAPSULE: at 16:21

## 2024-08-01 RX ADMIN — BISACODYL: 10 SUPPOSITORY RECTAL at 15:00

## 2024-08-01 RX ADMIN — Medication 10 ML: at 20:33

## 2024-08-01 NOTE — THERAPY TREATMENT NOTE
Acute Care - Speech Language Pathology   Swallow Treatment Note [facility]     Patient Name: Ron Cam  : 1970  MRN: [id number]  Today's Date: 2024               Admit Date: 2024    Visit Dx:     ICD-10-CM ICD-9-CM   1. Intraparenchymal hemorrhage of brain  I61.9 431   2. Dysarthria  R47.1 784.51   3. Acute left-sided weakness  R53.1 728.87   4. Left sided numbness  R20.0 782.0   5. Hemineglect  R41.4 781.8   6. Hypertensive emergency  I16.1 401.9   7. Nontraumatic subcortical hemorrhage of right cerebral hemisphere  I61.0 431   8. Dysphagia, unspecified type  R13.10 787.20     Patient Active Problem List   Diagnosis    Nontraumatic intracerebral hemorrhage    Hypertensive crisis     History reviewed. No pertinent past medical history.  History reviewed. No pertinent surgical history.    SLP Recommendation and Plan     SLP Diet Recommendation: NPO, other (see comments) (reassess when cleared per surgery) (24)  Recommended Precautions and Strategies: general aspiration precautions (24)  SLP Rec. for Method of Medication Administration: meds via alternate route (24)     Monitor for Signs of Aspiration: notify SLP if any concerns (24)  Recommended Diagnostics: reassess via clinical swallow evaluation (when cleared per surgery) (24)     Anticipated Discharge Disposition (SLP): inpatient rehabilitation facility (24)     Therapy Frequency (Swallow): 5 days per week (24)  Predicted Duration Therapy Intervention (Days): until discharge (24)  Oral Care Recommendations: Oral Care BID/PRN, Toothbrush (24)        Daily Summary of Progress (SLP): progress toward functional goals as expected (24)               Treatment Assessment (SLP): continued, dysarthria, cognitive-linguistic disorder (24)  Treatment Assessment Comments (SLP): Pt seen for tx this pm. Pt currently NPO for possible SBO  w/ NG hooked to low wall suction. Unable to assess for diet tolerance, although new RLL infiltrates noted on CXR-will monitor and reassess when pt cleared for po intake. Pt continues w/ dysarthria and cog-comm deficits. Difficult to complete higher level tasks 2/2 fatigue. Left dysarthria handouts at bedside w/ pt's family (08/01/24 1415)  Plan for Continued Treatment (SLP): continue treatment per plan of care (08/01/24 1415)         Plan of Care Reviewed With: patient, family  Progress: no change      SWALLOW EVALUATION (Last 72 Hours)       SLP Adult Swallow Evaluation       Row Name 08/01/24 1415       Rehab Evaluation    Document Type therapy note (daily note)  -CJ    Subjective Information no complaints  -CJ    Patient Observations alert;cooperative;agree to therapy  -CJ    Patient/Family/Caregiver Comments/Observations multiple family presents  -CJ    Patient Effort good  -CJ    Symptoms Noted During/After Treatment none  -CJ       Pain    Additional Documentation Pain Scale: FACES Pre/Post-Treatment (Group)  -CJ       Pain Scale: FACES Pre/Post-Treatment    Pain: FACES Scale, Pretreatment 0-->no hurt  -CJ    Posttreatment Pain Rating 0-->no hurt  -CJ       SLP Treatment Clinical Impressions    Treatment Assessment (SLP) continued;dysarthria;cognitive-linguistic disorder  -CJ    Treatment Assessment Comments (SLP) Pt seen for tx this pm. Pt currently NPO for possible SBO w/ NG hooked to low wall suction. Unable to assess for diet tolerance, although new RLL infiltrates noted on CXR-will monitor and reassess when pt cleared for po intake. Pt continues w/ dysarthria and cog-comm deficits. Difficult to complete higher level tasks 2/2 fatigue. Left dysarthria handouts at bedside w/ pt's family  -CJ    Daily Summary of Progress (SLP) progress toward functional goals as expected  -CJ    Barriers to Overall Progress (SLP) Fatigue  -CJ    Plan for Continued Treatment (SLP) continue treatment per plan of care  -CJ     Care Plan Review care plan/treatment goals reviewed  -CJ    Care Plan Review, Other Participant(s) family  -CJ       Recommendations    Therapy Frequency (Swallow) 5 days per week  -CJ    Predicted Duration Therapy Intervention (Days) until discharge  -    SLP Diet Recommendation NPO;other (see comments)  reassess when cleared per surgery  -CJ    Recommended Diagnostics reassess via clinical swallow evaluation  when cleared per surgery  -CJ    Recommended Precautions and Strategies general aspiration precautions  -CJ    Oral Care Recommendations Oral Care BID/PRN;Toothbrush  -    SLP Rec. for Method of Medication Administration meds via alternate route  -CJ    Monitor for Signs of Aspiration notify SLP if any concerns  -CJ    Anticipated Discharge Disposition (SLP) inpatient rehabilitation facility  -              User Key  (r) = Recorded By, (t) = Taken By, (c) = Cosigned By      Initials Name Effective Dates    Stephanie Paul, MS CCC-SLP 06/03/24 -                     EDUCATION  The patient has been educated in the following areas:   Cognitive Impairment Communication Impairment Dysphagia (Swallowing Impairment) Oral Care/Hydration.        SLP GOALS       Row Name 08/01/24 1415             (LTG) Patient will demonstrate functional swallow for    Diet Texture (Demonstrate functional swallow) regular textures  -CJ      Liquid viscosity (Demonstrate functional swallow) thin liquids  -CJ      [place] (Demonstrate functional swallow) independently (over 90% accuracy)  -CJ      Time Frame (Demonstrate functional swallow) 1 week  -CJ      Progress/Outcomes (Demonstrate functional swallow) goal ongoing  -CJ         (STG) Labial Strengthening Goal 1 (SLP)    Activity (Labial Strengthening Goal 1, SLP) increase labial tone  -CJ      Increase Labial Tone labial resistance exercises;swallow trials  -CJ      [place]/Accuracy (Labial Strengthening Goal 1, SLP) independently (over 90% accuracy)  -CJ       Time Frame (Labial Strengthening Goal 1, SLP) 1 week  -CJ      Progress/Outcomes (Labial Strengthening Goal 1, SLP) continuing progress toward goal  -CJ         (STG) Swallow Compensatory Strategies Goal 1 (SLP)    Activity (Swallow Compensatory Strategies/Techniques Goal 1, SLP) compensatory strategies;food/liquid placed on stronger right side  -CJ      [place]/Accuracy (Swallow Compensatory Strategies/Techniques Goal 1, SLP) independently (over 90% accuracy)  -CJ      Time Frame (Swallow Compensatory Strategies/Techniques Goal 1, SLP) 1 week  -CJ      Progress/Outcomes (Swallow Compensatory Strategies/Techniques Goal 1, SLP) goal ongoing  -CJ         Patient will demonstrate functional speech skills for return to discharge environment    [place] with minimal cues  -CJ      Time frame by discharge  -CJ      Progress/Outcomes continuing progress toward goal  -CJ         SLP Diagnostic Treatment     Patient will participate in further assessment in the following areas higher-level cognitive-linguistic  -CJ      Time Frame (Diagnostic) short term goal (STG)  -CJ      Barriers (Diagnostic) Fatigue;Lethargy  -CJ      Progress/Outcomes (Additional Goal 1, SLP) goal ongoing  -CJ      Comment (Diagnostic) Requested to rest  -CJ         Articulation Goal 1 (SLP)    Improve Articulation Goal 1 (SLP) by over-articulating at phrase level;90%;with minimal cues (75-90%)  -CJ      Time Frame (Articulation Goal 1, SLP) 1 week  -CJ      Progress (Articulation Goal 1, SLP) 50%;with moderate cues (50-74%)  -CJ      Progress/Outcomes (Articulation Goal 1, SLP) continuing progress toward goal  -CJ      Comment (Articulation Goal 1, SLP) increased lethargy this date; reviewed and left handouts w/ family  -CJ         Right Hemisphere Function Goal 1 (SLP)    Improve Right Hemisphere Function Through Goal 1 (SLP) demonstrate awareness of communication partner in left visual field;complete visuo-spatial activities (visual  closure, trail making, mazes;80%;with minimal cues (75-90%)  -CJ      Time Frame (Right Hemisphere Function Goal 1, SLP) 1 week  -CJ      Progress (Right Hemisphere Function Goal 1, SLP) 50%;with moderate cues (50-74%)  -CJ      Progress/Outcomes (Right Hemisphere Function Goal 1, SLP) continuing progress toward goal  -CJ      Comment (Right Hemisphere Function Goal 1, SLP) cued for family members  -CJ                User Key  (r) = Recorded By, (t) = Taken By, (c) = Cosigned By      Initials Name Provider Type    Stephanie Paul MS CCC-SLP Speech and Language Pathologist                         Time Calculation:    Time Calculation- SLP       Row Name 08/01/24 1458             Time Calculation- SLP    SLP Start Time 1415  -CJ      SLP Received On 08/01/24  -         Untimed Charges    10403-LH Treatment/ST Modification Prosth Aug Alter  24  -CJ      28600-DG Treatment Swallow Minutes 10  -CJ         Total Minutes    Untimed Charges Total Minutes 34  -CJ       Total Minutes 34  -CJ                User Key  (r) = Recorded By, (t) = Taken By, (c) = Cosigned By      Initials Name Provider Type    Stephanie Paul MS CCC-SLP Speech and Language Pathologist                    Therapy Charges for Today       Code Description Service Date Service Provider Modifiers Qty    71211885788 HC ST TREATMENT SPEECH 2 8/1/2024 Stephanie Chino MS CCC-SLP GN 1    71652301267 HC ST TREATMENT SWALLOW 1 8/1/2024 Stephanie Chino MS CCC-SLP GN 1                 MS SENTHIL Arreola  8/1/2024

## 2024-08-01 NOTE — PLAN OF CARE
Goal Outcome Evaluation:      Pt had multiple events of nausea and emesis throughout night, refractory to nausea medication administration. Intensivist notified and KUB obtained revealing possible obstruction, NG tube placed 1800 ml drained through intermittent decompression. Cardene weaned off. Pt resting comfortably at this time. CT abdomen pending

## 2024-08-01 NOTE — PLAN OF CARE
Goal Outcome Evaluation:  Plan of Care Reviewed With: patient, spouse        Progress: improving  Outcome Evaluation: Pt demonstrated improved static sitting balance compared to last therapy session. Continues to require MaxAx2 with blocking at roberto knees to perform STS. Continues to demonstrate BLE strength, balance, coordination, postural control and endurance below baseline requiring assist with functional mobility. PT recommended to address functional limitations and return to PLOF.

## 2024-08-01 NOTE — DISCHARGE INSTRUCTIONS
-Please start Aspirin 81mg on 8/9/2024, monitor for increased headache, confusion, or worsening neurological symptoms  -Continue lipitor 80mg nightly (LDL on admission 140, goal <70)  -Monitor blood pressure; goal <140/80, if consistently elevated contact PCP as medications may need to be adjusted  -Increase physical activity as tolerated  -Call 911 for stroke symptoms (unilateral weakness, numbness, visual disturbance, speech difficulty, balance difficulty, sudden severe headache/confusion)  -Follow-up with ophthalmology for formal visual field testing prior to resuming driving.  -No driving until cleared by opthamology

## 2024-08-01 NOTE — CONSULTS
General Surgery Consultation Note    Date of Service: 8/1/2024  Ron Cam  [id number]  1970      Referring Provider: Lottie Matias MD    Location of Consult: Inpatient     Reason for Consultation: Concern for small bowel obstruction       History of Present Illness:  I am seeing, Ron Cam, in consultation at request of Lottie Matias MD regarding concern for small bowel obstruction.  He is a 53-year-old gentleman with history of hypertension who was admitted following a stroke.  He was initially admitted to our hospital 3 days ago and has been seen by neurosurgery and neurology and diagnosed with a right basal ganglia hemorrhage and right pontine infarct.  He has had overall improvement in his medical status and was transferred out of the ICU overnight.  He was otherwise progressing, however yesterday he noted abdominal pain, abdominal distention, and vomiting.  He reports that the pain is located throughout his lower abdomen.  He had several episodes of vomiting.  He received a bowel regimen and suppositories and did have a bowel movement yesterday and also passed flatus yesterday.  He had a NG tube placed overnight with over 2 L of output and had significant improvement in his symptoms since then.  He describes the pain is in the middle of his abdomen.  He has never experiencing like this before.  Past abdominal surgery includes open umbilical hernia repair.      Problems Addressed this Visit          Neuro    * (Principal) Nontraumatic intracerebral hemorrhage    Relevant Orders    Ambulatory Referral to Neurology     Other Visit Diagnoses       Intraparenchymal hemorrhage of brain    -  Primary    Dysarthria        Acute left-sided weakness        Left sided numbness        Hemineglect        Hypertensive emergency        Relevant Medications    labetalol (NORMODYNE,TRANDATE) injection 10 mg (Completed)    hydrALAZINE (APRESOLINE) injection 20 mg    labetalol (NORMODYNE,TRANDATE) injection 10 mg     amLODIPine (NORVASC) tablet 10 mg    niCARdipine (CARDENE) 50 mg in sodium chloride 0.9 % 250 mL IVPB    hydrALAZINE (APRESOLINE) tablet 100 mg    hydrALAZINE (APRESOLINE) injection 20 mg (Completed)    metoprolol tartrate (LOPRESSOR) tablet 50 mg    metoprolol tartrate (LOPRESSOR) tablet 25 mg (Completed)    cloNIDine (CATAPRES) tablet 0.3 mg    lisinopril (PRINIVIL,ZESTRIL) tablet 5 mg          Diagnoses         Codes Comments    Intraparenchymal hemorrhage of brain    -  Primary ICD-10-CM: I61.9  ICD-9-CM: 431     Dysarthria     ICD-10-CM: R47.1  ICD-9-CM: 784.51     Acute left-sided weakness     ICD-10-CM: R53.1  ICD-9-CM: 728.87     Left sided numbness     ICD-10-CM: R20.0  ICD-9-CM: 782.0     Hemineglect     ICD-10-CM: R41.4  ICD-9-CM: 781.8     Hypertensive emergency     ICD-10-CM: I16.1  ICD-9-CM: 401.9     Nontraumatic subcortical hemorrhage of right cerebral hemisphere     ICD-10-CM: I61.0  ICD-9-CM: 431             PMHx:  History reviewed. No pertinent past medical history.    Past Surgical History:  No past surgical history on file.    Allergies:  No Known Allergies    Medications:  No current facility-administered medications on file prior to encounter.     Current Outpatient Medications on File Prior to Encounter   Medication Sig Dispense Refill    aspirin-acetaminophen-caffeine (EXCEDRIN MIGRAINE) 250-250-65 MG per tablet Take 1 tablet by mouth Every 6 (Six) Hours As Needed for Headache.      ibuprofen (ADVIL,MOTRIN) 200 MG tablet Take 1 tablet by mouth Every 6 (Six) Hours As Needed for Mild Pain.           Current Facility-Administered Medications:     amLODIPine (NORVASC) tablet 10 mg, 10 mg, Oral, Q24H, Philip Harper MD, 10 mg at 07/31/24 0839    atorvastatin (LIPITOR) tablet 80 mg, 80 mg, Oral, Nightly, Griselda Rojo MD, 80 mg at 07/31/24 2050    bisacodyl (DULCOLAX) EC tablet 10 mg, 10 mg, Oral, Daily, Julian Yi MD    calcium carbonate (TUMS) chewable tablet 500 mg (200 mg  elemental), 2 tablet, Oral, TID PRN, Trevor Rosa MD, 2 tablet at 07/31/24 2219    Calcium Replacement - Follow Nurse / BPA Driven Protocol, , Does not apply, PRN, Bird Caldwell PharmD    cloNIDine (CATAPRES) tablet 0.3 mg, 0.3 mg, Oral, Q12H, Philip Harper MD, 0.3 mg at 07/31/24 2050    diatrizoate meglumine-sodium (GASTROGRAFIN) 66-10 % oral solution 15 mL, 15 mL, Oral, Once, Nadeem Gomez MD    docusate sodium (COLACE) capsule 100 mg, 100 mg, Oral, BID, Julian Yi MD    Enoxaparin Sodium (LOVENOX) syringe 40 mg, 40 mg, Subcutaneous, Q24H, Lg Hines, PharmD, 40 mg at 07/31/24 0839    famotidine (PEPCID) tablet 20 mg, 20 mg, Oral, BID AC, Philip Harper MD, 20 mg at 07/31/24 1804    hydrALAZINE (APRESOLINE) injection 20 mg, 20 mg, Intravenous, Q6H PRN, Trevor Rosa MD, 20 mg at 07/30/24 2034    hydrALAZINE (APRESOLINE) tablet 100 mg, 100 mg, Oral, Q8H, Winifred Beard APRN, 100 mg at 07/31/24 2052    labetalol (NORMODYNE,TRANDATE) injection 10 mg, 10 mg, Intravenous, Q4H PRN, Trevor Rosa MD, 10 mg at 08/01/24 0251    lisinopril (PRINIVIL,ZESTRIL) tablet 5 mg, 5 mg, Oral, Q24H, Philip Harper MD, 5 mg at 07/31/24 1004    Magnesium Standard Dose Replacement - Follow Nurse / BPA Driven Protocol, , Does not apply, PRN, Bird Caldwell, PharmD    metoclopramide (REGLAN) injection 10 mg, 10 mg, Intravenous, Q6H, Julian Yi MD    metoprolol tartrate (LOPRESSOR) tablet 50 mg, 50 mg, Oral, Q12H, Philip Harper MD, 50 mg at 07/31/24 2051    niCARdipine (CARDENE) 50 mg in sodium chloride 0.9 % 250 mL IVPB, 5-15 mg/hr, Intravenous, Titrated, Philip Harper MD, Stopped at 08/01/24 0442    ondansetron (ZOFRAN) injection 4 mg, 4 mg, Intravenous, Q6H PRN, Winifred Beard, APRN, 4 mg at 08/01/24 0143    Phosphorus Replacement - Follow Nurse / BPA Driven Protocol, , Does not apply, PRN, Bird Caldwell, PharmD    Potassium  "Replacement - Follow Nurse / BPA Driven Protocol, , Does not apply, PRN, Bird Caldwell, PharmD    prochlorperazine (COMPAZINE) injection 5 mg, 5 mg, Intravenous, Q6H PRN, Nadeem Gomez MD, 5 mg at 08/01/24 0250    scopolamine patch 1 mg/72 hr, 1 patch, Transdermal, Once, RodrigoMoblanca, May, APRN, 1 patch at 07/31/24 0514    sodium chloride 0.9 % flush 10 mL, 10 mL, Intravenous, Q12H, Winifred Beard, APRN, 10 mL at 07/31/24 2051    sodium chloride 0.9 % flush 10 mL, 10 mL, Intravenous, PRN, Winifred Beard, APRN    sodium chloride 0.9 % infusion 40 mL, 40 mL, Intravenous, PRN, Saman Zarco PA-C      Family History:  Family History   Problem Relation Age of Onset    Stroke Paternal Grandfather     Diabetes Paternal Grandfather        Social History: Pt lives in [facility].    Tobacco use: Denies     EtOH use : Denies    Illicit drug use: Denies       Review of Systems:   Constitutional: No fevers, chills or malaise   Eyes: Denies visual changes    Cardiovascular: Denies chest pain, palpitations   Pulmonary: Denies cough or shortness of breath   Abdominal/ GI: See HPI    Genitourinary: Denies dysuria or hematuria   Musculoskeletal: Denies any but chronic joint aches, pains or deformities   Psychiatric: No recent mood changes   Neurologic: No paresthesias or loss of function    /72   Pulse 78   Temp 98.6 °F (37 °C) (Oral)   Resp 18   Ht 182.9 cm (72.01\")   Wt 122 kg (268 lb 11.9 oz)   SpO2 96%   BMI 36.45 kg/m²   Body mass index is 36.45 kg/m².    Gen: Awake, alert, sitting up in bed, has some expressive aphasia   Head: Normocephalic, atraumatic.   Eyes: Pupils equal, round, react to light and accommodation.   Mouth: Oral mucosa without lesions,   Neck: No masses, lymphadenopathy or carotid bruits bilaterally   CV: Rhythm and rate regular, no murmurs, rubs or gallops  Lungs: Clear to auscultation bilaterally, not labored on room air   Abdomen: Moderately distended throughout, " only some mild tenderness to deep palpation throughout, there is a periumbilical scar, there is no rebound or guarding  Groin : No obvious hernias bilaterally   Extremities:  No cyanosis, clubbing or edema bilaterally  Lymphatics: No abnormal lymphadenopathy appreciated   Neurologic: There is some expressive aphasia        CBC  Results from last 7 days   Lab Units 08/01/24  0435   WBC 10*3/mm3 14.02*   HEMOGLOBIN g/dL 13.9   HEMATOCRIT % 39.7   PLATELETS 10*3/mm3 262       CMP  Results from last 7 days   Lab Units 08/01/24  0435   SODIUM mmol/L 135*   POTASSIUM mmol/L 3.8   CHLORIDE mmol/L 98   CO2 mmol/L 26.0   BUN mg/dL 19   CREATININE mg/dL 0.93   CALCIUM mg/dL 9.1   BILIRUBIN mg/dL 0.5   ALK PHOS U/L 64   ALT (SGPT) U/L 28   AST (SGOT) U/L 16   GLUCOSE mg/dL 146*       Radiology  Imaging Results (Last 72 Hours)       Procedure Component Value Units Date/Time    XR Abdomen KUB [[id number]] Collected: 08/01/24 0453     Updated: 08/01/24 0456    Narrative:      XR ABDOMEN KUB    Date of Exam: 8/1/2024 4:37 AM EDT    Indication: NG placement    Comparison: 8/1/2024 at 0302 hours.    Findings:  Gastric suction tube terminates in the stomach. There are multiple dilated small bowel loops concerning for bowel obstruction.      Impression:      Impression:  1.Gastric suction tube terminates in the stomach.  2.Multiple dilated small bowel loops concerning for bowel obstruction.        Electronically Signed: Tony Infante MD    8/1/2024 4:53 AM EDT    Workstation ID: [id number]    XR Abdomen KUB [[id number]] Collected: 08/01/24 0334     Updated: 08/01/24 0341    Narrative:      XR ABDOMEN KUB    Date of Exam: 8/1/2024 2:50 AM EDT    Indication: abdominal distension    Comparison: None available.    Findings:  There are numerous dilated loops of small bowel measuring up to 47 mm diameter. Appearance is concerning for small bowel obstruction. There is mild gaseous distention of the stomach. The colon is empty. Lung bases are  clear. No acute osseous abnormality.   No pathologic abdominal calcifications.      Impression:      Impression:  Dilated loops of small bowel concerning for small bowel obstruction.        Electronically Signed: Tony Infante MD    8/1/2024 3:38 AM EDT    Workstation ID: [id number]    CT Head Without Contrast [[id number]] Collected: 07/30/24 2351     Updated: 07/30/24 2356    Narrative:      CT HEAD WO CONTRAST    Date of Exam: 7/30/2024 11:43 PM EDT    Indication: Stroke follow up.    Comparison: 7/29/2024.    Technique: Axial CT images were obtained of the head without contrast administration.  Automated exposure control and iterative construction methods were used.      Findings:  There is redemonstration of the right basal ganglia/right temporal lobe intraparenchymal hemorrhage. The hyperdense portion of the hematoma measures 53 x 30 mm, not significantly changed. There is a surrounding region of cerebral edema that extends into   the right temporal lobe, right basal ganglia and right frontoparietal lobes. There is stable mass effect with up to 4 mm leftward shift of midline. There is partial effacement of the right lateral ventricle. No acute hydrocephalus or trapped ventricle.   No new hemorrhage. Mild stable patchy white matter hypoattenuation otherwise. The cerebellum is unremarkable. Brainstem appears intact. Orbits are within normal limits. The calvarium appears intact. The sinuses and mastoids appear clear.      Impression:      Impression:  1.Stable right basal ganglia/right temporal lobe intraparenchymal hemorrhage with surrounding cerebral edema and mass effect. No new hemorrhage.  2.Mild chronic small vessel ischemic change.        Electronically Signed: Tony Infante MD    7/30/2024 11:53 PM EDT    Workstation ID: [id number]                Results Review: I have personally reviewed all of the recent lab and imaging results available at this time.     Vital signs are stable overnight    White count is  elevated at 14 on labs.  Otherwise without acute findings including a normal lactate at 1.3 and normal chemistries    I have reviewed his KUB from overnight.  This appears to show both some small bowel and colonic dilation    Assessment:  Mr. Cam is a 53-year-old gentleman with history of hypertension who is admitted following a stroke who I been asked to evaluate due to concern for small bowel obstruction.  His clinical history would be more consistent with an ileus, however obstruction would be a possibility.  He needs a CT scan of the abdomen and pelvis for further investigation.  Will continue with supportive measures and follow-up this result    Plan:  -I have reordered for a stat CT of the abdomen and pelvis to be performed  -Continue NG tube to continuous low wall suction  -I will add a scheduled bowel regimen  -IV fluid resuscitation.  Correct electrolytes.  Supportive measures       I discussed the patient's findings and my recommendations with the patient and/or family, as well as the primary team     Julian Yi MD  08/01/24  07:12 EDT      Part of this note may be an electronic transcription/translation of spoken language to printed text using the Dragon Dictation System.

## 2024-08-01 NOTE — PROGRESS NOTES
[facility] Medicine Services  PROGRESS NOTE    Patient Name: Ron Cam  : 1970  MRN: [id number]    Date of Admission: 2024  Primary Care Physician: Provider, No Known    Subjective   Subjective     CC:  Follow-up stroke    HPI:  Worked with physical therapy this morning and then became very lethargic and difficult to arouse.  Patient's wife at bedside reports he sounds more congested than previously.  Patient denies any abdominal pain.  Had an episode of emesis overnight.  Had 2 bowel movements.  NIH 16.  NG in place.      Objective   Objective     Vital Signs:   Temp:  [97.7 °F (36.5 °C)-99 °F (37.2 °C)] 99 °F (37.2 °C)  Heart Rate:  [66-94] 85  Resp:  [18-20] 18  BP: (104-160)/() 127/80  Flow (L/min):  [3-4] 4     Physical Exam:    PM physical exam on 2nd visit  Constitutional: No acute distress, awake, alert  HENT: NCAT, mucous membranes moist  Respiratory: Clear to auscultation bilaterally, respiratory effort normal   Cardiovascular: RRR, no murmurs, rubs, or gallops  Gastrointestinal: Positive bowel sounds, soft, nontender, nondistended  Musculoskeletal: No bilateral ankle edema  Psychiatric: Appropriate affect, cooperative  Neurologic: Oriented x 3, dysarthric, left homonymous hemianopsia, PERRL, crease sensation on the left face, left-sided facial droop, left upper extremity 0/5 strength, left lower extremity 0/2 strength, right upper and lower extremity 5/5 strength  Skin: No rashes    AM PE: Patient lethargic, not answering questions, not following commands --> now resolved      Results Reviewed:  LAB RESULTS:      Lab 24  0435 24  0529 24  0505 24  0515 24  1703 24  1702   WBC 14.02* 10.75 10.23 8.21  --  6.50   HEMOGLOBIN 13.9 13.5 12.5* 13.1  --  15.3   HEMOGLOBIN, POC  --   --   --   --  15.0  --    HEMATOCRIT 39.7 38.3 37.1* 39.4  --  43.2   HEMATOCRIT POC  --   --   --   --  44  --    PLATELETS 262 197 167 172  --   191   NEUTROS ABS  --  9.11* 7.66* 6.11  --  4.48   IMMATURE GRANS (ABS)  --  0.03 0.04 0.02  --  0.01   LYMPHS ABS  --  0.71 1.33 1.34  --  1.31   MONOS ABS  --  0.85 0.99* 0.65  --  0.56   EOS ABS  --  0.02 0.16 0.06  --  0.10   MCV 89.4 89.7 93.0 93.4  --  88.0   PROCALCITONIN 0.10  --   --   --   --   --    LACTATE 1.3  --   --   --   --   --    APTT  --   --   --   --   --  25.6         Lab 08/01/24  0435 07/31/24  1605 07/31/24  0529 07/30/24  0505 07/30/24  0047 07/29/24  1738 07/29/24  1408 07/29/24  0344 07/27/24  0843 07/27/24  0600   SODIUM 135* 129* 133* 138 138 140  139   < > 140   < > 139   POTASSIUM 3.8  --  3.9 3.8  3.8  --  3.6  --  3.4*   < >  --    CHLORIDE 98  --  98 105  --  108*  --  110*   < >  --    CO2 26.0  --  21.0* 19.0*  --  21.0*  --  21.0*   < >  --    ANION GAP 11.0  --  14.0 14.0  --  11.0  --  9.0   < >  --    BUN 19  --  17 13  --  11  --  9   < >  --    CREATININE 0.93  --  0.85 0.92  --  0.94  --  0.94   < >  --    EGFR 98.2  --  103.9 99.5  --  96.9  --  96.9   < >  --    GLUCOSE 146*  --  155* 119*  --  140*  --  142*   < >  --    CALCIUM 9.1  --  8.7 8.5*  --  8.4*  --  8.1*   < >  --    MAGNESIUM 2.2  --  1.9  --   --   --   --   --   --   --    PHOSPHORUS 3.9  --   --   --   --   --   --   --   --   --    HEMOGLOBIN A1C  --   --   --   --   --   --   --   --   --  5.50    < > = values in this interval not displayed.         Lab 08/01/24  0435 07/26/24  1702   TOTAL PROTEIN 6.7  --    ALBUMIN 3.7  --    GLOBULIN 3.0  --    ALT (SGPT) 28 35   AST (SGOT) 16 28   BILIRUBIN 0.5  --    ALK PHOS 64  --          Lab 07/26/24  2354 07/26/24  1702   PROBNP 1,437.0*  --    HSTROP T  --  13         Lab 07/27/24  0600   CHOLESTEROL 227*   LDL CHOL 140*   HDL CHOL 58   TRIGLYCERIDES 161*             Lab 08/01/24  1234   PH, ARTERIAL 7.496*   PCO2, ARTERIAL 35.5   PO2 .0*   FIO2 30   HCO3 ART 27.4*   BASE EXCESS ART 4.2*   CARBOXYHEMOGLOBIN 1.2     Brief Urine Lab Results        None            Microbiology Results Abnormal       None            EEG    Result Date: 8/1/2024  Reason for referral: 53 y.o.male with altered mental status Technical Summary:  A 19 channel digital EEG was performed using the international 10-20 placement system, including eye leads and EKG leads. Duration: 20 minutes Findings: The patient is resting quietly in bed.  He is lethargic.  Diffuse low amplitude 4-5 Hz intermixed delta and theta activity is seen over both hemispheres with a greater degree of slowing over the right.  When the patient rouses faster frequencies are seen much more prominently on the left, and right hemispheric slowing becomes more apparent.  When the patient is briefly awake, at times a poorly regulated 8 Hz posterior rhythm is evident on the left but not as clearly seen on the right.  Photic stimulation does not change the background.  Hyperventilation is not performed.  No epileptiform activity is seen. Video: Available Technical quality: Good EKG: Regular, approximately 60 bpm SUMMARY: Mild-moderate generalized slow Independent right hemispheric slow No epileptiform activity or electrographic seizures are seen     Impression: Focal cerebral dysfunction impacting the right hemisphere, superimposed upon diffuse cerebral dysfunction of more mild degree The above finding is consistent with a known right hemispheric stroke No evidence for epilepsy is seen This report is transcribed using the Dragon dictation system.  ]     XR Chest 1 View    Result Date: 8/1/2024  XR CHEST 1 VW Date of Exam: 8/1/2024 12:06 PM EDT Indication: cough Comparison: 7/26/2024. Findings: A right PICC line has been placed with its tip at the caval atrial junction. There are new patchy infiltrates of the right lower lobe. The left lung is clear. There are no effusions. There is stable borderline cardiomegaly.     Impression: Impression: Interval right PICC line placement as noted. New patchy infiltrates of the right  lower lobe, may represent area of atelectasis and/or pneumonia. Electronically Signed: Brigitte Kelley MD  8/1/2024 12:36 PM EDT  Workstation ID: [id number]    CT Head Without Contrast Stroke Protocol    Result Date: 8/1/2024  CT HEAD WO CONTRAST STROKE PROTOCOL Date of Exam: 8/1/2024 11:41 AM EDT Indication: decreased LOC, lethargy, eval stability of hemorrhage. Comparison: Head CT 7/30/2024. Technique: Axial CT images were obtained of the head without contrast administration.  Reconstructed coronal images were also obtained. Automated exposure control and iterative construction methods were used. Findings: Hematoma centered within the right basal ganglia measures 5 x 3 cm in the axial plane (series 3 image 44), unchanged in size when remeasured for consistency with differences in size from prior exams due to different measurement planes. Similar surrounding rim of vasogenic edema. Similar partial effacement of the right lateral ventricle. Similar right to left midline shift of 4 mm. No worsening mass effect. No new or enlarging intracranial hemorrhage. No extra-axial collection. No loss of gray-white matter differentiation. Unchanged background of chronic microvascular changes. Unchanged old pontine infarct. No osseous or soft tissue abnormality. Paranasal sinuses and mastoid air cells are well aerated. Nasogastric tube partially visualized.     Impression: Impression: No significant interval change from prior exam, with similar size and associated mass effect of the right basal ganglia hematoma, as described above. Electronically Signed: Bernabe Akers MD  8/1/2024 12:00 PM EDT  Workstation ID: [id number]    CT Abdomen Pelvis With Contrast    Result Date: 8/1/2024  CT ABDOMEN PELVIS W CONTRAST Date of Exam: 8/1/2024 8:16 AM EDT Indication: concern for small bowel obstruction. Comparison: Radiograph 8/1/2024 Technique: Axial CT images were obtained of the abdomen and pelvis following the uneventful intravenous  administration of 100 cc Isovue-300 . Reconstructed coronal and sagittal images were also obtained. Automated exposure control and iterative construction methods were used. Findings: There is small bilateral pleural effusions. There is bilateral lower lobe airspace consolidation which may be secondary to dependent atelectasis or pneumonia. There is nasogastric tube in place the tip within the mid body of the stomach. Images through the abdomen are degraded by patient respiratory motion. Liver, pancreas, and spleen appear within normal limits. Gallbladder appears grossly normal. No biliary tract obstruction. Bilateral adrenal glands are within normal limits. Kidneys appear normal bilaterally. No evidence of hydronephrosis. No abdominal or retroperitoneal adenopathy. There are multiple fluid-filled distended loops of small bowel throughout the abdomen. There is malrotation of the small bowel with the third portion of the duodenum within the right upper quadrant of the abdomen. This is likely congenital as see no surgical clips or other postoperative changes to suggest that this is postoperative in etiology. There is no definite volvulus identified. There are nondistended loops of small bowel within the right lower quadrant there appears to be a transition point within the upper pelvis near the midline seen on axial image 120. Findings would be consistent with partial small bowel obstruction. There is some gas and stool within the colon which is nondistended. Oscar: Urinary bladder is within normal limits. There are a few scattered sigmoid diverticula. No diverticulitis. No pelvic or inguinal adenopathy. No free intraperitoneal fluid. No lytic or sclerotic bony lesions are identified.     Impression: Impression: 1. Small effusions and bilateral lower lobe airspace disease favored to be secondary to atelectasis. 2. Air-filled and fluid-filled distended loops of small bowel throughout the abdomen with the tract to normal  caliber small bowel within the mid pelvis anteriorly. Findings would be consistent with partial small bowel obstruction. 3. 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 There is no evidence of small bowel volvulus. Electronically Signed: Nitin Gimenez MD  8/1/2024 8:46 AM EDT  Workstation ID: [id number]    XR Abdomen KUB    Result Date: 8/1/2024  XR ABDOMEN KUB Date of Exam: 8/1/2024 4:37 AM EDT Indication: NG placement Comparison: 8/1/2024 at 0302 hours. Findings: Gastric suction tube terminates in the stomach. There are multiple dilated small bowel loops concerning for bowel obstruction.     Impression: Impression: 1.Gastric suction tube terminates in the stomach. 2.Multiple dilated small bowel loops concerning for bowel obstruction. Electronically Signed: Tony Infante MD  8/1/2024 4:53 AM EDT  Workstation ID: [id number]    XR Abdomen KUB    Result Date: 8/1/2024  XR ABDOMEN KUB Date of Exam: 8/1/2024 2:50 AM EDT Indication: abdominal distension Comparison: None available. Findings: There are numerous dilated loops of small bowel measuring up to 47 mm diameter. Appearance is concerning for small bowel obstruction. There is mild gaseous distention of the stomach. The colon is empty. Lung bases are clear. No acute osseous abnormality.  No pathologic abdominal calcifications.     Impression: Impression: Dilated loops of small bowel concerning for small bowel obstruction. Electronically Signed: Tony Infante MD  8/1/2024 3:38 AM EDT  Workstation ID: [id number]    CT Head Without Contrast    Result Date: 7/30/2024  CT HEAD WO CONTRAST Date of Exam: 7/30/2024 11:43 PM EDT Indication: Stroke follow up. Comparison: 7/29/2024. Technique: Axial CT images were obtained of the head without contrast administration.  Automated exposure control and iterative construction methods were used. Findings:  There is redemonstration of the right basal ganglia/right temporal lobe intraparenchymal hemorrhage. The hyperdense portion of the hematoma measures 53 x 30 mm, not significantly changed. There is a surrounding region of cerebral edema that extends into the right temporal lobe, right basal ganglia and right frontoparietal lobes. There is stable mass effect with up to 4 mm leftward shift of midline. There is partial effacement of the right lateral ventricle. No acute hydrocephalus or trapped ventricle. No new hemorrhage. Mild stable patchy white matter hypoattenuation otherwise. The cerebellum is unremarkable. Brainstem appears intact. Orbits are within normal limits. The calvarium appears intact. The sinuses and mastoids appear clear.     Impression: Impression: 1.Stable right basal ganglia/right temporal lobe intraparenchymal hemorrhage with surrounding cerebral edema and mass effect. No new hemorrhage. 2.Mild chronic small vessel ischemic change. Electronically Signed: Tony Infante MD  7/30/2024 11:53 PM EDT  Workstation ID: [id number]     Results for orders placed during the hospital encounter of 07/26/24    Adult Transthoracic Echo Complete W/ Cont if Necessary Per Protocol (With Agitated Saline)    Interpretation Summary    Left ventricular systolic function is normal. Estimated left ventricular EF = 65%    The cardiac valves are anatomically and functionally normal.    Saline test results are negative.      Current medications:  Scheduled Meds:[START ON 8/2/2024] amLODIPine, 10 mg, Nasogastric, Q24H  atorvastatin, 80 mg, Nasogastric, Nightly  bisacodyl, 10 mg, Rectal, Daily  cefTRIAXone, 2,000 mg, Intravenous, Daily  cloNIDine, 0.3 mg, Nasogastric, Q12H  docusate sodium, 100 mg, Nasogastric, BID  doxycycline, 100 mg, Intravenous, Q12H  enoxaparin, 40 mg, Subcutaneous, Q24H  famotidine, 20 mg, Nasogastric, BID AC  hydrALAZINE, 100 mg, Nasogastric, Q8H  [START ON 8/2/2024] lisinopril, 5 mg, Nasogastric,  Q24H  metoclopramide, 10 mg, Intravenous, Q6H  metoprolol tartrate, 50 mg, Nasogastric, Q12H  Scopolamine, 1 patch, Transdermal, Once  senna, 2 tablet, Nasogastric, Nightly  sodium chloride, 10 mL, Intravenous, Q12H      Continuous Infusions:lactated ringers, 75 mL/hr, Last Rate: 75 mL/hr (08/01/24 1306)  niCARdipine, 5-15 mg/hr, Last Rate: Stopped (08/01/24 0492)      PRN Meds:.  calcium carbonate    Calcium Replacement - Follow Nurse / BPA Driven Protocol    hydrALAZINE    labetalol    Magnesium Standard Dose Replacement - Follow Nurse / BPA Driven Protocol    ondansetron    Phosphorus Replacement - Follow Nurse / BPA Driven Protocol    Potassium Replacement - Follow Nurse / BPA Driven Protocol    prochlorperazine    sodium chloride    sodium chloride    Assessment & Plan   Assessment & Plan     Active Hospital Problems    Diagnosis  POA    **Nontraumatic intracerebral hemorrhage [I61.9]  Yes    Hypertensive crisis [I16.9]  Yes      Resolved Hospital Problems   No resolved problems to display.        Brief Hospital Course to date:  Ron Cam is a 53 y.o. male with history of migraines, who presented via EMS on 5/26 with left-sided weakness, sensory loss, dysarthria, visual disturbance.  NIH noted to be 17.  CT head showed 4 x 3 x 3.5 right basal ganglia hemorrhage.  CT angio without evidence of large vessel occlusion.  Ulcerated plaque observed within the left distal common carotid.  He was deemed not to be a surgical candidate.  He was monitored in the neurological ICU.  Stroke neurology consulted on admission.  Transferred to the floor service on 8/1.    This patient's problems and plans were partially entered by my partner and updated as appropriate by me 08/01/24.    Right basal ganglia hemorrhage, etiology likely hypertensive  -Neurology following  -Neurosurgery evaluated, deemed nonsurgical  -CT head stable today  -ET/OT recommend IRF  -Follow-up with the stroke clinic 4 weeks after discharge  -Continue  neurochecks, telemetry    Partial small bowel obstruction  -General surgery consulted  -Continue NG to low wall suction  -Bowel regimen  -IVF    Pneumonia  -Ceftriaxone, doxycycline    Extracranial atherosclerotic disease  -Eventually needs aspirin, but will wait 2 weeks post hemorrhage, 8/9/2024 before starting    HTN  -Goal SBP less than 140  -Continue clonidine, hydralazine, metoprolol    HLD  -Continue atorvastatin    Altered mental status, likely secondary to lack of sleep and new pneumonia  -new on 8/1  -CT head stable from prior; glucose okay, ABG okay    Discussed CODE STATUS with patient's wife, she stated he is full code.  At time of evaluation/code discussion this morning, patient was not able to make decisions for himself due to altered mental status.      Expected Discharge Location and Transportation: IRF  Expected Discharge   Expected Discharge Date: 8/5/2024; Expected Discharge Time:      VTE Prophylaxis:  Pharmacologic & mechanical VTE prophylaxis orders are present.         AM-PAC 6 Clicks Score (PT): 9 (08/01/24 1029)    CODE STATUS:   Code Status and Medical Interventions: CPR (Attempt to Resuscitate); Full Support; discussed with wife; patient lacked capacity at time of discussion of code status on 8/1   Ordered at: 08/01/24 1203     Level Of Support Discussed With:    Next of Kin (If No Surrogate)     Code Status (Patient has no pulse and is not breathing):    CPR (Attempt to Resuscitate)     Medical Interventions (Patient has pulse or is breathing):    Full Support     Comments:    discussed with wife; patient lacked capacity at time of discussion of code status on 8/1       Lottie Matias MD  08/01/24

## 2024-08-01 NOTE — SIGNIFICANT NOTE
Patient had an episode of vomiting and abdominal distention with concern for constipation.  I will obtain a KUB.  I ordered Compazine.  Previously received Zofran without adequate effect.  Patient to get a Dulcolax suppository.  Will follow.    Nadeem Gomez MD  08/01/24  02:41 EDT      KUB is concerning for bowel obstruction and patient has vomited 2 additional times.  I will go ahead and obtain a CT scan of the abdomen pelvis and place a consult for general surgery.  I will place the patient on bowel rest.

## 2024-08-01 NOTE — PROGRESS NOTES
Stroke Neurology Progress Note     Subjective     This patient was seen in follow-up for: right basal ganglia hemorrhage    Subjective:  Patient currently is sleeping on recliner.  He is hard to arouse and quickly falls back to sleep.  His wife is currently at bedside and tells me that he was up all evening with pain.  He underwent a CT abdomen which revealed a partial bowel obstruction, a NG tube was placed.  Nursing and his wife report that he did work well with physical therapy.      Objective      Temp:  [97.7 °F (36.5 °C)-99 °F (37.2 °C)] 99 °F (37.2 °C)  Heart Rate:  [] 85  Resp:  [18-20] 18  BP: (104-181)/() 127/80          Objective    Physical Exam  Vitals and nursing note reviewed.   Constitutional:       General: He is not in acute distress.     Appearance: He is not ill-appearing.      Comments: Lethargic   HENT:      Head: Normocephalic and atraumatic.      Mouth/Throat:      Mouth: Mucous membranes are dry.   Cardiovascular:      Rate and Rhythm: Normal rate and regular rhythm.   Pulmonary:      Effort: Pulmonary effort is normal. No respiratory distress.      Comments: On room air  Musculoskeletal:      Right lower leg: No edema.      Left lower leg: No edema.   Skin:     General: Skin is warm and dry.   Neurological:      Cranial Nerves: Cranial nerve deficit and dysarthria present.      Sensory: Sensory deficit present.      Motor: Weakness present.   Psychiatric:         Speech: Speech is slurred.         Behavior: Behavior is uncooperative and withdrawn.         Cognition and Memory: Cognition is impaired.         Neurological Exam  Mental Status  Responsive to painful stimuli. Oriented only to person. Moderate dysarthria present. Language: Unble to assess 2/2 lethargy.    Cranial Nerves  CN II: Right normal visual field. Left homonymous hemianopsia.  CN III, IV, VI:   Right pupil: Pinpoint. Round.   Left pupil: Pinpoint. Round.  CN V: Unable to assess 2/2 lethargy.  CN VII:  Left:  There is central facial weakness.  CN VIII: Hearing appears to be intact bilaterally.  CN XI:  Right: Sternocleidomastoid strength is normal.  Left: Absent.    Motor    Unable to assess 2/2 lethargy.    Sensory  Left upper and lower extremity. Sensation: Unable to assess 2/2 lethargy.  Left-sided hemispatial neglect: Extinction to double simultaneous stimulation of the left arm and leg.    Gait    Not observed.      Results Review:      All brain images and reports were personally reviewed and I agree with the interpretations except as noted below.    CT head 7/29/2024  Impression: 1. Stable right basal ganglia hemorrhage with surrounding edema and mass effect. 2. Stable mild chronic small vessel ischemic change and chronic lacunar infarct in the right central liz.    CT head 7/28/2024  Impression: 1. Stable size of right basal ganglia intraparenchymal hemorrhage with increased surrounding vasogenic edema and slightly increased mass effect with 4 mm leftward shift of midline structures. 2. Stable mild chronic small vessel ischemic change and chronic lacunar infarct in the left basal ganglia.        CT Head Without Contrast 7/26/2024  Impression: Interval increase in intraparenchymal hemorrhage centered in the right basal ganglia with mild increase in mass effect on the adjacent lateral ventricles and minimal change of the basilar and suprasellar cisterns.     CT Angiogram Head and Neck 7/26/2024  Impression: 1. No evidence of intracranial large vessel arterial occlusion or aneurysm 2. Ulcerated plaque in the left distal common carotid and proximal internal carotid arteries that results in less than 50% luminal diameter stenosis 3. Patent right carotid and bilateral vertebral arteries without significant stenosis or dissection.    CT Head Without Contrast 7/26/2024  Impression: 1. 4 x 3 x 3.5 cm right basal ganglia and periventricular white matter parenchymal hematoma with mass effect as described 2. No evidence of  major vascular territory brain ischemia 3. Chronic small vessel ischemic changes.    XR Abdomen KUB    Result Date: 8/1/2024  Impression: 1.Gastric suction tube terminates in the stomach. 2.Multiple dilated small bowel loops concerning for bowel obstruction. Electronically Signed: Tony Infante MD  8/1/2024 4:53 AM EDT  Workstation ID: [id number]    XR Abdomen KUB    Result Date: 8/1/2024  Impression: Dilated loops of small bowel concerning for small bowel obstruction. Electronically Signed: Tony Infante MD  8/1/2024 3:38 AM EDT  Workstation ID: [id number]    CT Head Without Contrast    Result Date: 7/30/2024  Impression: 1.Stable right basal ganglia/right temporal lobe intraparenchymal hemorrhage with surrounding cerebral edema and mass effect. No new hemorrhage. 2.Mild chronic small vessel ischemic change. Electronically Signed: Tony Infante MD  7/30/2024 11:53 PM EDT  Workstation ID: [id number]     Transthoracic echocardiogram 7/26/2024  Impression:    Left ventricular systolic function is normal. Estimated left ventricular EF = 65%    The cardiac valves are anatomically and functionally normal.    Saline test results are negative.      A1c 5.50    WBC 14.02  H/H 13.9/39.7  Platelets 262  Glucose 146  Cretinine 0.93, BUN 19  Na+ 135  AST 16  ALT 28        Assessment/Plan     Assessment:    This is a 54 yo male with PMH of migraines who presented to [facility] ED via EMS on 7/26/2024 with complaints of left sided weakness, sensory loss, dysarthria and visual disturbance;  NIH noted to be 17.  CT head without contrast revealed a 4 x 3 x 3.5 right basal ganglia hemorrhage.  CT angiogram without evidence of LVO.  Ulcerated plaque observed within the left distal common carotid.  He was not a candidate for IV thrombolytic therapy given presence of ICH and was not deemed a candidate for neurosurgical intervention.  He was admitted to the neurological ICU for further monitoring and work-up.     Antiplatelet PTA:  None  Anticoagulation PTA: None    # Right basal ganglia hemorrhage, etiology likely hypertensive  -Hemorrhagic stroke order set is currently in place  -ICH score 0   -/112 on admission   -Neurosurgery evaluated, non-surgical  -Hold all antiplatelet medications x 2 weeks  -Serum Na this   -Given increased lethargy will obtain STAT CT to ensure stability of hemorrhage; STABLE appearance, suspect lethargy is 2/2 inadequate sleep overnight and working with PT this morning  -PT OT recommend IRF, CM following for DC needs, referral has been made to [facility] for enoxaparin for DVT prophylaxis   -Follow-up in stroke clinic in 4 weeks after DC (added to ADT)  -Follow-up with neuro-ophthalmology for formal visual field testing; no driving until cleared from them; discussed with patient    # Extracranial atherosclerotic disease  -Eventually, we will need to treat extracranial atherosclerotic disease with aspirin but would wait at least 2 weeks post-hemorrhage (8/9/2024)      # Essential hypertension, new diagnosis  -SBP < 140.  Cardene discontinued on 8/1 0442  -Oral antihypertensive medications added 7/30.    -Management per primary team.    # Hyperlipidemia  -LDL on admission 140, goal <70 for secondary stroke prevention  -Continue atorvastatin 80 mg daily ()    # Partial SBO  -Management per primary team  -NGT in place    Plan discussed with the patient's family and primary team.  Stroke neurology will continue to follow.  Please call with any questions or concerns.    REENA Ibrahim, AGACNP-[facility] STROKE NEURO  08/01/24  08:15 EDT

## 2024-08-01 NOTE — PLAN OF CARE
Goal Outcome Evaluation:  Plan of Care Reviewed With: patient, family        Progress: no change         Anticipated Discharge Disposition (SLP): inpatient rehabilitation facility             Treatment Assessment (SLP): continued, dysarthria, cognitive-linguistic disorder (08/01/24 1415)  Treatment Assessment Comments (SLP): Pt seen for tx this pm. Pt currently NPO for possible SBO w/ NG hooked to low wall suction. Unable to assess for diet tolerance, although new RLL infiltrates noted on CXR-will monitor and reassess when pt cleared for po intake. Pt continues w/ dysarthria and cog-comm deficits. Difficult to complete higher level tasks 2/2 fatigue. Left dysarthria handouts at bedside w/ pt's family (08/01/24 1415)  Plan for Continued Treatment (SLP): continue treatment per plan of care (08/01/24 1415)

## 2024-08-02 ENCOUNTER — APPOINTMENT (OUTPATIENT)
Dept: GENERAL RADIOLOGY | Facility: HOSPITAL | Age: 54
DRG: 064 | End: 2024-08-02
Payer: COMMERCIAL

## 2024-08-02 LAB
ANION GAP SERPL CALCULATED.3IONS-SCNC: 13 MMOL/L (ref 5–15)
BUN SERPL-MCNC: 19 MG/DL (ref 6–20)
BUN/CREAT SERPL: 21.6 (ref 7–25)
CALCIUM SPEC-SCNC: 8.2 MG/DL (ref 8.6–10.5)
CHLORIDE SERPL-SCNC: 99 MMOL/L (ref 98–107)
CO2 SERPL-SCNC: 24 MMOL/L (ref 22–29)
CREAT SERPL-MCNC: 0.88 MG/DL (ref 0.76–1.27)
DEPRECATED RDW RBC AUTO: 42.1 FL (ref 37–54)
EGFRCR SERPLBLD CKD-EPI 2021: 102.8 ML/MIN/1.73
ERYTHROCYTE [DISTWIDTH] IN BLOOD BY AUTOMATED COUNT: 12.7 % (ref 12.3–15.4)
GLUCOSE BLDC GLUCOMTR-MCNC: 94 MG/DL (ref 70–130)
GLUCOSE BLDC GLUCOMTR-MCNC: 95 MG/DL (ref 70–130)
GLUCOSE BLDC GLUCOMTR-MCNC: 95 MG/DL (ref 70–130)
GLUCOSE SERPL-MCNC: 93 MG/DL (ref 65–99)
HCT VFR BLD AUTO: 37.8 % (ref 37.5–51)
HGB BLD-MCNC: 12.8 G/DL (ref 13–17.7)
MAGNESIUM SERPL-MCNC: 2.5 MG/DL (ref 1.6–2.6)
MCH RBC QN AUTO: 30.8 PG (ref 26.6–33)
MCHC RBC AUTO-ENTMCNC: 33.9 G/DL (ref 31.5–35.7)
MCV RBC AUTO: 91.1 FL (ref 79–97)
PHOSPHATE SERPL-MCNC: 3 MG/DL (ref 2.5–4.5)
PLATELET # BLD AUTO: 194 10*3/MM3 (ref 140–450)
PMV BLD AUTO: 9.4 FL (ref 6–12)
POTASSIUM SERPL-SCNC: 3.9 MMOL/L (ref 3.5–5.2)
RBC # BLD AUTO: 4.15 10*6/MM3 (ref 4.14–5.8)
SODIUM SERPL-SCNC: 136 MMOL/L (ref 136–145)
WBC NRBC COR # BLD AUTO: 8.04 10*3/MM3 (ref 3.4–10.8)

## 2024-08-02 PROCEDURE — 83735 ASSAY OF MAGNESIUM: CPT | Performed by: SURGERY

## 2024-08-02 PROCEDURE — 74018 RADEX ABDOMEN 1 VIEW: CPT

## 2024-08-02 PROCEDURE — 99232 SBSQ HOSP IP/OBS MODERATE 35: CPT

## 2024-08-02 PROCEDURE — 99232 SBSQ HOSP IP/OBS MODERATE 35: CPT | Performed by: STUDENT IN AN ORGANIZED HEALTH CARE EDUCATION/TRAINING PROGRAM

## 2024-08-02 PROCEDURE — 25010000002 CEFTRIAXONE PER 250 MG: Performed by: STUDENT IN AN ORGANIZED HEALTH CARE EDUCATION/TRAINING PROGRAM

## 2024-08-02 PROCEDURE — 80048 BASIC METABOLIC PNL TOTAL CA: CPT | Performed by: STUDENT IN AN ORGANIZED HEALTH CARE EDUCATION/TRAINING PROGRAM

## 2024-08-02 PROCEDURE — 85027 COMPLETE CBC AUTOMATED: CPT | Performed by: STUDENT IN AN ORGANIZED HEALTH CARE EDUCATION/TRAINING PROGRAM

## 2024-08-02 PROCEDURE — 25010000002 METOCLOPRAMIDE PER 10 MG: Performed by: SURGERY

## 2024-08-02 PROCEDURE — 82948 REAGENT STRIP/BLOOD GLUCOSE: CPT

## 2024-08-02 PROCEDURE — 25010000002 ENOXAPARIN PER 10 MG: Performed by: INTERNAL MEDICINE

## 2024-08-02 PROCEDURE — 84100 ASSAY OF PHOSPHORUS: CPT | Performed by: SURGERY

## 2024-08-02 RX ORDER — FAMOTIDINE 20 MG/1
20 TABLET, FILM COATED ORAL
Status: DISCONTINUED | OUTPATIENT
Start: 2024-08-02 | End: 2024-08-06 | Stop reason: HOSPADM

## 2024-08-02 RX ORDER — BISACODYL 5 MG/1
10 TABLET, DELAYED RELEASE ORAL DAILY
Status: DISCONTINUED | OUTPATIENT
Start: 2024-08-02 | End: 2024-08-06 | Stop reason: HOSPADM

## 2024-08-02 RX ORDER — DOCUSATE SODIUM 100 MG/1
100 CAPSULE, LIQUID FILLED ORAL 2 TIMES DAILY
Status: DISCONTINUED | OUTPATIENT
Start: 2024-08-02 | End: 2024-08-06 | Stop reason: HOSPADM

## 2024-08-02 RX ORDER — LISINOPRIL 5 MG/1
5 TABLET ORAL
Status: DISCONTINUED | OUTPATIENT
Start: 2024-08-03 | End: 2024-08-04

## 2024-08-02 RX ORDER — HYDRALAZINE HYDROCHLORIDE 50 MG/1
100 TABLET, FILM COATED ORAL EVERY 8 HOURS SCHEDULED
Status: DISCONTINUED | OUTPATIENT
Start: 2024-08-02 | End: 2024-08-06 | Stop reason: HOSPADM

## 2024-08-02 RX ORDER — ATORVASTATIN CALCIUM 40 MG/1
80 TABLET, FILM COATED ORAL NIGHTLY
Status: DISCONTINUED | OUTPATIENT
Start: 2024-08-02 | End: 2024-08-06 | Stop reason: HOSPADM

## 2024-08-02 RX ORDER — CALCIUM CARBONATE 500 MG/1
2 TABLET, CHEWABLE ORAL 3 TIMES DAILY PRN
Status: DISCONTINUED | OUTPATIENT
Start: 2024-08-02 | End: 2024-08-06 | Stop reason: HOSPADM

## 2024-08-02 RX ORDER — AMLODIPINE BESYLATE 10 MG/1
10 TABLET ORAL
Status: DISCONTINUED | OUTPATIENT
Start: 2024-08-03 | End: 2024-08-06 | Stop reason: HOSPADM

## 2024-08-02 RX ADMIN — DOXYCYCLINE 100 MG: 100 INJECTION, POWDER, LYOPHILIZED, FOR SOLUTION INTRAVENOUS at 20:58

## 2024-08-02 RX ADMIN — CLONIDINE HYDROCHLORIDE 0.3 MG: 0.2 TABLET ORAL at 20:57

## 2024-08-02 RX ADMIN — Medication 10 ML: at 20:58

## 2024-08-02 RX ADMIN — METOCLOPRAMIDE 10 MG: 5 INJECTION, SOLUTION INTRAMUSCULAR; INTRAVENOUS at 01:51

## 2024-08-02 RX ADMIN — DOXYCYCLINE 100 MG: 100 INJECTION, POWDER, LYOPHILIZED, FOR SOLUTION INTRAVENOUS at 01:51

## 2024-08-02 RX ADMIN — METOCLOPRAMIDE 10 MG: 5 INJECTION, SOLUTION INTRAMUSCULAR; INTRAVENOUS at 20:58

## 2024-08-02 RX ADMIN — AMLODIPINE BESYLATE 10 MG: 10 TABLET ORAL at 09:26

## 2024-08-02 RX ADMIN — LISINOPRIL 5 MG: 5 TABLET ORAL at 09:27

## 2024-08-02 RX ADMIN — METOPROLOL TARTRATE 75 MG: 25 TABLET, FILM COATED ORAL at 09:26

## 2024-08-02 RX ADMIN — HYDRALAZINE HYDROCHLORIDE 100 MG: 50 TABLET ORAL at 05:28

## 2024-08-02 RX ADMIN — HYDRALAZINE HYDROCHLORIDE 100 MG: 50 TABLET ORAL at 20:58

## 2024-08-02 RX ADMIN — SODIUM CHLORIDE 2000 MG: 900 INJECTION INTRAVENOUS at 09:28

## 2024-08-02 RX ADMIN — DOCUSATE SODIUM 100 MG: 100 CAPSULE, LIQUID FILLED ORAL at 09:30

## 2024-08-02 RX ADMIN — METOPROLOL TARTRATE 75 MG: 25 TABLET, FILM COATED ORAL at 20:57

## 2024-08-02 RX ADMIN — ATORVASTATIN CALCIUM 80 MG: 40 TABLET, FILM COATED ORAL at 20:58

## 2024-08-02 RX ADMIN — ENOXAPARIN SODIUM 40 MG: 100 INJECTION SUBCUTANEOUS at 09:29

## 2024-08-02 RX ADMIN — Medication 10 ML: at 09:58

## 2024-08-02 RX ADMIN — FAMOTIDINE 20 MG: 20 TABLET, FILM COATED ORAL at 09:27

## 2024-08-02 RX ADMIN — METOCLOPRAMIDE 10 MG: 5 INJECTION, SOLUTION INTRAMUSCULAR; INTRAVENOUS at 09:28

## 2024-08-02 RX ADMIN — DOXYCYCLINE 100 MG: 100 INJECTION, POWDER, LYOPHILIZED, FOR SOLUTION INTRAVENOUS at 09:47

## 2024-08-02 RX ADMIN — CLONIDINE HYDROCHLORIDE 0.3 MG: 0.2 TABLET ORAL at 09:27

## 2024-08-02 RX ADMIN — BISACODYL 10 MG: 5 TABLET, COATED ORAL at 09:27

## 2024-08-02 RX ADMIN — DOCUSATE SODIUM 100 MG: 100 CAPSULE, LIQUID FILLED ORAL at 20:57

## 2024-08-02 RX ADMIN — METOCLOPRAMIDE 10 MG: 5 INJECTION, SOLUTION INTRAMUSCULAR; INTRAVENOUS at 14:43

## 2024-08-02 RX ADMIN — Medication 10 MG: at 05:28

## 2024-08-02 RX ADMIN — FAMOTIDINE 20 MG: 20 TABLET, FILM COATED ORAL at 20:58

## 2024-08-02 NOTE — PROGRESS NOTES
Stroke Neurology Progress Note     Subjective     This patient was seen in follow-up for: right basal ganglia hemorrhage    Subjective:  Patient is much more alert and interactive today.  His wife is currently at bedside and states that he got a good nights rest.  He currently denies any dizziness, nausea/vomiting, or headache.  He remains neurologically intact however may be slightly stronger in his left lower extremity.  Vital signs are stable.      Objective      Temp:  [97.8 °F (36.6 °C)-98.4 °F (36.9 °C)] 98.4 °F (36.9 °C)  Heart Rate:  [71-88] 77  Resp:  [18-20] 20  BP: (111-164)/(75-95) 147/88          Objective    Physical Exam  Vitals and nursing note reviewed.   Constitutional:       General: He is not in acute distress.     Appearance: He is ill-appearing.   HENT:      Head: Normocephalic.      Nose:      Comments: Right NG in place     Mouth/Throat:      Mouth: Mucous membranes are dry.   Eyes:      Extraocular Movements: Extraocular movements intact.      Pupils: Pupils are equal, round, and reactive to light.      Comments: Left homonymous hemianopia   Cardiovascular:      Rate and Rhythm: Normal rate and regular rhythm.   Pulmonary:      Effort: Pulmonary effort is normal. No respiratory distress.      Comments: On 2 L nasal cannula  Musculoskeletal:         General: Swelling (L UE) present.      Right lower leg: No edema.      Left lower leg: No edema.   Skin:     General: Skin is warm and dry.      Findings: Bruising present.   Neurological:      Mental Status: He is alert and oriented to person, place, and time.      Cranial Nerves: Cranial nerve deficit and dysarthria present.      Sensory: Sensory deficit present.      Motor: Weakness present.   Psychiatric:         Mood and Affect: Affect is flat.         Speech: Speech is slurred.         Behavior: Behavior normal. Behavior is cooperative.         Cognition and Memory: Cognition and memory normal.         Neurological Exam  Mental  Status  Alert. Oriented to person, place, time and situation. Oriented to person, place, and time. Memory is normal. Mild dysarthria present. Improving. Language is fluent with no aphasia.    Cranial Nerves  CN II: Vision test: Left homonymous hemianopia. Left homonymous hemianopsia.  CN III, IV, VI: Extraocular movements intact bilaterally. Left ptosis. Pupils equal round and reactive to light bilaterally.  CN V:  Right: Facial sensation is normal.  Left: Diminished sensation of the entire left side of the face.  CN VII:  Left: There is central facial weakness.  CN VIII: Hearing appears to be intact bilaterally.  CN XII: Tongue midline without atrophy or fasciculations.    Motor  Normal muscle bulk throughout. Decreased muscle tone.  LUE with 0/5 strength  LLE with 1/5 strength  RUE/RLE with full range of motion, 5/5 strength.    Sensory  Light touch abnormality: Left hemisensory loss.     Coordination  Left: Unable to assess secondary to weakness. Unable to assess secondary to weakness.  No obvious dysmetria noted in right side.    Gait    Not observed.      Results Review:      All brain images and reports were personally reviewed and I agree with the interpretations except as noted below.          EEG    Result Date: 8/1/2024  Focal cerebral dysfunction impacting the right hemisphere, superimposed upon diffuse cerebral dysfunction of more mild degree The above finding is consistent with a known right hemispheric stroke No evidence for epilepsy is seen This report is transcribed using the Dragon dictation system.  ]     CT Head Without Contrast Stroke Protocol    Result Date: 8/1/2024  Impression: No significant interval change from prior exam, with similar size and associated mass effect of the right basal ganglia hematoma, as described above. Electronically Signed: Bernabe Akers MD  8/1/2024 12:00 PM EDT  Workstation ID: [id number]    CT Abdomen Pelvis With Contrast    Result Date: 8/1/2024  Impression: 1. Small  effusions and bilateral lower lobe airspace disease favored to be secondary to atelectasis. 2. Air-filled and fluid-filled distended loops of small bowel throughout the abdomen with the tract to normal caliber small bowel within the mid pelvis anteriorly. Findings would be consistent with partial small bowel obstruction. 3. 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 There is no evidence of small bowel volvulus. Electronically Signed: Nitin Gimenez MD  8/1/2024 8:46 AM EDT  Workstation ID: [id number]    CT head 7/29/2024  Impression: 1. Stable right basal ganglia hemorrhage with surrounding edema and mass effect. 2. Stable mild chronic small vessel ischemic change and chronic lacunar infarct in the right central liz.    CT head 7/28/2024  Impression: 1. Stable size of right basal ganglia intraparenchymal hemorrhage with increased surrounding vasogenic edema and slightly increased mass effect with 4 mm leftward shift of midline structures. 2. Stable mild chronic small vessel ischemic change and chronic lacunar infarct in the left basal ganglia.    CT Head Without Contrast 7/26/2024  Impression: Interval increase in intraparenchymal hemorrhage centered in the right basal ganglia with mild increase in mass effect on the adjacent lateral ventricles and minimal change of the basilar and suprasellar cisterns.     CT Angiogram Head and Neck 7/26/2024  Impression: 1. No evidence of intracranial large vessel arterial occlusion or aneurysm 2. Ulcerated plaque in the left distal common carotid and proximal internal carotid arteries that results in less than 50% luminal diameter stenosis 3. Patent right carotid and bilateral vertebral arteries without significant stenosis or dissection.    CT Head Without Contrast 7/26/2024  Impression: 1. 4 x 3 x 3.5 cm right basal ganglia and periventricular  white matter parenchymal hematoma with mass effect as described 2. No evidence of major vascular territory brain ischemia 3. Chronic small vessel ischemic changes.    Transthoracic echocardiogram 7/26/2024  Impression:    Left ventricular systolic function is normal. Estimated left ventricular EF = 65%    The cardiac valves are anatomically and functionally normal.    Saline test results are negative.      A1c 5.50    WBC 8.04, downtrending   H/H 12.8/37.8  Platelets 194  Glucose 95  Cretinine 0.88, BUN 19  Na+ 136  AST 16  ALT 28        Assessment/Plan     Assessment:    This is a 52 yo male with PMH of migraines who presented to [facility] ED via EMS on 7/26/2024 with complaints of left sided weakness, sensory loss, dysarthria and visual disturbance;  NIH noted to be 17.  CT head without contrast revealed a 4 x 3 x 3.5 right basal ganglia hemorrhage.  CT angiogram without evidence of LVO.  Ulcerated plaque observed within the left distal common carotid.  He was not a candidate for IV thrombolytic therapy given presence of ICH and was not deemed a candidate for neurosurgical intervention.  He was admitted to the neurological ICU for further monitoring and work-up.     Antiplatelet PTA: None  Anticoagulation PTA: None    # Right basal ganglia hemorrhage, etiology likely hypertensive  -Hemorrhagic stroke order set is currently in place  -ICH score 0   -/112 on admission   -Neurosurgery evaluated, non-surgical  -Hold all antiplatelet medications x 2 weeks  -Serum Na this   -Stat CT on 8/1 revealed stable appearance of hemorrhage  -EEG negative for seizure activity  -PT OT recommend IRF, CM following for DC needs, referral has been made to [facility] for enoxaparin for DVT prophylaxis   -Follow-up in stroke clinic in 4 weeks after DC (added to ADT)  -Follow-up with neuro-ophthalmology for formal visual field testing; no driving until cleared from them; discussed with patient    # Extracranial atherosclerotic  disease  -Eventually, we will need to treat extracranial atherosclerotic disease with aspirin but would wait at least 2 weeks post-hemorrhage (8/9/2024)      # Essential hypertension, new diagnosis  -SBP < 140.  Cardene discontinued on 8/1 0442  -Oral antihypertensive medications added 7/30.    -Management per primary team.    # Hyperlipidemia  -LDL on admission 140, goal <70 for secondary stroke prevention  -Continue atorvastatin 80 mg daily ()    # Ileus  -Management per primary team  -NGT in place and clamped, plans to remove later this afternoon if stable  -General surgery following, management per their recommendations    # Right lower lobe pneumonia  -Patient currently on 2 L nasal cannula, denies shortness of breath, has productive cough  -Antibiotics per primary care team    Plan of care was discussed with the patient, his wife, Dr. Williamson and primary team.  Stroke neurology will continue to follow.  Suspect the patient may be ready for IPR on Monday.  Please call with any questions or concerns.    REENA Ibrahim, AGACNP-[facility] STROKE NEURO  08/02/24  09:54 EDT

## 2024-08-02 NOTE — PROGRESS NOTES
"Patient Name:  Ron Cam  YOB: 1970  [id number]    Surgery Progress Note    Date of visit: 8/2/2024      Subjective: No acute events overnight.  Feeling much better.  Denies any abdominal pain.  Mostly had issues with knee pain yesterday.  Had a bowel movement yesterday afternoon.  Worked with PT          Objective:     /81 (BP Location: Left arm, Patient Position: Lying)   Pulse 77   Temp 97.8 °F (36.6 °C) (Oral)   Resp 18   Ht 182.9 cm (72.01\")   Wt 122 kg (268 lb 11.9 oz)   SpO2 93%   BMI 36.45 kg/m²     Intake/Output Summary (Last 24 hours) at 8/2/2024 0707  Last data filed at 8/2/2024 0535  Gross per 24 hour   Intake 50 ml   Output 3800 ml   Net -3750 ml       GEN:   Awake, alert, in no acute distress, resting comfortably in bed, some expressive aphasia and features consistent with history of recent stroke  CV:   Regular rate and rhythm  L:  Symmetric expansion, not labored on oxygen by nasal cannula  Abd:  Soft, not obviously distended and soft and compressible throughout the abdomen, not tender, there is a periumbilical scar, improved distention as compared to yesterday  Ext:  No cyanosis, clubbing, or edema    Recent labs that are back at this time have been reviewed.           Assessment/ Plan:    Mr. Cam is a 53-year-old gentleman with history of hypertension who is admitted following a stroke who I been asked to evaluate due to concern for small bowel obstruction.     # Ileus, concern for small bowel obstruction  -Vital signs stable  -CT scan yesterday with distended proximal loops of small bowel and some decompressed distal small bowel loops, but also with air in the colon and some mild distention of the mid transverse colon.  -His clinical history appears to be more consistent with ileus  -KUB this morning shows air throughout the colon  -Clamp NG tube today.  Check residuals  -Okay for ice chips and popsicles  -Continue bowel regimen  -Mobilize      Julian V " MD dK  8/2/2024  07:07 EDT

## 2024-08-02 NOTE — PROGRESS NOTES
[facility] Medicine Services  PROGRESS NOTE    Patient Name: Ron Cam  : 1970  MRN: [id number]    Date of Admission: 2024  Primary Care Physician: Provider, No Known    Subjective   Subjective     CC:  Follow-up stroke    HPI:  NG with 2.6L out. Had 2 BM.  NG clamped this morning, no nausea or vomiting so far.  No abdominal pain.  Reports feeling much better after sleeping last night.  Patient's wife is at bedside.    Objective   Objective     Vital Signs:   Temp:  [97.8 °F (36.6 °C)-98.4 °F (36.9 °C)] 98.4 °F (36.9 °C)  Heart Rate:  [71-88] 77  Resp:  [18-20] 20  BP: (111-164)/(75-95) 147/88  Flow (L/min):  [2] 2     Physical Exam:  Constitutional: No acute distress, awake, alert  HENT: NCAT, mucous membranes moist  Respiratory: Clear to auscultation bilaterally, respiratory effort normal   Cardiovascular: RRR, no murmurs, rubs, or gallops  Gastrointestinal: Positive bowel sounds, soft, nontender, nondistended  Musculoskeletal: No bilateral ankle edema  Psychiatric: Appropriate affect, cooperative  Neurologic: Alert, oriented, dysarthric, left homonymous hemianopsia, PERRL, decreased sensation on the left face, left-sided facial droop, left upper extremity 0/5 strength, left lower extremity 0/2 strength, right upper and lower extremity 5/5 strength  Skin: No rashes    Results Reviewed:  LAB RESULTS:      Lab 24  0741 24  0435 24  0529 24  0505 24  0515 24  1703 24  1702   WBC 8.04 14.02* 10.75 10.23 8.21  --  6.50   HEMOGLOBIN 12.8* 13.9 13.5 12.5* 13.1  --  15.3   HEMOGLOBIN, POC  --   --   --   --   --    < >  --    HEMATOCRIT 37.8 39.7 38.3 37.1* 39.4  --  43.2   HEMATOCRIT POC  --   --   --   --   --    < >  --    PLATELETS 194 262 197 167 172  --  191   NEUTROS ABS  --   --  9.11* 7.66* 6.11  --  4.48   IMMATURE GRANS (ABS)  --   --  0.03 0.04 0.02  --  0.01   LYMPHS ABS  --   --  0.71 1.33 1.34  --  1.31   MONOS ABS  --    --  0.85 0.99* 0.65  --  0.56   EOS ABS  --   --  0.02 0.16 0.06  --  0.10   MCV 91.1 89.4 89.7 93.0 93.4  --  88.0   PROCALCITONIN  --  0.10  --   --   --   --   --    LACTATE  --  1.3  --   --   --   --   --    APTT  --   --   --   --   --   --  25.6    < > = values in this interval not displayed.         Lab 08/01/24  0435 07/31/24  1605 07/31/24  0529 07/30/24  0505 07/30/24  0047 07/29/24  1738 07/29/24  1408 07/29/24  0344 07/27/24  0843 07/27/24  0600   SODIUM 135* 129* 133* 138 138 140  139   < > 140   < > 139   POTASSIUM 3.8  --  3.9 3.8  3.8  --  3.6  --  3.4*   < >  --    CHLORIDE 98  --  98 105  --  108*  --  110*   < >  --    CO2 26.0  --  21.0* 19.0*  --  21.0*  --  21.0*   < >  --    ANION GAP 11.0  --  14.0 14.0  --  11.0  --  9.0   < >  --    BUN 19  --  17 13  --  11  --  9   < >  --    CREATININE 0.93  --  0.85 0.92  --  0.94  --  0.94   < >  --    EGFR 98.2  --  103.9 99.5  --  96.9  --  96.9   < >  --    GLUCOSE 146*  --  155* 119*  --  140*  --  142*   < >  --    CALCIUM 9.1  --  8.7 8.5*  --  8.4*  --  8.1*   < >  --    MAGNESIUM 2.2  --  1.9  --   --   --   --   --   --   --    PHOSPHORUS 3.9  --   --   --   --   --   --   --   --   --    HEMOGLOBIN A1C  --   --   --   --   --   --   --   --   --  5.50    < > = values in this interval not displayed.         Lab 08/01/24  0435 07/26/24  1702   TOTAL PROTEIN 6.7  --    ALBUMIN 3.7  --    GLOBULIN 3.0  --    ALT (SGPT) 28 35   AST (SGOT) 16 28   BILIRUBIN 0.5  --    ALK PHOS 64  --          Lab 07/26/24  2354 07/26/24  1702   PROBNP 1,437.0*  --    HSTROP T  --  13         Lab 07/27/24  0600   CHOLESTEROL 227*   LDL CHOL 140*   HDL CHOL 58   TRIGLYCERIDES 161*             Lab 08/01/24  1234   PH, ARTERIAL 7.496*   PCO2, ARTERIAL 35.5   PO2 .0*   FIO2 30   HCO3 ART 27.4*   BASE EXCESS ART 4.2*   CARBOXYHEMOGLOBIN 1.2     Brief Urine Lab Results  (Last result in the past 365 days)        Color   Clarity   Blood   Leuk Est   Nitrite    Protein   CREAT   Urine HCG        08/01/24 1752 Yellow   Clear   Negative   Negative   Negative   Negative                   Microbiology Results Abnormal       Procedure Component Value - Date/Time    Blood Culture - Blood, Arm, Left [[id number]]  (Normal) Collected: 08/01/24 0427    Lab Status: Preliminary result Specimen: Blood from Arm, Left Updated: 08/02/24 0500     Blood Culture No growth at 24 hours    Blood Culture - Blood, Arm, Left [[id number]]  (Normal) Collected: 08/01/24 0430    Lab Status: Preliminary result Specimen: Blood from Arm, Left Updated: 08/02/24 0500     Blood Culture No growth at 24 hours            XR Abdomen KUB    Result Date: 8/2/2024  XR ABDOMEN KUB Date of Exam: 8/2/2024 4:09 AM EDT Indication: assess bowel distention Comparison: 8/1/2024 and prior Findings: Study is limited by overlying support and monitoring apparatus. NG tube projects over the expected position of the proximal stomach. No obvious pneumatosis or free air noted on limited imaging. There appears to be wall thickening of loops of bowel in the right mid abdomen. Osseous structures are unremarkable     Impression: Impression: 1. NG tube projects in expected position. 2. Gaseous distention of loops of bowel again noted slightly less prominent than the comparison. There is some suspected wall thickening of loops of bowel in the right mid abdomen. Recommend continued follow-up Electronically Signed: Saman Momin MD  8/2/2024 6:58 AM EDT  Workstation ID: [id number]    EEG    Result Date: 8/1/2024  Reason for referral: 53 y.o.male with altered mental status Technical Summary:  A 19 channel digital EEG was performed using the international 10-20 placement system, including eye leads and EKG leads. Duration: 20 minutes Findings: The patient is resting quietly in bed.  He is lethargic.  Diffuse low amplitude 4-5 Hz intermixed delta and theta activity is seen over both hemispheres with a greater degree of slowing over the  right.  When the patient rouses faster frequencies are seen much more prominently on the left, and right hemispheric slowing becomes more apparent.  When the patient is briefly awake, at times a poorly regulated 8 Hz posterior rhythm is evident on the left but not as clearly seen on the right.  Photic stimulation does not change the background.  Hyperventilation is not performed.  No epileptiform activity is seen. Video: Available Technical quality: Good EKG: Regular, approximately 60 bpm SUMMARY: Mild-moderate generalized slow Independent right hemispheric slow No epileptiform activity or electrographic seizures are seen     Impression: Focal cerebral dysfunction impacting the right hemisphere, superimposed upon diffuse cerebral dysfunction of more mild degree The above finding is consistent with a known right hemispheric stroke No evidence for epilepsy is seen This report is transcribed using the Dragon dictation system.  ]     XR Chest 1 View    Result Date: 8/1/2024  XR CHEST 1 VW Date of Exam: 8/1/2024 12:06 PM EDT Indication: cough Comparison: 7/26/2024. Findings: A right PICC line has been placed with its tip at the caval atrial junction. There are new patchy infiltrates of the right lower lobe. The left lung is clear. There are no effusions. There is stable borderline cardiomegaly.     Impression: Impression: Interval right PICC line placement as noted. New patchy infiltrates of the right lower lobe, may represent area of atelectasis and/or pneumonia. Electronically Signed: Brigitte Kelley MD  8/1/2024 12:36 PM EDT  Workstation ID: [id number]    CT Head Without Contrast Stroke Protocol    Result Date: 8/1/2024  CT HEAD WO CONTRAST STROKE PROTOCOL Date of Exam: 8/1/2024 11:41 AM EDT Indication: decreased LOC, lethargy, eval stability of hemorrhage. Comparison: Head CT 7/30/2024. Technique: Axial CT images were obtained of the head without contrast administration.  Reconstructed coronal images were also  obtained. Automated exposure control and iterative construction methods were used. Findings: Hematoma centered within the right basal ganglia measures 5 x 3 cm in the axial plane (series 3 image 44), unchanged in size when remeasured for consistency with differences in size from prior exams due to different measurement planes. Similar surrounding rim of vasogenic edema. Similar partial effacement of the right lateral ventricle. Similar right to left midline shift of 4 mm. No worsening mass effect. No new or enlarging intracranial hemorrhage. No extra-axial collection. No loss of gray-white matter differentiation. Unchanged background of chronic microvascular changes. Unchanged old pontine infarct. No osseous or soft tissue abnormality. Paranasal sinuses and mastoid air cells are well aerated. Nasogastric tube partially visualized.     Impression: Impression: No significant interval change from prior exam, with similar size and associated mass effect of the right basal ganglia hematoma, as described above. Electronically Signed: Bernabe Akers MD  8/1/2024 12:00 PM EDT  Workstation ID: [id number]    CT Abdomen Pelvis With Contrast    Result Date: 8/1/2024  CT ABDOMEN PELVIS W CONTRAST Date of Exam: 8/1/2024 8:16 AM EDT Indication: concern for small bowel obstruction. Comparison: Radiograph 8/1/2024 Technique: Axial CT images were obtained of the abdomen and pelvis following the uneventful intravenous administration of 100 cc Isovue-300 . Reconstructed coronal and sagittal images were also obtained. Automated exposure control and iterative construction methods were used. Findings: There is small bilateral pleural effusions. There is bilateral lower lobe airspace consolidation which may be secondary to dependent atelectasis or pneumonia. There is nasogastric tube in place the tip within the mid body of the stomach. Images through the abdomen are degraded by patient respiratory motion. Liver, pancreas, and spleen appear  within normal limits. Gallbladder appears grossly normal. No biliary tract obstruction. Bilateral adrenal glands are within normal limits. Kidneys appear normal bilaterally. No evidence of hydronephrosis. No abdominal or retroperitoneal adenopathy. There are multiple fluid-filled distended loops of small bowel throughout the abdomen. There is malrotation of the small bowel with the third portion of the duodenum within the right upper quadrant of the abdomen. This is likely congenital as see no surgical clips or other postoperative changes to suggest that this is postoperative in etiology. There is no definite volvulus identified. There are nondistended loops of small bowel within the right lower quadrant there appears to be a transition point within the upper pelvis near the midline seen on axial image 120. Findings would be consistent with partial small bowel obstruction. There is some gas and stool within the colon which is nondistended. Oscar: Urinary bladder is within normal limits. There are a few scattered sigmoid diverticula. No diverticulitis. No pelvic or inguinal adenopathy. No free intraperitoneal fluid. No lytic or sclerotic bony lesions are identified.     Impression: Impression: 1. Small effusions and bilateral lower lobe airspace disease favored to be secondary to atelectasis. 2. Air-filled and fluid-filled distended loops of small bowel throughout the abdomen with the tract to normal caliber small bowel within the mid pelvis anteriorly. Findings would be consistent with partial small bowel obstruction. 3. 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 There is no evidence of small bowel volvulus. Electronically Signed: Nitin Gimenez MD  8/1/2024 8:46 AM EDT  Workstation ID: [id number]    XR Abdomen KUB    Result Date: 8/1/2024  XR ABDOMEN KUB Date of Exam: 8/1/2024 4:37 AM  EDT Indication: NG placement Comparison: 8/1/2024 at 0302 hours. Findings: Gastric suction tube terminates in the stomach. There are multiple dilated small bowel loops concerning for bowel obstruction.     Impression: Impression: 1.Gastric suction tube terminates in the stomach. 2.Multiple dilated small bowel loops concerning for bowel obstruction. Electronically Signed: Tony Infante MD  8/1/2024 4:53 AM EDT  Workstation ID: [id number]    XR Abdomen KUB    Result Date: 8/1/2024  XR ABDOMEN KUB Date of Exam: 8/1/2024 2:50 AM EDT Indication: abdominal distension Comparison: None available. Findings: There are numerous dilated loops of small bowel measuring up to 47 mm diameter. Appearance is concerning for small bowel obstruction. There is mild gaseous distention of the stomach. The colon is empty. Lung bases are clear. No acute osseous abnormality.  No pathologic abdominal calcifications.     Impression: Impression: Dilated loops of small bowel concerning for small bowel obstruction. Electronically Signed: Tony Infante MD  8/1/2024 3:38 AM EDT  Workstation ID: [id number]     Results for orders placed during the hospital encounter of 07/26/24    Adult Transthoracic Echo Complete W/ Cont if Necessary Per Protocol (With Agitated Saline)    Interpretation Summary    Left ventricular systolic function is normal. Estimated left ventricular EF = 65%    The cardiac valves are anatomically and functionally normal.    Saline test results are negative.      Current medications:  Scheduled Meds:amLODIPine, 10 mg, Nasogastric, Q24H  atorvastatin, 80 mg, Nasogastric, Nightly  bisacodyl, 10 mg, Oral, Daily  cefTRIAXone, 2,000 mg, Intravenous, Daily  cloNIDine, 0.3 mg, Nasogastric, Q12H  docusate sodium, 100 mg, Nasogastric, BID  doxycycline, 100 mg, Intravenous, Q12H  enoxaparin, 40 mg, Subcutaneous, Q24H  famotidine, 20 mg, Nasogastric, BID AC  hydrALAZINE, 100 mg, Nasogastric, Q8H  lactobacillus acidophilus, 1 capsule, Oral,  Daily  lisinopril, 5 mg, Nasogastric, Q24H  metoclopramide, 10 mg, Intravenous, Q6H  metoprolol tartrate, 50 mg, Nasogastric, Q12H  Scopolamine, 1 patch, Transdermal, Once  senna, 2 tablet, Nasogastric, Nightly  sodium chloride, 10 mL, Intravenous, Q12H      Continuous Infusions:niCARdipine, 5-15 mg/hr, Last Rate: Stopped (08/01/24 0442)      PRN Meds:.  calcium carbonate    Calcium Replacement - Follow Nurse / BPA Driven Protocol    hydrALAZINE    labetalol    Magnesium Standard Dose Replacement - Follow Nurse / BPA Driven Protocol    ondansetron    Phosphorus Replacement - Follow Nurse / BPA Driven Protocol    Potassium Replacement - Follow Nurse / BPA Driven Protocol    prochlorperazine    sodium chloride    sodium chloride    Assessment & Plan   Assessment & Plan     Active Hospital Problems    Diagnosis  POA    **Nontraumatic intracerebral hemorrhage [I61.9]  Yes    Hypertensive crisis [I16.9]  Yes      Resolved Hospital Problems   No resolved problems to display.        Brief Hospital Course to date:  Ron Cam is a 53 y.o. male with history of migraines, who presented via EMS on 5/26 with left-sided weakness, sensory loss, dysarthria, visual disturbance.  NIH noted to be 17.  CT head showed 4 x 3 x 3.5 right basal ganglia hemorrhage.  CT angio without evidence of large vessel occlusion.  Ulcerated plaque observed within the left distal common carotid.  He was deemed not to be a surgical candidate.  He was monitored in the neurological ICU.  Stroke neurology consulted on admission.  Transferred to the floor service on 8/1.    This patient's problems and plans were partially entered by my partner and updated as appropriate by me 08/02/24.    Right basal ganglia hemorrhage, etiology likely hypertensive  -Neurology following  -Neurosurgery evaluated, deemed nonsurgical  -CT head stable today  -PT/OT recommend IRF  -Follow-up with the stroke clinic 4 weeks after discharge  -Continue neurochecks,  telemetry    Ileus  -General surgery consulted  -Clamp NG  -Bowel regimen  -IVF    Right lower lobe pneumonia  -Ceftriaxone, doxycycline, probiotic  -Wean oxygen as tolerated    Extracranial atherosclerotic disease  -Eventually needs aspirin, but will wait 2 weeks post hemorrhage, 8/9/2024 before starting    HTN  -Goal SBP less than 140  -Continue clonidine, hydralazine, metoprolol with dosing increased today    HLD  -Continue atorvastatin    Altered mental status, likely secondary to lack of sleep and new pneumonia, now resolved  -new on 8/1  -CT head stable from prior; glucose okay, ABG okay    Expected Discharge Location and Transportation: IRF  Expected Discharge   Expected Discharge Date: 8/5/2024; Expected Discharge Time:      VTE Prophylaxis:  Pharmacologic & mechanical VTE prophylaxis orders are present.         AM-PAC 6 Clicks Score (PT): 7 (08/01/24 2031)    CODE STATUS:   Code Status and Medical Interventions: CPR (Attempt to Resuscitate); Full Support; discussed with wife; patient lacked capacity at time of discussion of code status on 8/1   Ordered at: 08/01/24 1203     Level Of Support Discussed With:    Next of Kin (If No Surrogate)     Code Status (Patient has no pulse and is not breathing):    CPR (Attempt to Resuscitate)     Medical Interventions (Patient has pulse or is breathing):    Full Support     Comments:    discussed with wife; patient lacked capacity at time of discussion of code status on 8/1       Lottie Matias MD  08/02/24

## 2024-08-02 NOTE — CASE MANAGEMENT/SOCIAL WORK
Continued Stay Note  [facility]     Patient Name: Ron Cam  MRN: [id number]  Today's Date: 8/2/2024    Admit Date: 7/26/2024    Plan: IRF   Discharge Plan       Row Name 08/02/24 1420       Plan    Plan IRF    Patient/Family in Agreement with Plan yes    Plan Comments Mr. Cam is not ready for discharge today. Possible discharge next week. April, Liaison with [facility] is following. HIREN will continue to follow and will follow-up with April on Monday.    Final Discharge Disposition Code 62 - inpatient rehab facility                   Discharge Codes    No documentation.                 Expected Discharge Date and Time       Expected Discharge Date Expected Discharge Time    Aug 5, 2024               Dolly Akers RN

## 2024-08-03 LAB
ANION GAP SERPL CALCULATED.3IONS-SCNC: 10 MMOL/L (ref 5–15)
BUN SERPL-MCNC: 13 MG/DL (ref 6–20)
BUN/CREAT SERPL: 14.8 (ref 7–25)
CALCIUM SPEC-SCNC: 8.1 MG/DL (ref 8.6–10.5)
CHLORIDE SERPL-SCNC: 100 MMOL/L (ref 98–107)
CO2 SERPL-SCNC: 25 MMOL/L (ref 22–29)
CREAT SERPL-MCNC: 0.88 MG/DL (ref 0.76–1.27)
DEPRECATED RDW RBC AUTO: 41.4 FL (ref 37–54)
EGFRCR SERPLBLD CKD-EPI 2021: 102.8 ML/MIN/1.73
ERYTHROCYTE [DISTWIDTH] IN BLOOD BY AUTOMATED COUNT: 12.4 % (ref 12.3–15.4)
GLUCOSE SERPL-MCNC: 122 MG/DL (ref 65–99)
HCT VFR BLD AUTO: 37.8 % (ref 37.5–51)
HGB BLD-MCNC: 12.8 G/DL (ref 13–17.7)
MAGNESIUM SERPL-MCNC: 1.9 MG/DL (ref 1.6–2.6)
MCH RBC QN AUTO: 30.9 PG (ref 26.6–33)
MCHC RBC AUTO-ENTMCNC: 33.9 G/DL (ref 31.5–35.7)
MCV RBC AUTO: 91.3 FL (ref 79–97)
PHOSPHATE SERPL-MCNC: 2.9 MG/DL (ref 2.5–4.5)
PLATELET # BLD AUTO: 213 10*3/MM3 (ref 140–450)
PMV BLD AUTO: 9.6 FL (ref 6–12)
POTASSIUM SERPL-SCNC: 4 MMOL/L (ref 3.5–5.2)
RBC # BLD AUTO: 4.14 10*6/MM3 (ref 4.14–5.8)
SODIUM SERPL-SCNC: 135 MMOL/L (ref 136–145)
WBC NRBC COR # BLD AUTO: 8.61 10*3/MM3 (ref 3.4–10.8)

## 2024-08-03 PROCEDURE — 25010000002 CEFTRIAXONE PER 250 MG: Performed by: STUDENT IN AN ORGANIZED HEALTH CARE EDUCATION/TRAINING PROGRAM

## 2024-08-03 PROCEDURE — 85027 COMPLETE CBC AUTOMATED: CPT | Performed by: STUDENT IN AN ORGANIZED HEALTH CARE EDUCATION/TRAINING PROGRAM

## 2024-08-03 PROCEDURE — 99232 SBSQ HOSP IP/OBS MODERATE 35: CPT | Performed by: NURSE PRACTITIONER

## 2024-08-03 PROCEDURE — 80048 BASIC METABOLIC PNL TOTAL CA: CPT | Performed by: STUDENT IN AN ORGANIZED HEALTH CARE EDUCATION/TRAINING PROGRAM

## 2024-08-03 PROCEDURE — 25010000002 METOCLOPRAMIDE PER 10 MG: Performed by: SURGERY

## 2024-08-03 PROCEDURE — 25010000002 ENOXAPARIN PER 10 MG: Performed by: INTERNAL MEDICINE

## 2024-08-03 PROCEDURE — 25010000002 HYDRALAZINE PER 20 MG: Performed by: INTERNAL MEDICINE

## 2024-08-03 PROCEDURE — 83735 ASSAY OF MAGNESIUM: CPT | Performed by: STUDENT IN AN ORGANIZED HEALTH CARE EDUCATION/TRAINING PROGRAM

## 2024-08-03 PROCEDURE — 84100 ASSAY OF PHOSPHORUS: CPT | Performed by: STUDENT IN AN ORGANIZED HEALTH CARE EDUCATION/TRAINING PROGRAM

## 2024-08-03 RX ADMIN — AMLODIPINE BESYLATE 10 MG: 10 TABLET ORAL at 09:14

## 2024-08-03 RX ADMIN — METOCLOPRAMIDE 10 MG: 5 INJECTION, SOLUTION INTRAMUSCULAR; INTRAVENOUS at 21:30

## 2024-08-03 RX ADMIN — METOCLOPRAMIDE 10 MG: 5 INJECTION, SOLUTION INTRAMUSCULAR; INTRAVENOUS at 14:11

## 2024-08-03 RX ADMIN — METOPROLOL TARTRATE 75 MG: 25 TABLET, FILM COATED ORAL at 21:32

## 2024-08-03 RX ADMIN — SODIUM CHLORIDE 2000 MG: 900 INJECTION INTRAVENOUS at 09:18

## 2024-08-03 RX ADMIN — HYDRALAZINE HYDROCHLORIDE 100 MG: 50 TABLET ORAL at 14:11

## 2024-08-03 RX ADMIN — METOCLOPRAMIDE 10 MG: 5 INJECTION, SOLUTION INTRAMUSCULAR; INTRAVENOUS at 02:08

## 2024-08-03 RX ADMIN — LISINOPRIL 5 MG: 5 TABLET ORAL at 09:14

## 2024-08-03 RX ADMIN — HYDRALAZINE HYDROCHLORIDE 20 MG: 20 INJECTION INTRAMUSCULAR; INTRAVENOUS at 17:41

## 2024-08-03 RX ADMIN — HYDRALAZINE HYDROCHLORIDE 100 MG: 50 TABLET ORAL at 06:02

## 2024-08-03 RX ADMIN — METOPROLOL TARTRATE 75 MG: 25 TABLET, FILM COATED ORAL at 09:15

## 2024-08-03 RX ADMIN — FAMOTIDINE 20 MG: 20 TABLET, FILM COATED ORAL at 09:18

## 2024-08-03 RX ADMIN — DOCUSATE SODIUM 100 MG: 100 CAPSULE, LIQUID FILLED ORAL at 09:14

## 2024-08-03 RX ADMIN — Medication 10 ML: at 21:33

## 2024-08-03 RX ADMIN — FAMOTIDINE 20 MG: 20 TABLET, FILM COATED ORAL at 17:40

## 2024-08-03 RX ADMIN — DOCUSATE SODIUM 100 MG: 100 CAPSULE, LIQUID FILLED ORAL at 21:31

## 2024-08-03 RX ADMIN — ATORVASTATIN CALCIUM 80 MG: 40 TABLET, FILM COATED ORAL at 21:31

## 2024-08-03 RX ADMIN — HYDRALAZINE HYDROCHLORIDE 100 MG: 50 TABLET ORAL at 21:32

## 2024-08-03 RX ADMIN — DOXYCYCLINE 100 MG: 100 INJECTION, POWDER, LYOPHILIZED, FOR SOLUTION INTRAVENOUS at 21:30

## 2024-08-03 RX ADMIN — METOCLOPRAMIDE 10 MG: 5 INJECTION, SOLUTION INTRAMUSCULAR; INTRAVENOUS at 09:17

## 2024-08-03 RX ADMIN — ENOXAPARIN SODIUM 40 MG: 100 INJECTION SUBCUTANEOUS at 09:15

## 2024-08-03 RX ADMIN — CLONIDINE HYDROCHLORIDE 0.3 MG: 0.2 TABLET ORAL at 09:14

## 2024-08-03 RX ADMIN — Medication 10 ML: at 09:18

## 2024-08-03 RX ADMIN — BISACODYL 10 MG: 5 TABLET, COATED ORAL at 09:17

## 2024-08-03 RX ADMIN — CLONIDINE HYDROCHLORIDE 0.3 MG: 0.2 TABLET ORAL at 21:31

## 2024-08-03 RX ADMIN — Medication 1 CAPSULE: at 09:17

## 2024-08-03 RX ADMIN — DOXYCYCLINE 100 MG: 100 INJECTION, POWDER, LYOPHILIZED, FOR SOLUTION INTRAVENOUS at 09:37

## 2024-08-03 NOTE — PROGRESS NOTES
[facility] Medicine Services  PROGRESS NOTE    Patient Name: Ron Cam  : 1970  MRN: [id number]    Date of Admission: 2024  Primary Care Physician: Provider, No Known    Subjective   Subjective     CC:  Follow-up stroke    HPI:  Resting in bed, NAD. No pain. Is tolerating clear liquids and still passing gas/ no BM yet. No n/v. Wife in room. No new needs.     Objective   Objective     Vital Signs:   Temp:  [98.2 °F (36.8 °C)-98.5 °F (36.9 °C)] 98.2 °F (36.8 °C)  Heart Rate:  [72-82] 77  Resp:  [16-20] 18  BP: (128-160)/(74-96) 156/96  Flow (L/min):  [2] 2     Physical Exam:  Constitutional: No acute distress, awake, alert  HENT: NCAT, mucous membranes moist  Respiratory: Clear to auscultation bilaterally, respiratory effort normal   Cardiovascular: RRR, no murmurs, rubs, or gallops  Gastrointestinal: Positive bowel sounds, soft, nontender, nondistended  Musculoskeletal: No bilateral ankle edema  Psychiatric: Flat affect, cooperative  Neurologic: Oriented x 3, left sided weakness and left facial droop, speech garbled  Skin: No rashes     Results Reviewed:  LAB RESULTS:      Lab 24  1046 24  0741 24  0435 24  0529 24  0505 24  0515   WBC 8.61 8.04 14.02* 10.75 10.23 8.21   HEMOGLOBIN 12.8* 12.8* 13.9 13.5 12.5* 13.1   HEMATOCRIT 37.8 37.8 39.7 38.3 37.1* 39.4   PLATELETS 213 194 262 197 167 172   NEUTROS ABS  --   --   --  9.11* 7.66* 6.11   IMMATURE GRANS (ABS)  --   --   --  0.03 0.04 0.02   LYMPHS ABS  --   --   --  0.71 1.33 1.34   MONOS ABS  --   --   --  0.85 0.99* 0.65   EOS ABS  --   --   --  0.02 0.16 0.06   MCV 91.3 91.1 89.4 89.7 93.0 93.4   PROCALCITONIN  --   --  0.10  --   --   --    LACTATE  --   --  1.3  --   --   --          Lab 24  1046 24  0741 24  0435 24  1605 24  0529 24  0505   SODIUM 135* 136 135* 129* 133* 138   POTASSIUM 4.0 3.9 3.8  --  3.9 3.8  3.8   CHLORIDE 100 99 98  --  98  105   CO2 25.0 24.0 26.0  --  21.0* 19.0*   ANION GAP 10.0 13.0 11.0  --  14.0 14.0   BUN 13 19 19  --  17 13   CREATININE 0.88 0.88 0.93  --  0.85 0.92   EGFR 102.8 102.8 98.2  --  103.9 99.5   GLUCOSE 122* 93 146*  --  155* 119*   CALCIUM 8.1* 8.2* 9.1  --  8.7 8.5*   MAGNESIUM 1.9 2.5 2.2  --  1.9  --    PHOSPHORUS 2.9 3.0 3.9  --   --   --          Lab 08/01/24  0435   TOTAL PROTEIN 6.7   ALBUMIN 3.7   GLOBULIN 3.0   ALT (SGPT) 28   AST (SGOT) 16   BILIRUBIN 0.5   ALK PHOS 64                         Lab 08/01/24  1234   PH, ARTERIAL 7.496*   PCO2, ARTERIAL 35.5   PO2 .0*   FIO2 30   HCO3 ART 27.4*   BASE EXCESS ART 4.2*   CARBOXYHEMOGLOBIN 1.2     Brief Urine Lab Results  (Last result in the past 365 days)        Color   Clarity   Blood   Leuk Est   Nitrite   Protein   CREAT   Urine HCG        08/01/24 1752 Yellow   Clear   Negative   Negative   Negative   Negative                   Microbiology Results Abnormal       Procedure Component Value - Date/Time    Blood Culture - Blood, Arm, Left [[id number]]  (Normal) Collected: 08/01/24 0427    Lab Status: Preliminary result Specimen: Blood from Arm, Left Updated: 08/03/24 0500     Blood Culture No growth at 2 days    Blood Culture - Blood, Arm, Left [[id number]]  (Normal) Collected: 08/01/24 0430    Lab Status: Preliminary result Specimen: Blood from Arm, Left Updated: 08/03/24 0500     Blood Culture No growth at 2 days            XR Abdomen KUB    Result Date: 8/2/2024  XR ABDOMEN KUB Date of Exam: 8/2/2024 4:09 AM EDT Indication: assess bowel distention Comparison: 8/1/2024 and prior Findings: Study is limited by overlying support and monitoring apparatus. NG tube projects over the expected position of the proximal stomach. No obvious pneumatosis or free air noted on limited imaging. There appears to be wall thickening of loops of bowel in the right mid abdomen. Osseous structures are unremarkable     Impression: Impression: 1. NG tube projects in expected  position. 2. Gaseous distention of loops of bowel again noted slightly less prominent than the comparison. There is some suspected wall thickening of loops of bowel in the right mid abdomen. Recommend continued follow-up Electronically Signed: Saman Momin MD  8/2/2024 6:58 AM EDT  Workstation ID: [id number]    EEG    Result Date: 8/1/2024  Reason for referral: 53 y.o.male with altered mental status Technical Summary:  A 19 channel digital EEG was performed using the international 10-20 placement system, including eye leads and EKG leads. Duration: 20 minutes Findings: The patient is resting quietly in bed.  He is lethargic.  Diffuse low amplitude 4-5 Hz intermixed delta and theta activity is seen over both hemispheres with a greater degree of slowing over the right.  When the patient rouses faster frequencies are seen much more prominently on the left, and right hemispheric slowing becomes more apparent.  When the patient is briefly awake, at times a poorly regulated 8 Hz posterior rhythm is evident on the left but not as clearly seen on the right.  Photic stimulation does not change the background.  Hyperventilation is not performed.  No epileptiform activity is seen. Video: Available Technical quality: Good EKG: Regular, approximately 60 bpm SUMMARY: Mild-moderate generalized slow Independent right hemispheric slow No epileptiform activity or electrographic seizures are seen     Impression: Focal cerebral dysfunction impacting the right hemisphere, superimposed upon diffuse cerebral dysfunction of more mild degree The above finding is consistent with a known right hemispheric stroke No evidence for epilepsy is seen This report is transcribed using the Dragon dictation system.  ]      Results for orders placed during the hospital encounter of 07/26/24    Adult Transthoracic Echo Complete W/ Cont if Necessary Per Protocol (With Agitated Saline)    Interpretation Summary    Left ventricular systolic function is  normal. Estimated left ventricular EF = 65%    The cardiac valves are anatomically and functionally normal.    Saline test results are negative.      Current medications:  Scheduled Meds:amLODIPine, 10 mg, Oral, Q24H  atorvastatin, 80 mg, Oral, Nightly  bisacodyl, 10 mg, Oral, Daily  cefTRIAXone, 2,000 mg, Intravenous, Daily  cloNIDine, 0.3 mg, Oral, Q12H  docusate sodium, 100 mg, Oral, BID  doxycycline, 100 mg, Intravenous, Q12H  enoxaparin, 40 mg, Subcutaneous, Q24H  famotidine, 20 mg, Oral, BID AC  hydrALAZINE, 100 mg, Oral, Q8H  lactobacillus acidophilus, 1 capsule, Oral, Daily  lisinopril, 5 mg, Oral, Q24H  metoclopramide, 10 mg, Intravenous, Q6H  metoprolol tartrate, 75 mg, Oral, Q12H  sodium chloride, 10 mL, Intravenous, Q12H      Continuous Infusions:niCARdipine, 5-15 mg/hr, Last Rate: Stopped (08/01/24 0442)      PRN Meds:.  calcium carbonate    Calcium Replacement - Follow Nurse / BPA Driven Protocol    hydrALAZINE    labetalol    Magnesium Standard Dose Replacement - Follow Nurse / BPA Driven Protocol    ondansetron    Phosphorus Replacement - Follow Nurse / BPA Driven Protocol    Potassium Replacement - Follow Nurse / BPA Driven Protocol    prochlorperazine    sodium chloride    sodium chloride    Assessment & Plan   Assessment & Plan     Active Hospital Problems    Diagnosis  POA    **Nontraumatic intracerebral hemorrhage [I61.9]  Yes    Hypertensive crisis [I16.9]  Yes      Resolved Hospital Problems   No resolved problems to display.        Brief Hospital Course to date:  Ron Cam is a 53 y.o. male with history of migraines, who presented via EMS on 5/26 with left-sided weakness, sensory loss, dysarthria, visual disturbance.  NIH noted to be 17.  CT head showed 4 x 3 x 3.5 right basal ganglia hemorrhage.  CT angio without evidence of large vessel occlusion.  Ulcerated plaque observed within the left distal common carotid.  He was deemed not to be a surgical candidate.  He was monitored in the  neurological ICU.  Stroke neurology consulted on admission.  Transferred to the floor service on 8/1.    This patient's problems and plans were partially entered by my partner and updated as appropriate by me 08/03/24.    Right basal ganglia hemorrhage, etiology likely hypertensive  -Neurology following  -Neurosurgery evaluated, deemed nonsurgical  -CT head stable   -PT/OT recommend IRF  -Follow-up with the stroke clinic 4 weeks after discharge  -Continue neurochecks, telemetry    Ileus  -General surgery consulted; improvement in symptoms, cont to follow   -Bowel regimen  -IVF  --NG now out, tolerating clears     Right lower lobe pneumonia  -Ceftriaxone, doxycycline, probiotic  -Wean oxygen as tolerated    Extracranial atherosclerotic disease  -Eventually needs aspirin, but will wait 2 weeks post hemorrhage, 8/9/2024 before starting    HTN  -Goal SBP less than 140  -Continue clonidine, hydralazine, metoprolol with dosing increased     HLD  -Continue atorvastatin    Altered mental status, likely secondary to lack of sleep and new pneumonia, now resolved  -new on 8/1  -CT head stable from prior; glucose okay, ABG okay    Expected Discharge Location and Transportation: IRF  Expected Discharge   Expected Discharge Date: 8/5/2024; Expected Discharge Time:      VTE Prophylaxis:  Pharmacologic & mechanical VTE prophylaxis orders are present.         AM-PAC 6 Clicks Score (PT): 7 (08/02/24 2058)    CODE STATUS:   Code Status and Medical Interventions: CPR (Attempt to Resuscitate); Full Support; discussed with wife; patient lacked capacity at time of discussion of code status on 8/1   Ordered at: 08/01/24 1203     Level Of Support Discussed With:    Next of Kin (If No Surrogate)     Code Status (Patient has no pulse and is not breathing):    CPR (Attempt to Resuscitate)     Medical Interventions (Patient has pulse or is breathing):    Full Support     Comments:    discussed with wife; patient lacked capacity at time of  discussion of code status on 8/1       Adeola Huntley, APRN  08/03/24

## 2024-08-03 NOTE — PROGRESS NOTES
"Stroke Neurology Progress Note     Subjective     This patient was seen in follow-up for: right basal ganglia hemorrhage    Subjective:  Lying in Bed. Wife is present at the bedside.   Denies any pain//neurologic changes.   Left sided hemiparesis continues. Reports he is getting \"some\" feeling back in areas on the left arm.   BP slightly elevated this morning. Discussed with the nurse.     Objective      Temp:  [98.2 °F (36.8 °C)-98.8 °F (37.1 °C)] 98.2 °F (36.8 °C)  Heart Rate:  [65-82] 77  Resp:  [18-20] 18  BP: (128-160)/(74-91) 139/87    Objective    Physical Exam  Vitals and nursing note reviewed.   Constitutional:       General: He is awake. He is not in acute distress.     Appearance: He is ill-appearing.   HENT:      Head: Normocephalic.      Nose:      Comments: Right NG in place     Mouth/Throat:      Mouth: Mucous membranes are dry.      Pharynx: Oropharynx is clear.   Eyes:      Extraocular Movements: Extraocular movements intact.      Pupils: Pupils are equal, round, and reactive to light.      Comments: Left homonymous hemianopia   Cardiovascular:      Rate and Rhythm: Normal rate and regular rhythm.   Pulmonary:      Effort: Pulmonary effort is normal. No respiratory distress.      Comments: On 2 L nasal cannula  Musculoskeletal:         General: Swelling (L UE) present.      Right lower leg: No edema.      Left lower leg: No edema.   Skin:     General: Skin is warm and dry.      Findings: Bruising present.   Neurological:      Mental Status: He is alert and oriented to person, place, and time.      Cranial Nerves: Cranial nerve deficit and dysarthria present.      Sensory: Sensory deficit present.      Motor: Weakness present.   Psychiatric:         Mood and Affect: Affect is flat.         Speech: Speech is slurred.         Behavior: Behavior normal. Behavior is cooperative.         Cognition and Memory: Cognition and memory normal.       Neurological Exam  Mental Status  Awake and alert. Oriented to " person, place, time and situation. Oriented to person, place, and time. Memory is normal. Moderate dysarthria present. Improving. Language is fluent with no aphasia.    Cranial Nerves  CN II: Vision test: Left homonymous hemianopia. Left homonymous hemianopsia.  CN III, IV, VI: Extraocular movements intact bilaterally. Left ptosis. Pupils equal round and reactive to light bilaterally.  CN V:  Right: Facial sensation is normal.  Left: Diminished sensation of the entire left side of the face.  CN VII:  Left: There is central facial weakness.  CN VIII: Hearing appears to be intact bilaterally.  CN XII: Tongue midline without atrophy or fasciculations.    Motor  Normal muscle bulk throughout. No fasciculations present. Decreased muscle tone.  LUE with 0/5 strength  LLE with 1/5 strength  RUE/RLE with full range of motion, 5/5 strength.    Sensory  Light touch abnormality: Left hemisensory loss.     Coordination  Left: Unable to assess secondary to weakness. Unable to assess secondary to weakness.  No obvious dysmetria noted in right side.    Gait    Not observed.      Results Review:      All brain images and reports were personally reviewed and I agree with the interpretations except as noted below.          EEG    Result Date: 8/1/2024  Focal cerebral dysfunction impacting the right hemisphere, superimposed upon diffuse cerebral dysfunction of more mild degree The above finding is consistent with a known right hemispheric stroke No evidence for epilepsy is seen This report is transcribed using the Dragon dictation system.  ]     CT Head Without Contrast Stroke Protocol    Result Date: 8/1/2024  Impression: No significant interval change from prior exam, with similar size and associated mass effect of the right basal ganglia hematoma, as described above. Electronically Signed: Bernabe Akers MD  8/1/2024 12:00 PM EDT  Workstation ID: [id number]    CT Abdomen Pelvis With Contrast    Result Date: 8/1/2024  Impression: 1.  Small effusions and bilateral lower lobe airspace disease favored to be secondary to atelectasis. 2. Air-filled and fluid-filled distended loops of small bowel throughout the abdomen with the tract to normal caliber small bowel within the mid pelvis anteriorly. Findings would be consistent with partial small bowel obstruction. 3. 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 There is no evidence of small bowel volvulus. Electronically Signed: Nitin Gimenez MD  8/1/2024 8:46 AM EDT  Workstation ID: [id number]    CT head 7/29/2024  Impression: 1. Stable right basal ganglia hemorrhage with surrounding edema and mass effect. 2. Stable mild chronic small vessel ischemic change and chronic lacunar infarct in the right central liz.    CT head 7/28/2024  Impression: 1. Stable size of right basal ganglia intraparenchymal hemorrhage with increased surrounding vasogenic edema and slightly increased mass effect with 4 mm leftward shift of midline structures. 2. Stable mild chronic small vessel ischemic change and chronic lacunar infarct in the left basal ganglia.    CT Head Without Contrast 7/26/2024  Impression: Interval increase in intraparenchymal hemorrhage centered in the right basal ganglia with mild increase in mass effect on the adjacent lateral ventricles and minimal change of the basilar and suprasellar cisterns.     CT Angiogram Head and Neck 7/26/2024  Impression: 1. No evidence of intracranial large vessel arterial occlusion or aneurysm 2. Ulcerated plaque in the left distal common carotid and proximal internal carotid arteries that results in less than 50% luminal diameter stenosis 3. Patent right carotid and bilateral vertebral arteries without significant stenosis or dissection.    CT Head Without Contrast 7/26/2024  Impression: 1. 4 x 3 x 3.5 cm right basal ganglia and  periventricular white matter parenchymal hematoma with mass effect as described 2. No evidence of major vascular territory brain ischemia 3. Chronic small vessel ischemic changes.    Transthoracic echocardiogram 7/26/2024  Impression:    Left ventricular systolic function is normal. Estimated left ventricular EF = 65%    The cardiac valves are anatomically and functionally normal.    Saline test results are negative.      A1c 5.50    WBC 8.04, downtrending   H/H 12.8/37.8  Platelets 194  Glucose 95  Cretinine 0.88, BUN 19  Na+ 136  AST 16  ALT 28    Assessment/Plan     Assessment:    This is a 52 yo male with PMH of migraines who presented to [facility] ED via EMS on 7/26/2024 with complaints of left sided weakness, sensory loss, dysarthria and visual disturbance;  NIH noted to be 17.  CT head without contrast revealed a 4 x 3 x 3.5 right basal ganglia hemorrhage.  CT angiogram without evidence of LVO.  Ulcerated plaque observed within the left distal common carotid.  He was not a candidate for IV thrombolytic therapy given presence of ICH and was not deemed a candidate for neurosurgical intervention.  He was admitted to the neurological ICU for further monitoring and work-up.     Antiplatelet PTA: None  Anticoagulation PTA: None    # Right basal ganglia hemorrhage, etiology likely hypertensive  -Hemorrhagic stroke order set is currently in place  -ICH score 0   -/112 on admission   -Neurosurgery evaluated, non-surgical  -Hold all antiplatelet medications x 2 weeks from index event   -Stat CT on 8/1 revealed stable appearance of hemorrhage  -EEG negative for seizure activity  -PT OT recommend IRF, CM following for DC needs, referral has been made to [facility] for enoxaparin for DVT prophylaxis   -Follow-up in stroke clinic in 4 weeks after DC (added to ADT)  -Follow-up with neuro-ophthalmology for formal visual field testing; no driving until cleared from them; discussed with patient  -Stroke Education     #  Extracranial atherosclerotic disease  -Eventually, we will need to treat extracranial atherosclerotic disease with aspirin but would wait at least 2 weeks post-hemorrhage (8/9/2024)      # Essential hypertension, new diagnosis  -SBP < 140.  Cardene discontinued on 8/1 0442  -Oral antihypertensive medications added 7/30.    -Elevated BP this morning, discussed with nurse.  -Management per primary team.    # Hyperlipidemia  -LDL on admission 140, goal <70 for secondary stroke prevention  -Continue atorvastatin 80 mg daily ()    Plan of care was discussed with the patient, his wife, Dr. Williamson, and primary team.  Stroke neurology will continue to follow.  Suspect the patient may be ready for IPR on Monday.  Please call with any questions or concerns.    REENA Jimenez, AGACNP-BC  [facility] STROKE NEURO  08/03/24  07:09 EDT

## 2024-08-03 NOTE — PROGRESS NOTES
"Patient Name:  Ron Cam  YOB: 1970  [id number]    Surgery Progress Note    Date of visit: 8/3/2024      Subjective: Doing well.  Denies abdominal pain.  Passed flatus multiple times overnight.  Had 1 bowel movement yesterday.  NG tube was removed yesterday evening as residuals were lower during the day yesterday.  He had clear liquids yesterday evening and tolerated this without difficulty.          Objective:     /87 (BP Location: Left arm, Patient Position: Lying)   Pulse 77   Temp 98.2 °F (36.8 °C) (Oral)   Resp 18   Ht 182.9 cm (72.01\")   Wt 122 kg (268 lb 11.9 oz)   SpO2 92%   BMI 36.45 kg/m²     Intake/Output Summary (Last 24 hours) at 8/3/2024 0748  Last data filed at 8/3/2024 0210  Gross per 24 hour   Intake --   Output 2375 ml   Net -2375 ml       GEN:   Awake, alert, in no acute distress, resting comfortably in bed, some expressive aphasia  CV:   Regular rate and rhythm  L:  Symmetric expansion, not labored on room air  Abd:  Soft, not stented, no significant tenderness palpation throughout the abdomen, periumbilical scar  Ext:  No cyanosis, clubbing, or edema    Recent labs that are back at this time have been reviewed.           Assessment/ Plan:    Mr. Cam is a 53-year-old gentleman with history of hypertension who is admitted following a stroke who I been asked to evaluate due to concern for small bowel obstruction.      # Ileus, concern for small bowel obstruction  -Clinically improving.  Having bowel function.  No abdominal complaints.  -Advance to full liquid diet  -Continue bowel regimen  -Mobilize      Julian Yi MD  8/3/2024  07:48 EDT      "

## 2024-08-04 PROCEDURE — 99231 SBSQ HOSP IP/OBS SF/LOW 25: CPT | Performed by: NURSE PRACTITIONER

## 2024-08-04 PROCEDURE — 92526 ORAL FUNCTION THERAPY: CPT

## 2024-08-04 PROCEDURE — 25010000002 METOCLOPRAMIDE PER 10 MG: Performed by: SURGERY

## 2024-08-04 PROCEDURE — 99233 SBSQ HOSP IP/OBS HIGH 50: CPT | Performed by: NURSE PRACTITIONER

## 2024-08-04 PROCEDURE — 25010000002 ENOXAPARIN PER 10 MG: Performed by: INTERNAL MEDICINE

## 2024-08-04 PROCEDURE — 25010000002 HYDRALAZINE PER 20 MG: Performed by: INTERNAL MEDICINE

## 2024-08-04 PROCEDURE — 25010000002 CEFTRIAXONE PER 250 MG: Performed by: STUDENT IN AN ORGANIZED HEALTH CARE EDUCATION/TRAINING PROGRAM

## 2024-08-04 RX ORDER — ACETAMINOPHEN 325 MG/1
650 TABLET ORAL EVERY 6 HOURS PRN
Status: DISCONTINUED | OUTPATIENT
Start: 2024-08-04 | End: 2024-08-06 | Stop reason: HOSPADM

## 2024-08-04 RX ORDER — LISINOPRIL 20 MG/1
20 TABLET ORAL
Status: DISCONTINUED | OUTPATIENT
Start: 2024-08-05 | End: 2024-08-04

## 2024-08-04 RX ORDER — LISINOPRIL 5 MG/1
5 TABLET ORAL ONCE
Status: COMPLETED | OUTPATIENT
Start: 2024-08-04 | End: 2024-08-04

## 2024-08-04 RX ORDER — LISINOPRIL 5 MG/1
5 TABLET ORAL
Status: DISCONTINUED | OUTPATIENT
Start: 2024-08-05 | End: 2024-08-04

## 2024-08-04 RX ORDER — LISINOPRIL 10 MG/1
10 TABLET ORAL
Status: DISCONTINUED | OUTPATIENT
Start: 2024-08-05 | End: 2024-08-05

## 2024-08-04 RX ORDER — LISINOPRIL 10 MG/1
10 TABLET ORAL ONCE
Status: DISCONTINUED | OUTPATIENT
Start: 2024-08-04 | End: 2024-08-04

## 2024-08-04 RX ADMIN — CLONIDINE HYDROCHLORIDE 0.3 MG: 0.2 TABLET ORAL at 08:33

## 2024-08-04 RX ADMIN — Medication 10 ML: at 08:08

## 2024-08-04 RX ADMIN — METOPROLOL TARTRATE 75 MG: 25 TABLET, FILM COATED ORAL at 08:06

## 2024-08-04 RX ADMIN — DOXYCYCLINE 100 MG: 100 INJECTION, POWDER, LYOPHILIZED, FOR SOLUTION INTRAVENOUS at 08:20

## 2024-08-04 RX ADMIN — CLONIDINE HYDROCHLORIDE 0.3 MG: 0.2 TABLET ORAL at 20:16

## 2024-08-04 RX ADMIN — HYDRALAZINE HYDROCHLORIDE 100 MG: 50 TABLET ORAL at 20:14

## 2024-08-04 RX ADMIN — METOCLOPRAMIDE 10 MG: 5 INJECTION, SOLUTION INTRAMUSCULAR; INTRAVENOUS at 02:59

## 2024-08-04 RX ADMIN — AMLODIPINE BESYLATE 10 MG: 10 TABLET ORAL at 08:07

## 2024-08-04 RX ADMIN — FAMOTIDINE 20 MG: 20 TABLET, FILM COATED ORAL at 08:06

## 2024-08-04 RX ADMIN — METOCLOPRAMIDE 10 MG: 5 INJECTION, SOLUTION INTRAMUSCULAR; INTRAVENOUS at 14:14

## 2024-08-04 RX ADMIN — BISACODYL 10 MG: 5 TABLET, COATED ORAL at 08:07

## 2024-08-04 RX ADMIN — LISINOPRIL 5 MG: 5 TABLET ORAL at 20:18

## 2024-08-04 RX ADMIN — ACETAMINOPHEN 650 MG: 325 TABLET ORAL at 22:53

## 2024-08-04 RX ADMIN — HYDRALAZINE HYDROCHLORIDE 100 MG: 50 TABLET ORAL at 14:14

## 2024-08-04 RX ADMIN — METOPROLOL TARTRATE 75 MG: 25 TABLET, FILM COATED ORAL at 20:16

## 2024-08-04 RX ADMIN — Medication 1 CAPSULE: at 08:06

## 2024-08-04 RX ADMIN — DOCUSATE SODIUM 100 MG: 100 CAPSULE, LIQUID FILLED ORAL at 20:17

## 2024-08-04 RX ADMIN — METOCLOPRAMIDE 10 MG: 5 INJECTION, SOLUTION INTRAMUSCULAR; INTRAVENOUS at 08:07

## 2024-08-04 RX ADMIN — HYDRALAZINE HYDROCHLORIDE 20 MG: 20 INJECTION INTRAMUSCULAR; INTRAVENOUS at 18:43

## 2024-08-04 RX ADMIN — Medication 10 ML: at 20:17

## 2024-08-04 RX ADMIN — Medication 10 MG: at 15:57

## 2024-08-04 RX ADMIN — ATORVASTATIN CALCIUM 80 MG: 40 TABLET, FILM COATED ORAL at 20:17

## 2024-08-04 RX ADMIN — HYDRALAZINE HYDROCHLORIDE 100 MG: 50 TABLET ORAL at 05:55

## 2024-08-04 RX ADMIN — DOCUSATE SODIUM 100 MG: 100 CAPSULE, LIQUID FILLED ORAL at 08:07

## 2024-08-04 RX ADMIN — SODIUM CHLORIDE 2000 MG: 900 INJECTION INTRAVENOUS at 08:20

## 2024-08-04 RX ADMIN — LISINOPRIL 5 MG: 5 TABLET ORAL at 08:07

## 2024-08-04 RX ADMIN — ENOXAPARIN SODIUM 40 MG: 100 INJECTION SUBCUTANEOUS at 08:07

## 2024-08-04 RX ADMIN — METOCLOPRAMIDE 10 MG: 5 INJECTION, SOLUTION INTRAMUSCULAR; INTRAVENOUS at 20:17

## 2024-08-04 RX ADMIN — DOXYCYCLINE 100 MG: 100 INJECTION, POWDER, LYOPHILIZED, FOR SOLUTION INTRAVENOUS at 20:14

## 2024-08-04 RX ADMIN — Medication 10 MG: at 03:48

## 2024-08-04 RX ADMIN — FAMOTIDINE 20 MG: 20 TABLET, FILM COATED ORAL at 17:29

## 2024-08-04 NOTE — THERAPY RE-EVALUATION
Acute Care - Speech Language Pathology   Swallow Re-Assessment Psychiatric     Patient Name: Ron Cam  : 1970  MRN: [id number]  Today's Date: 2024               Admit Date: 2024    Visit Dx:     ICD-10-CM ICD-9-CM   1. Intraparenchymal hemorrhage of brain  I61.9 431   2. Dysarthria  R47.1 784.51   3. Acute left-sided weakness  R53.1 728.87   4. Left sided numbness  R20.0 782.0   5. Hemineglect  R41.4 781.8   6. Hypertensive emergency  I16.1 401.9   7. Nontraumatic subcortical hemorrhage of right cerebral hemisphere  I61.0 431   8. Dysphagia, unspecified type  R13.10 787.20     Patient Active Problem List   Diagnosis    Nontraumatic intracerebral hemorrhage    Hypertensive crisis     History reviewed. No pertinent past medical history.  History reviewed. No pertinent surgical history.    SLP Recommendation and Plan     SLP Diet Recommendation: soft to chew textures, chopped, no mixed consistencies, honey thick liquids (24)  Recommended Precautions and Strategies: upright posture during/after eating, small bites of food and sips of liquid, no straw, general aspiration precautions (24)  SLP Rec. for Method of Medication Administration: meds whole, meds crushed, with puree (24)     Monitor for Signs of Aspiration: yes, notify SLP if any concerns (24)  Recommended Diagnostics: reassess via V[facility]SS ([facility] [place]) (24)     Anticipated Discharge Disposition (SLP): inpatient rehabilitation facility (24)     Therapy Frequency (Swallow): 5 days per week (24)  Predicted Duration Therapy Intervention (Days): 1 week (24)  Oral Care Recommendations: Oral Care BID/PRN, Suction toothbrush (24)        Daily Summary of Progress (SLP): progress toward functional goals is gradual (24)               Treatment Assessment (SLP): clinical signs of, aspiration, oral dysphagia, pharyngeal dysphagia, continued, dysarthria  (08/04/24 1500)  Treatment Assessment Comments (SLP): MD notes reviewed, Surgery has advanced pt to a reg/thin diet. Pt seen for re-assessment. Overt oropharyngeal patterns. Dense L paresis. Uvula deviates significantly to L, intermittent nasal air emission. Pt needs instrumental swallow study, will modify diet for now. (08/04/24 1500)  Plan for Continued Treatment (SLP): goals adjusted to reflect functional decline demonstrated (08/04/24 1500)         Progress: declining      SWALLOW EVALUATION (Last 72 Hours)       SLP Adult Swallow Evaluation       Row Name 08/04/24 1500                   Rehab Evaluation    Document Type re-evaluation  -RS        Subjective Information no complaints  -RS        Patient Observations cooperative  -RS        Patient/Family/Caregiver Comments/Observations in laws present  -RS        Patient Effort good  -RS        Symptoms Noted During/After Treatment none  -RS        Oral Care oral rinse provided  -RS           Pain    Additional Documentation Pain Scale: FACES Pre/Post-Treatment (Group)  -RS           Pain Scale: FACES Pre/Post-Treatment    Pain: FACES Scale, Pretreatment 2-->hurts little bit  -RS        Posttreatment Pain Rating 2-->hurts little bit  -RS        Pain Location - back  -RS        Pre/Posttreatment Pain Comment Repositioned pt in bed  -RS           Clinical Swallow Eval    Oral Prep Phase impaired  -RS        Oral Transit impaired  -RS        Oral Residue WFL  -RS        Pharyngeal Phase suspected pharyngeal impairment  -RS        Esophageal Phase unremarkable  -RS        Clinical Swallow Evaluation Summary Overt oropharyngeal patterns w thin, nectar, and regular solids. Appears to tolerate honey liquids via cup only, and soft/chopped solids w no mixed consistencies. Pt needs instrumental for further pharyngeal assessment, + RLL pna. D/w RN and charge RN  -RS           Oral Prep Concerns    Oral Prep Concerns anterior loss  -RS        Anterior Loss thin;nectar  -RS            Oral Transit Concerns    Oral Transit Concerns increased oral transit time;other (see comments)  pt throws head back into extension w all PO trials, suspect in an attempt to propel boluses toward pharynx  -RS        Increased Oral Transit Time all consistencies  -RS           Pharyngeal Phase Concerns    Pharyngeal Phase Concerns throat clear  -RS        Throat Clear thin;nectar;mixed consistencies  -RS           Swallowing Quality of Life Assessment    Education and counseling provided Signs of aspiration;Silent aspiration;Risks of aspiration;Safest diet options;Alternate nutrition/hydration options, risks, and benefits;Oral care recommendations and rationale;Aspiration precautions  -RS           SLP Treatment Clinical Impressions    Treatment Assessment (SLP) clinical signs of;aspiration;oral dysphagia;pharyngeal dysphagia;continued;dysarthria  -RS        Treatment Assessment Comments (SLP) MD notes reviewed, Surgery has advanced pt to a reg/thin diet. Pt seen for re-assessment. Overt oropharyngeal patterns. Dense L paresis. Uvula deviates significantly to L, intermittent nasal air emission. Pt needs instrumental swallow study, will modify diet for now.  -RS        Daily Summary of Progress (SLP) progress toward functional goals is gradual  -RS        Barriers to Overall Progress (SLP) Medically complex  -RS        Plan for Continued Treatment (SLP) goals adjusted to reflect functional decline demonstrated  -RS        Care Plan Review care plan/treatment goals reviewed  -RS        Care Plan Review, Other Participant(s) family  -RS           Recommendations    Therapy Frequency (Swallow) 5 days per week  -RS        Predicted Duration Therapy Intervention (Days) 1 week  -RS        SLP Diet Recommendation soft to chew textures;chopped;no mixed consistencies;honey thick liquids  -RS        Recommended Diagnostics reassess via VFSS ([facility])  -RS        Recommended Precautions and Strategies upright posture  during/after eating;small bites of food and sips of liquid;no straw;general aspiration precautions  -RS        Oral Care Recommendations Oral Care BID/PRN;Suction toothbrush  -RS        SLP Rec. for Method of Medication Administration meds whole;meds crushed;with puree  -RS        Monitor for Signs of Aspiration yes;notify SLP if any concerns  -RS        Anticipated Discharge Disposition (SLP) inpatient rehabilitation facility  -RS                  User Key  (r) = Recorded By, (t) = Taken By, (c) = Cosigned By      Initials Name Effective Dates    RS Domenic Taveras MS CCC-SLP 09/14/23 -                     EDUCATION  The patient has been educated in the following areas:   Dysphagia (Swallowing Impairment) Modified Diet Instruction.        SLP GOALS       Row Name 08/04/24 1500             (LTG) Patient will demonstrate functional swallow for    Diet Texture (Demonstrate functional swallow) regular textures  -RS      Liquid viscosity (Demonstrate functional swallow) thin liquids  -RS      [place] (Demonstrate functional swallow) independently (over 90% accuracy)  -RS      Time Frame (Demonstrate functional swallow) 1 week  -RS      Progress/Outcomes (Demonstrate functional swallow) goal ongoing  -RS         (STG) Labial Strengthening Goal 1 (SLP)    Activity (Labial Strengthening Goal 1, SLP) increase labial tone  -RS      Increase Labial Tone labial resistance exercises;swallow trials  -RS      [place]/Accuracy (Labial Strengthening Goal 1, SLP) independently (over 90% accuracy)  -RS      Time Frame (Labial Strengthening Goal 1, SLP) 1 week  -RS      Progress/Outcomes (Labial Strengthening Goal 1, SLP) goal ongoing  -RS         (STG) Swallow Compensatory Strategies Goal 1 (SLP)    Activity (Swallow Compensatory Strategies/Techniques Goal 1, SLP) compensatory strategies;food/liquid placed on stronger right side  -RS      [place]/Accuracy (Swallow Compensatory Strategies/Techniques Goal 1, SLP)  independently (over 90% accuracy)  -RS      Time Frame (Swallow Compensatory Strategies/Techniques Goal 1, SLP) 1 week  -RS      Progress/Outcomes (Swallow Compensatory Strategies/Techniques Goal 1, SLP) goal ongoing  -RS         Patient will demonstrate functional speech skills for return to discharge environment    [place] with minimal cues  -RS      Time frame by discharge  -RS      Progress/Outcomes goal ongoing  -RS         SLP Diagnostic Treatment     Patient will participate in further assessment in the following areas higher-level cognitive-linguistic  -RS      Time Frame (Diagnostic) short term goal (STG)  -RS      Progress/Outcomes (Additional Goal 1, SLP) goal ongoing  -RS         Articulation Goal 1 (SLP)    Improve Articulation Goal 1 (SLP) by over-articulating at phrase level;90%;with minimal cues (75-90%)  -RS      Time Frame (Articulation Goal 1, SLP) 1 week  -RS      Progress/Outcomes (Articulation Goal 1, SLP) goal ongoing  -RS         Right Hemisphere Function Goal 1 (SLP)    Improve Right Hemisphere Function Through Goal 1 (SLP) demonstrate awareness of communication partner in left visual field;complete visuo-spatial activities (visual closure, trail making, mazes;80%;with minimal cues (75-90%)  -RS      Time Frame (Right Hemisphere Function Goal 1, SLP) 1 week  -RS      Progress/Outcomes (Right Hemisphere Function Goal 1, SLP) goal ongoing  -RS                User Key  (r) = Recorded By, (t) = Taken By, (c) = Cosigned By      Initials Name Provider Type    RS Domenic Taveras MS CCC-SLP Speech and Language Pathologist                         Time Calculation:    Time Calculation- SLP       Row Name 08/04/24 1557             Time Calculation- SLP    SLP Start Time 1530  -RS      SLP Received On 08/04/24  -RS         Untimed Charges    25308-BC Treatment Swallow Minutes 56  -RS         Total Minutes    Untimed Charges Total Minutes 56  -RS       Total Minutes 56  -RS                User Key  (r)  = Recorded By, (t) = Taken By, (c) = Cosigned By      Initials Name Provider Type    RS Domenic Taveras MS CCC-SLP Speech and Language Pathologist                    Therapy Charges for Today       Code Description Service Date Service Provider Modifiers Qty    19005090365 HC ST TREATMENT SWALLOW 4 8/4/2024 Domenic Taveras MS CCC-SLP GN 1                 MS SENTHIL Mccoy  8/4/2024

## 2024-08-04 NOTE — PROGRESS NOTES
Stroke Neurology Progress Note     Subjective     This patient was seen in follow-up for: right basal ganglia hemorrhage    Subjective:  Resting in bed. No current complaints.   Reports that he did not sleep well last night.   Continued increase in sensation in LUE, pulls bad with painful stimuli.  Sensation intact in LLE.  Blood pressures still elevated requiring PRN medications.     Objective      Temp:  [97.7 °F (36.5 °C)-98.9 °F (37.2 °C)] 97.7 °F (36.5 °C)  Heart Rate:  [63-91] 76  Resp:  [18] 18  BP: (106-170)/() 150/89    Objective    Physical Exam  Vitals and nursing note reviewed.   Constitutional:       General: He is awake. He is not in acute distress.  HENT:      Head: Normocephalic.      Nose:      Comments: Right NG in place     Mouth/Throat:      Mouth: Mucous membranes are dry.      Pharynx: Oropharynx is clear.   Eyes:      Extraocular Movements: Extraocular movements intact.      Pupils: Pupils are equal, round, and reactive to light.      Comments: Left homonymous hemianopia   Cardiovascular:      Rate and Rhythm: Normal rate and regular rhythm.   Pulmonary:      Effort: Pulmonary effort is normal. No respiratory distress.   Musculoskeletal:         General: Swelling (L UE) present.      Right lower leg: No edema.      Left lower leg: No edema.   Skin:     General: Skin is warm and dry.      Findings: Bruising present.   Neurological:      Mental Status: He is alert and oriented to person, place, and time.      Cranial Nerves: Cranial nerve deficit and dysarthria present.      Sensory: Sensory deficit present.      Motor: Weakness present.   Psychiatric:         Mood and Affect: Affect is flat.         Speech: Speech is slurred.         Behavior: Behavior normal. Behavior is cooperative.         Cognition and Memory: Cognition and memory normal.       Neurological Exam  Mental Status  Awake and alert. Oriented to person, place, time and situation. Oriented to person, place, and time. Memory  is normal. Moderate dysarthria present. Improving. Language is fluent with no aphasia.    Cranial Nerves  CN II: Vision test: Left homonymous hemianopia. Left homonymous hemianopsia.  CN III, IV, VI: Extraocular movements intact bilaterally. Left ptosis. Pupils equal round and reactive to light bilaterally.  CN V:  Right: Facial sensation is normal.  Left: Diminished sensation of the entire left side of the face.  CN VII:  Left: There is central facial weakness.  CN VIII: Hearing appears to be intact bilaterally.  CN XII: Tongue midline without atrophy or fasciculations.    Motor  Normal muscle bulk throughout. No fasciculations present. Decreased muscle tone.  LUE with 0/5 strength  LLE with 1/5 strength  RUE/RLE with full range of motion, 5/5 strength.    Sensory  Light touch abnormality: Left hemisensory loss.     Coordination  Left: Unable to assess secondary to weakness. Unable to assess secondary to weakness.  No obvious dysmetria noted in right side.    Gait    Not observed.      Results Review:      All brain images and reports were personally reviewed and I agree with the interpretations except as noted below.          EEG    Result Date: 8/1/2024  Focal cerebral dysfunction impacting the right hemisphere, superimposed upon diffuse cerebral dysfunction of more mild degree The above finding is consistent with a known right hemispheric stroke No evidence for epilepsy is seen This report is transcribed using the Dragon dictation system.  ]     CT Head Without Contrast Stroke Protocol    Result Date: 8/1/2024  Impression: No significant interval change from prior exam, with similar size and associated mass effect of the right basal ganglia hematoma, as described above. Electronically Signed: Bernabe Akers MD  8/1/2024 12:00 PM EDT  Workstation ID: [id number]    CT Abdomen Pelvis With Contrast    Result Date: 8/1/2024  Impression: 1. Small effusions and bilateral lower lobe airspace disease favored to be secondary  to atelectasis. 2. Air-filled and fluid-filled distended loops of small bowel throughout the abdomen with the tract to normal caliber small bowel within the mid pelvis anteriorly. Findings would be consistent with partial small bowel obstruction. 3. 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 There is no evidence of small bowel volvulus. Electronically Signed: Nitin Gimenez MD  8/1/2024 8:46 AM EDT  Workstation ID: [id number]    CT head 7/29/2024  Impression: 1. Stable right basal ganglia hemorrhage with surrounding edema and mass effect. 2. Stable mild chronic small vessel ischemic change and chronic lacunar infarct in the right central liz.    CT head 7/28/2024  Impression: 1. Stable size of right basal ganglia intraparenchymal hemorrhage with increased surrounding vasogenic edema and slightly increased mass effect with 4 mm leftward shift of midline structures. 2. Stable mild chronic small vessel ischemic change and chronic lacunar infarct in the left basal ganglia.    CT Head Without Contrast 7/26/2024  Impression: Interval increase in intraparenchymal hemorrhage centered in the right basal ganglia with mild increase in mass effect on the adjacent lateral ventricles and minimal change of the basilar and suprasellar cisterns.     CT Angiogram Head and Neck 7/26/2024  Impression: 1. No evidence of intracranial large vessel arterial occlusion or aneurysm 2. Ulcerated plaque in the left distal common carotid and proximal internal carotid arteries that results in less than 50% luminal diameter stenosis 3. Patent right carotid and bilateral vertebral arteries without significant stenosis or dissection.    CT Head Without Contrast 7/26/2024  Impression: 1. 4 x 3 x 3.5 cm right basal ganglia and periventricular white matter parenchymal hematoma with mass effect as described 2. No evidence  of major vascular territory brain ischemia 3. Chronic small vessel ischemic changes.    Transthoracic echocardiogram 7/26/2024  Impression:    Left ventricular systolic function is normal. Estimated left ventricular EF = 65%    The cardiac valves are anatomically and functionally normal.    Saline test results are negative.      A1c 5.50    WBC 8.04, downtrending   H/H 12.8/37.8  Platelets 194  Glucose 95  Cretinine 0.88, BUN 19  Na+ 136  AST 16  ALT 28    Assessment/Plan     Assessment:    This is a 52 yo male with PMH of migraines who presented to [facility] ED via EMS on 7/26/2024 with complaints of left sided weakness, sensory loss, dysarthria and visual disturbance;  NIH noted to be 17.  CT head without contrast revealed a 4 x 3 x 3.5 right basal ganglia hemorrhage.  CT angiogram without evidence of LVO.  Ulcerated plaque observed within the left distal common carotid.  He was not a candidate for IV thrombolytic therapy given presence of ICH and was not deemed a candidate for neurosurgical intervention.  He was admitted to the neurological ICU for further monitoring and work-up.     Antiplatelet PTA: None  Anticoagulation PTA: None    # Right basal ganglia hemorrhage, etiology likely hypertensive  -Hemorrhagic stroke order set is currently in place  -ICH score 0   -/112 on admission   -Neurosurgery evaluated, non-surgical  -Hold all antiplatelet medications at least 2 weeks from index event   -Stat CT on 8/1 revealed stable appearance of hemorrhage  -EEG negative for seizure activity  -PT OT recommend IRF, CM following for DC needs, plan for [facility]  -OK for enoxaparin for DVT prophylaxis   -Follow-up in stroke clinic in 4 weeks after DC (added to ADT)  -Follow-up with neuro-ophthalmology for formal visual field testing; no driving until cleared from them; discussed with patient  -Stroke Education     # Extracranial atherosclerotic disease  -Eventually, we will need to treat extracranial atherosclerotic  disease with aspirin but would wait at least 2 weeks post-hemorrhage (8/9/2024)      # Essential hypertension, new diagnosis  -SBP < 140.  Cardene discontinued on 8/1 0442  -Oral antihypertensive medications added 7/30.    -Elevated BP this morning, discussed with nurse as well as hospital APRN.   -Increase in oral medications made this morning.   -Utilize PRN medications as needed.  -Management per primary team.    # Hyperlipidemia  -LDL on admission 140, goal <70 for secondary stroke prevention  -Continue atorvastatin 80 mg daily ()    Plan discussed with Hospital Medicine as well as the patient.  Stroke neurology will continue to follow.  Suspect the patient may be ready for IPR on Monday if BP well controlled.  Please call with any questions or concerns.    REENA Jimenez, AGACNP-BC  [facility] [place] [facility]  08/04/24  08:13 EDT

## 2024-08-04 NOTE — PROGRESS NOTES
"          Clinical Nutrition Assessment     Patient Name: Ron Cam  YOB: 1970  MRN: [id number]  Date of Encounter: 08/04/24 19:57 EDT  Admission date: 7/26/2024  Reason for Visit: LOS    Assessment   Nutrition Assessment   Admission Diagnosis:  Nontraumatic intracerebral hemorrhage [I61.9]    Problem List:    Nontraumatic intracerebral hemorrhage    Hypertensive crisis      PMH:   He  has no past medical history on file.    PSH:  He  has no past surgical history on file.    Applicable Nutrition History:   7/27 SLP rec Regular texture food Thin liquid  8/1 SLP rec NPO  8/4 SLP rec Soft to chew Chopped food Honey thick liquid    Anthropometrics     Height: Height: 182.9 cm (72.01\")  Last Filed Weight: Weight: 122 kg (268 lb 11.9 oz) (07/31/24 0600)  Method: Weight Method: Bed scale  BMI: BMI (Calculated): 36.4    UBW:  Per EMR stated wt 229 lbs on 7/27, bed wt 253 lbs on 7/29  Weight change: No recent loss indicated per charting - note no confirmed wts. Note per family pt lost to 230-220 lbs intentionally within last few mo.    Nutrition Focused Physical Exam    Date: 8/4    Unable to perform due to Pt unable to participate at time of visit     Subjective   Reported/Observed/Food/Nutrition Related History:     8/4  Wife radha lost wt to 220-230 lbs intentionally over last few mo. Hardeep was eating ok before event.       Current Nutrition Prescription   PO: Diet: Cardiac; Healthy Heart (2-3 Na+); No Straw, No Mixed Consistencies; Texture: Soft to Chew (NDD 3); Soft to Chew: Chopped Meat; Fluid Consistency: Honey Thick  Oral Nutrition Supplement:   Intake:  9 DAYS   40% x 13 meals recorded. 50% x last 5 meals    Assessment & Plan   Nutrition Diagnosis   Date:  8/4            Updated:    Problem No nutrition diagnosis at this time pending further data   Etiology    Signs/Symptoms    Status:     Goal / Objectives:   Nutrition to support treatment and Increase intake      Nutrition Intervention    "   Follow treatment progress, Care plan reviewed, Advise alternate selection, Menu provided to use w pt.for texture modified diet.        Monitoring/Evaluation:   Per protocol, I&O, PO intake, Pertinent labs, Weight, Symptoms, Swallow function    Isabela Coles RD  Time Spent: 25 min

## 2024-08-04 NOTE — PROGRESS NOTES
"Patient Name:  Ron Cam  YOB: 1970  [id number]    Surgery Progress Note    Date of visit: 8/4/2024      Subjective: No issues overnight.  Denies any nausea or vomiting.  Tolerated full liquids yesterday without difficulty.  Passed flatus on multiple occasions and had a small bowel movement.          Objective:     /76   Pulse 60   Temp 97.7 °F (36.5 °C) (Oral)   Resp 18   Ht 182.9 cm (72.01\")   Wt 122 kg (268 lb 11.9 oz)   SpO2 94%   BMI 36.45 kg/m²     Intake/Output Summary (Last 24 hours) at 8/4/2024 1004  Last data filed at 8/4/2024 0825  Gross per 24 hour   Intake --   Output 2600 ml   Net -2600 ml       GEN:   Awake, alert, in no acute distress, resting comfortably in bed, some expressive aphasia  CV:      Regular rate and rhythm  L:         Symmetric expansion, not labored on room air  Abd:    Soft, not stented, no significant tenderness palpation throughout the abdomen, periumbilical scar  Ext:     No cyanosis, clubbing, or edema    Recent labs that are back at this time have been reviewed.           Assessment/ Plan:    Mr. Cam is a 53-year-old gentleman with history of hypertension who is admitted following a stroke who I been asked to evaluate due to concern for small bowel obstruction.      # Ileus, concern for small bowel obstruction  -Doing well.  Having bowel function.  No abdominal complaints.  Tolerated full liquids without difficulty  -Continue bowel regimen  -Advance to regular diet  -Mobilize      Julian Yi MD  8/4/2024  10:04 EDT      "

## 2024-08-04 NOTE — PROGRESS NOTES
[facility] Medicine Services  PROGRESS NOTE    Patient Name: Ron Cam  : 1970  MRN: [id number]    Date of Admission: 2024  Primary Care Physician: Provider, No Known    Subjective   Subjective     CC:  Follow-up stroke    HPI:  Pt sitting up in the bed eating breakfast. BP elevated this morning after exercising 183/105 but returned to 144/92 after 15 min, then down to 106/76 after 25 more min. No blood pressure medication adjustment but monitoring closely. Last BM 2 days ago.     Copied text in this note has been reviewed and is accurate as of 24.       Objective   Objective     Vital Signs:   Temp:  [97.7 °F (36.5 °C)-98.9 °F (37.2 °C)] 98.2 °F (36.8 °C)  Heart Rate:  [60-91] 60  Resp:  [18-20] 20  BP: (106-183)/() 106/76     Physical Exam:  Constitutional: Awake, alert, NAD  HENT: NCAT, mucous membranes moist  Respiratory: Clear to auscultation bilaterally, nonlabored  Cardiovascular: RRR, no murmurs, rubs, or gallops  Gastrointestinal: Positive bowel sounds, soft, nontender, nondistended  Musculoskeletal: No bilateral ankle edema  Psychiatric: Flat affect, cooperative  Neurologic: Oriented x 3, dysarthria, LUE 0/5, LLE 1/5, left sided facial droop  Skin: No rashes      Results Reviewed:  LAB RESULTS:      Lab 24  1046 24  0741 24  0435 24  0529 24  0505   WBC 8.61 8.04 14.02* 10.75 10.23   HEMOGLOBIN 12.8* 12.8* 13.9 13.5 12.5*   HEMATOCRIT 37.8 37.8 39.7 38.3 37.1*   PLATELETS 213 194 262 197 167   NEUTROS ABS  --   --   --  9.11* 7.66*   IMMATURE GRANS (ABS)  --   --   --  0.03 0.04   LYMPHS ABS  --   --   --  0.71 1.33   MONOS ABS  --   --   --  0.85 0.99*   EOS ABS  --   --   --  0.02 0.16   MCV 91.3 91.1 89.4 89.7 93.0   PROCALCITONIN  --   --  0.10  --   --    LACTATE  --   --  1.3  --   --          Lab 24  1046 24  0741 24  0435 24  1605 24  0529 24  0505   SODIUM 135* 136 135* 129*  133* 138   POTASSIUM 4.0 3.9 3.8  --  3.9 3.8  3.8   CHLORIDE 100 99 98  --  98 105   CO2 25.0 24.0 26.0  --  21.0* 19.0*   ANION GAP 10.0 13.0 11.0  --  14.0 14.0   BUN 13 19 19  --  17 13   CREATININE 0.88 0.88 0.93  --  0.85 0.92   EGFR 102.8 102.8 98.2  --  103.9 99.5   GLUCOSE 122* 93 146*  --  155* 119*   CALCIUM 8.1* 8.2* 9.1  --  8.7 8.5*   MAGNESIUM 1.9 2.5 2.2  --  1.9  --    PHOSPHORUS 2.9 3.0 3.9  --   --   --          Lab 08/01/24  0435   TOTAL PROTEIN 6.7   ALBUMIN 3.7   GLOBULIN 3.0   ALT (SGPT) 28   AST (SGOT) 16   BILIRUBIN 0.5   ALK PHOS 64                         Lab 08/01/24  1234   PH, ARTERIAL 7.496*   PCO2, ARTERIAL 35.5   PO2 .0*   FIO2 30   HCO3 ART 27.4*   BASE EXCESS ART 4.2*   CARBOXYHEMOGLOBIN 1.2     Brief Urine Lab Results  (Last result in the past 365 days)        Color   Clarity   Blood   Leuk Est   Nitrite   Protein   CREAT   Urine HCG        08/01/24 1752 Yellow   Clear   Negative   Negative   Negative   Negative                   Microbiology Results Abnormal       Procedure Component Value - Date/Time    Blood Culture - Blood, Arm, Left [[id number]]  (Normal) Collected: 08/01/24 0427    Lab Status: Preliminary result Specimen: Blood from Arm, Left Updated: 08/04/24 0500     Blood Culture No growth at 3 days    Blood Culture - Blood, Arm, Left [[id number]]  (Normal) Collected: 08/01/24 0430    Lab Status: Preliminary result Specimen: Blood from Arm, Left Updated: 08/04/24 0500     Blood Culture No growth at 3 days            No radiology results from the last 24 hrs    Results for orders placed during the hospital encounter of 07/26/24    Adult Transthoracic Echo Complete W/ Cont if Necessary Per Protocol (With Agitated Saline)    Interpretation Summary    Left ventricular systolic function is normal. Estimated left ventricular EF = 65%    The cardiac valves are anatomically and functionally normal.    Saline test results are negative.      Current medications:  Scheduled  Meds:amLODIPine, 10 mg, Oral, Q24H  atorvastatin, 80 mg, Oral, Nightly  bisacodyl, 10 mg, Oral, Daily  cefTRIAXone, 2,000 mg, Intravenous, Daily  cloNIDine, 0.3 mg, Oral, Q12H  docusate sodium, 100 mg, Oral, BID  doxycycline, 100 mg, Intravenous, Q12H  enoxaparin, 40 mg, Subcutaneous, Q24H  famotidine, 20 mg, Oral, BID AC  hydrALAZINE, 100 mg, Oral, Q8H  lactobacillus acidophilus, 1 capsule, Oral, Daily  [START ON 8/5/2024] lisinopril, 5 mg, Oral, Q24H  metoclopramide, 10 mg, Intravenous, Q6H  metoprolol tartrate, 75 mg, Oral, Q12H  sodium chloride, 10 mL, Intravenous, Q12H      Continuous Infusions:     PRN Meds:.  calcium carbonate    Calcium Replacement - Follow Nurse / BPA Driven Protocol    hydrALAZINE    labetalol    Magnesium Standard Dose Replacement - Follow Nurse / BPA Driven Protocol    ondansetron    Phosphorus Replacement - Follow Nurse / BPA Driven Protocol    Potassium Replacement - Follow Nurse / BPA Driven Protocol    prochlorperazine    sodium chloride    sodium chloride    Assessment & Plan   Assessment & Plan     Active Hospital Problems    Diagnosis  POA    **Nontraumatic intracerebral hemorrhage [I61.9]  Yes    Hypertensive crisis [I16.9]  Yes      Resolved Hospital Problems   No resolved problems to display.        Brief Hospital Course to date:  Ron Cam is a 53 y.o. male with history of migraines, who presented via EMS on 5/26 with left-sided weakness, sensory loss, dysarthria, visual disturbance.  NIH noted to be 17.  CT head showed 4 x 3 x 3.5 right basal ganglia hemorrhage.  CT angio without evidence of large vessel occlusion.  Ulcerated plaque observed within the left distal common carotid.  He was deemed not to be a surgical candidate.  He was monitored in the neurological ICU.  Stroke neurology consulted on admission.  Transferred to the floor service on 8/1.    This patient's problems and plans were partially entered by my partner and updated as appropriate by me  08/04/24.    Right basal ganglia hemorrhage, etiology likely hypertensive  -Neurology following  -Neurosurgery evaluated, deemed nonsurgical  -CT head stable   -PT/OT recommend IRF  -Follow-up with the stroke clinic 4 weeks after discharge  -Continue neurochecks, telemetry    Ileus  -General surgery consulted; improvement in symptoms, cont to follow   -Bowel regimen  -IVF  --NG now out, tolerating clears, advanced to regular diet.     Right lower lobe pneumonia  -Ceftriaxone, doxycycline, probiotic  -Wean oxygen as tolerated    Extracranial atherosclerotic disease  -Eventually needs aspirin, but will wait 2 weeks post hemorrhage, 8/9/2024 before starting    HTN  -Goal SBP less than 140  -Continue clonidine, hydralazine, metoprolol with dosing increased on 08/02    HLD  -Continue atorvastatin    Altered mental status, likely secondary to lack of sleep and new pneumonia, now resolved  -new on 8/1  -CT head stable from prior; glucose okay, ABG okay    Expected Discharge Location and Transportation: IRF  Expected Discharge   Expected Discharge Date: 8/5/2024; Expected Discharge Time:      VTE Prophylaxis:  Pharmacologic & mechanical VTE prophylaxis orders are present.         AM-PAC 6 Clicks Score (PT): 7 (08/04/24 0800)    CODE STATUS:   Code Status and Medical Interventions: CPR (Attempt to Resuscitate); Full Support; discussed with wife; patient lacked capacity at time of discussion of code status on 8/1   Ordered at: 08/01/24 1203     Level Of Support Discussed With:    Next of Kin (If No Surrogate)     Code Status (Patient has no pulse and is not breathing):    CPR (Attempt to Resuscitate)     Medical Interventions (Patient has pulse or is breathing):    Full Support     Comments:    discussed with wife; patient lacked capacity at time of discussion of code status on 8/1       REENA Pagan  08/04/24

## 2024-08-04 NOTE — PLAN OF CARE
Goal Outcome Evaluation:           Progress: declining         Anticipated Discharge Disposition (SLP): inpatient rehabilitation facility             Treatment Assessment (SLP): clinical signs of, aspiration, oral dysphagia, pharyngeal dysphagia, continued, dysarthria (08/04/24 1500)  Treatment Assessment Comments (SLP): MD notes reviewed, Surgery has advanced pt to a reg/thin diet. Pt seen for re-assessment. Overt oropharyngeal patterns. Dense L paresis. Uvula deviates significantly to L, intermittent nasal air emission. Pt needs instrumental swallow study, will modify diet for now. (08/04/24 1500)  Plan for Continued Treatment (SLP): goals adjusted to reflect functional decline demonstrated (08/04/24 1500)

## 2024-08-05 ENCOUNTER — ANCILLARY PROCEDURE (OUTPATIENT)
Dept: SPEECH THERAPY | Facility: HOSPITAL | Age: 54
DRG: 064 | End: 2024-08-05
Payer: COMMERCIAL

## 2024-08-05 PROCEDURE — 97530 THERAPEUTIC ACTIVITIES: CPT

## 2024-08-05 PROCEDURE — 25010000002 ENOXAPARIN PER 10 MG: Performed by: INTERNAL MEDICINE

## 2024-08-05 PROCEDURE — 97112 NEUROMUSCULAR REEDUCATION: CPT

## 2024-08-05 PROCEDURE — 99232 SBSQ HOSP IP/OBS MODERATE 35: CPT | Performed by: HOSPITALIST

## 2024-08-05 PROCEDURE — 25010000002 CEFTRIAXONE PER 250 MG: Performed by: STUDENT IN AN ORGANIZED HEALTH CARE EDUCATION/TRAINING PROGRAM

## 2024-08-05 PROCEDURE — 25010000002 METOCLOPRAMIDE PER 10 MG: Performed by: SURGERY

## 2024-08-05 PROCEDURE — 92507 TX SP LANG VOICE COMM INDIV: CPT

## 2024-08-05 PROCEDURE — 99232 SBSQ HOSP IP/OBS MODERATE 35: CPT

## 2024-08-05 PROCEDURE — 97535 SELF CARE MNGMENT TRAINING: CPT

## 2024-08-05 PROCEDURE — 92612 ENDOSCOPY SWALLOW (FEES) VID: CPT

## 2024-08-05 RX ORDER — LISINOPRIL 20 MG/1
20 TABLET ORAL
Status: DISCONTINUED | OUTPATIENT
Start: 2024-08-05 | End: 2024-08-06

## 2024-08-05 RX ADMIN — METOCLOPRAMIDE 10 MG: 5 INJECTION, SOLUTION INTRAMUSCULAR; INTRAVENOUS at 02:24

## 2024-08-05 RX ADMIN — HYDRALAZINE HYDROCHLORIDE 100 MG: 50 TABLET ORAL at 06:27

## 2024-08-05 RX ADMIN — CLONIDINE HYDROCHLORIDE 0.3 MG: 0.2 TABLET ORAL at 21:17

## 2024-08-05 RX ADMIN — LISINOPRIL 20 MG: 20 TABLET ORAL at 08:14

## 2024-08-05 RX ADMIN — Medication 10 ML: at 21:22

## 2024-08-05 RX ADMIN — DOXYCYCLINE 100 MG: 100 INJECTION, POWDER, LYOPHILIZED, FOR SOLUTION INTRAVENOUS at 21:21

## 2024-08-05 RX ADMIN — ACETAMINOPHEN 650 MG: 325 TABLET ORAL at 06:27

## 2024-08-05 RX ADMIN — DOCUSATE SODIUM 100 MG: 100 CAPSULE, LIQUID FILLED ORAL at 21:16

## 2024-08-05 RX ADMIN — METOPROLOL TARTRATE 75 MG: 25 TABLET, FILM COATED ORAL at 08:14

## 2024-08-05 RX ADMIN — Medication 1 CAPSULE: at 08:14

## 2024-08-05 RX ADMIN — ATORVASTATIN CALCIUM 80 MG: 40 TABLET, FILM COATED ORAL at 21:17

## 2024-08-05 RX ADMIN — Medication 10 ML: at 08:16

## 2024-08-05 RX ADMIN — METOCLOPRAMIDE 10 MG: 5 INJECTION, SOLUTION INTRAMUSCULAR; INTRAVENOUS at 21:20

## 2024-08-05 RX ADMIN — DOXYCYCLINE 100 MG: 100 INJECTION, POWDER, LYOPHILIZED, FOR SOLUTION INTRAVENOUS at 08:16

## 2024-08-05 RX ADMIN — METOCLOPRAMIDE 10 MG: 5 INJECTION, SOLUTION INTRAMUSCULAR; INTRAVENOUS at 14:35

## 2024-08-05 RX ADMIN — CLONIDINE HYDROCHLORIDE 0.3 MG: 0.2 TABLET ORAL at 08:15

## 2024-08-05 RX ADMIN — METOCLOPRAMIDE 10 MG: 5 INJECTION, SOLUTION INTRAMUSCULAR; INTRAVENOUS at 08:15

## 2024-08-05 RX ADMIN — HYDRALAZINE HYDROCHLORIDE 100 MG: 50 TABLET ORAL at 21:17

## 2024-08-05 RX ADMIN — ENOXAPARIN SODIUM 40 MG: 100 INJECTION SUBCUTANEOUS at 08:15

## 2024-08-05 RX ADMIN — METOPROLOL TARTRATE 75 MG: 25 TABLET, FILM COATED ORAL at 21:16

## 2024-08-05 RX ADMIN — HYDRALAZINE HYDROCHLORIDE 100 MG: 50 TABLET ORAL at 14:35

## 2024-08-05 RX ADMIN — DOCUSATE SODIUM 100 MG: 100 CAPSULE, LIQUID FILLED ORAL at 08:15

## 2024-08-05 RX ADMIN — FAMOTIDINE 20 MG: 20 TABLET, FILM COATED ORAL at 16:52

## 2024-08-05 RX ADMIN — AMLODIPINE BESYLATE 10 MG: 10 TABLET ORAL at 08:15

## 2024-08-05 RX ADMIN — BISACODYL 10 MG: 5 TABLET, COATED ORAL at 08:15

## 2024-08-05 RX ADMIN — SODIUM CHLORIDE 2000 MG: 900 INJECTION INTRAVENOUS at 08:16

## 2024-08-05 RX ADMIN — FAMOTIDINE 20 MG: 20 TABLET, FILM COATED ORAL at 08:15

## 2024-08-05 NOTE — PLAN OF CARE
Problem: Adult Inpatient Plan of Care  Goal: Plan of Care Review  8/5/2024 1404 by Yun Oakley, CCC-SLP  Outcome: Ongoing, Progressing  Flowsheets (Taken 8/5/2024 1404)  Outcome Evaluation: SLP to continue to follow for dysarthria and cognitive impairment.  8/5/2024 1349 by Yun Oakley, CCC-SLP  Outcome: Ongoing, Progressing  Flowsheets (Taken 8/5/2024 1349)  Outcome Evaluation: FEES completed. Diet advanced. Mild to moderate oral dysphagia and mild pharyngeal.  SLP to continue to treat swallowing and advance at bedside as able.   Goal Outcome Evaluation:              Outcome Evaluation: SLP to continue to follow for dysarthria and cognitive impairment.      Anticipated Discharge Disposition (SLP): inpatient rehabilitation facility          SLP Swallowing Diagnosis: mild-moderate, oral dysphagia, mild, pharyngeal dysphagia (08/05/24 1200)  Treatment Assessment (SLP): continued, severe, dysarthria (08/05/24 1300)  Treatment Assessment Comments (SLP): Speech treatment to address use of effective compensatory strategies. Pt verbalizes compensatory strategies, however is not successfully using them at word level. No significant change with repetition or model.  Difficulty with divergent thinking- concrete and abstract. Difficulty with mental flexibility and speed of processing.  SLP to continue to treat with revision to plan of care. (08/05/24 1300)  Plan for Continued Treatment (SLP): continue treatment per plan of care, other (see comments) (added additional goals based on performance today) (08/05/24 1300)

## 2024-08-05 NOTE — CASE MANAGEMENT/SOCIAL WORK
Continued Stay Note  [facility]     Patient Name: Ron Cam  MRN: [id number]  Today's Date: 8/5/2024    Admit Date: 7/26/2024    Plan: IRF   Discharge Plan       Row Name 08/05/24 1249       Plan    Plan IRF    Patient/Family in Agreement with Plan yes    Plan Comments Discussed in MDR. Mr. Cam is not being discharged today. The plan for Mr. Cam is for him to go to [facility] at discharge. April, Liaison with [facility] is following.  asked PT/OT to see patient today for updated notes, in order for April to start pre-cert with his insurance for rehab. CM will continue to follow.    Final Discharge Disposition Code 62 - inpatient rehab facility                   Discharge Codes    No documentation.                 Expected Discharge Date and Time       Expected Discharge Date Expected Discharge Time    Aug 7, 2024               Dolly Akers RN

## 2024-08-05 NOTE — PROGRESS NOTES
"Patient Name:  Ron Cam  YOB: 1970  [id number]    Surgery Progress Note    Date of visit: 8/5/2024      Subjective: No acute events.  Had a bowel movement yesterday.  Also passing flatus.  Tolerated small amounts of regular diet without any nausea, or vomiting          Objective:     /96   Pulse 76   Temp 98.2 °F (36.8 °C) (Oral)   Resp 22   Ht 182.9 cm (72.01\")   Wt 122 kg (268 lb 11.9 oz)   SpO2 95%   BMI 36.45 kg/m²     Intake/Output Summary (Last 24 hours) at 8/5/2024 0753  Last data filed at 8/5/2024 0400  Gross per 24 hour   Intake 360 ml   Output 1775 ml   Net -1415 ml       GEN:   Awake, alert, in no acute distress, resting comfortably in bed   CV:   Regular rate and rhythm  L:  Symmetric expansion, not labored on room air  Abd:  Soft, not tender, not distended   Ext:  No cyanosis, clubbing, or edema    Recent labs that are back at this time have been reviewed.           Assessment/ Plan:    Mr. Cam is a 53-year-old gentleman with history of hypertension who is admitted following a stroke who I been asked to evaluate due to concern for small bowel obstruction.      # Ileus, concern for small bowel obstruction  -Clinically doing well and symptoms have resolved.  Tolerating a diet.  Having bowel function.  -Continue bowel regimen  -I will sign off         Julian Yi MD  8/5/2024  07:53 EDT      "

## 2024-08-05 NOTE — PLAN OF CARE
Goal Outcome Evaluation:  Plan of Care Reviewed With: patient, spouse        Progress: improving  Outcome Evaluation: Pt. progressing towards baseline with ADLs and functional mobility. Continues to be limited by L sided weakness/inattention, impaired balance, and decreased activity tolerance. Completed STS and SPT with Max A x 2. DEP for LBB and Max A for UBD. Will continue to progress as able. Recommend IPR at discharge.

## 2024-08-05 NOTE — PLAN OF CARE
Goal Outcome Evaluation:  Plan of Care Reviewed With: patient, spouse        Progress: improving  Outcome Evaluation: Pt able to perform stand-pivot transfer bed to chair this date with 2-person assist. Strong L quad contraction noted with LE therex with neuromuscular tapping. Education provided re: neuroplasticity and attention to L. Goals updated to reflect pt's current level of function and anticipated progress. Pt will continue to benefit from PT to address ongoing strength, balance, endurance, and L attention deficits. Pt excellent candidate for IRF.      Anticipated Discharge Disposition (PT): inpatient rehabilitation facility

## 2024-08-05 NOTE — PLAN OF CARE
Goal Outcome Evaluation:           Progress: improving       Diet advanced to thin liquids today. PT VSS through out shift. Patient got up to chair with lift and tolerated working with PT today. Awaiting placement at rehab.

## 2024-08-05 NOTE — PLAN OF CARE
Goal Outcome Evaluation:              Outcome Evaluation: FEES completed. Diet advanced. Mild to moderate oral dysphagia and mild pharyngeal.  SLP to continue to treat swallowing and advance at bedside as able.      Anticipated Discharge Disposition (SLP): inpatient rehabilitation facility          SLP Swallowing Diagnosis: mild-moderate, oral dysphagia, mild, pharyngeal dysphagia (08/05/24 1200)

## 2024-08-05 NOTE — MBS/VFSS/FEES
Acute Care - Speech Language Pathology   Swallow Re-Evaluation [facility]  Fiberoptic Endoscopic Evaluation of Swallowing (FEES)       Patient Name: Ron Cam  : 1970  MRN: [id number]  Today's Date: 2024               Admit Date: 2024    Visit Dx:     ICD-10-CM ICD-9-CM   1. Intraparenchymal hemorrhage of brain  I61.9 431   2. Dysarthria  R47.1 784.51   3. Acute left-sided weakness  R53.1 728.87   4. Left sided numbness  R20.0 782.0   5. Hemineglect  R41.4 781.8   6. Hypertensive emergency  I16.1 401.9   7. Nontraumatic subcortical hemorrhage of right cerebral hemisphere  I61.0 431   8. Oropharyngeal dysphagia  R13.12 787.22     Patient Active Problem List   Diagnosis    Nontraumatic intracerebral hemorrhage    Hypertensive crisis     History reviewed. No pertinent past medical history.  History reviewed. No pertinent surgical history.    SLP Recommendation and Plan  SLP Swallowing Diagnosis: mild-moderate, oral dysphagia, mild, pharyngeal dysphagia (24)  SLP Diet Recommendation: soft to chew textures, whole, thin liquids (24)  Recommended Precautions and Strategies: upright posture during/after eating, check mouth frequently for oral residue/pocketing, general aspiration precautions (Place food on stronger side-right) (24)  SLP Rec. for Method of Medication Administration: meds whole, meds crushed, with puree, as tolerated (try smaller pills with thin- progress as tolerated) (24)     Monitor for Signs of Aspiration: notify SLP if any concerns (24)  Recommended Diagnostics: other (see comments) (treat swallowing x 1 week- d/c without repeat swallow study. Diet can be advanced at bedside based on oral skills.) (24)  Swallow Criteria for Skilled Therapeutic Interventions Met: demonstrates skilled criteria (24)  Anticipated Discharge Disposition (SLP): inpatient rehabilitation facility (24)  Rehab  Potential/Prognosis, Swallowing: good, to achieve stated therapy goals (08/05/24 1200)  Therapy Frequency (Swallow): 3 days per week (08/05/24 1200)  Predicted Duration Therapy Intervention (Days): 1 week (08/05/24 1200)  Oral Care Recommendations: Oral Care BID/PRN, Toothbrush (08/05/24 1200)                                        Outcome Evaluation: FEES completed. Diet advanced. Mild to moderate oral dysphagia and mild pharyngeal.  SLP to continue to treat swallowing and advance at bedside as able.      SWALLOW EVALUATION (Last 72 Hours)       SLP Adult Swallow Evaluation       Row Name 08/05/24 1200 08/04/24 1500                Rehab Evaluation    Document Type re-evaluation  -ML re-evaluation  -RS       Subjective Information no complaints  -ML no complaints  -RS       Patient Observations cooperative;agree to therapy  -ML cooperative  -RS       Patient/Family/Caregiver Comments/Observations Wife present  -ML in laws present  -RS       Patient Effort good  -ML good  -RS       Comment c/o difficulty with medication- taking medication crushed  -ML --       Symptoms Noted During/After Treatment none  -ML none  -RS       Oral Care -- oral rinse provided  -RS          General Information    Patient Profile Reviewed yes  -ML --       Pertinent History Of Current Problem Right basal ganglia hemorrhage- FEES recommended to ensure safety with PO and consider diet increase.  -ML --       Current Method of Nutrition honey-thick liquids;soft to chew textures;chopped  -ML --       Prior Level of Function-Swallowing no diet consistency restrictions  Prior to admission  -ML --          Pain    Additional Documentation Pain Scale: Numbers Pre/Post-Treatment (Group)  -ML Pain Scale: FACES Pre/Post-Treatment (Group)  -RS          Pain Scale: Numbers Pre/Post-Treatment    Pretreatment Pain Rating 0/10 - no pain  -ML --       Posttreatment Pain Rating 0/10 - no pain  -ML --          Pain Scale: FACES Pre/Post-Treatment    Pain:  FACES Scale, Pretreatment -- 2-->hurts little bit  -RS       Posttreatment Pain Rating -- 2-->hurts little bit  -RS       Pain Location - -- back  -RS       Pre/Posttreatment Pain Comment -- Repositioned pt in bed  -RS          Oral Motor Structure and Function    Dentition Assessment natural, present and adequate  -ML --       Secretion Management WNL/WFL  -ML --          General Eating/Swallowing Observations    Respiratory Support Currently in Use room air  -ML --       Eating/Swallowing Skills fed by staff/caregiver  -ML --       Positioning During Eating upright 90 degree;upright in bed  -ML --          Clinical Swallow Eval    Oral Prep Phase -- impaired  -RS       Oral Transit -- impaired  -RS       Oral Residue -- WFL  -RS       Pharyngeal Phase -- suspected pharyngeal impairment  -RS       Esophageal Phase -- unremarkable  -RS       Clinical Swallow Evaluation Summary -- Overt oropharyngeal patterns w thin, nectar, and regular solids. Appears to tolerate honey liquids via cup only, and soft/chopped solids w no mixed consistencies. Pt needs instrumental for further pharyngeal assessment, + RLL pna. D/w RN and charge RN  -RS          Oral Prep Concerns    Oral Prep Concerns -- anterior loss  -RS       Anterior Loss -- thin;nectar  -RS          Oral Transit Concerns    Oral Transit Concerns -- increased oral transit time;other (see comments)  pt throws head back into extension w all PO trials, suspect in an attempt to propel boluses toward pharynx  -RS       Increased Oral Transit Time -- all consistencies  -RS          Pharyngeal Phase Concerns    Pharyngeal Phase Concerns -- throat clear  -RS       Throat Clear -- thin;nectar;mixed consistencies  -RS          Fiberoptic Endoscopic Evaluation of Swallowing (FEES)    Risks/Benefits Reviewed risks/benefits explained;patient;family;agreed to eval  -ML --       Nasal Entry right:  -ML --       Scope serial number/identification [id number]  -ML --          Anatomy and  Physiology    Anatomic Considerations no anatomic structural deviation  -ML --       Velopharyngeal WFL  -ML --       Base of Tongue symmetrical  -ML --       Epiglottis WFL  -ML --       Laryngeal Function Breathing symmetrical  -ML --       Laryngeal Function Phonation symmetrical  -ML --       Laryngeal Function to Breath Hold TVF contact  -ML --       Secretion Rating Scale (Ziggy et al. 1996) 0- normal, no visible secretions  -ML --       Ice Chips elicited swallow  -ML --       Spontaneous Swallow frequency adequate  -ML --       Sensory sensed scope  -ML --       Utensils Used Spoon;Cup;Straw  -ML --       Consistencies Trialed ice chips;thin liquids;nectar-thick liquids;soft to chew;regular textures;mixed consistency  -ML --          FEES Interpretation    Oral Phase prolonged manipulation;increased A-P transit time  -ML --          Initiation of Pharyngeal Swallow    Initiation of Pharyngeal Swallow WFL;bolus in valleculae;other (see comments)  Intermittently to the valleculae  -ML --       Pharyngeal Phase impaired pharyngeal phase of swallowing  -ML --       Penetration During the Swallow thin liquids;secondary to reduced vestibular closure  -ML --       Depth of Penetration shallow  Epiglottis tip  -ML --       Response to Penetration No  -ML --       Rosenbek's Scale thin:;2-->Level 2  -ML --       FEES Summary FEES completed. Easily placed nasoendoscope. Normal appearing laryngeal mechanism without pooled secretions or edema.  Mild to moderate oral dysphagia with increased prep time, decreased oral sensation, and intermittent trace premature spill.  Mild pharyngeal impairment with decreased upper airway closure.  High laryngeal penetration with thin liquids during the swallow intermittently. There is no pharyngeal residue or aspiration across trials.  SLP to advanced diet but continued diet modification is necessary due to oral dysphagia.  Ongoing  services for speech.  -ML --          Swallowing  Quality of Life Assessment    Education and counseling provided Signs of aspiration;Aspiration precautions;Compensatory strategy recommendations and rationale  -ML Signs of aspiration;Silent aspiration;Risks of aspiration;Safest diet options;Alternate nutrition/hydration options, risks, and benefits;Oral care recommendations and rationale;Aspiration precautions  -RS          SLP Evaluation Clinical Impression    SLP Swallowing Diagnosis mild-moderate;oral dysphagia;mild;pharyngeal dysphagia  -ML --       Functional Impact risk of malnutrition  -ML --       Rehab Potential/Prognosis, Swallowing good, to achieve stated therapy goals  -ML --       Swallow Criteria for Skilled Therapeutic Interventions Met demonstrates skilled criteria  -ML --          SLP Treatment Clinical Impressions    Treatment Assessment (SLP) -- clinical signs of;aspiration;oral dysphagia;pharyngeal dysphagia;continued;dysarthria  -RS       Treatment Assessment Comments (SLP) -- MD notes reviewed, Surgery has advanced pt to a reg/thin diet. Pt seen for re-assessment. Overt oropharyngeal patterns. Dense L paresis. Uvula deviates significantly to L, intermittent nasal air emission. Pt needs instrumental swallow study, will modify diet for now.  -RS       Daily Summary of Progress (SLP) -- progress toward functional goals is gradual  -RS       Barriers to Overall Progress (SLP) -- Medically complex  -RS       Plan for Continued Treatment (SLP) -- goals adjusted to reflect functional decline demonstrated  -RS       Care Plan Review -- care plan/treatment goals reviewed  -RS       Care Plan Review, Other Participant(s) -- family  -RS          Recommendations    Therapy Frequency (Swallow) 3 days per week  -ML 5 days per week  -RS       Predicted Duration Therapy Intervention (Days) 1 week  -ML 1 week  -RS       SLP Diet Recommendation soft to chew textures;whole;thin liquids  -ML soft to chew textures;chopped;no mixed consistencies;honey thick liquids   -RS       Recommended Diagnostics other (see comments)  treat swallowing x 1 week- d/c without repeat swallow study. Diet can be advanced at bedside based on oral skills.  -ML reassess via [facility] ([facility] [place]  -RS       Recommended Precautions and Strategies upright posture during/after eating;check mouth frequently for oral residue/pocketing;general aspiration precautions  Place food on stronger side-right  -ML upright posture during/after eating;small bites of food and sips of liquid;no straw;general aspiration precautions  -RS       Oral Care Recommendations Oral Care BID/PRN;Toothbrush  -ML Oral Care BID/PRN;Suction toothbrush  -RS       SLP Rec. for Method of Medication Administration meds whole;meds crushed;with puree;as tolerated  try smaller pills with thin- progress as tolerated  -ML meds whole;meds crushed;with puree  -RS       Monitor for Signs of Aspiration notify SLP if any concerns  -ML yes;notify SLP if any concerns  -RS       Anticipated Discharge Disposition (SLP) inpatient rehabilitation facility  -ML inpatient rehabilitation facility  -RS                 User Key  (r) = Recorded By, (t) = Taken By, (c) = Cosigned By      Initials Name Effective Dates    ML Yun Oakley, CCC-SLP 06/16/21 -     RS Domenic Taveras MS CCC-SLP 09/14/23 -                     EDUCATION  The patient has been educated in the following areas:   Dysphagia (Swallowing Impairment) Oral Care/Hydration Modified Diet Instruction FEES purpose/plan .        SLP GOALS       Row Name 08/05/24 1200 08/04/24 1500          (LTG) Patient will demonstrate functional swallow for    Diet Texture (Demonstrate functional swallow) regular textures  -ML regular textures  -RS     Liquid viscosity (Demonstrate functional swallow) thin liquids  -ML thin liquids  -RS     [place] (Demonstrate functional swallow) independently (over 90% accuracy)  -ML independently (over 90% accuracy)  -RS     Time Frame (Demonstrate functional swallow) 1 week  -ML  1 week  -RS     Progress/Outcomes (Demonstrate functional swallow) goal ongoing  -ML goal ongoing  -RS     Comment (Demonstrate functional swallow) Oral dysphagia limited diet to soft to chew/whole/thin/ok for mixed/ok for straw or cup-  -ML --        (STG) Labial Strengthening Goal 1 (SLP)    Activity (Labial Strengthening Goal 1, SLP) increase labial tone  -ML increase labial tone  -RS     Increase Labial Tone labial resistance exercises;swallow trials  -ML labial resistance exercises;swallow trials  -RS     [place]/Accuracy (Labial Strengthening Goal 1, SLP) with minimal cues (75-90% accuracy)  -ML independently (over 90% accuracy)  -RS     Time Frame (Labial Strengthening Goal 1, SLP) short term goal (STG);1 week  -ML 1 week  -RS     Progress/Outcomes (Labial Strengthening Goal 1, SLP) goal revised this date  -ML goal ongoing  -RS     Comment (Labial Strengthening Goal 1, SLP) Assistance needed to execute  labial strengthening exercises  -ML --        (STG) Lingual Strengthening Goal 1 (SLP)    Activity (Lingual Strengthening Goal 1, SLP) increase tongue back strength;increase lingual tone/sensation/control/coordination/movement  -ML --     Increase Lingual Tone/Sensation/Control/Coordination/Movement lingual movement exercises;lingual resistance exercises  -ML --     Increase Tongue Back Strength lingual resistance exercises;swallow trials  -ML --     [place]/Accuracy (Lingual Strengthening Goal 1, SLP) with minimal cues (75-90% accuracy)  -ML --     Time Frame (Lingual Strengthening Goal 1, SLP) short term goal (STG);1 week  -ML --     Progress/Outcomes (Lingual Strengthening Goal 1, SLP) goal revised this date  -ML --        (STG) Pharyngeal Strengthening Exercise Goal 1 (SLP)    Activity (Pharyngeal Strengthening Goal 1, SLP) increase closure at entrance to airway/closure of airway at glottis  -ML --     Increase Closure at Entrance to Airway/Closure of Airway at Glottis supraglottic swallow  -ML  --     [place]/Accuracy (Pharyngeal Strengthening Goal 1, SLP) with minimal cues (75-90% accuracy)  -ML --     Time Frame (Pharyngeal Strengthening Goal 1, SLP) short term goal (STG);1 week  -ML --     Progress/Outcomes (Pharyngeal Strengthening Goal 1, SLP) new goal  -ML --        (STG) Swallow Compensatory Strategies Goal 1 (SLP)    Activity (Swallow Compensatory Strategies/Techniques Goal 1, SLP) compensatory strategies;food/liquid placed on stronger right side;other (see comments)  Lingual/finger sweep/oral care after po  -ML compensatory strategies;food/liquid placed on stronger right side  -RS     [place]/Accuracy (Swallow Compensatory Strategies/Techniques Goal 1, SLP) with minimal cues (75-90% accuracy)  -ML independently (over 90% accuracy)  -RS     Time Frame (Swallow Compensatory Strategies/Techniques Goal 1, SLP) short term goal (STG);1 week  -ML 1 week  -RS     Progress/Outcomes (Swallow Compensatory Strategies/Techniques Goal 1, SLP) goal revised this date  -ML goal ongoing  -RS        Patient will demonstrate functional speech skills for return to discharge environment    [place] -- with minimal cues  -RS     Time frame -- by discharge  -RS     Progress/Outcomes -- goal ongoing  -RS        SLP Diagnostic Treatment     Patient will participate in further assessment in the following areas -- higher-level cognitive-linguistic  -RS     Time Frame (Diagnostic) -- short term goal (STG)  -RS     Progress/Outcomes (Additional Goal 1, SLP) -- goal ongoing  -RS        Articulation Goal 1 (SLP)    Improve Articulation Goal 1 (SLP) -- by over-articulating at phrase level;90%;with minimal cues (75-90%)  -RS     Time Frame (Articulation Goal 1, SLP) -- 1 week  -RS     Progress/Outcomes (Articulation Goal 1, SLP) -- goal ongoing  -RS        Right Hemisphere Function Goal 1 (SLP)    Improve Right Hemisphere Function Through Goal 1 (SLP) -- demonstrate awareness of communication partner in left visual  field;complete visuo-spatial activities (visual closure, trail making, mazes;80%;with minimal cues (75-90%)  -RS     Time Frame (Right Hemisphere Function Goal 1, SLP) -- 1 week  -RS     Progress/Outcomes (Right Hemisphere Function Goal 1, SLP) -- goal ongoing  -RS               User Key  (r) = Recorded By, (t) = Taken By, (c) = Cosigned By      Initials Name Provider Type    Yun Shankar CCC-SLP Speech and Language Pathologist    Domenic Gutiérrez MS CCC-SLP Speech and Language Pathologist                         Time Calculation:    Time Calculation- SLP       Row Name 08/05/24 1350             Time Calculation- SLP    SLP Start Time 1200  -ML      SLP Received On 08/05/24  -ML                User Key  (r) = Recorded By, (t) = Taken By, (c) = Cosigned By      Initials Name Provider Type    Yun Shankar CCC-SLP Speech and Language Pathologist                    Therapy Charges for Today       Code Description Service Date Service Provider Modifiers Qty    [id number]  ST FIBEROPTIC ENDO EVAL [facility] 6 8/5/2024 Yun Oakley CCC-SLP GN 1                 SENTHIL Carias  8/5/2024

## 2024-08-05 NOTE — PROGRESS NOTES
Stroke Neurology Progress Note     Subjective     This patient was seen in follow-up for: right basal ganglia hemorrhage    Subjective:  Patient was observed resting in bed with wife at bedside.  No new complaints.  No acute events overnight.  Blood pressures are still elevated this morning and nurse was giving as needed medications during my exam.  Left-sided weakness continues however some increase in sensation to left upper extremity and left lower extremity has improved      Objective      Temp:  [97.6 °F (36.4 °C)-98.4 °F (36.9 °C)] 98.1 °F (36.7 °C)  Heart Rate:  [60-86] 60  Resp:  [18-22] 18  BP: ()/() 105/73    Objective    Physical Exam  Vitals and nursing note reviewed.   Constitutional:       General: He is awake. He is not in acute distress.  HENT:      Head: Normocephalic.      Mouth/Throat:      Pharynx: Oropharynx is clear.   Eyes:      Extraocular Movements: Extraocular movements intact.      Pupils: Pupils are equal, round, and reactive to light.      Comments: Left homonymous hemianopia   Cardiovascular:      Rate and Rhythm: Normal rate and regular rhythm.   Pulmonary:      Effort: Pulmonary effort is normal. No respiratory distress.   Musculoskeletal:         General: Swelling (L UE) present.      Right lower leg: No edema.      Left lower leg: No edema.   Skin:     General: Skin is warm and dry.      Findings: Bruising present.   Neurological:      Mental Status: He is alert and oriented to person, place, and time.      Cranial Nerves: Cranial nerve deficit and dysarthria present.      Sensory: Sensory deficit present.      Motor: Weakness present.   Psychiatric:         Mood and Affect: Affect is flat.         Speech: Speech is slurred.         Behavior: Behavior normal. Behavior is cooperative.         Cognition and Memory: Cognition and memory normal.       Neurological Exam  Mental Status  Awake and alert. Oriented to person, place, time and situation. Oriented to person, place,  and time. Memory is normal. Moderate dysarthria present. Improving. Language is fluent with no aphasia.    Cranial Nerves  CN II: Vision test: Left homonymous hemianopia. Left homonymous hemianopsia.  CN III, IV, VI: Extraocular movements intact bilaterally. Left ptosis. Pupils equal round and reactive to light bilaterally.  CN V:  Right: Facial sensation is normal.  Left: Diminished sensation of the entire left side of the face.  CN VII:  Left: There is central facial weakness.  CN VIII: Hearing appears to be intact bilaterally.  CN XII: Tongue midline without atrophy or fasciculations.    Motor  Normal muscle bulk throughout. No fasciculations present. Decreased muscle tone.  LUE with 0/5 strength  LLE with 1/5 strength  RUE/RLE with full range of motion, 5/5 strength.    Sensory  Light touch abnormality: Left hemisensory loss.     Coordination  Left: Unable to assess secondary to weakness. Unable to assess secondary to weakness.  No obvious dysmetria noted in right side.    Gait    Not observed.    Interval: baseline  1a. Level of Consciousness: 0-->Alert, keenly responsive  1b. LOC Questions: 0-->Answers both questions correctly  1c. LOC Commands: 0-->Performs both tasks correctly  2. Best Gaze: 0-->Normal  3. Visual: 1-->Partial hemianopia  4. Facial Palsy: 2-->Partial paralysis (total or near-total paralysis of lower face)  5a. Motor Arm, Left: 2-->Some effort against gravity, limb cannot get to or maintain (if cued) 90 (or 45) degrees, drifts down to bed, but has some effort against gravity  5b. Motor Arm, Right: 0-->No drift, limb holds 90 (or 45) degrees for full 10 secs  6a. Motor Leg, Left: 3-->No effort against gravity, leg falls to bed immediately  6b. Motor Leg, Right: 0-->No drift, leg holds 30 degree position for full 5 secs  7. Limb Ataxia: 0-->Absent  8. Sensory: 1-->Mild-to-moderate sensory loss, patient feels pinprick is less sharp or is dull on the affected side, or there is a loss of  superficial pain with pinprick, but patient is aware of being touched  9. Best Language: 0-->No aphasia, normal  10. Dysarthria: 1-->Mild-to-moderate dysarthria, patient slurs at least some words and, at worst, can be understood with some difficulty  11. Extinction and Inattention (formerly Neglect): 1-->Visual, tactile, auditory, spatial, or personal inattention or extinction to bilateral simultaneous stimulation in one of the sensory modalities    Total (NIH Stroke Scale): 11     Results Review:      All brain images and reports were personally reviewed and I agree with the interpretations except as noted below.          EEG    Result Date: 8/1/2024  Focal cerebral dysfunction impacting the right hemisphere, superimposed upon diffuse cerebral dysfunction of more mild degree The above finding is consistent with a known right hemispheric stroke No evidence for epilepsy is seen This report is transcribed using the Dragon dictation system.  ]     CT Head Without Contrast Stroke Protocol    Result Date: 8/1/2024  Impression: No significant interval change from prior exam, with similar size and associated mass effect of the right basal ganglia hematoma, as described above. Electronically Signed: Bernabe Akers MD  8/1/2024 12:00 PM EDT  Workstation ID: [id number]    CT Abdomen Pelvis With Contrast    Result Date: 8/1/2024  Impression: 1. Small effusions and bilateral lower lobe airspace disease favored to be secondary to atelectasis. 2. Air-filled and fluid-filled distended loops of small bowel throughout the abdomen with the tract to normal caliber small bowel within the mid pelvis anteriorly. Findings would be consistent with partial small bowel obstruction. 3. 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 There is no evidence of small bowel volvulus. Electronically Signed: Nitin Gimenez MD   8/1/2024 8:46 AM EDT  Workstation ID: [id number]    CT head 7/29/2024  Impression: 1. Stable right basal ganglia hemorrhage with surrounding edema and mass effect. 2. Stable mild chronic small vessel ischemic change and chronic lacunar infarct in the right central liz.    CT head 7/28/2024  Impression: 1. Stable size of right basal ganglia intraparenchymal hemorrhage with increased surrounding vasogenic edema and slightly increased mass effect with 4 mm leftward shift of midline structures. 2. Stable mild chronic small vessel ischemic change and chronic lacunar infarct in the left basal ganglia.    CT Head Without Contrast 7/26/2024  Impression: Interval increase in intraparenchymal hemorrhage centered in the right basal ganglia with mild increase in mass effect on the adjacent lateral ventricles and minimal change of the basilar and suprasellar cisterns.     CT Angiogram Head and Neck 7/26/2024  Impression: 1. No evidence of intracranial large vessel arterial occlusion or aneurysm 2. Ulcerated plaque in the left distal common carotid and proximal internal carotid arteries that results in less than 50% luminal diameter stenosis 3. Patent right carotid and bilateral vertebral arteries without significant stenosis or dissection.    CT Head Without Contrast 7/26/2024  Impression: 1. 4 x 3 x 3.5 cm right basal ganglia and periventricular white matter parenchymal hematoma with mass effect as described 2. No evidence of major vascular territory brain ischemia 3. Chronic small vessel ischemic changes.    Transthoracic echocardiogram 7/26/2024  Impression:    Left ventricular systolic function is normal. Estimated left ventricular EF = 65%    The cardiac valves are anatomically and functionally normal.    Saline test results are negative.      A1c 5.50    WBC 8.04, downtrending   H/H 12.8/37.8  Platelets 194  Glucose 95  Cretinine 0.88, BUN 19  Na+ 136  AST 16  ALT 28    Assessment/Plan     Assessment:    This is a 53  yo male with PMH of migraines who presented to [facility] ED via EMS on 7/26/2024 with complaints of left sided weakness, sensory loss, dysarthria and visual disturbance;  NIH noted to be 17.  CT head without contrast revealed a 4 x 3 x 3.5 right basal ganglia hemorrhage.  CT angiogram without evidence of LVO.  Ulcerated plaque observed within the left distal common carotid.  He was not a candidate for IV thrombolytic therapy given presence of ICH and was not deemed a candidate for neurosurgical intervention.  He was admitted to the neurological ICU for further monitoring and work-up.     Antiplatelet PTA: None  Anticoagulation PTA: None    # Right basal ganglia hemorrhage, etiology likely hypertensive  -Hemorrhagic stroke order set is currently in place  -ICH score 0   -/112 on admission   -Neurosurgery evaluated, non-surgical  -Hold all antiplatelet medications at least 2 weeks from index event   -Most recent CT head stable  -PT OT recommend IRF, CM following for DC needs, plan for [facility]  -OK for enoxaparin for DVT prophylaxis   -Follow-up in stroke clinic in 4 weeks after DC (added to ADT)  -Follow-up with neuro-ophthalmology for formal visual field testing; no driving until cleared from them; discussed with patient  -Stroke Education     # Extracranial atherosclerotic disease  -Eventually, we will need to treat extracranial atherosclerotic disease with aspirin but would wait at least 2 weeks post-hemorrhage (8/9/2024)      # Essential hypertension  -SBP < 140.    -Continue clonidine, hydralazine, metoprolol   -increasing ACE   -Utilize PRN medications as needed.  -Management per primary team.    # Hyperlipidemia  -LDL on admission 140, goal <70 for secondary stroke prevention  -Continue atorvastatin 80 mg daily ()    Disposition: Anticipate discharge to inpatient rehab facility once medically appropriate with blood pressure controled.     Plan discussed with Hospital Medicine as well as the patient.  Stroke  neurology will continue to follow until discharge. Please call with any questions or concerns.    Saman Zarco PA-C,   [facility] STROKE NEURO  08/05/24  15:10 EDT

## 2024-08-05 NOTE — THERAPY TREATMENT NOTE
Patient Name: Ron Cam  : 1970    MRN: [id number]                              Today's Date: 2024       Admit Date: 2024    Visit Dx:     ICD-10-CM ICD-9-CM   1. Intraparenchymal hemorrhage of brain  I61.9 431   2. Dysarthria  R47.1 784.51   3. Acute left-sided weakness  R53.1 728.87   4. Left sided numbness  R20.0 782.0   5. Hemineglect  R41.4 781.8   6. Hypertensive emergency  I16.1 401.9   7. Nontraumatic subcortical hemorrhage of right cerebral hemisphere  I61.0 431   8. Oropharyngeal dysphagia  R13.12 787.22     Patient Active Problem List   Diagnosis    Nontraumatic intracerebral hemorrhage    Hypertensive crisis     History reviewed. No pertinent past medical history.  History reviewed. No pertinent surgical history.   General Information       Row Name 24 1349          OT Time and Intention    Document Type therapy note (daily note)  -     Mode of Treatment occupational therapy  -       Row Name 24 134          General Information    Patient Profile Reviewed yes  -     Prior Level of Function --  See IE  -     Existing Precautions/Restrictions fall;other (see comments)  L sided perceptual deficits, L sided inattention, L sided weakness  -     Barriers to Rehab medically complex;cognitive status  -       Row Name 24 1348          Living Environment    People in Home spouse  -       Row Name 24 1349          Cognition    Orientation Status (Cognition) oriented x 3  -       Row Name 24 1349          Safety Issues, Functional Mobility    Safety Issues Affecting Function (Mobility) awareness of need for assistance;insight into deficits/self-awareness;safety precaution awareness;safety precautions follow-through/compliance;sequencing abilities  -     Impairments Affecting Function (Mobility) balance;coordination;endurance/activity tolerance;muscle tone abnormal;postural/trunk control;sensation/sensory awareness;strength  -     Cognitive  Impairments, Mobility Safety/Performance awareness, need for assistance;insight into deficits/self-awareness;problem-solving/reasoning;safety precaution awareness;safety precaution follow-through;sequencing abilities  -     Comment, Safety Issues/Impairments (Mobility) L inattention  -               User Key  (r) = Recorded By, (t) = Taken By, (c) = Cosigned By      Initials Name Provider Type     Josie Bridges OT Occupational Therapist                     Mobility/ADL's       Row Name 08/05/24 1410          Bed Mobility    Bed Mobility scooting/bridging;supine-sit  -     Scooting/Bridging [place] (Bed Mobility) verbal cues;moderate assist (50% patient effort);2 person assist  -     Supine-Sit [place] (Bed Mobility) maximum assist (25% patient effort);2 person assist;verbal cues  -     Assistive Device (Bed Mobility) bed rails;head of bed elevated  -     Comment, (Bed Mobility) VC's for R hand placement to assist with mobility. Assist to bring L LE to EOB and trunk into upright position.  -       Row Name 08/05/24 1410          Transfers    Transfers sit-stand transfer;bed-chair transfer  -     Comment, (Transfers) VC's for upright posture, B LE's blocked.  -       Row Name 08/05/24 1410          Bed-Chair Transfer    Bed-Chair [place] (Transfers) maximum assist (25% patient effort);2 person assist;verbal cues  -     Assistive Device (Bed-Chair Transfers) other (see comments)  B UE support  -       Row Name 08/05/24 1410          Sit-Stand Transfer    Sit-Stand [place] (Transfers) maximum assist (25% patient effort);2 person assist;verbal cues  -     Assistive Device (Sit-Stand Transfers) other (see comments)  B UE support  -       Row Name 08/05/24 1410          Activities of Daily Living    BADL Assessment/Intervention bathing;upper body dressing;lower body dressing  -       Row Name 08/05/24 1410          Lower Body Dressing Assessment/Training    [place]  Level (Lower Body Dressing) lower body dressing skills;dependent (less than 25% patient effort)  -       Row Name 08/05/24 1410          Bathing Assessment/Intervention    [place] Level (Bathing) lower body;proximal lower extremities;perineal area;dependent (less than 25% patient effort)  -     Position (Bathing) supported standing  -     Comment, (Bathing) Pt. required clean up. Increased time needed to completed.  -       Row Name 08/05/24 1410          Upper Body Dressing Assessment/Training    [place] Level (Upper Body Dressing) don;pajama/robe;maximum assist (25% patient effort)  -     Position (Upper Body Dressing) edge of bed sitting  -               User Key  (r) = Recorded By, (t) = Taken By, (c) = Cosigned By      Initials Name Provider Type    Josie Pickett OT Occupational Therapist                   Obj/Interventions       Row Name 08/05/24 1420          Balance    Balance Assessment sitting static balance;sitting dynamic balance;standing static balance  -     Static Sitting Balance minimal assist;verbal cues  -     Position, Sitting Balance sitting in chair;unsupported  -     Static Standing Balance maximum assist;1 person to manage equipment;verbal cues;non-verbal cues (demo/gesture)  -     Dynamic Standing Balance maximum assist;2-person assist;verbal cues  -     Position/Device Used, Standing Balance supported;other (see comments)  UE support  -     Balance Interventions sitting;standing;sit to stand;supported;weight shifting activity;static;dynamic  -     Comment, Balance Tactile and verbal cues to sustain midline.  -               User Key  (r) = Recorded By, (t) = Taken By, (c) = Cosigned By      Initials Name Provider Type    Josie Pickett OT Occupational Therapist                   Goals/Plan    No documentation.                  Clinical Impression       Row Name 08/05/24 1421          Pain Assessment    Pretreatment Pain Rating 0/10 - no pain   -     Posttreatment Pain Rating 0/10 - no pain  -     Pain Intervention(s) Repositioned;Ambulation/increased activity  -     Additional Documentation Pain Scale: Word Pre/Post-Treatment (Group)  -       Row Name 08/05/24 1421          Plan of Care Review    Plan of Care Reviewed With patient;spouse  -     Progress improving  -     Outcome Evaluation Pt. progressing towards baseline with ADLs and functional mobility. Continues to be limited by L sided weakness/inattention, impaired balance, and decreased activity tolerance. Completed STS and SPT with Max A x 2. DEP for LBB and Max A for UBD. Will continue to progress as able. Recommend IPR at discharge.  -       Row Name 08/05/24 1421          Positioning and Restraints    Pre-Treatment Position in bed  -     Post Treatment Position chair  -     In Chair notified nsg;reclined;call light within reach;encouraged to call for assist;exit alarm on;with PT;waffle cushion;legs elevated;on mechanical lift sling  -               User Key  (r) = Recorded By, (t) = Taken By, (c) = Cosigned By      Initials Name Provider Type     Josie Bridges, OT Occupational Therapist                   Outcome Measures       Row Name 08/05/24 1427          How much help from another is currently needed...    Putting on and taking off regular lower body clothing? 1  -LC     Bathing (including washing, rinsing, and drying) 2  -LC     Toileting (which includes using toilet bed pan or urinal) 1  -LC     Putting on and taking off regular upper body clothing 2  -LC     Taking care of personal grooming (such as brushing teeth) 2  -LC     Eating meals 3  -LC     AM-PAC 6 Clicks Score (OT) 11  -       Row Name 08/05/24 1408 08/05/24 0800       How much help from another person do you currently need...    Turning from your back to your side while in flat bed without using bedrails? 2  -KR 2  -TP    Moving from lying on back to sitting on the side of a flat bed without bedrails?  2  -KR 2  -TP    Moving to and from a bed to a chair (including a wheelchair)? 2  -KR 1  -TP    Standing up from a chair using your arms (e.g., wheelchair, bedside chair)? 2  -KR 1  -TP    Climbing 3-5 steps with a railing? 1  -KR 1  -TP    To walk in hospital room? 1  -KR 1  -TP    AM-PAC 6 Clicks Score (PT) 10  -KR 8  -TP    Highest Level of Mobility Goal 4 --> Transfer to chair/commode  -KR 3 --> Sit at edge of bed  -TP              User Key  (r) = Recorded By, (t) = Taken By, (c) = Cosigned By      Initials Name Provider Type    LC Josie Bridges, OT Occupational Therapist    Mariposa Villeda, PT Physical Therapist    Agueda Kennedy, RN Registered Nurse                    Occupational Therapy Education       Title: PT OT SLP Therapies (In Progress)       Topic: Occupational Therapy (In Progress)       Point: ADL training (In Progress)       Description:   Instruct learner(s) on proper safety adaptation and remediation techniques during self care or transfers.   Instruct in proper use of assistive devices.                  Learning Progress Summary             Patient Acceptance, E, NR by RANDY at 8/5/2024 1310    Acceptance, E, NR by CS at 7/29/2024 1242    Acceptance, E, NR by  at 7/27/2024 0920    Comment: role of therapy                         Point: Home exercise program (In Progress)       Description:   Instruct learner(s) on appropriate technique for monitoring, assisting and/or progressing therapeutic exercises/activities.                  Learning Progress Summary             Patient Acceptance, E, NR by AIDAN at 7/29/2024 1242    Acceptance, E, NR by  at 7/27/2024 0920    Comment: role of therapy                         Point: Precautions (In Progress)       Description:   Instruct learner(s) on prescribed precautions during self-care and functional transfers.                  Learning Progress Summary             Patient Acceptance, E, NR by RANDY at 8/5/2024 1310    Acceptance, E, NR by CS at  7/29/2024 1242    Acceptance, E, NR by  at 7/27/2024 0920    Comment: role of therapy                         Point: Body mechanics (In Progress)       Description:   Instruct learner(s) on proper positioning and spine alignment during self-care, functional mobility activities and/or exercises.                  Learning Progress Summary             Patient Acceptance, E, NR by  at 8/5/2024 1310    Acceptance, E, NR by  at 7/29/2024 1242    Acceptance, E, NR by  at 7/27/2024 0920    Comment: role of therapy                                         User Key       Initials Effective Dates Name Provider Type Discipline     02/03/23 -  Sultana Garner, OT Occupational Therapist OT     09/02/21 -  Odalis Foss, OT Occupational Therapist OT     06/16/21 -  Josie Bridges, OT Occupational Therapist OT                  OT Recommendation and Plan     Plan of Care Review  Plan of Care Reviewed With: patient, spouse  Progress: improving  Outcome Evaluation: Pt. progressing towards baseline with ADLs and functional mobility. Continues to be limited by L sided weakness/inattention, impaired balance, and decreased activity tolerance. Completed STS and SPT with Max A x 2. DEP for LBB and Max A for UBD. Will continue to progress as able. Recommend IPR at discharge.     Time Calculation:         Time Calculation- OT       Row Name 08/05/24 1428             Time Calculation- OT    OT Start Time 1310  -      OT Received On 08/05/24  -      OT Goal Re-Cert Due Date 08/06/24  -         Timed Charges    69719 - OT Self Care/Mgmt Minutes 18  -LC         Total Minutes    Timed Charges Total Minutes 18  -LC       Total Minutes 18  -LC                User Key  (r) = Recorded By, (t) = Taken By, (c) = Cosigned By      Initials Name Provider Type     Josie Bridges, OT Occupational Therapist                  Therapy Charges for Today       Code Description Service Date Service Provider Modifiers Qty    [id number]  OT  SELF CARE/MGMT/TRAIN EA 15 MIN 8/5/2024 Josie Bridges, OT GO 1                 Josie Bridges, OT  8/5/2024

## 2024-08-05 NOTE — PROGRESS NOTES
[facility] Medicine Services  PROGRESS NOTE    Patient Name: Ron Cam  : 1970  MRN: [id number]    Date of Admission: 2024  Primary Care Physician: Provider, No Known    Subjective   Subjective     CC: CVA    HPI:Up in bed. Notes knee arthritis. Tolerating PO. No n/v. On RA.     Objective   Objective     Vital Signs:   Temp:  [97.6 °F (36.4 °C)-98.2 °F (36.8 °C)] 98.2 °F (36.8 °C)  Heart Rate:  [60-86] 76  Resp:  [20-22] 22  BP: (106-183)/() 154/96     Physical Exam:  NAD, alert  OP clear, dry MM  Neck supple  No LAD  RRR  Decreased at bases  +BS, soft  Flat  Dysarthria, L weakness, L facial droop    Results Reviewed:  LAB RESULTS:      Lab 24  1046 24  0741 24  0435 24  0529 24  0505   WBC 8.61 8.04 14.02* 10.75 10.23   HEMOGLOBIN 12.8* 12.8* 13.9 13.5 12.5*   HEMATOCRIT 37.8 37.8 39.7 38.3 37.1*   PLATELETS 213 194 262 197 167   NEUTROS ABS  --   --   --  9.11* 7.66*   IMMATURE GRANS (ABS)  --   --   --  0.03 0.04   LYMPHS ABS  --   --   --  0.71 1.33   MONOS ABS  --   --   --  0.85 0.99*   EOS ABS  --   --   --  0.02 0.16   MCV 91.3 91.1 89.4 89.7 93.0   PROCALCITONIN  --   --  0.10  --   --    LACTATE  --   --  1.3  --   --          Lab 24  1046 24  0741 24  0435 24  1605 24  0529 24  0505   SODIUM 135* 136 135* 129* 133* 138   POTASSIUM 4.0 3.9 3.8  --  3.9 3.8  3.8   CHLORIDE 100 99 98  --  98 105   CO2 25.0 24.0 26.0  --  21.0* 19.0*   ANION GAP 10.0 13.0 11.0  --  14.0 14.0   BUN 13 19 19  --  17 13   CREATININE 0.88 0.88 0.93  --  0.85 0.92   EGFR 102.8 102.8 98.2  --  103.9 99.5   GLUCOSE 122* 93 146*  --  155* 119*   CALCIUM 8.1* 8.2* 9.1  --  8.7 8.5*   MAGNESIUM 1.9 2.5 2.2  --  1.9  --    PHOSPHORUS 2.9 3.0 3.9  --   --   --          Lab 24  0435   TOTAL PROTEIN 6.7   ALBUMIN 3.7   GLOBULIN 3.0   ALT (SGPT) 28   AST (SGOT) 16   BILIRUBIN 0.5   ALK PHOS 64                          Lab 08/01/24  1234   PH, ARTERIAL 7.496*   PCO2, ARTERIAL 35.5   PO2 .0*   FIO2 30   HCO3 ART 27.4*   BASE EXCESS ART 4.2*   CARBOXYHEMOGLOBIN 1.2     Brief Urine Lab Results  (Last result in the past 365 days)        Color   Clarity   Blood   Leuk Est   Nitrite   Protein   CREAT   Urine HCG        08/01/24 1752 Yellow   Clear   Negative   Negative   Negative   Negative                   Microbiology Results Abnormal       Procedure Component Value - Date/Time    Blood Culture - Blood, Arm, Left [[id number]]  (Normal) Collected: 08/01/24 0427    Lab Status: Preliminary result Specimen: Blood from Arm, Left Updated: 08/05/24 0500     Blood Culture No growth at 4 days    Blood Culture - Blood, Arm, Left [[id number]]  (Normal) Collected: 08/01/24 0430    Lab Status: Preliminary result Specimen: Blood from Arm, Left Updated: 08/05/24 0500     Blood Culture No growth at 4 days            No radiology results from the last 24 hrs    Results for orders placed during the hospital encounter of 07/26/24    Adult Transthoracic Echo Complete W/ Cont if Necessary Per Protocol (With Agitated Saline)    Interpretation Summary    Left ventricular systolic function is normal. Estimated left ventricular EF = 65%    The cardiac valves are anatomically and functionally normal.    Saline test results are negative.      Current medications:  Scheduled Meds:amLODIPine, 10 mg, Oral, Q24H  atorvastatin, 80 mg, Oral, Nightly  bisacodyl, 10 mg, Oral, Daily  cefTRIAXone, 2,000 mg, Intravenous, Daily  cloNIDine, 0.3 mg, Oral, Q12H  docusate sodium, 100 mg, Oral, BID  doxycycline, 100 mg, Intravenous, Q12H  enoxaparin, 40 mg, Subcutaneous, Q24H  famotidine, 20 mg, Oral, BID AC  hydrALAZINE, 100 mg, Oral, Q8H  lactobacillus acidophilus, 1 capsule, Oral, Daily  lisinopril, 20 mg, Oral, Q24H  metoclopramide, 10 mg, Intravenous, Q6H  metoprolol tartrate, 75 mg, Oral, Q12H  sodium chloride, 10 mL, Intravenous, Q12H      Continuous Infusions:      PRN Meds:.  acetaminophen    calcium carbonate    Calcium Replacement - Follow Nurse / BPA Driven Protocol    hydrALAZINE    labetalol    Magnesium Standard Dose Replacement - Follow Nurse / BPA Driven Protocol    ondansetron    Phosphorus Replacement - Follow Nurse / BPA Driven Protocol    Potassium Replacement - Follow Nurse / BPA Driven Protocol    prochlorperazine    sodium chloride    sodium chloride    Assessment & Plan   Assessment & Plan     Active Hospital Problems    Diagnosis  POA    **Nontraumatic intracerebral hemorrhage [I61.9]  Yes    Hypertensive crisis [I16.9]  Yes      Resolved Hospital Problems   No resolved problems to display.        Brief Hospital Course to date:  Ron Cam is a 53 y.o. male with history of migraines, who presented via EMS on 5/26 with left-sided weakness, sensory loss, dysarthria, visual disturbance.  NIH noted to be 17.  CT head showed 4 x 3 x 3.5 right basal ganglia hemorrhage.  CT angio without evidence of large vessel occlusion.  Ulcerated plaque observed within the left distal common carotid.  He was deemed not to be a surgical candidate.  He was monitored in the neurological ICU.  Stroke neurology consulted on admission.  Transferred to the floor service on 8/1.    Right basal ganglia hemorrhage, etiology likely hypertensive  -Neurology following  -Neurosurgery evaluated, deemed nonsurgical  -CT head stable   -PT/OT recommend IRF  -Follow-up with the stroke clinic 4 weeks after discharge  -Continue neurochecks, telemetry    Ileus  -resolved, tolerating PO    Right lower lobe pneumonia  -Ceftriaxone, doxycycline, probiotic  -on RA, completing regimen    Extracranial atherosclerotic disease  -Eventually needs aspirin, but will wait 2 weeks post hemorrhage, 8/9/2024 before starting, per recommendatoins    HTN  -Goal SBP less than 140  -Continue clonidine, hydralazine, metoprolol  -increasing ACE today    HLD  -Continue atorvastatin    Altered mental status, likely  secondary to lack of sleep and new pneumonia, now resolved  -resolved    Expected Discharge Location and Transportation: IRF  Expected Discharge   Expected Discharge Date: 8/5/2024; Expected Discharge Time:      VTE Prophylaxis:  Pharmacologic & mechanical VTE prophylaxis orders are present.         AM-PAC 6 Clicks Score (PT): 7 (08/04/24 0800)    CODE STATUS:   Code Status and Medical Interventions: CPR (Attempt to Resuscitate); Full Support; discussed with wife; patient lacked capacity at time of discussion of code status on 8/1   Ordered at: 08/01/24 1203     Level Of Support Discussed With:    Next of Kin (If No Surrogate)     Code Status (Patient has no pulse and is not breathing):    CPR (Attempt to Resuscitate)     Medical Interventions (Patient has pulse or is breathing):    Full Support     Comments:    discussed with wife; patient lacked capacity at time of discussion of code status on 8/1       Jose Valentine MD  08/05/24

## 2024-08-05 NOTE — THERAPY PROGRESS REPORT/RE-CERT
Patient Name: Ron Cam  : 1970    MRN: [id number]                              Today's Date: 2024       Admit Date: 2024    Visit Dx:     ICD-10-CM ICD-9-CM   1. Intraparenchymal hemorrhage of brain  I61.9 431   2. Dysarthria  R47.1 784.51   3. Acute left-sided weakness  R53.1 728.87   4. Left sided numbness  R20.0 782.0   5. Hemineglect  R41.4 781.8   6. Hypertensive emergency  I16.1 401.9   7. Nontraumatic subcortical hemorrhage of right cerebral hemisphere  I61.0 431   8. Oropharyngeal dysphagia  R13.12 787.22     Patient Active Problem List   Diagnosis    Nontraumatic intracerebral hemorrhage    Hypertensive crisis     History reviewed. No pertinent past medical history.  History reviewed. No pertinent surgical history.   General Information       Row Name 24 [facility]2          Physical Therapy Time and Intention    Document Type progress note/recertification  -KR     Mode of Treatment physical therapy  -KR       Row Name 24 [facility]4          General Information    Patient Profile Reviewed yes  -KR     Existing Precautions/Restrictions fall;other (see comments)  L sided perceptual deficits, L sided inattention, L sided weakness  -KR     Barriers to Rehab medically complex;cognitive status  -KR       Row Name 24 4208          Cognition    Orientation Status (Cognition) oriented x 3  -KR       Row Name 24 [facility]0          Safety Issues, Functional Mobility    Safety Issues Affecting Function (Mobility) insight into deficits/self-awareness;awareness of need for assistance;safety precaution awareness;problem-solving;sequencing abilities;safety precautions follow-through/compliance  -KR     Impairments Affecting Function (Mobility) balance;coordination;endurance/activity tolerance;muscle tone abnormal;postural/trunk control;sensation/sensory awareness;strength  -KR     Cognitive Impairments, Mobility Safety/Performance awareness, need for assistance;insight into  deficits/self-awareness;problem-solving/reasoning;sequencing abilities;safety precaution follow-through;safety precaution awareness;attention  -KR     Comment, Safety Issues/Impairments (Mobility) L inattention  -KR               User Key  (r) = Recorded By, (t) = Taken By, (c) = Cosigned By      Initials Name Provider Type    Mariposa Villeda, DEMARCUS Physical Therapist                   Mobility       Row Name 08/05/24 1350          Bed Mobility    Bed Mobility supine-sit  -KR     Supine-Sit [place] (Bed Mobility) maximum assist (25% patient effort);2 person assist  -KR     Assistive Device (Bed Mobility) bed rails;head of bed elevated  -KR     Comment, (Bed Mobility) assist for LLE progression towards EOB. Cues for R hand placement on bedrail. Assist req'd at trunk.  -KR       Row Name 08/05/24 6078          Bed-Chair Transfer    Bed-Chair [place] (Transfers) maximum assist (25% patient effort);2 person assist  -KR     Assistive Device (Bed-Chair Transfers) other (see comments)  RUE support  -KR     Comment, (Bed-Chair Transfer) stand-pivot towards R with blocking at L knee to prevent buckling.  -KR       Row Name 08/05/24 9866          Sit-Stand Transfer    Sit-Stand [place] (Transfers) maximum assist (25% patient effort);2 person assist;verbal cues  -KR     Assistive Device (Sit-Stand Transfers) other (see comments)  RUE support  -KR     Comment, (Sit-Stand Transfer) 1x form bed, 2x from chair with RUE support. Cues for rocking for momentum and upright posture upon standing. Blocking at LLE to prevent knee buckling and hip adduction. Pt maintained final standing trial for ~1 minute with visual cues via mirror anteriorly for midline orientation. Heavy L lateral lean, able to correct temporarily following verbal and visual cues. MaxA to maintain static standing.  -KR       Row Name 08/05/24 6818          Gait/Stairs (Locomotion)    [place] Level (Gait) unable to assess  -KR               User Key   (r) = Recorded By, (t) = Taken By, (c) = Cosigned By      Initials Name Provider Type    KR Mariposa Landa PT Physical Therapist                   Obj/Interventions       Row Name 08/05/24 1402          Motor Skills    Therapeutic Exercise hip;knee;ankle  -       Row Name 08/05/24 1402          Hip (Therapeutic Exercise)    Hip (Therapeutic Exercise) AAROM (active assistive range of motion)  -     Hip AAROM (Therapeutic Exercise) 10 repetitions;left;external rotation;internal rotation;other (see comments)  tapping at hip IR/ERs  -       Row Name 08/05/24 1402          Knee (Therapeutic Exercise)    Knee (Therapeutic Exercise) strengthening exercise;isometric exercises  -     Knee Isometrics (Therapeutic Exercise) 2 sets;5 repetitions;left;quad sets  tapping at L quad  -     Knee Strengthening (Therapeutic Exercise) 2 sets;5 repetitions;left;LAQ (long arc quad)  tapping at L quad  -       Row Name 08/05/24 1402          Ankle (Therapeutic Exercise)    Ankle (Therapeutic Exercise) PROM (passive range of motion)  -     Ankle PROM (Therapeutic Exercise) 5 repetitions;left;dorsiflexion;plantarflexion;other (see comments)  tapping at L ant. tib and gastroc, no AROM noted this date.  -       Row Name 08/05/24 1402          Balance    Balance Assessment sitting static balance;standing static balance;standing dynamic balance  -     Static Sitting Balance minimal assist;1-person assist;other (see comments)  able to achieve brief periods (5 seconds) of SBA for unsupported sitting balance at EOC following verbal and visual cueing.  -KR     Position, Sitting Balance unsupported;sitting in chair  -KR     Static Standing Balance maximum assist;1-person assist;verbal cues;non-verbal cues (demo/gesture)  -KR     Dynamic Standing Balance maximum assist;2-person assist  -KR     Position/Device Used, Standing Balance supported;other (see comments)  RUE support  -     Balance Interventions sitting;sit to  stand;standing;supported;static;dynamic  -KR     Comment, Balance 5x modified chair Leo with visual and verbal cueing for midline orientation 2/2 L lateral lean. Cues for upright posture, scap retraction, and midline. 10x scap retractions in unsupported sitting.  -KR               User Key  (r) = Recorded By, (t) = Taken By, (c) = Cosigned By      Initials Name Provider Type    Mariposa Villeda, PT Physical Therapist                   Goals/Plan       Row Name 08/05/24 1409          Bed Mobility Goal 1 (PT)    Activity/Assistive Device (Bed Mobility Goal 1, PT) sit to supine/supine to sit  -KR     [place] Level/Cues Needed (Bed Mobility Goal 1, PT) moderate assist (50-74% patient effort)  -KR     Time Frame (Bed Mobility Goal 1, PT) short term goal (STG);1 week  -KR     Progress/Outcomes (Bed Mobility Goal 1, PT) goal revised this date;progress slower than expected  -KR       Row Name 08/05/24 1409          Transfer Goal 1 (PT)    Activity/Assistive Device (Transfer Goal 1, PT) sit-to-stand/stand-to-sit  -KR     [place] Level/Cues Needed (Transfer Goal 1, PT) maximum assist (25-49% patient effort)  -KR     Time Frame (Transfer Goal 1, PT) 2 weeks;long term goal (LTG)  -KR     Progress/Outcome (Transfer Goal 1, PT) goal ongoing;progress slower than expected  -KR       Row Name 08/05/24 1409          Gait Training Goal 1 (PT)    Activity/Assistive Device (Gait Training Goal 1, PT) gait (walking locomotion);assistive device use  -KR     [place] Level (Gait Training Goal 1, PT) maximum assist (25-49% patient effort)  -KR     Distance (Gait Training Goal 1, PT) 10  -KR     Time Frame (Gait Training Goal 1, PT) 2 weeks;long term goal (LTG)  -KR     Progress/Outcome (Gait Training Goal 1, PT) goal ongoing;progress slower than expected  -KR       Row Name 08/05/24 1409          Therapy Assessment/Plan (PT)    Planned Therapy Interventions (PT) balance training;bed mobility training;gait training;home  exercise program;neuromuscular re-education;patient/family education;postural re-education;transfer training;joint mobilization;stretching;strengthening;stair training;ROM (range of motion);motor coordination training  -KR               User Key  (r) = Recorded By, (t) = Taken By, (c) = Cosigned By      Initials Name Provider Type    KR Mariposa Landa, PT Physical Therapist                   Clinical Impression       Row Name 08/05/24 1405          Pain    Pretreatment Pain Rating 0/10 - no pain  -KR     Posttreatment Pain Rating 0/10 - no pain  -KR       Row Name 08/05/24 1405          Plan of Care Review    Plan of Care Reviewed With patient;spouse  -KR     Progress improving  -KR     Outcome Evaluation Pt able to perform stand-pivot transfer bed to chair this date with 2-person assist. Strong L quad contraction noted with LE therex with neuromuscular tapping. Education provided re: neuroplasticity and attention to L. Goals updated to reflect pt's current level of function and anticipated progress. Pt will continue to benefit from PT to address ongoing strength, balance, endurance, and L attention deficits. Pt excellent candidate for IRF.  -KR       [facility] Name 08/05/24 1405          Therapy Assessment/Plan (PT)    Rehab Potential (PT) good, to achieve stated therapy goals  -KR     Criteria for Skilled Interventions Met (PT) yes  -KR     Therapy Frequency (PT) daily  -KR       Row Name 08/05/24 1405          Vital Signs    Pre Patient Position Supine  -KR     Intra Patient Position Standing  -KR     Post Patient Position Sitting  -KR       [facility] Name 08/05/24 1405          Positioning and Restraints    Pre-Treatment Position in bed  -KR     Post Treatment Position chair  -KR     In Chair notified nsg;reclined;call light within reach;encouraged to call for assist;exit alarm on;waffle cushion;LUE elevated;on mechanical lift sling;legs elevated;with family/caregiver  -KR               User Key  (r) = Recorded By, (t) =  Taken By, (c) = Cosigned By      Initials Name Provider Type    Mariposa Villeda PT Physical Therapist                   Outcome Measures       Row Name 08/05/24 1408 08/05/24 0800       How much help from another person do you currently need...    Turning from your back to your side while in flat bed without using bedrails? 2  -KR 2  -TP    Moving from lying on back to sitting on the side of a flat bed without bedrails? 2  -KR 2  -TP    Moving to and from a bed to a chair (including a wheelchair)? 2  -KR 1  -TP    Standing up from a chair using your arms (e.g., wheelchair, bedside chair)? 2  -KR 1  -TP    Climbing 3-5 steps with a railing? 1  -KR 1  -TP    To walk in hospital room? 1  -KR 1  -TP    AM-PAC 6 Clicks Score (PT) 10  -KR 8  -TP    Highest Level of Mobility Goal 4 --> Transfer to chair/commode  -KR 3 --> Sit at edge of bed  -TP              User Key  (r) = Recorded By, (t) = Taken By, (c) = Cosigned By      Initials Name Provider Type    Mariposa Villeda PT Physical Therapist    TP Agueda Sykes, RN Registered Nurse                                 Physical Therapy Education       Title: PT OT SLP Therapies (In Progress)       Topic: Physical Therapy (Done)       Point: Mobility training (Done)       Learning Progress Summary             Patient Acceptance, E,H, VU by MELISSA at 8/5/2024 1408    Comment: PT provided LE HEP    Acceptance, E, NR by THERESA at 8/1/2024 1029    Acceptance, E, VU,NR by  at 7/29/2024 1453    Acceptance, E,D, VU,NR by  at 7/27/2024 1005   Family Acceptance, E, VU,NR by  at 7/29/2024 1453   Significant Other Acceptance, E,H, VU by MELISSA at 8/5/2024 1408    Comment: PT provided LE HEP    Acceptance, E,D, VU,NR by  at 7/27/2024 1005                         Point: Home exercise program (Done)       Learning Progress Summary             Patient Acceptance, E,H, VU by MELISSA at 8/5/2024 1408    Comment: PT provided LE HEP    Acceptance, E, VU,NR by  at 7/29/2024 1453   Family  Acceptance, E, VU,NR by AC at 7/29/2024 1453   Significant Other Acceptance, E,H, VU by KR at 8/5/2024 1408    Comment: PT provided LE HEP                         Point: Body mechanics (Done)       Learning Progress Summary             Patient Acceptance, E,H, VU by KR at 8/5/2024 1408    Comment: PT provided LE HEP    Acceptance, E, NR by THERESA at 8/1/2024 1029    Acceptance, E, VU,NR by AC at 7/29/2024 1453    Acceptance, E,D, VU,NR by LS at 7/27/2024 1005   Family Acceptance, E, VU,NR by AC at 7/29/2024 1453   Significant Other Acceptance, E,H, VU by KR at 8/5/2024 1408    Comment: PT provided LE HEP    Acceptance, E,D, VU,NR by LS at 7/27/2024 1005                         Point: Precautions (Done)       Learning Progress Summary             Patient Acceptance, E,H, VU by KR at 8/5/2024 1408    Comment: PT provided LE HEP    Acceptance, E, NR by THERESA at 8/1/2024 1029    Acceptance, E, VU,NR by AC at 7/29/2024 1453    Acceptance, E,D, VU,NR by LS at 7/27/2024 1005   Family Acceptance, E, VU,NR by AC at 7/29/2024 1453   Significant Other Acceptance, E,H, VU by KR at 8/5/2024 1408    Comment: PT provided LE HEP    Acceptance, E,D, VU,NR by LS at 7/27/2024 1005                                         User Key       Initials Effective Dates Name Provider Type Discipline     02/03/23 -  Yakelin Araujo, PT Physical Therapist PT    KR 12/30/22 -  Mariposa Landa, PT Physical Therapist PT    THERESA 05/07/24 -  Adalgisa Zarco, PT Student PT Student PT    AC 07/11/24 -  Bel Gates, PT Physical Therapist PT                  PT Recommendation and Plan  Planned Therapy Interventions (PT): balance training, bed mobility training, gait training, home exercise program, neuromuscular re-education, patient/family education, postural re-education, transfer training, joint mobilization, stretching, strengthening, stair training, ROM (range of motion), motor coordination training  Plan of Care Reviewed With: patient, spouse  Progress:  improving  Outcome Evaluation: Pt able to perform stand-pivot transfer bed to chair this date with 2-person assist. Strong L quad contraction noted with LE therex with neuromuscular tapping. Education provided re: neuroplasticity and attention to L. Goals updated to reflect pt's current level of function and anticipated progress. Pt will continue to benefit from PT to address ongoing strength, balance, endurance, and L attention deficits. Pt excellent candidate for IRF.     Time Calculation:         PT Charges       Row Name 08/05/24 1409             Time Calculation    Start Time 1330  -KR      PT Received On 08/05/24  -KR      PT Goal Re-Cert Due Date 08/15/24  -KR         Timed Charges    57574 -  PT Neuromuscular Reeducation Minutes 15  -KR      81350 - PT Therapeutic Activity Minutes 8  -KR         Total Minutes    Timed Charges Total Minutes 23  -KR       Total Minutes 23  -KR                User Key  (r) = Recorded By, (t) = Taken By, (c) = Cosigned By      Initials Name Provider Type    Mariposa Villeda PT Physical Therapist                  Therapy Charges for Today       Code Description Service Date Service Provider Modifiers Qty    [id number]  PT NEUROMUSC RE EDUCATION EA 15 MIN 8/5/2024 Mariposa Landa, PT GP 1    [id number] HC PT THERAPEUTIC ACT EA 15 MIN 8/5/2024 Mariposa Landa, PT GP 1            PT G-Codes  Outcome Measure Options: AM-PAC 6 Clicks Basic Mobility (PT)  AM-PAC 6 Clicks Score (PT): 10  AM-PAC 6 Clicks Score (OT): 10  Modified Martin Scale: 4 - Moderately severe disability.  Unable to walk without assistance, and unable to attend to own bodily needs without assistance.  PT Discharge Summary  Anticipated Discharge Disposition (PT): inpatient rehabilitation facility    Mariposa Landa PT  8/5/2024

## 2024-08-05 NOTE — PLAN OF CARE
Problem: Skin Injury Risk Increased  Goal: Skin Health and Integrity  Outcome: Ongoing, Progressing  Intervention: Optimize Skin Protection  Recent Flowsheet Documentation  Taken 8/5/2024 0345 by Sheree Vieyra RN  Pressure Reduction Techniques: frequent weight shift encouraged  Head of Bed (HOB) Positioning: HOB elevated  Pressure Reduction Devices:   positioning supports utilized   pressure-redistributing mattress utilized  Skin Protection: adhesive use limited  Taken 8/5/2024 0223 by Sheree Vieyra RN  Pressure Reduction Techniques: frequent weight shift encouraged  Head of Bed (HOB) Positioning: HOB elevated  Pressure Reduction Devices: pressure-redistributing mattress utilized  Skin Protection: adhesive use limited  Taken 8/5/2024 0200 by Sheree Vieyra RN  Head of Bed (HOB) Positioning: HOB elevated  Taken 8/5/2024 0000 by Sheree Vieyra RN  Pressure Reduction Techniques: frequent weight shift encouraged  Head of Bed (HOB) Positioning: HOB elevated  Pressure Reduction Devices: pressure-redistributing mattress utilized  Skin Protection: adhesive use limited  Taken 8/4/2024 2252 by Sheree Vieyra RN  Pressure Reduction Techniques: frequent weight shift encouraged  Head of Bed (HOB) Positioning: HOB elevated  Pressure Reduction Devices: pressure-redistributing mattress utilized  Skin Protection: adhesive use limited  Taken 8/4/2024 2012 by Sheree Vieyra RN  Pressure Reduction Techniques: frequent weight shift encouraged  Head of Bed (HOB) Positioning: HOB elevated  Pressure Reduction Devices: pressure-redistributing mattress utilized  Skin Protection: adhesive use limited     Problem: Adult Inpatient Plan of Care  Goal: Plan of Care Review  Outcome: Ongoing, Progressing  Goal: Patient-Specific Goal (Individualized)  Outcome: Ongoing, Progressing  Goal: Absence of Hospital-Acquired Illness or Injury  Outcome: Ongoing, Progressing  Intervention: Identify and Manage Fall  Risk  Recent Flowsheet Documentation  Taken 8/5/2024 0345 by Sheree Vieyra RN  Safety Promotion/Fall Prevention:   safety round/check completed   activity supervised   fall prevention program maintained  Taken 8/5/2024 0223 by Sheree Vieyra RN  Safety Promotion/Fall Prevention:   activity supervised   fall prevention program maintained   safety round/check completed  Taken 8/5/2024 0200 by Sheree Vieyra RN  Safety Promotion/Fall Prevention:   activity supervised   fall prevention program maintained   safety round/check completed  Taken 8/5/2024 0000 by Sheree Vieyra RN  Safety Promotion/Fall Prevention:   activity supervised   fall prevention program maintained   safety round/check completed  Taken 8/4/2024 2252 by Sheree Vieyra RN  Safety Promotion/Fall Prevention:   activity supervised   fall prevention program maintained   safety round/check completed  Taken 8/4/2024 2012 by Sheree Vieyra RN  Safety Promotion/Fall Prevention:   activity supervised   fall prevention program maintained   safety round/check completed  Intervention: Prevent Skin Injury  Recent Flowsheet Documentation  Taken 8/5/2024 0345 by Sheree Vieyra RN  Body Position: supine, legs elevated  Skin Protection: adhesive use limited  Taken 8/5/2024 0223 by Sheree Vieyra RN  Body Position:   left   side-lying  Skin Protection: adhesive use limited  Taken 8/5/2024 0200 by Sheree Vieyra RN  Body Position:   left   side-lying  Taken 8/5/2024 0000 by Sheree Vieyra RN  Body Position:   left   side-lying  Skin Protection: adhesive use limited  Taken 8/4/2024 2252 by Sheree Vieyra RN  Body Position:   right   side-lying  Skin Protection: adhesive use limited  Taken 8/4/2024 2012 by Sheree Vieyra RN  Body Position:   left   side-lying  Skin Protection: adhesive use limited  Intervention: Prevent and Manage VTE (Venous Thromboembolism) Risk  Recent Flowsheet  Documentation  Taken 8/5/2024 0345 by Sheree Vieyra RN  Activity Management: activity encouraged  Taken 8/5/2024 0223 by Sheree Vieyra RN  Activity Management: activity encouraged  Taken 8/5/2024 0200 by Sheree Vieyra RN  Activity Management: activity encouraged  Taken 8/5/2024 0000 by Sheree Vieyra RN  Activity Management: activity encouraged  Taken 8/4/2024 2252 by Sheree Vieyra RN  Activity Management: activity encouraged  Taken 8/4/2024 2012 by Sheree Vieyra RN  Activity Management: activity encouraged  Range of Motion: active ROM (range of motion) encouraged  Intervention: Prevent Infection  Recent Flowsheet Documentation  Taken 8/5/2024 0345 by Sheree Vieyra RN  Infection Prevention:   environmental surveillance performed   rest/sleep promoted  Taken 8/5/2024 0223 by Sheree Vieyra RN  Infection Prevention:   environmental surveillance performed   rest/sleep promoted  Taken 8/5/2024 0200 by Sheree Vieyra RN  Infection Prevention: environmental surveillance performed  Taken 8/5/2024 0000 by Sheree Vieyra RN  Infection Prevention: environmental surveillance performed  Taken 8/4/2024 2252 by Sheree Vieyra RN  Infection Prevention:   environmental surveillance performed   rest/sleep promoted  Taken 8/4/2024 2012 by Sheree Vieyra RN  Infection Prevention: environmental surveillance performed  Goal: Optimal Comfort and Wellbeing  Outcome: Ongoing, Progressing  Intervention: Monitor Pain and Promote Comfort  Recent Flowsheet Documentation  Taken 8/5/2024 0345 by Sheree Vieyra RN  Pain Management Interventions: position adjusted  Intervention: Provide Person-Centered Care  Recent Flowsheet Documentation  Taken 8/5/2024 0345 by Sheree Vieyra RN  Trust Relationship/Rapport:   care explained   choices provided  Taken 8/5/2024 0223 by Sheree Vieyra RN  Trust Relationship/Rapport:   care explained    choices provided  Taken 8/5/2024 0000 by Sheree Vieyra RN  Trust Relationship/Rapport:   care explained   choices provided   reassurance provided  Taken 8/4/2024 2252 by Sheree Vieyra RN  Trust Relationship/Rapport:   care explained   choices provided   reassurance provided  Taken 8/4/2024 2012 by Sheree Veiyra RN  Trust Relationship/Rapport:   care explained   choices provided  Goal: Readiness for Transition of Care  Outcome: Ongoing, Progressing   Goal Outcome Evaluation:

## 2024-08-05 NOTE — THERAPY TREATMENT NOTE
Acute Care - Speech Language Pathology Treatment Note  [facility]     Patient Name: Ron Cam  : 1970  MRN: [id number]  Today's Date: 2024               Admit Date: 2024     Visit Dx:    ICD-10-CM ICD-9-CM   1. Intraparenchymal hemorrhage of brain  I61.9 431   2. Dysarthria  R47.1 784.51   3. Acute left-sided weakness  R53.1 728.87   4. Left sided numbness  R20.0 782.0   5. Hemineglect  R41.4 781.8   6. Hypertensive emergency  I16.1 401.9   7. Nontraumatic subcortical hemorrhage of right cerebral hemisphere  I61.0 431   8. Oropharyngeal dysphagia  R13.12 787.22     Patient Active Problem List   Diagnosis    Nontraumatic intracerebral hemorrhage    Hypertensive crisis     History reviewed. No pertinent past medical history.  History reviewed. No pertinent surgical history.    SLP Recommendation and Plan           Monitor for Signs of Aspiration: notify SLP if any concerns (24 1200)  Swallow Criteria for Skilled Therapeutic Interventions Met: demonstrates skilled criteria (24 1200)     Anticipated Discharge Disposition (SLP): inpatient rehabilitation facility (24 1300)     Therapy Frequency (Swallow): 3 days per week (24 1200)  Therapy Frequency (SLP SLC): 5 days per week (24 1300)  Predicted Duration Therapy Intervention (Days): 2 weeks (24 1300)  Oral Care Recommendations: Oral Care BID/PRN, Toothbrush (24 1200)     Daily Summary of Progress (SLP): progress towards functional goals is fair (24 1300)           Treatment Assessment (SLP): continued, severe, dysarthria (24 1300)  Treatment Assessment Comments (SLP): Speech treatment to address use of effective compensatory strategies. Pt verbalizes compensatory strategies, however is not successfully using them at word level. No significant change with repetition or model.  Difficulty with divergent thinking- concrete and abstract. Difficulty with mental flexibility and speed of processing.   SLP to continue to treat with revision to plan of care. (08/05/24 1300)  Plan for Continued Treatment (SLP): continue treatment per plan of care, other (see comments) (added additional goals based on performance today) (08/05/24 1300)  Outcome Evaluation: SLP to continue to follow for dysarthria and cognitive impairment. (08/05/24 1404)      SLP EVALUATION (Last 72 Hours)       SLP SLC Evaluation       Row Name 08/05/24 1300                   Communication Assessment/Intervention    Document Type therapy note (daily note)  -ML        Subjective Information no complaints  -ML        Patient Observations cooperative  -ML        Patient/Family/Caregiver Comments/Observations Wife present  -ML        Patient Effort good  -ML        Comment Pt endorses only slurred speech NOT cognitive communication impairment.  -ML        Symptoms Noted During/After Treatment none  -ML           General Information    Patient Profile Reviewed yes  -ML        Pertinent History Of Current Problem See hx  -ML        Plans/Goals Discussed with patient;spouse/S.O.;agreed upon  -ML        Barriers to Rehab none identified  -ML        Patient's Goals for Discharge patient did not state  -ML        Family Goals for Discharge family did not state  -ML           Pain    Additional Documentation Pain Scale: Numbers Pre/Post-Treatment (Group)  -ML           Pain Scale: Numbers Pre/Post-Treatment    Pretreatment Pain Rating 0/10 - no pain  -ML        Posttreatment Pain Rating 0/10 - no pain  -ML           SLP Treatment Clinical Impressions    Treatment Assessment (SLP) continued;severe;dysarthria  -ML        Treatment Assessment Comments (SLP) Speech treatment to address use of effective compensatory strategies. Pt verbalizes compensatory strategies, however is not successfully using them at word level. No significant change with repetition or model.  Difficulty with divergent thinking- concrete and abstract. Difficulty with mental flexibility and  speed of processing.  SLP to continue to treat with revision to plan of care.  -ML        Daily Summary of Progress (SLP) progress towards functional goals is fair  -ML        Plan for Continued Treatment (SLP) continue treatment per plan of care;other (see comments)  added additional goals based on performance today  -ML        Care Plan Review care plan/treatment goals reviewed;patient/other agree to care plan  -ML        Care Plan Review, Other Participant(s) spouse  -ML           Recommendations    Therapy Frequency (SLP SLC) 5 days per week  -ML        Predicted Duration Therapy Intervention (Days) 2 weeks  -ML        Anticipated Discharge Disposition (SLP) inpatient rehabilitation facility  -ML                  User Key  (r) = Recorded By, (t) = Taken By, (c) = Cosigned By      Initials Name Effective Dates    ML Yun Oakley, CCC-SLP 06/16/21 -                        EDUCATION  The patient has been educated in the following areas:     Cognitive Impairment Communication Impairment/compensatory strategies/home exercises.           SLP GOALS       Row Name 08/05/24 1300 08/05/24 1200 08/04/24 1500       (LTG) Patient will demonstrate functional swallow for    Diet Texture (Demonstrate functional swallow) -- regular textures  -ML regular textures  -RS    Liquid viscosity (Demonstrate functional swallow) -- thin liquids  -ML thin liquids  -RS    Gypsum (Demonstrate functional swallow) -- independently (over 90% accuracy)  -ML independently (over 90% accuracy)  -RS    Time Frame (Demonstrate functional swallow) -- 1 week  -ML 1 week  -RS    Progress/Outcomes (Demonstrate functional swallow) -- goal ongoing  -ML goal ongoing  -RS    Comment (Demonstrate functional swallow) -- Oral dysphagia limited diet to soft to chew/whole/thin/ok for mixed/ok for straw or cup-  -ML --       (STG) Labial Strengthening Goal 1 (SLP)    Activity (Labial Strengthening Goal 1, SLP) -- increase labial tone  -ML increase  labial tone  -RS    Increase Labial Tone -- labial resistance exercises;swallow trials  -ML labial resistance exercises;swallow trials  -RS    [place]/Accuracy (Labial Strengthening Goal 1, SLP) -- with minimal cues (75-90% accuracy)  -ML independently (over 90% accuracy)  -RS    Time Frame (Labial Strengthening Goal 1, SLP) -- short term goal (STG);1 week  -ML 1 week  -RS    Progress/Outcomes (Labial Strengthening Goal 1, SLP) -- goal revised this date  -ML goal ongoing  -RS    Comment (Labial Strengthening Goal 1, SLP) -- Assistance needed to execute  labial strengthening exercises  -ML --       (STG) Lingual Strengthening Goal 1 (SLP)    Activity (Lingual Strengthening Goal 1, SLP) -- increase tongue back strength;increase lingual tone/sensation/control/coordination/movement  -ML --    Increase Lingual Tone/Sensation/Control/Coordination/Movement -- lingual movement exercises;lingual resistance exercises  -ML --    Increase Tongue Back Strength -- lingual resistance exercises;swallow trials  -ML --    [place]/Accuracy (Lingual Strengthening Goal 1, SLP) -- with minimal cues (75-90% accuracy)  -ML --    Time Frame (Lingual Strengthening Goal 1, SLP) -- short term goal (STG);1 week  -ML --    Progress/Outcomes (Lingual Strengthening Goal 1, SLP) -- goal revised this date  -ML --       (STG) Pharyngeal Strengthening Exercise Goal 1 (SLP)    Activity (Pharyngeal Strengthening Goal 1, SLP) -- increase closure at entrance to airway/closure of airway at glottis  -ML --    Increase Closure at Entrance to Airway/Closure of Airway at Glottis -- supraglottic swallow  -ML --    [place]/Accuracy (Pharyngeal Strengthening Goal 1, SLP) -- with minimal cues (75-90% accuracy)  -ML --    Time Frame (Pharyngeal Strengthening Goal 1, SLP) -- short term goal (STG);1 week  -ML --    Progress/Outcomes (Pharyngeal Strengthening Goal 1, SLP) -- new goal  -ML --       (STG) Swallow Compensatory Strategies Goal 1 (SLP)     Activity (Swallow Compensatory Strategies/Techniques Goal 1, SLP) -- compensatory strategies;food/liquid placed on stronger right side;other (see comments)  Lingual/finger sweep/oral care after po  -ML compensatory strategies;food/liquid placed on stronger right side  -RS    [place]/Accuracy (Swallow Compensatory Strategies/Techniques Goal 1, SLP) -- with minimal cues (75-90% accuracy)  -ML independently (over 90% accuracy)  -RS    Time Frame (Swallow Compensatory Strategies/Techniques Goal 1, SLP) -- short term goal (STG);1 week  -ML 1 week  -RS    Progress/Outcomes (Swallow Compensatory Strategies/Techniques Goal 1, SLP) -- goal revised this date  -ML goal ongoing  -RS       Patient will demonstrate functional speech skills for return to discharge environment    [place] with minimal cues  -ML -- with minimal cues  -RS    Time frame by discharge  -ML -- by discharge  -RS    Progress/Outcomes progress slower than expected  -ML -- goal ongoing  -RS    Comments Verbalizes need to decrease rate of speaking but unable to implement the strategy.  Discussed and practiced additional compensatory strategies.  -ML -- --       SLP Diagnostic Treatment     Patient will participate in further assessment in the following areas higher-level cognitive-linguistic  -ML -- higher-level cognitive-linguistic  -RS    Time Frame (Diagnostic) short term goal (STG)  -ML -- short term goal (STG)  -RS    Progress/Assessment (Diagnostic) Difficulty with divergent, convergent, processing speed, attention, reasoning, problem solving, and mental flexibility.  -ML -- --    Progress/Outcomes (Additional Goal 1, SLP) goal revised this date  -ML -- goal ongoing  -RS       Articulation Goal 1 (SLP)    Improve Articulation Goal 1 (SLP) by over-articulating at phrase level;90%;with minimal cues (75-90%)  -ML -- by over-articulating at phrase level;90%;with minimal cues (75-90%)  -RS    Time Frame (Articulation Goal 1, SLP) short term goal  (STG);1 week  -ML -- 1 week  -RS    Progress (Articulation Goal 1, SLP) 20%;with moderate cues (50-74%)  -ML -- --    Progress/Outcomes (Articulation Goal 1, SLP) progress slower than expected  -ML -- goal ongoing  -RS    Comment (Articulation Goal 1, SLP) Pt not demonstrating slow rate with moderate cues. Speech is poorly understood without context but improves with context. No evidence that pt revises his production to improve listener understtanding.  -ML -- --       Patient Will Implement Strategies Goal 1 (SLP)    Implement Strategies of Goal 1 (SLP) using alphabet/word board;80%;with moderate cues (50-74%)  -ML -- --    Time Frame (Strategy Implementation Goal 1, SLP) short term goal (STG);2 weeks  -ML -- --    Progress/Outcomes (Strategy Implementation Goal 1, SLP) new goal  -ML -- --       Attention Goal 1 (SLP)    Improve Attention by Goal 1 (SLP) complete selective attention task;90%;with minimal cues (75-90%)  -ML -- --    Time Frame (Attention Goal 1, SLP) short term goal (STG);1 week  -ML -- --    Progress/Outcomes (Attention Goal 1, SLP) new goal  -ML -- --       Memory Skills Goal 1 (SLP)    Improve Memory Skills Through Goal 1 (SLP) recalling related word lists with an imposed delay;recalling unrelated word lists with an imposed delay;select a word from a list by exclusion;repeat list in sequential order;80%;with minimal cues (75-90%)  -ML -- --    Time Frame (Memory Skills Goal 1, SLP) short term goal (STG);2 weeks  -ML -- --    Progress/Outcomes (Memory Skills Goal 1, SLP) new goal  -ML -- --       Organizational Skills Goal 1 (SLP)    Improve Thought Organization Through Goal 1 (SLP) completing a divergent naming task;completing a convergent naming task;concrete;80%;with minimal cues (75-90%)  -ML -- --    Time Frame (Thought Organization Skills Goal 1, SLP) short term goal (STG);2 weeks  -ML -- --    Progress/Outcomes (Thought Organization Skills Goal 1, SLP) new goal  -ML -- --       Reasoning  Goal 1 (SLP)    Improve Reasoning Through Goal 1 (SLP) complete mental flexibility task;80%;with minimal cues (75-90%);with moderate cues (50-74%)  -ML -- --    Time Frame (Reasoning Goal 1, SLP) short term goal (STG);1 week  -ML -- --    Progress/Outcomes (Reasoning Goal 1, SLP) new goal  -ML -- --       Functional Problem Solving Skills Goal 1 (SLP)    Improve Problem Solving Through Goal 1 (SLP) tell similarity between items;name items for a task;80%;with minimal cues (75-90%)  -ML -- --    Time Frame (Problem Solving Goal 1, SLP) short term goal (STG);1 week  -ML -- --    Progress/Outcomes (Problem Solving Goal 1, SLP) new goal  -ML -- --       Right Hemisphere Function Goal 1 (SLP)    Improve Right Hemisphere Function Through Goal 1 (SLP) demonstrate awareness of communication partner in left visual field;complete visuo-spatial activities (visual closure, trail making, mazes;80%;with minimal cues (75-90%)  -ML -- demonstrate awareness of communication partner in left visual field;complete visuo-spatial activities (visual closure, trail making, mazes;80%;with minimal cues (75-90%)  -RS    Time Frame (Right Hemisphere Function Goal 1, SLP) short term goal (STG);1 week  -ML -- 1 week  -RS    Progress (Right Hemisphere Function Goal 1, SLP) 70%;with minimal cues (75-90%)  -ML -- --    Progress/Outcomes (Right Hemisphere Function Goal 1, SLP) good progress toward goal  -ML -- goal ongoing  -RS    Comment (Right Hemisphere Function Goal 1, SLP) With intermittent cues, pt able to look to speaker on the left. Without direction or cues, pt neglects the left side of the room.  -ML -- --              User Key  (r) = Recorded By, (t) = Taken By, (c) = Cosigned By      Initials Name Provider Type    Yun Shankar, CCC-SLP Speech and Language Pathologist    Domenic Gutiérrez MS CCC-SLP Speech and Language Pathologist                              Time Calculation:      Time Calculation- SLP       Row Name 08/05/24  1404 08/05/24 1350          Time Calculation- SLP    SLP Start Time 1300  -ML 1200  -ML     SLP Received On 08/05/24  -ML 08/05/24  -ML               User Key  (r) = Recorded By, (t) = Taken By, (c) = Cosigned By      Initials Name Provider Type    Yun Shankar CCC-SLP Speech and Language Pathologist                    Therapy Charges for Today       Code Description Service Date Service Provider Modifiers Qty    04702837807 HC ST FIBEROPTIC ENDO EVAL SWALL 6 8/5/2024 Yun Oakley CCC-SLP GN 1    [id number] HC ST TREATMENT SPEECH 3 8/5/2024 Yun Oakley CCC-SLP GN 1                       SENTHIL Carias  8/5/2024

## 2024-08-06 VITALS
RESPIRATION RATE: 16 BRPM | HEART RATE: 68 BPM | DIASTOLIC BLOOD PRESSURE: 82 MMHG | BODY MASS INDEX: 36.4 KG/M2 | OXYGEN SATURATION: 96 % | TEMPERATURE: 98.5 F | SYSTOLIC BLOOD PRESSURE: 124 MMHG | WEIGHT: 268.74 LBS | HEIGHT: 72 IN

## 2024-08-06 LAB
ANION GAP SERPL CALCULATED.3IONS-SCNC: 15 MMOL/L (ref 5–15)
BACTERIA SPEC AEROBE CULT: NORMAL
BACTERIA SPEC AEROBE CULT: NORMAL
BUN SERPL-MCNC: 12 MG/DL (ref 6–20)
BUN/CREAT SERPL: 19 (ref 7–25)
CALCIUM SPEC-SCNC: 8.7 MG/DL (ref 8.6–10.5)
CHLORIDE SERPL-SCNC: 97 MMOL/L (ref 98–107)
CO2 SERPL-SCNC: 21 MMOL/L (ref 22–29)
CREAT SERPL-MCNC: 0.63 MG/DL (ref 0.76–1.27)
EGFRCR SERPLBLD CKD-EPI 2021: 113.7 ML/MIN/1.73
GLUCOSE SERPL-MCNC: 101 MG/DL (ref 65–99)
POTASSIUM SERPL-SCNC: 4.2 MMOL/L (ref 3.5–5.2)
SODIUM SERPL-SCNC: 133 MMOL/L (ref 136–145)

## 2024-08-06 PROCEDURE — 25010000002 METOCLOPRAMIDE PER 10 MG: Performed by: SURGERY

## 2024-08-06 PROCEDURE — 97110 THERAPEUTIC EXERCISES: CPT

## 2024-08-06 PROCEDURE — 80048 BASIC METABOLIC PNL TOTAL CA: CPT | Performed by: HOSPITALIST

## 2024-08-06 PROCEDURE — 99232 SBSQ HOSP IP/OBS MODERATE 35: CPT | Performed by: HOSPITALIST

## 2024-08-06 PROCEDURE — 25010000002 ENOXAPARIN PER 10 MG: Performed by: INTERNAL MEDICINE

## 2024-08-06 PROCEDURE — 99233 SBSQ HOSP IP/OBS HIGH 50: CPT | Performed by: NURSE PRACTITIONER

## 2024-08-06 RX ORDER — HYDRALAZINE HYDROCHLORIDE 100 MG/1
100 TABLET, FILM COATED ORAL EVERY 8 HOURS SCHEDULED
Qty: 90 TABLET | Refills: 0 | Status: SHIPPED | OUTPATIENT
Start: 2024-08-06 | End: 2024-09-05

## 2024-08-06 RX ORDER — ATORVASTATIN CALCIUM 80 MG/1
80 TABLET, FILM COATED ORAL NIGHTLY
Qty: 90 TABLET | Refills: 0 | Status: SHIPPED | OUTPATIENT
Start: 2024-08-06

## 2024-08-06 RX ORDER — METOPROLOL TARTRATE 75 MG/1
75 TABLET, FILM COATED ORAL EVERY 12 HOURS SCHEDULED
Qty: 60 TABLET | Refills: 2 | Status: SHIPPED | OUTPATIENT
Start: 2024-08-06 | End: 2024-09-05

## 2024-08-06 RX ORDER — L.ACID,PARA/B.BIFIDUM/S.THERM 8B CELL
1 CAPSULE ORAL DAILY
Qty: 7 CAPSULE | Refills: 0 | Status: SHIPPED | OUTPATIENT
Start: 2024-08-06 | End: 2024-08-13

## 2024-08-06 RX ORDER — LISINOPRIL 40 MG/1
40 TABLET ORAL
Status: DISCONTINUED | OUTPATIENT
Start: 2024-08-06 | End: 2024-08-06 | Stop reason: HOSPADM

## 2024-08-06 RX ORDER — FAMOTIDINE 20 MG/1
20 TABLET, FILM COATED ORAL
Qty: 30 TABLET | Refills: 2 | Status: SHIPPED | OUTPATIENT
Start: 2024-08-06

## 2024-08-06 RX ORDER — ASPIRIN 81 MG/1
81 TABLET ORAL DAILY
Qty: 30 TABLET | Refills: 0 | Status: SHIPPED | OUTPATIENT
Start: 2024-08-09

## 2024-08-06 RX ORDER — LISINOPRIL 40 MG/1
40 TABLET ORAL
Qty: 30 TABLET | Refills: 0 | Status: SHIPPED | OUTPATIENT
Start: 2024-08-06 | End: 2024-09-05

## 2024-08-06 RX ORDER — CLONIDINE HYDROCHLORIDE 0.3 MG/1
0.3 TABLET ORAL EVERY 12 HOURS SCHEDULED
Qty: 30 TABLET | Refills: 2 | Status: SHIPPED | OUTPATIENT
Start: 2024-08-06

## 2024-08-06 RX ORDER — AMLODIPINE BESYLATE 10 MG/1
10 TABLET ORAL
Qty: 30 TABLET | Refills: 2 | Status: SHIPPED | OUTPATIENT
Start: 2024-08-06

## 2024-08-06 RX ADMIN — METOCLOPRAMIDE 10 MG: 5 INJECTION, SOLUTION INTRAMUSCULAR; INTRAVENOUS at 02:45

## 2024-08-06 RX ADMIN — LISINOPRIL 40 MG: 40 TABLET ORAL at 10:17

## 2024-08-06 RX ADMIN — Medication 1 CAPSULE: at 10:17

## 2024-08-06 RX ADMIN — HYDRALAZINE HYDROCHLORIDE 100 MG: 50 TABLET ORAL at 06:08

## 2024-08-06 RX ADMIN — FAMOTIDINE 20 MG: 20 TABLET, FILM COATED ORAL at 10:17

## 2024-08-06 RX ADMIN — ENOXAPARIN SODIUM 40 MG: 100 INJECTION SUBCUTANEOUS at 10:18

## 2024-08-06 RX ADMIN — CLONIDINE HYDROCHLORIDE 0.3 MG: 0.2 TABLET ORAL at 10:18

## 2024-08-06 RX ADMIN — Medication 10 ML: at 10:19

## 2024-08-06 RX ADMIN — AMLODIPINE BESYLATE 10 MG: 10 TABLET ORAL at 10:18

## 2024-08-06 RX ADMIN — METOCLOPRAMIDE 10 MG: 5 INJECTION, SOLUTION INTRAMUSCULAR; INTRAVENOUS at 10:18

## 2024-08-06 RX ADMIN — METOPROLOL TARTRATE 75 MG: 25 TABLET, FILM COATED ORAL at 10:17

## 2024-08-06 NOTE — DISCHARGE PLACEMENT REQUEST
"FROM: Dolly BRYANT, RN,  [phone]  Sherine Alonso (53 y.o. Male)       Date of Birth   1970    Social Security Number       Address   [address] [place], [place] [address]    Home Phone   [phone]    MRN   [id number]       Restoration   None    Marital Status                               Admission Date   24    Admission Type   Emergency    Admitting Provider   Jose Valentine MD    Attending Provider   Jose Valentine MD    Department, Room/Bed   [facility] 3E, S339/1       Discharge Date       Discharge Disposition   Rehab Facility or Unit (DC - External)    Discharge Destination                                 Attending Provider: Jose Valentine MD    Allergies: No Known Allergies    Isolation: None   Infection: None   Code Status: CPR    Ht: 182.9 cm (72.01\")   Wt: 122 kg (268 lb 11.9 oz)    Admission Cmt: None   Principal Problem: Nontraumatic intracerebral hemorrhage [I61.9]                   Active Insurance as of 2024       Primary Coverage       Payor Plan Insurance Group Employer/Plan Group    [employer]HE[employer] BLUE CROSS AN[employer] BLUE CROSS BLUE [employer]IELD PPO [id number]       Payor Plan Address Payor Plan Phone Number Payor Plan Fax Number Effective Dates    [address] [phone]  2023 - None Entered    [place] [address]         Subscriber Name Subscriber Birth Date Member ID       SHERINE ALONSO 1970 [id number]                     Emergency Contacts        (Rel.) Home Phone Work Phone Mobile Phone    Robbins,Collette (Spouse) [phone] -- --                 Discharge Summary        Jose Valentine MD at 24 0858              [facility]  DISCHARGE SUMMARY    Patient Name: Sherine Alonso  : 1970  MRN: [id number]    Date of Admission: 2024  4:53 PM  Date of Discharge:  2024  Primary Care Physician: Provider, No Known    Consults       Date and Time Order Name Status Description    " 8/1/2024  3:59 AM Inpatient General Surgery Consult Completed     7/26/2024  5:50 PM Inpatient Neurosurgery Consult Completed     7/26/2024  4:50 PM Inpatient Neurology Consult Stroke Completed             Hospital Course     Presenting Problem: ICH and HTN    Active Hospital Problems    Diagnosis  POA    **Nontraumatic intracerebral hemorrhage [I61.9]  Yes    Hypertensive crisis [I16.9]  Yes      Resolved Hospital Problems   No resolved problems to display.          Hospital Course:  Ron Cam is a 53 y.o. male with history of migraines, who presented via EMS on 5/26 with left-sided weakness, sensory loss, dysarthria, visual disturbance.  NIH noted to be 17.  CT head showed 4 x 3 x 3.5 right basal ganglia hemorrhage.  CT angio without evidence of large vessel occlusion.  Ulcerated plaque observed within the left distal common carotid.  He was deemed not to be a surgical candidate.  He was monitored in the neurological ICU.  Stroke neurology consulted on admission.  Transferred to the floor service on 8/1.     Right basal ganglia hemorrhage, etiology likely hypertensive  -Neurology following  -Neurosurgery evaluated, deemed nonsurgical  -CT head stable   -PT/OT recommend IRF  -Follow-up with the stroke clinic 4 weeks after discharge  -Continue neurochecks, telemetry  -Neurology recommends starting aspirin 8/9/2024, see below     Ileus  -resolved, tolerating PO     Right lower lobe pneumonia  -Ceftriaxone, doxycycline, probiotic  -on RA, completed course of antibiotics     Extracranial atherosclerotic disease  -Eventually needs aspirin, but will wait 2 weeks post hemorrhage, 8/9/2024 before starting, per recommendatoins     HTN  -Continue current regimen, titrate goal, SBP below 140    HLD  -Continue atorvastatin       Discharge Follow Up Recommendations for outpatient labs/diagnostics:  PCP 1 week  Stroke neurology one month  Needs refer to neuro/ophthalmology within 1 month    Day of Discharge     HPI:  Up in  bed. No issues. No n/v. No dyspnea. No f/c.     Review of Systems  As above    Vital Signs:   Temp:  [97.7 °F (36.5 °C)-98.8 °F (37.1 °C)] 98.5 °F (36.9 °C)  Heart Rate:  [60-79] 69  Resp:  [16-20] 16  BP: ()/(62-98) 147/92      Physical Exam:  NAD, alert  OP clear, dry MM  Neck supple  No LAD  RRR  Decreased at bases  +BS, soft  Flat  Dysarthria, L weakness, L facial droop  No change from 8/5  Pertinent  and/or Most Recent Results     LAB RESULTS:      Lab 08/03/24  1046 08/02/24  0741 08/01/24  0435 07/31/24  0529   WBC 8.61 8.04 14.02* 10.75   HEMOGLOBIN 12.8* 12.8* 13.9 13.5   HEMATOCRIT 37.8 37.8 39.7 38.3   PLATELETS 213 194 262 197   NEUTROS ABS  --   --   --  9.11*   IMMATURE GRANS (ABS)  --   --   --  0.03   LYMPHS ABS  --   --   --  0.71   MONOS ABS  --   --   --  0.85   EOS ABS  --   --   --  0.02   MCV 91.3 91.1 89.4 89.7   PROCALCITONIN  --   --  0.10  --    LACTATE  --   --  1.3  --          Lab 08/06/24  0747 08/03/24  1046 08/02/24  0741 08/01/24  0435 07/31/24  1605 07/31/24  0529   SODIUM 133* 135* 136 135* 129* 133*   POTASSIUM 4.2 4.0 3.9 3.8  --  3.9   CHLORIDE 97* 100 99 98  --  98   CO2 21.0* 25.0 24.0 26.0  --  21.0*   ANION GAP 15.0 10.0 13.0 11.0  --  14.0   BUN 12 13 19 19  --  17   CREATININE 0.63* 0.88 0.88 0.93  --  0.85   EGFR 113.7 102.8 102.8 98.2  --  103.9   GLUCOSE 101* 122* 93 146*  --  155*   CALCIUM 8.7 8.1* 8.2* 9.1  --  8.7   MAGNESIUM  --  1.9 2.5 2.2  --  1.9   PHOSPHORUS  --  2.9 3.0 3.9  --   --          Lab 08/01/24  0435   TOTAL PROTEIN 6.7   ALBUMIN 3.7   GLOBULIN 3.0   ALT (SGPT) 28   AST (SGOT) 16   BILIRUBIN 0.5   ALK PHOS 64                     Lab 08/01/24  1234   PH, ARTERIAL 7.496*   PCO2, ARTERIAL 35.5   PO2 .0*   FIO2 30   HCO3 ART 27.4*   BASE EXCESS ART 4.2*   CARBOXYHEMOGLOBIN 1.2     Brief Urine Lab Results  (Last result in the past 365 days)        Color   Clarity   Blood   Leuk Est   Nitrite   Protein   CREAT   Urine HCG        08/01/24 0296  Yellow   Clear   Negative   Negative   Negative   Negative                 Microbiology Results (last 10 days)       Procedure Component Value - Date/Time    Blood Culture - Blood, Arm, Left [[id number]]  (Normal) Collected: 08/01/24 0430    Lab Status: Final result Specimen: Blood from Arm, Left Updated: 08/06/24 0500     Blood Culture No growth at 5 days    Blood Culture - Blood, Arm, Left [[id number]]  (Normal) Collected: 08/01/24 0427    Lab Status: Final result Specimen: Blood from Arm, Left Updated: 08/06/24 0500     Blood Culture No growth at 5 days            SLP FEES - Fiberoptic Endo Eval Swallow    Result Date: 8/5/2024  This procedure was auto-finalized with no dictation required.    XR Abdomen KUB    Result Date: 8/2/2024  XR ABDOMEN KUB Date of Exam: 8/2/2024 4:09 AM EDT Indication: assess bowel distention Comparison: 8/1/2024 and prior Findings: Study is limited by overlying support and monitoring apparatus. NG tube projects over the expected position of the proximal stomach. No obvious pneumatosis or free air noted on limited imaging. There appears to be wall thickening of loops of bowel in the right mid abdomen. Osseous structures are unremarkable     Impression: 1. NG tube projects in expected position. 2. Gaseous distention of loops of bowel again noted slightly less prominent than the comparison. There is some suspected wall thickening of loops of bowel in the right mid abdomen. Recommend continued follow-up Electronically Signed: Saman Momin MD  8/2/2024 6:58 AM EDT  Workstation ID: [id number]    EEG    Result Date: 8/1/2024  Reason for referral: 53 y.o.male with altered mental status Technical Summary:  A 19 channel digital EEG was performed using the international 10-20 placement system, including eye leads and EKG leads. Duration: 20 minutes Findings: The patient is resting quietly in bed.  He is lethargic.  Diffuse low amplitude 4-5 Hz intermixed delta and theta activity is seen over both  hemispheres with a greater degree of slowing over the right.  When the patient rouses faster frequencies are seen much more prominently on the left, and right hemispheric slowing becomes more apparent.  When the patient is briefly awake, at times a poorly regulated 8 Hz posterior rhythm is evident on the left but not as clearly seen on the right.  Photic stimulation does not change the background.  Hyperventilation is not performed.  No epileptiform activity is seen. Video: Available Technical quality: Good EKG: Regular, approximately 60 bpm SUMMARY: Mild-moderate generalized slow Independent right hemispheric slow No epileptiform activity or electrographic seizures are seen     Focal cerebral dysfunction impacting the right hemisphere, superimposed upon diffuse cerebral dysfunction of more mild degree The above finding is consistent with a known right hemispheric stroke No evidence for epilepsy is seen This report is transcribed using the Dragon dictation system.  ]     XR Chest 1 View    Result Date: 8/1/2024  XR CHEST 1 VW Date of Exam: 8/1/2024 12:06 PM EDT Indication: cough Comparison: 7/26/2024. Findings: A right PICC line has been placed with its tip at the caval atrial junction. There are new patchy infiltrates of the right lower lobe. The left lung is clear. There are no effusions. There is stable borderline cardiomegaly.     Impression: Interval right PICC line placement as noted. New patchy infiltrates of the right lower lobe, may represent area of atelectasis and/or pneumonia. Electronically Signed: Brigitte Kelley MD  8/1/2024 12:36 PM EDT  Workstation ID: [id number]    CT Head Without Contrast Stroke Protocol    Result Date: 8/1/2024  CT HEAD WO CONTRAST STROKE PROTOCOL Date of Exam: 8/1/2024 11:41 AM EDT Indication: decreased LOC, lethargy, eval stability of hemorrhage. Comparison: Head CT 7/30/2024. Technique: Axial CT images were obtained of the head without contrast administration.  Reconstructed  coronal images were also obtained. Automated exposure control and iterative construction methods were used. Findings: Hematoma centered within the right basal ganglia measures 5 x 3 cm in the axial plane (series 3 image 44), unchanged in size when remeasured for consistency with differences in size from prior exams due to different measurement planes. Similar surrounding rim of vasogenic edema. Similar partial effacement of the right lateral ventricle. Similar right to left midline shift of 4 mm. No worsening mass effect. No new or enlarging intracranial hemorrhage. No extra-axial collection. No loss of gray-white matter differentiation. Unchanged background of chronic microvascular changes. Unchanged old pontine infarct. No osseous or soft tissue abnormality. Paranasal sinuses and mastoid air cells are well aerated. Nasogastric tube partially visualized.     Impression: No significant interval change from prior exam, with similar size and associated mass effect of the right basal ganglia hematoma, as described above. Electronically Signed: Bernabe Akers MD  8/1/2024 12:00 PM EDT  Workstation ID: [id number]    CT Abdomen Pelvis With Contrast    Result Date: 8/1/2024  CT ABDOMEN PELVIS W CONTRAST Date of Exam: 8/1/2024 8:16 AM EDT Indication: concern for small bowel obstruction. Comparison: Radiograph 8/1/2024 Technique: Axial CT images were obtained of the abdomen and pelvis following the uneventful intravenous administration of 100 cc Isovue-300 . Reconstructed coronal and sagittal images were also obtained. Automated exposure control and iterative construction methods were used. Findings: There is small bilateral pleural effusions. There is bilateral lower lobe airspace consolidation which may be secondary to dependent atelectasis or pneumonia. There is nasogastric tube in place the tip within the mid body of the stomach. Images through the abdomen are degraded by patient respiratory motion. Liver, pancreas, and  spleen appear within normal limits. Gallbladder appears grossly normal. No biliary tract obstruction. Bilateral adrenal glands are within normal limits. Kidneys appear normal bilaterally. No evidence of hydronephrosis. No abdominal or retroperitoneal adenopathy. There are multiple fluid-filled distended loops of small bowel throughout the abdomen. There is malrotation of the small bowel with the third portion of the duodenum within the right upper quadrant of the abdomen. This is likely congenital as see no surgical clips or other postoperative changes to suggest that this is postoperative in etiology. There is no definite volvulus identified. There are nondistended loops of small bowel within the right lower quadrant there appears to be a transition point within the upper pelvis near the midline seen on axial image 120. Findings would be consistent with partial small bowel obstruction. There is some gas and stool within the colon which is nondistended. Oscar: Urinary bladder is within normal limits. There are a few scattered sigmoid diverticula. No diverticulitis. No pelvic or inguinal adenopathy. No free intraperitoneal fluid. No lytic or sclerotic bony lesions are identified.     Impression: 1. Small effusions and bilateral lower lobe airspace disease favored to be secondary to atelectasis. 2. Air-filled and fluid-filled distended loops of small bowel throughout the abdomen with the tract to normal caliber small bowel within the mid pelvis anteriorly. Findings would be consistent with partial small bowel obstruction. 3. 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 There is no evidence of small bowel volvulus. Electronically Signed: Nitin Gimenez MD  8/1/2024 8:46 AM EDT  Workstation ID: [id number]    XR Abdomen KUB    Result Date: 8/1/2024  XR ABDOMEN KUB Date of Exam: 8/1/2024 4:37 AM  EDT Indication: NG placement Comparison: 8/1/2024 at 0302 hours. Findings: Gastric suction tube terminates in the stomach. There are multiple dilated small bowel loops concerning for bowel obstruction.     Impression: 1.Gastric suction tube terminates in the stomach. 2.Multiple dilated small bowel loops concerning for bowel obstruction. Electronically Signed: Tony Infante MD  8/1/2024 4:53 AM EDT  Workstation ID: [id number]    XR Abdomen KUB    Result Date: 8/1/2024  XR ABDOMEN KUB Date of Exam: 8/1/2024 2:50 AM EDT Indication: abdominal distension Comparison: None available. Findings: There are numerous dilated loops of small bowel measuring up to 47 mm diameter. Appearance is concerning for small bowel obstruction. There is mild gaseous distention of the stomach. The colon is empty. Lung bases are clear. No acute osseous abnormality.  No pathologic abdominal calcifications.     Impression: Dilated loops of small bowel concerning for small bowel obstruction. Electronically Signed: Tony Infante MD  8/1/2024 3:38 AM EDT  Workstation ID: [id number]    CT Head Without Contrast    Result Date: 7/30/2024  CT HEAD WO CONTRAST Date of Exam: 7/30/2024 11:43 PM EDT Indication: Stroke follow up. Comparison: 7/29/2024. Technique: Axial CT images were obtained of the head without contrast administration.  Automated exposure control and iterative construction methods were used. Findings: There is redemonstration of the right basal ganglia/right temporal lobe intraparenchymal hemorrhage. The hyperdense portion of the hematoma measures 53 x 30 mm, not significantly changed. There is a surrounding region of cerebral edema that extends into the right temporal lobe, right basal ganglia and right frontoparietal lobes. There is stable mass effect with up to 4 mm leftward shift of midline. There is partial effacement of the right lateral ventricle. No acute hydrocephalus or trapped ventricle. No new hemorrhage. Mild stable patchy white  matter hypoattenuation otherwise. The cerebellum is unremarkable. Brainstem appears intact. Orbits are within normal limits. The calvarium appears intact. The sinuses and mastoids appear clear.     Impression: 1.Stable right basal ganglia/right temporal lobe intraparenchymal hemorrhage with surrounding cerebral edema and mass effect. No new hemorrhage. 2.Mild chronic small vessel ischemic change. Electronically Signed: Tony Infante MD  7/30/2024 11:53 PM EDT  Workstation ID: [id number]    CT Head Without Contrast    Result Date: 7/29/2024  CT HEAD WO CONTRAST Date of Exam: 7/29/2024 4:28 AM EDT Indication: stability. Intracranial hemorrhage Comparison: None available. Technique: Axial CT images were obtained of the head without contrast administration.  Automated exposure control and iterative construction methods were used. Findings: Stable right basal ganglia hemorrhage measuring 51 x 29 mm in the axial plane. Stable surrounding region of cerebral edema. There is persistent mass effect on the right cerebral hemisphere with 4 mm leftward shift of midline structures. Stable patchy white matter hypoattenuation otherwise. No new hemorrhage. No new hypoattenuating lesions. Stable chronic lacunar infarct in the right central liz. Orbits, calvarium, paranasal sinuses, mastoids and superficial soft tissues appear unremarkable.     Impression: 1.Stable right basal ganglia hemorrhage with surrounding edema and mass effect. 2.Stable mild chronic small vessel ischemic change and chronic lacunar infarct in the right central liz. Electronically Signed: Tony Infante MD  7/29/2024 5:34 AM EDT  Workstation ID: [id number]    CT Head Without Contrast    Result Date: 7/28/2024  CT HEAD WO CONTRAST Date of Exam: 7/28/2024 5:00 AM EDT Indication: Intraparenchymal hemorrhage. Comparison: 7/26/2024. Technique: Axial CT images were obtained of the head without contrast administration.  Automated exposure control and iterative  construction methods were used. Findings: There is a stable intraparenchymal hemorrhage in the lateral aspect of the right basal ganglia measuring up to 4.9 x 3.2 cm in the axial plane with surrounding vasogenic edema. The degree of edema appears increased from comparison CT. There is mass effect with leftward shift of midline structures up to 4 mm, previously 2 mm. There is a chronic lacunar infarct in the left basal ganglia extending to the corona radiata. There is stable underlying mild patchy white matter hypoattenuation otherwise. No new hemorrhage. No new hypoattenuating lesions in the brain. Orbits are unremarkable. The calvarium is intact. Superficial soft tissues are unremarkable. The sinuses and mastoids are clear.     Impression: 1.Stable size of right basal ganglia intraparenchymal hemorrhage with increased surrounding vasogenic edema and slightly increased mass effect with 4 mm leftward shift of midline structures. 2.Stable mild chronic small vessel ischemic change and chronic lacunar infarct in the left basal ganglia. Electronically Signed: Tony Infante MD  7/28/2024 7:52 AM EDT  Workstation ID: [id number]    MRI Brain Without Contrast    Result Date: 7/27/2024  MRI BRAIN WO CONTRAST Date of Exam: 7/27/2024 4:48 PM EDT Indication: right basal ganglia hemorrhage.  Comparison: 7/26/2024 Technique:  Routine multiplanar/multisequence sequence images of the brain were obtained without contrast administration. Findings: Stable right basal ganglia hemorrhage with surrounding edema. No underlying infarct. No evidence of a mass but this could be obscured by blood and edema. The hemorrhage measures 4.7 cm x 3 cm which is essentially unchanged compared with the prior study. Localized surrounding mass effect with effacement of the right lateral ventricle and third ventricle but no midline shift. Extensive chronic small vessel/microangiopathic ischemic changes. Old pontine infarct. No extra-axial mass or  collection. Orbital and parable soft tissues are normal. The paranasal sinuses are clear. The mastoid air cells are aerated.     Stable right basal ganglia hemorrhage. Electronically Signed: Roberto Mackey MD  7/27/2024 5:13 PM EDT  Workstation ID: [id number]    Adult Transthoracic Echo Complete W/ Cont if Necessary Per Protocol (With Agitated Saline)    Result Date: 7/27/2024    Left ventricular systolic function is normal. Estimated left ventricular EF = 65%   The cardiac valves are anatomically and functionally normal.   Saline test results are negative.     CT Head Without Contrast    Result Date: 7/26/2024  CT HEAD WO CONTRAST Date of Exam: 7/26/2024 10:52 PM EDT Indication: Stroke, follow up ICH 6 hour post scan. Comparison: CT same day Technique: Axial CT images were obtained of the head without contrast administration.  Automated exposure control and iterative construction methods were used. Findings: Study is limited by motion. Interval increase in size of parenchymal hemorrhage measuring approximately 2.5 x 4.5 cm (image 35 axial series) and 4 cm (image 54 coronal imaging). Previous measurements on similar slice selection measured 2.0 x 3.6 x 3.2 cm. Associated vasogenic edema  and sulcal effacement noted. Mass effect on the adjacent lateral ventricles noted. No definite additional areas of acute hemorrhage noted. There is some calcification within the left basal ganglia. Approximately 4 mm right to left midline shift. Mild mass effect on the basilar and suprasellar cisterns. White matter changes compatible small vessel ischemic disease in this age group noted Bones and soft tissues appear stable. Sinuses are patent.     Impression: Interval increase in intraparenchymal hemorrhage centered in the right basal ganglia with mild increase in mass effect on the adjacent lateral ventricles and minimal change of the basilar and suprasellar cisterns. Findings called to the stroke coordinator at the time of  interpretation Electronically Signed: Saman Momin MD  7/26/2024 11:05 PM EDT  Workstation ID: [id number]    XR Chest 1 View    Result Date: 7/26/2024  XR CHEST 1 VW Date of Exam: 7/26/2024 5:29 PM EDT Indication: Acute Stroke Protocol (onset < 12 hrs) Comparison: None available. Findings: Incomplete inspiration. Heart size and pulmonary vasculature within normal limits. Lungs clear. Costophrenic angles sharp     Impression: No active cardiopulmonary disease Electronically Signed: Bird Joanne  7/26/2024 5:51 PM EDT  Workstation ID: [id number]    CT Angiogram Head w AI Analysis of LVO    Result Date: 7/26/2024  CT ANGIOGRAM NECK, CT ANGIOGRAM HEAD W AI ANALYSIS OF LVO Date of Exam: 7/26/2024 4:56 PM EDT Indication: Neuro Deficit, acute, Stroke suspected Neuro deficit, acute stroke suspected. Comparison: None available. Technique: CTA of the neck was performed before and after the uneventful intravenous administration of 75 cc Isovue-370. Reconstructed coronal and sagittal images were also obtained. In addition, a 3-D volume rendered image was created for interpretation. Automated exposure control and iterative reconstruction methods were used. Findings: Vascular: Normal arch anatomy. Right common carotid artery patent without stenosis. Cervical and intracranial right internal carotid artery patent without significant stenosis or dissection. Mostly noncalcified plaque present in the distal left common and proximal left internal carotid artery, containing a fairly large plaque ulcer. Plaque results in less than 50% luminal diameter stenosis of the origin of the internal carotid artery. Remainder of the cervical and intracranial left internal carotid artery are patent without significant stenosis or dissection. Both vertebral arteries have subclavian origins. Both vertebral arteries are patent without significant stenosis or dissection. V4 segment of the right vertebral artery distal to the PICA takeoff is relatively  small, representing anatomic variation. Proximal anterior, middle, and posterior cerebral arteries are patent. Peripheral cerebral branches are grossly symmetric. No aneurysms are demonstrated Extravascular: No contrast-enhancing intracranial abnormality. Right basal ganglia and periventricular white matter parenchymal hematoma noted, with mass effect as discussed in the report for head CT performed earlier. No acute soft tissue neck abnormality. No acute abnormality of the upper thorax     Impression: 1. No evidence of intracranial large vessel arterial occlusion or aneurysm 2. Ulcerated plaque in the left distal common carotid and proximal internal carotid arteries that results in less than 50% luminal diameter stenosis 3. Patent right carotid and bilateral vertebral arteries without significant stenosis or dissection Electronically Signed: Bird Rubio  7/26/2024 5:39 PM EDT  Workstation ID: [id number]    CT Angiogram Neck    Result Date: 7/26/2024  CT ANGIOGRAM NECK, CT ANGIOGRAM HEAD W AI ANALYSIS OF LVO Date of Exam: 7/26/2024 4:56 PM EDT Indication: Neuro Deficit, acute, Stroke suspected Neuro deficit, acute stroke suspected. Comparison: None available. Technique: CTA of the neck was performed before and after the uneventful intravenous administration of 75 cc Isovue-370. Reconstructed coronal and sagittal images were also obtained. In addition, a 3-D volume rendered image was created for interpretation. Automated exposure control and iterative reconstruction methods were used. Findings: Vascular: Normal arch anatomy. Right common carotid artery patent without stenosis. Cervical and intracranial right internal carotid artery patent without significant stenosis or dissection. Mostly noncalcified plaque present in the distal left common and proximal left internal carotid artery, containing a fairly large plaque ulcer. Plaque results in less than 50% luminal diameter stenosis of the origin of the internal carotid  artery. Remainder of the cervical and intracranial left internal carotid artery are patent without significant stenosis or dissection. Both vertebral arteries have subclavian origins. Both vertebral arteries are patent without significant stenosis or dissection. V4 segment of the right vertebral artery distal to the PICA takeoff is relatively small, representing anatomic variation. Proximal anterior, middle, and posterior cerebral arteries are patent. Peripheral cerebral branches are grossly symmetric. No aneurysms are demonstrated Extravascular: No contrast-enhancing intracranial abnormality. Right basal ganglia and periventricular white matter parenchymal hematoma noted, with mass effect as discussed in the report for head CT performed earlier. No acute soft tissue neck abnormality. No acute abnormality of the upper thorax     Impression: 1. No evidence of intracranial large vessel arterial occlusion or aneurysm 2. Ulcerated plaque in the left distal common carotid and proximal internal carotid arteries that results in less than 50% luminal diameter stenosis 3. Patent right carotid and bilateral vertebral arteries without significant stenosis or dissection Electronically Signed: Bird Rubio  7/26/2024 5:39 PM EDT  Workstation ID: [id number]    CT Head Without Contrast Stroke Protocol    Result Date: 7/26/2024  CT HEAD WO CONTRAST STROKE PROTOCOL Date of Exam: 7/26/2024 4:50 PM EDT Indication: Neuro deficit, acute, stroke suspected Neuro Deficit, acute, Stroke suspected. Comparison: None available. Technique: Axial CT images were obtained of the head without contrast administration.  Reconstructed coronal images were also obtained. Automated exposure control and iterative construction methods were used. Scan Time: 4:55 p.m. Results discussed with the stroke team navigator at 5:04 p.m. Findings: There is an acute intraparenchymal hematoma in the right basal ganglia and periventricular white matter. The hematoma  measures 4 x 3 cm in axial dimension and 3.5 cm in craniocaudal dimension, and is associated with mild vasogenic edema. There is some mass effect on the right lateral ventricle, and there is 4 mm right to left shift. There are scattered low-attenuation foci in the supratentorial white matter and basal ganglia that are likely chronic microvascular ischemic changes. There is an old pontine lacunar infarct. There is no loss of gray-white differentiation.     Impression: 1. 4 x 3 x 3.5 cm right basal ganglia and periventricular white matter parenchymal hematoma with mass effect as described 2. No evidence of major vascular territory brain ischemia 3. Chronic small vessel ischemic changes Electronically Signed: Bird Rubio  7/26/2024 5:04 PM EDT  Workstation ID: [id number]             Results for orders placed during the hospital encounter of 07/26/24    Adult Transthoracic Echo Complete W/ Cont if Necessary Per Protocol (With Agitated Saline)    Interpretation Summary    Left ventricular systolic function is normal. Estimated left ventricular EF = 65%    The cardiac valves are anatomically and functionally normal.    Saline test results are negative.      Plan for Follow-up of Pending Labs/Results: Reviewed    Discharge Details        Discharge Medications        New Medications        Instructions Start Date   amLODIPine 10 MG tablet  Commonly known as: NORVASC   10 mg, Oral, Every 24 Hours Scheduled      aspirin 81 MG EC tablet   81 mg, Oral, Daily   Start Date: August 9, 2024     atorvastatin 80 MG tablet  Commonly known as: LIPITOR   80 mg, Oral, Nightly      cloNIDine 0.3 MG tablet  Commonly known as: CATAPRES   0.3 mg, Oral, Every 12 Hours Scheduled      famotidine 20 MG tablet  Commonly known as: PEPCID   20 mg, Oral, 2 Times Daily Before Meals      hydrALAZINE 100 MG tablet  Commonly known as: APRESOLINE   100 mg, Oral, Every 8 Hours Scheduled      lactobacillus acidophilus capsule capsule   1 capsule, Oral,  Daily      lisinopril 40 MG tablet  Commonly known as: PRINIVIL,ZESTRIL   40 mg, Oral, Every 24 Hours Scheduled      Metoprolol Tartrate 75 MG tablet   75 mg, Oral, Every 12 Hours Scheduled             Stop These Medications      aspirin-acetaminophen-caffeine 250-250-65 MG per tablet  Commonly known as: EXCEDRIN MIGRAINE     ibuprofen 200 MG tablet  Commonly known as: ADVIL,MOTRIN              No Known Allergies      Discharge Disposition:  Rehab Facility or Unit (DC - External)    Diet:  Hospital:  Diet Order   Procedures    Diet: Cardiac; Healthy Heart (2-3 Na+); Texture: Soft to Chew (NDD 3); Soft to Chew: Whole Meat; Fluid Consistency: Thin (IDDSI 0)            Activity:      Restrictions or Other Recommendations:         CODE STATUS:    Code Status and Medical Interventions: CPR (Attempt to Resuscitate); Full Support; discussed with wife; patient lacked capacity at time of discussion of code status on 8/1   Ordered at: 08/01/24 1203     Level Of Support Discussed With:    Next of Kin (If No Surrogate)     Code Status (Patient has no pulse and is not breathing):    CPR (Attempt to Resuscitate)     Medical Interventions (Patient has pulse or is breathing):    Full Support     Comments:    discussed with wife; patient lacked capacity at time of discussion of code status on 8/1       Future Appointments   Date Time Provider Department Center   8/30/2024 10:30 AM [facility]                 Jose Valentine MD  08/06/24      Time Spent on Discharge:  I spent  32  minutes on this discharge activity which included: face-to-face encounter with the patient, reviewing the data in the system, coordination of the care with the nursing staff as well as consultants, documentation, and entering orders.            Electronically signed by Jose Valentine MD at 08/06/24 0900

## 2024-08-06 NOTE — PROGRESS NOTES
Stroke Neurology Progress Note     Subjective     This patient was seen in follow-up for: right basal ganglia hemorrhage    Subjective:  Resting in bed.   Wife is present at the bedside.   He currently is having multiple bowel movements.  Reports an increase in feeling noted in the left arm and leg.   Speech is also improved some today.       Objective      Temp:  [97.7 °F (36.5 °C)-98.8 °F (37.1 °C)] 97.7 °F (36.5 °C)  Heart Rate:  [60-79] 78  Resp:  [16-20] 18  BP: ()/(62-98) 142/87    Objective    Physical Exam  Vitals and nursing note reviewed.   Constitutional:       General: He is awake. He is not in acute distress.  HENT:      Head: Normocephalic.      Mouth/Throat:      Pharynx: Oropharynx is clear.   Eyes:      Extraocular Movements: Extraocular movements intact.      Pupils: Pupils are equal, round, and reactive to light.      Comments: Left homonymous hemianopia   Cardiovascular:      Rate and Rhythm: Normal rate and regular rhythm.   Pulmonary:      Effort: Pulmonary effort is normal. No respiratory distress.   Musculoskeletal:         General: Swelling (L UE) present.      Right lower leg: No edema.      Left lower leg: No edema.   Skin:     General: Skin is warm and dry.      Findings: Bruising present.   Neurological:      Mental Status: He is alert and oriented to person, place, and time.      Cranial Nerves: Cranial nerve deficit and dysarthria present.      Sensory: Sensory deficit present.      Motor: Weakness present.   Psychiatric:         Mood and Affect: Affect is flat.         Speech: Speech is slurred.         Behavior: Behavior normal. Behavior is cooperative.         Cognition and Memory: Cognition and memory normal.       Neurological Exam  Mental Status  Awake and alert. Oriented to person, place, time and situation. Oriented to person, place, and time. Memory is normal. Moderate dysarthria present. Improving. Language is fluent with no aphasia.    Cranial Nerves  CN II: Vision  test: Left homonymous hemianopia. Left homonymous hemianopsia.  CN III, IV, VI: Extraocular movements intact bilaterally. Left ptosis. Pupils equal round and reactive to light bilaterally.  CN V:  Right: Facial sensation is normal.  Left: Diminished sensation of the entire left side of the face.  CN VII:  Left: There is central facial weakness.  CN VIII: Hearing appears to be intact bilaterally.  CN XII: Tongue midline without atrophy or fasciculations.    Motor  Normal muscle bulk throughout. No fasciculations present. Decreased muscle tone.  LUE with 0/5 strength  LLE with 1/5 strength  RUE/RLE with full range of motion, 5/5 strength.    Sensory  Light touch abnormality: Left hemisensory loss.     Coordination  Left: Unable to assess secondary to weakness. Unable to assess secondary to weakness.  No obvious dysmetria noted in right side.    Gait    Not observed.    Interval: baseline  1a. Level of Consciousness: 0-->Alert, keenly responsive  1b. LOC Questions: 0-->Answers both questions correctly  1c. LOC Commands: 0-->Performs both tasks correctly  2. Best Gaze: 0-->Normal  3. Visual: 1-->Partial hemianopia  4. Facial Palsy: 2-->Partial paralysis (total or near-total paralysis of lower face)  5a. Motor Arm, Left: 3-->No effort against gravity, limb falls  5b. Motor Arm, Right: 0-->No drift, limb holds 90 (or 45) degrees for full 10 secs  6a. Motor Leg, Left: 3-->No effort against gravity, leg falls to bed immediately  6b. Motor Leg, Right: 0-->No drift, leg holds 30 degree position for full 5 secs  7. Limb Ataxia: 0-->Absent  8. Sensory: 1-->Mild-to-moderate sensory loss, patient feels pinprick is less sharp or is dull on the affected side, or there is a loss of superficial pain with pinprick, but patient is aware of being touched  9. Best Language: 0-->No aphasia, normal  10. Dysarthria: 1-->Mild-to-moderate dysarthria, patient slurs at least some words and, at worst, can be understood with some difficulty  11.  Extinction and Inattention (formerly Neglect): 1-->Visual, tactile, auditory, spatial, or personal inattention or extinction to bilateral simultaneous stimulation in one of the sensory modalities    Total (NIH Stroke Scale): 12     Results Review:      All brain images and reports were personally reviewed and I agree with the interpretations except as noted below.          EEG    Result Date: 8/1/2024  Focal cerebral dysfunction impacting the right hemisphere, superimposed upon diffuse cerebral dysfunction of more mild degree The above finding is consistent with a known right hemispheric stroke No evidence for epilepsy is seen This report is transcribed using the Dragon dictation system.  ]     CT Head Without Contrast Stroke Protocol    Result Date: 8/1/2024  Impression: No significant interval change from prior exam, with similar size and associated mass effect of the right basal ganglia hematoma, as described above. Electronically Signed: Bernabe Akers MD  8/1/2024 12:00 PM EDT  Workstation ID: [id number]    CT Abdomen Pelvis With Contrast    Result Date: 8/1/2024  Impression: 1. Small effusions and bilateral lower lobe airspace disease favored to be secondary to atelectasis. 2. Air-filled and fluid-filled distended loops of small bowel throughout the abdomen with the tract to normal caliber small bowel within the mid pelvis anteriorly. Findings would be consistent with partial small bowel obstruction. 3. 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 There is no evidence of small bowel volvulus. Electronically Signed: Nitin Gimenez MD  8/1/2024 8:46 AM EDT  Workstation ID: [id number]    CT head 7/29/2024  Impression: 1. Stable right basal ganglia hemorrhage with surrounding edema and mass effect. 2. Stable mild chronic small vessel ischemic change and chronic lacunar infarct in the right  central liz.    CT head 7/28/2024  Impression: 1. Stable size of right basal ganglia intraparenchymal hemorrhage with increased surrounding vasogenic edema and slightly increased mass effect with 4 mm leftward shift of midline structures. 2. Stable mild chronic small vessel ischemic change and chronic lacunar infarct in the left basal ganglia.    CT Head Without Contrast 7/26/2024  Impression: Interval increase in intraparenchymal hemorrhage centered in the right basal ganglia with mild increase in mass effect on the adjacent lateral ventricles and minimal change of the basilar and suprasellar cisterns.     CT Angiogram Head and Neck 7/26/2024  Impression: 1. No evidence of intracranial large vessel arterial occlusion or aneurysm 2. Ulcerated plaque in the left distal common carotid and proximal internal carotid arteries that results in less than 50% luminal diameter stenosis 3. Patent right carotid and bilateral vertebral arteries without significant stenosis or dissection.    CT Head Without Contrast 7/26/2024  Impression: 1. 4 x 3 x 3.5 cm right basal ganglia and periventricular white matter parenchymal hematoma with mass effect as described 2. No evidence of major vascular territory brain ischemia 3. Chronic small vessel ischemic changes.    Transthoracic echocardiogram 7/26/2024  Impression:    Left ventricular systolic function is normal. Estimated left ventricular EF = 65%    The cardiac valves are anatomically and functionally normal.    Saline test results are negative.      A1c 5.50    WBC 8.04, downtrending   H/H 12.8/37.8  Platelets 194  Glucose 95  Cretinine 0.88, BUN 19  Na+ 136  AST 16  ALT 28    Assessment/Plan     Assessment:    This is a 54 yo male with PMH of migraines [facility] with complaints of left sided weakness, sensory loss, dysarthria and visual disturbance;  NIH noted to be 17.  CT head without contrast revealed a 4 x 3 x 3.5 right basal ganglia  hemorrhage.  CT angiogram without evidence of LVO.  Ulcerated plaque observed within the left distal common carotid.  He was not a candidate for IV thrombolytic therapy given presence of ICH and was not deemed a candidate for neurosurgical intervention.  He was admitted to the neurological ICU for further monitoring and work-up.     Antiplatelet PTA: None  Anticoagulation PTA: None    # Right basal ganglia hemorrhage, etiology likely hypertensive  -Hemorrhagic stroke order set is currently in place  -ICH score 0   -/112 on admission   -Neurosurgery evaluated, non-surgical  -Hold all antiplatelet medications at least 2 weeks from index event   -Most recent CT head stable  -PT OT recommend IRF, CM following for DC needs, plan for [facility]  -OK for enoxaparin for DVT prophylaxis   -Follow-up in stroke clinic in 4 weeks after DC (added to ADT)  -Follow-up with neuro-ophthalmology for formal visual field testing; no driving until cleared from them; discussed with patient  -Stroke Education     # Extracranial atherosclerotic disease  -Eventually, we will need to treat extracranial atherosclerotic disease with aspirin but would wait at least 2 weeks post-hemorrhage (8/9/2024)      # Essential hypertension  -SBP < 140.    -BP on examination 160's systolic.  -Continue clonidine, hydralazine, metoprolol   -increasing ACE   -Utilize PRN medications as needed.  -Management per primary team.    # Hyperlipidemia  -LDL on admission 140, goal <70 for secondary stroke prevention  -Continue atorvastatin 80 mg daily ()    Disposition: Anticipate discharge to inpatient rehab facility once medically appropriate with blood pressure controled.     Plan discussed with Hospital Medicine as well as the patient.  Stroke neurology will continue to follow until discharge. Please call with any questions or concerns.    REENA Jimenez,   [facility] [place] [facility]  08/06/24  07:34 EDT

## 2024-08-06 NOTE — NURSING NOTE
Attempted report to [facility] rehab. RN unavailable, will call back 3E until for report shortly.    PICC line removed by charge BHARATHI Shannon, patient has laid flat for 30 min with no s/s of bleed.      Report given to PARKER at [facility]. Discharge plans reviewed, dc summary received. Pressure 124/82 at time of discharge.

## 2024-08-06 NOTE — THERAPY DISCHARGE NOTE
Patient Name: Ron Cam  : 1970    MRN: [id number]                              Today's Date: 2024       Admit Date: 2024    Visit Dx:     ICD-10-CM ICD-9-CM   1. Intraparenchymal hemorrhage of brain  I61.9 431   2. Dysarthria  R47.1 784.51   3. Acute left-sided weakness  R53.1 728.87   4. Left sided numbness  R20.0 782.0   5. Hemineglect  R41.4 781.8   6. Hypertensive emergency  I16.1 401.9   7. Nontraumatic subcortical hemorrhage of right cerebral hemisphere  I61.0 431   8. Oropharyngeal dysphagia  R13.12 787.22     Patient Active Problem List   Diagnosis    Nontraumatic intracerebral hemorrhage    Hypertensive crisis     History reviewed. No pertinent past medical history.  History reviewed. No pertinent surgical history.   General Information       Row Name 24 1037          Physical Therapy Time and Intention    Document Type therapy note (daily note);discharge evaluation/summary  -SC     Mode of Treatment physical therapy  -[facility] Name 24 1037          General Information    Patient Profile Reviewed yes  -SC     Existing Precautions/Restrictions fall;other (see comments)  L side weakness, L side inattention  -SC       Row Name 24 1037          Cognition    Orientation Status (Cognition) oriented x 3  -[facility] Name 24 1037          Safety Issues, Functional Mobility    Impairments Affecting Function (Mobility) balance;coordination;endurance/activity tolerance;muscle tone abnormal;postural/trunk control;sensation/sensory awareness;strength  -SC     Comment, Safety Issues/Impairments (Mobility) alert, following commands , Monitored BP  -SC               User Key  (r) = Recorded By, (t) = Taken By, (c) = Cosigned By      Initials Name Provider Type    SC Errol Dawson, PT Physical Therapist                   Mobility       Row Name 24 1038          Bed Mobility    Bed Mobility supine-sit;sit-supine;scooting/bridging  -SC     Supine-Sit [place]  (Bed Mobility) maximum assist (25% patient effort);2 person assist;verbal cues  -SC     Sit-Supine [place] (Bed Mobility) maximum assist (25% patient effort);2 person assist;verbal cues  -SC     Supine-Sit-Supine [place] (Bed Mobility) 2 person assist;maximum assist (25% patient effort);verbal cues  -SC     Assistive Device (Bed Mobility) head of bed elevated;bed rails;draw sheet  -SC     Comment, (Bed Mobility) Up to edge of bed for therapeutic activities. time taken to work on sitting balance.  -[facility] Name 08/06/24 1038          Transfers    Comment, (Transfers) deferred due to elevated BP at this time: 184/120: Back to bed after reading  -[facility] Name 08/06/24 1038          Bed-Chair Transfer    Comment, (Bed-Chair Transfer) deferred due to elevated  BP  -[facility] Name 08/06/24 1038          Sit-Stand Transfer    Comment, (Sit-Stand Transfer) deferred due to elevated BP  -SC       Row Name 08/06/24 1038          Gait/Stairs (Locomotion)    Comment, (Gait/Stairs) not appropriate  -SC               User Key  (r) = Recorded By, (t) = Taken By, (c) = Cosigned By      Initials Name Provider Type    SC Errol Dawson, PT Physical Therapist                   Obj/Interventions       [facility] Name 08/06/24 1041          Motor Skills    Therapeutic Exercise hip;knee  -[facility] Name 08/06/24 1041          Hip (Therapeutic Exercise)    Hip (Therapeutic Exercise) AAROM (active assistive range of motion)  -SC     Hip AAROM (Therapeutic Exercise) bilateral;aBduction;aDduction;supine;10 repetitions  x5 reps of supine bridging  -SC       Row Name 08/06/24 1041          Balance    Static Sitting Balance 1-person assist;1 person to manage equipment;minimal assist  -SC     Comment, Balance Time taken to work on midline orientation in EOB sitting. Patient given a visual object to assist with midlin orientation. VC and tactile cues to readjust midlilne. Some R UE pushing noted and was able to stop with cues.  good effort overall. Further activity stoped due to rising BP  -SC               User Key  (r) = Recorded By, (t) = Taken By, (c) = Cosigned By      Initials Name Provider Type    SC Errol Dawson, PT Physical Therapist                   Goals/Plan       Row Name 08/06/24 1046          Bed Mobility Goal 1 (PT)    Activity/Assistive Device (Bed Mobility Goal 1, PT) sit to supine/supine to sit  -SC     [place] Level/Cues Needed (Bed Mobility Goal 1, PT) moderate assist (50-74% patient effort)  -SC     Time Frame (Bed Mobility Goal 1, PT) short term goal (STG);1 week  -SC     Progress/Outcomes (Bed Mobility Goal 1, PT) goal not met  -SC       Row Name 08/06/24 1046          Transfer Goal 1 (PT)    Activity/Assistive Device (Transfer Goal 1, PT) sit-to-stand/stand-to-sit  -SC     [place] Level/Cues Needed (Transfer Goal 1, PT) maximum assist (25-49% patient effort)  -SC     Time Frame (Transfer Goal 1, PT) 2 weeks;long term goal (LTG)  -SC     Progress/Outcome (Transfer Goal 1, PT) goal ongoing;progress slower than expected  -SC       Row Name 08/06/24 1046          Gait Training Goal 1 (PT)    Activity/Assistive Device (Gait Training Goal 1, PT) gait (walking locomotion);assistive device use  -SC     [place] Level (Gait Training Goal 1, PT) maximum assist (25-49% patient effort)  -SC     Distance (Gait Training Goal 1, PT) 10  -SC     Time Frame (Gait Training Goal 1, PT) 2 weeks;long term goal (LTG)  -SC     Progress/Outcome (Gait Training Goal 1, PT) goal not met  -SC               User Key  (r) = Recorded By, (t) = Taken By, (c) = Cosigned By      Initials Name Provider Type    SC Errol Dawson, PT Physical Therapist                   Clinical Impression       Row Name 08/06/24 1044          Pain Scale: FACES Pre/Post-Treatment    Pain: FACES Scale, Pretreatment 0-->no hurt  -SC     Posttreatment Pain Rating 0-->no hurt  -SC       Row Name 08/06/24 1044          Plan of Care Review    Plan of Care  Reviewed With patient  -SC     Progress improving  -SC     Outcome Evaluation Patient very motivated to participate in therapeutic activities. He remains non ambulatory at this point. He will bed discharge to Rehab today.  -SC       Row Name 08/06/24 1044          Therapy Assessment/Plan (PT)    Rehab Potential (PT) good, to achieve stated therapy goals  -SC     Criteria for Skilled Interventions Met (PT) yes  -SC     Therapy Frequency (PT) daily  -SC       Row Name 08/06/24 1044          Vital Signs    Intra Systolic BP Rehab 184  -SC     Intra Treatment Diastolic   -SC     Post Systolic BP Rehab 161  -SC     Post Treatment Diastolic BP 89  -SC     Posttreatment Heart Rate (beats/min) 81  -SC     Post SpO2 (%) 92  -SC     O2 Delivery Post Treatment room air  -[facility] Name 08/06/24 1044          Positioning and Restraints    Pre-Treatment Position in bed  -SC     Post Treatment Position bed  -SC     In Bed supine;exit alarm on;with family/caregiver  -SC               User Key  (r) = Recorded By, (t) = Taken By, (c) = Cosigned By      Initials Name Provider Type    SC Errol Dawson, PT Physical Therapist                   Outcome Measures       Row Name 08/06/24 1047          How much help from another person do you currently need...    Turning from your back to your side while in flat bed without using bedrails? 2  -SC     Moving from lying on back to sitting on the side of a flat bed without bedrails? 2  -SC     Moving to and from a bed to a chair (including a wheelchair)? 2  -SC     Standing up from a chair using your arms (e.g., wheelchair, bedside chair)? 2  -SC     Climbing 3-5 steps with a railing? 1  -SC     To walk in hospital room? 1  -SC     AM-PAC 6 Clicks Score (PT) 10  -SC     Highest Level of Mobility Goal 4 --> Transfer to chair/commode  -SC       Row Name 08/06/24 1047          Functional Assessment    Outcome Measure Options AM-PAC 6 Clicks Basic Mobility (PT)  -SC               User  Key  (r) = Recorded By, (t) = Taken By, (c) = Cosigned By      Initials Name Provider Type    Errol Olivas PT Physical Therapist                  Physical Therapy Education       Title: PT OT SLP Therapies (Done)       Topic: Physical Therapy (Done)       Point: Mobility training (Done)       Learning Progress Summary             Patient Eager, E,TB,D, VU,DU by SC at 8/6/2024 1048    Comment: taught wife to do gently colon massage to assist with gas relief -patient in suping    Acceptance, E,H, VU by KR at 8/5/2024 1408    Comment: PT provided LE HEP    Acceptance, E, NR by THERESA at 8/1/2024 1029    Acceptance, E, VU,NR by  at 7/29/2024 1453    Acceptance, E,D, VU,NR by  at 7/27/2024 1005   Family Eager, E,TB,D, VU,DU by SC at 8/6/2024 1048    Comment: taught wife to do gently colon massage to assist with gas relief -patient in suping    Acceptance, E, VU,NR by  at 7/29/2024 1453   Significant Other Acceptance, E,H, VU by KR at 8/5/2024 1408    Comment: PT provided LE HEP    Acceptance, E,D, VU,NR by  at 7/27/2024 1005                         Point: Home exercise program (Done)       Learning Progress Summary             Patient Eager, E,TB,D, VU,DU by SC at 8/6/2024 1048    Comment: taught wife to do gently colon massage to assist with gas relief -patient in suping    Acceptance, E,H, VU by KR at 8/5/2024 1408    Comment: PT provided LE HEP    Acceptance, E, VU,NR by  at 7/29/2024 1453   Family Eager, E,TB,D, VU,DU by SC at 8/6/2024 1048    Comment: taught wife to do gently colon massage to assist with gas relief -patient in suping    Acceptance, E, VU,NR by  at 7/29/2024 1453   Significant Other Acceptance, E,H, VU by KR at 8/5/2024 1408    Comment: PT provided LE HEP                         Point: Body mechanics (Done)       Learning Progress Summary             Patient Eager, E,TB,D, VU,DU by SC at 8/6/2024 8439    Comment: taught wife to do gently colon massage to assist with gas relief -patient  in suping    Acceptance, E,H, VU by KR at 8/5/2024 1408    Comment: PT provided LE HEP    Acceptance, E, NR by THERESA at 8/1/2024 1029    Acceptance, E, VU,NR by  at 7/29/2024 1453    Acceptance, E,D, VU,NR by  at 7/27/2024 1005   Family Eager, E,TB,D, VU,DU by SC at 8/6/2024 1048    Comment: taught wife to do gently colon massage to assist with gas relief -patient in suping    Acceptance, E, VU,NR by AC at 7/29/2024 1453   Significant Other Acceptance, E,H, VU by KR at 8/5/2024 1408    Comment: PT provided LE HEP    Acceptance, E,D, VU,NR by  at 7/27/2024 1005                         Point: Precautions (Done)       Learning Progress Summary             Patient Eager, E,TB,D, VU,DU by SC at 8/6/2024 1048    Comment: taught wife to do gently colon massage to assist with gas relief -patient in suping    Acceptance, E,H, VU by KR at 8/5/2024 1408    Comment: PT provided LE HEP    Acceptance, E, NR by THERESA at 8/1/2024 1029    Acceptance, E, VU,NR by  at 7/29/2024 1453    Acceptance, E,D, VU,NR by  at 7/27/2024 1005   Family Eager, E,TB,D, VU,DU by SC at 8/6/2024 1048    Comment: taught wife to do gently colon massage to assist with gas relief -patient in suping    Acceptance, E, VU,NR by AC at 7/29/2024 1453   Significant Other Acceptance, E,H, VU by KR at 8/5/2024 1408    Comment: PT provided LE HEP    Acceptance, E,D, VU,NR by  at 7/27/2024 1005                                         User Key       Initials Effective Dates Name Provider Type Discipline    SC 02/03/23 -  Errol Dawson, PT Physical Therapist PT    LS 02/03/23 -  Yakelin Araujo, PT Physical Therapist PT    KR 12/30/22 -  Mariposa Landa, PT Physical Therapist PT    THERESA 05/07/24 -  Adalgisa Zarco, PT Student PT Student PT    AC 07/11/24 -  Bel Gates PT Physical Therapist PT                  PT Recommendation and Plan     Plan of Care Reviewed With: patient  Progress: improving  Outcome Evaluation: Patient very motivated to participate in  therapeutic activities. He remains non ambulatory at this point. He will bed discharge to Rehab today.     Time Calculation:         PT Charges       Row Name 08/06/24 0909             Time Calculation    Start Time 0909  -SC      PT Received On 08/06/24  -SC      PT Goal Re-Cert Due Date 08/15/24  -SC         Timed Charges    56396 - PT Therapeutic Exercise Minutes 10  -SC      72826 - PT Therapeutic Activity Minutes 7  -SC         Total Minutes    Timed Charges Total Minutes 17  -SC       Total Minutes 17  -SC                User Key  (r) = Recorded By, (t) = Taken By, (c) = Cosigned By      Initials Name Provider Type    SC Errol Dawson PT Physical Therapist                  Therapy Charges for Today       Code Description Service Date Service Provider Modifiers Qty    25376777141 HC PT THER PROC EA 15 MIN 8/6/2024 Errol Dawson PT GP 1    13251884249 HC PT THER SUPP EA 15 MIN 8/6/2024 Errol Dawson PT GP 2            PT G-Codes  Outcome Measure Options: AM-PAC 6 Clicks Basic Mobility (PT)  AM-PAC 6 Clicks Score (PT): 10  AM-PAC 6 Clicks Score (OT): 11  Modified [place] Scale: 4 - Moderately severe disability.  Unable to walk without assistance, and unable to attend to own bodily needs without assistance.    PT Discharge Summary  Anticipated Discharge Disposition (PT): inpatient rehabilitation facility    Errol Dawson PT  8/6/2024

## 2024-08-06 NOTE — PROGRESS NOTES
[facility] Medicine Services  PROGRESS NOTE    Patient Name: Ron Cam  : 1970  MRN: [id number]    Date of Admission: 2024  Primary Care Physician: Provider, No Known    Subjective   Subjective     CC: CVA    HPI:Up in bed. Some loose stool.    Objective   Objective     Vital Signs:   Temp:  [97.7 °F (36.5 °C)-98.8 °F (37.1 °C)] 97.7 °F (36.5 °C)  Heart Rate:  [60-79] 78  Resp:  [16-20] 18  BP: ()/(62-98) 142/87     Physical Exam:  NAD, alert  OP clear, dry MM  Neck supple  No LAD  RRR  Decreased at bases  +BS, soft  Flat  Dysarthria, L weakness, L facial droop  No change from     Results Reviewed:  LAB RESULTS:      Lab 24  1046 24  0741 24  0435 24  0529   WBC 8.61 8.04 14.02* 10.75   HEMOGLOBIN 12.8* 12.8* 13.9 13.5   HEMATOCRIT 37.8 37.8 39.7 38.3   PLATELETS 213 194 262 197   NEUTROS ABS  --   --   --  9.11*   IMMATURE GRANS (ABS)  --   --   --  0.03   LYMPHS ABS  --   --   --  0.71   MONOS ABS  --   --   --  0.85   EOS ABS  --   --   --  0.02   MCV 91.3 91.1 89.4 89.7   PROCALCITONIN  --   --  0.10  --    LACTATE  --   --  1.3  --          Lab 24  1046 24  0741 24  0435 24  1605 24  0529   SODIUM 135* 136 135* 129* 133*   POTASSIUM 4.0 3.9 3.8  --  3.9   CHLORIDE 100 99 98  --  98   CO2 25.0 24.0 26.0  --  21.0*   ANION GAP 10.0 13.0 11.0  --  14.0   BUN 13 19 19  --  17   CREATININE 0.88 0.88 0.93  --  0.85   EGFR 102.8 102.8 98.2  --  103.9   GLUCOSE 122* 93 146*  --  155*   CALCIUM 8.1* 8.2* 9.1  --  8.7   MAGNESIUM 1.9 2.5 2.2  --  1.9   PHOSPHORUS 2.9 3.0 3.9  --   --          Lab 24  0435   TOTAL PROTEIN 6.7   ALBUMIN 3.7   GLOBULIN 3.0   ALT (SGPT) 28   AST (SGOT) 16   BILIRUBIN 0.5   ALK PHOS 64                         Lab 24  1234   PH, ARTERIAL 7.496*   PCO2, ARTERIAL 35.5   PO2 .0*   FIO2 30   HCO3 ART 27.4*   BASE EXCESS ART 4.2*   CARBOXYHEMOGLOBIN 1.2     Brief Urine Lab  Results  (Last result in the past 365 days)        Color   Clarity   Blood   Leuk Est   Nitrite   Protein   CREAT   Urine HCG        08/01/24 1752 Yellow   Clear   Negative   Negative   Negative   Negative                   Microbiology Results Abnormal       Procedure Component Value - Date/Time    Blood Culture - Blood, Arm, Left [[id number]]  (Normal) Collected: 08/01/24 0427    Lab Status: Final result Specimen: Blood from Arm, Left Updated: 08/06/24 0500     Blood Culture No growth at 5 days    Blood Culture - Blood, Arm, Left [[id number]]  (Normal) Collected: 08/01/24 0430    Lab Status: Final result Specimen: Blood from Arm, Left Updated: 08/06/24 0500     Blood Culture No growth at 5 days            SLP FEES - Fiberoptic Endo Eval Swallow    Result Date: 8/5/2024  This procedure was auto-finalized with no dictation required.     Results for orders placed during the hospital encounter of 07/26/24    Adult Transthoracic Echo Complete W/ Cont if Necessary Per Protocol (With Agitated Saline)    Interpretation Summary    Left ventricular systolic function is normal. Estimated left ventricular EF = 65%    The cardiac valves are anatomically and functionally normal.    Saline test results are negative.      Current medications:  Scheduled Meds:amLODIPine, 10 mg, Oral, Q24H  atorvastatin, 80 mg, Oral, Nightly  bisacodyl, 10 mg, Oral, Daily  cloNIDine, 0.3 mg, Oral, Q12H  docusate sodium, 100 mg, Oral, BID  enoxaparin, 40 mg, Subcutaneous, Q24H  famotidine, 20 mg, Oral, BID AC  hydrALAZINE, 100 mg, Oral, Q8H  lactobacillus acidophilus, 1 capsule, Oral, Daily  lisinopril, 40 mg, Oral, Q24H  metoclopramide, 10 mg, Intravenous, Q6H  metoprolol tartrate, 75 mg, Oral, Q12H  sodium chloride, 10 mL, Intravenous, Q12H      Continuous Infusions:     PRN Meds:.  acetaminophen    calcium carbonate    Calcium Replacement - Follow Nurse / BPA Driven Protocol    hydrALAZINE    labetalol    Magnesium Standard Dose Replacement -  Follow Nurse / BPA Driven Protocol    ondansetron    Phosphorus Replacement - Follow Nurse / BPA Driven Protocol    Potassium Replacement - Follow Nurse / BPA Driven Protocol    prochlorperazine    sodium chloride    sodium chloride    Assessment & Plan   Assessment & Plan     Active Hospital Problems    Diagnosis  POA    **Nontraumatic intracerebral hemorrhage [I61.9]  Yes    Hypertensive crisis [I16.9]  Yes      Resolved Hospital Problems   No resolved problems to display.        Brief Hospital Course to date:  Ron Cam is a 53 y.o. male with history of migraines, who presented via EMS on 5/26 with left-sided weakness, sensory loss, dysarthria, visual disturbance.  NIH noted to be 17.  CT head showed 4 x 3 x 3.5 right basal ganglia hemorrhage.  CT angio without evidence of large vessel occlusion.  Ulcerated plaque observed within the left distal common carotid.  He was deemed not to be a surgical candidate.  He was monitored in the neurological ICU.  Stroke neurology consulted on admission.  Transferred to the floor service on 8/1.    Right basal ganglia hemorrhage, etiology likely hypertensive  -Neurology following  -Neurosurgery evaluated, deemed nonsurgical  -CT head stable   -PT/OT recommend IRF  -Follow-up with the stroke clinic 4 weeks after discharge  -Continue neurochecks, telemetry    Ileus  -resolved, tolerating PO    Right lower lobe pneumonia  -Ceftriaxone, doxycycline, probiotic  -on RA, completed course of antibiotics    Extracranial atherosclerotic disease  -Eventually needs aspirin, but will wait 2 weeks post hemorrhage, 8/9/2024 before starting, per recommendatoins    HTN  -Goal SBP less than 140  -Continue clonidine, hydralazine, metoprolol  -increasing ACE 8/6    HLD  -Continue atorvastatin    Altered mental status, likely secondary to lack of sleep and new pneumonia, now resolved  -resolved    Expected Discharge Location and Transportation: IRF  Expected Discharge   Expected Discharge  Date: 8/7/2024; Expected Discharge Time:      VTE Prophylaxis:  Pharmacologic & mechanical VTE prophylaxis orders are present.         AM-PAC 6 Clicks Score (PT): 10 (08/05/24 2100)    CODE STATUS:   Code Status and Medical Interventions: CPR (Attempt to Resuscitate); Full Support; discussed with wife; patient lacked capacity at time of discussion of code status on 8/1   Ordered at: 08/01/24 1203     Level Of Support Discussed With:    Next of Kin (If No Surrogate)     Code Status (Patient has no pulse and is not breathing):    CPR (Attempt to Resuscitate)     Medical Interventions (Patient has pulse or is breathing):    Full Support     Comments:    discussed with wife; patient lacked capacity at time of discussion of code status on 8/1       Jose Valentine MD  08/06/24

## 2024-08-06 NOTE — PLAN OF CARE
Goal Outcome Evaluation:  Plan of Care Reviewed With: patient        Progress: improving  Outcome Evaluation: Patient very motivated to participate in therapeutic activities. He remains non ambulatory at this point. He will bed discharge to Rehab today.      Anticipated Discharge Disposition (PT): inpatient rehabilitation facility

## 2024-08-06 NOTE — PLAN OF CARE
Goal Outcome Evaluation:              Outcome Evaluation: Resting in bed with wife at bedside, patient has had several bowel movements throughout the night. Patient is incontinent of BM at this time, uses bedpan. Patient does not want to take anything to promote bowel movements at this time. Assist x 2.

## 2024-08-06 NOTE — DISCHARGE SUMMARY
[facility] Medicine Services  DISCHARGE SUMMARY    Patient Name: Ron Cam  : 1970  MRN: [id number]    Date of Admission: 2024  4:53 PM  Date of Discharge:  2024  Primary Care Physician: Provider, No Known    Consults       Date and Time Order Name Status Description    2024  3:59 AM Inpatient General Surgery Consult Completed     2024  5:50 PM Inpatient Neurosurgery Consult Completed     2024  4:50 PM Inpatient Neurology Consult Stroke Completed             Hospital Course     Presenting Problem: ICH and HTN    Active Hospital Problems    Diagnosis  POA    **Nontraumatic intracerebral hemorrhage [I61.9]  Yes    Hypertensive crisis [I16.9]  Yes      Resolved Hospital Problems   No resolved problems to display.          Hospital Course:  Ron Cam is a 53 y.o. male with history of migraines, who presented via EMS on  with left-sided weakness, sensory loss, dysarthria, visual disturbance.  NIH noted to be 17.  CT head showed 4 x 3 x 3.5 right basal ganglia hemorrhage.  CT angio without evidence of large vessel occlusion.  Ulcerated plaque observed within the left distal common carotid.  He was deemed not to be a surgical candidate.  He was monitored in the neurological ICU.  Stroke neurology consulted on admission.  Transferred to the floor service on .     Right basal ganglia hemorrhage, etiology likely hypertensive  -Neurology following  -Neurosurgery evaluated, deemed nonsurgical  -CT head stable   -PT/OT recommend IRF  -Follow-up with the stroke clinic 4 weeks after discharge  -Continue neurochecks, telemetry  -Neurology recommends starting aspirin 2024, see below     Ileus  -resolved, tolerating PO     Right lower lobe pneumonia  -Ceftriaxone, doxycycline, probiotic  -on RA, completed course of antibiotics     Extracranial atherosclerotic disease  -Eventually needs aspirin, but will wait 2 weeks post hemorrhage, 2024 before starting,  per recommendatoins     HTN  -Continue current regimen, titrate goal, SBP below 140    HLD  -Continue atorvastatin       Discharge Follow Up Recommendations for outpatient labs/diagnostics:  PCP 1 week  Stroke neurology one month  Needs refer to neuro/ophthalmology within 1 month    Day of Discharge     HPI:  Up in bed. No issues. No n/v. No dyspnea. No f/c.     Review of Systems  As above    Vital Signs:   Temp:  [97.7 °F (36.5 °C)-98.8 °F (37.1 °C)] 98.5 °F (36.9 °C)  Heart Rate:  [60-79] 69  Resp:  [16-20] 16  BP: ()/(62-98) 147/92      Physical Exam:  NAD, alert  OP clear, dry MM  Neck supple  No LAD  RRR  Decreased at bases  +BS, soft  Flat  Dysarthria, L weakness, L facial droop  No change from 8/5  Pertinent  and/or Most Recent Results     LAB RESULTS:      Lab 08/03/24  1046 08/02/24  0741 08/01/24  0435 07/31/24  0529   WBC 8.61 8.04 14.02* 10.75   HEMOGLOBIN 12.8* 12.8* 13.9 13.5   HEMATOCRIT 37.8 37.8 39.7 38.3   PLATELETS 213 194 262 197   NEUTROS ABS  --   --   --  9.11*   IMMATURE GRANS (ABS)  --   --   --  0.03   LYMPHS ABS  --   --   --  0.71   MONOS ABS  --   --   --  0.85   EOS ABS  --   --   --  0.02   MCV 91.3 91.1 89.4 89.7   PROCALCITONIN  --   --  0.10  --    LACTATE  --   --  1.3  --          Lab 08/06/24  0747 08/03/24  1046 08/02/24  0741 08/01/24  0435 07/31/24  1605 07/31/24  0529   SODIUM 133* 135* 136 135* 129* 133*   POTASSIUM 4.2 4.0 3.9 3.8  --  3.9   CHLORIDE 97* 100 99 98  --  98   CO2 21.0* 25.0 24.0 26.0  --  21.0*   ANION GAP 15.0 10.0 13.0 11.0  --  14.0   BUN 12 13 19 19  --  17   CREATININE 0.63* 0.88 0.88 0.93  --  0.85   EGFR 113.7 102.8 102.8 98.2  --  103.9   GLUCOSE 101* 122* 93 146*  --  155*   CALCIUM 8.7 8.1* 8.2* 9.1  --  8.7   MAGNESIUM  --  1.9 2.5 2.2  --  1.9   PHOSPHORUS  --  2.9 3.0 3.9  --   --          Lab 08/01/24  0435   TOTAL PROTEIN 6.7   ALBUMIN 3.7   GLOBULIN 3.0   ALT (SGPT) 28   AST (SGOT) 16   BILIRUBIN 0.5   ALK PHOS 64                     Lab  08/01/24  1234   PH, ARTERIAL 7.496*   PCO2, ARTERIAL 35.5   PO2 .0*   FIO2 30   HCO3 ART 27.4*   BASE EXCESS ART 4.2*   CARBOXYHEMOGLOBIN 1.2     Brief Urine Lab Results  (Last result in the past 365 days)        Color   Clarity   Blood   Leuk Est   Nitrite   Protein   CREAT   Urine HCG        08/01/24 1752 Yellow   Clear   Negative   Negative   Negative   Negative                 Microbiology Results (last 10 days)       Procedure Component Value - Date/Time    Blood Culture - Blood, Arm, Left [[id number]]  (Normal) Collected: 08/01/24 0430    Lab Status: Final result Specimen: Blood from Arm, Left Updated: 08/06/24 0500     Blood Culture No growth at 5 days    Blood Culture - Blood, Arm, Left [[id number]]  (Normal) Collected: 08/01/24 0427    Lab Status: Final result Specimen: Blood from Arm, Left Updated: 08/06/24 0500     Blood Culture No growth at 5 days            SLP FEES - Fiberoptic Endo Eval Swallow    Result Date: 8/5/2024  This procedure was auto-finalized with no dictation required.    XR Abdomen KUB    Result Date: 8/2/2024  XR ABDOMEN KUB Date of Exam: 8/2/2024 4:09 AM EDT Indication: assess bowel distention Comparison: 8/1/2024 and prior Findings: Study is limited by overlying support and monitoring apparatus. NG tube projects over the expected position of the proximal stomach. No obvious pneumatosis or free air noted on limited imaging. There appears to be wall thickening of loops of bowel in the right mid abdomen. Osseous structures are unremarkable     Impression: 1. NG tube projects in expected position. 2. Gaseous distention of loops of bowel again noted slightly less prominent than the comparison. There is some suspected wall thickening of loops of bowel in the right mid abdomen. Recommend continued follow-up Electronically Signed: Saman Momin MD  8/2/2024 6:58 AM EDT  Workstation ID: [id number]    EEG    Result Date: 8/1/2024  Reason for referral: 53 y.o.male with altered mental  status Technical Summary:  A 19 channel digital EEG was performed using the international 10-20 placement system, including eye leads and EKG leads. Duration: 20 minutes Findings: The patient is resting quietly in bed.  He is lethargic.  Diffuse low amplitude 4-5 Hz intermixed delta and theta activity is seen over both hemispheres with a greater degree of slowing over the right.  When the patient rouses faster frequencies are seen much more prominently on the left, and right hemispheric slowing becomes more apparent.  When the patient is briefly awake, at times a poorly regulated 8 Hz posterior rhythm is evident on the left but not as clearly seen on the right.  Photic stimulation does not change the background.  Hyperventilation is not performed.  No epileptiform activity is seen. Video: Available Technical quality: Good EKG: Regular, approximately 60 bpm SUMMARY: Mild-moderate generalized slow Independent right hemispheric slow No epileptiform activity or electrographic seizures are seen     Focal cerebral dysfunction impacting the right hemisphere, superimposed upon diffuse cerebral dysfunction of more mild degree The above finding is consistent with a known right hemispheric stroke No evidence for epilepsy is seen This report is transcribed using the Dragon dictation system.  ]     XR Chest 1 View    Result Date: 8/1/2024  XR CHEST 1 VW Date of Exam: 8/1/2024 12:06 PM EDT Indication: cough Comparison: 7/26/2024. Findings: A right PICC line has been placed with its tip at the caval atrial junction. There are new patchy infiltrates of the right lower lobe. The left lung is clear. There are no effusions. There is stable borderline cardiomegaly.     Impression: Interval right PICC line placement as noted. New patchy infiltrates of the right lower lobe, may represent area of atelectasis and/or pneumonia. Electronically Signed: Brigitte Kelley MD  8/1/2024 12:36 PM EDT  Workstation ID: [id number]    CT Head Without  Contrast Stroke Protocol    Result Date: 8/1/2024  CT HEAD WO CONTRAST STROKE PROTOCOL Date of Exam: 8/1/2024 11:41 AM EDT Indication: decreased LOC, lethargy, eval stability of hemorrhage. Comparison: Head CT 7/30/2024. Technique: Axial CT images were obtained of the head without contrast administration.  Reconstructed coronal images were also obtained. Automated exposure control and iterative construction methods were used. Findings: Hematoma centered within the right basal ganglia measures 5 x 3 cm in the axial plane (series 3 image 44), unchanged in size when remeasured for consistency with differences in size from prior exams due to different measurement planes. Similar surrounding rim of vasogenic edema. Similar partial effacement of the right lateral ventricle. Similar right to left midline shift of 4 mm. No worsening mass effect. No new or enlarging intracranial hemorrhage. No extra-axial collection. No loss of gray-white matter differentiation. Unchanged background of chronic microvascular changes. Unchanged old pontine infarct. No osseous or soft tissue abnormality. Paranasal sinuses and mastoid air cells are well aerated. Nasogastric tube partially visualized.     Impression: No significant interval change from prior exam, with similar size and associated mass effect of the right basal ganglia hematoma, as described above. Electronically Signed: Bernabe Akers MD  8/1/2024 12:00 PM EDT  Workstation ID: [id number]    CT Abdomen Pelvis With Contrast    Result Date: 8/1/2024  CT ABDOMEN PELVIS W CONTRAST Date of Exam: 8/1/2024 8:16 AM EDT Indication: concern for small bowel obstruction. Comparison: Radiograph 8/1/2024 Technique: Axial CT images were obtained of the abdomen and pelvis following the uneventful intravenous administration of 100 cc Isovue-300 . Reconstructed coronal and sagittal images were also obtained. Automated exposure control and iterative construction methods were used. Findings: There is  small bilateral pleural effusions. There is bilateral lower lobe airspace consolidation which may be secondary to dependent atelectasis or pneumonia. There is nasogastric tube in place the tip within the mid body of the stomach. Images through the abdomen are degraded by patient respiratory motion. Liver, pancreas, and spleen appear within normal limits. Gallbladder appears grossly normal. No biliary tract obstruction. Bilateral adrenal glands are within normal limits. Kidneys appear normal bilaterally. No evidence of hydronephrosis. No abdominal or retroperitoneal adenopathy. There are multiple fluid-filled distended loops of small bowel throughout the abdomen. There is malrotation of the small bowel with the third portion of the duodenum within the right upper quadrant of the abdomen. This is likely congenital as see no surgical clips or other postoperative changes to suggest that this is postoperative in etiology. There is no definite volvulus identified. There are nondistended loops of small bowel within the right lower quadrant there appears to be a transition point within the upper pelvis near the midline seen on axial image 120. Findings would be consistent with partial small bowel obstruction. There is some gas and stool within the colon which is nondistended. Oscar: Urinary bladder is within normal limits. There are a few scattered sigmoid diverticula. No diverticulitis. No pelvic or inguinal adenopathy. No free intraperitoneal fluid. No lytic or sclerotic bony lesions are identified.     Impression: 1. Small effusions and bilateral lower lobe airspace disease favored to be secondary to atelectasis. 2. Air-filled and fluid-filled distended loops of small bowel throughout the abdomen with the tract to normal caliber small bowel within the mid pelvis anteriorly. Findings would be consistent with partial small bowel obstruction. 3. 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 There is no evidence of small bowel volvulus. Electronically Signed: Nitin Gimenez MD  8/1/2024 8:46 AM EDT  Workstation ID: [id number]    XR Abdomen KUB    Result Date: 8/1/2024  XR ABDOMEN KUB Date of Exam: 8/1/2024 4:37 AM EDT Indication: NG placement Comparison: 8/1/2024 at 0302 hours. Findings: Gastric suction tube terminates in the stomach. There are multiple dilated small bowel loops concerning for bowel obstruction.     Impression: 1.Gastric suction tube terminates in the stomach. 2.Multiple dilated small bowel loops concerning for bowel obstruction. Electronically Signed: Tony Infante MD  8/1/2024 4:53 AM EDT  Workstation ID: [id number]    XR Abdomen KUB    Result Date: 8/1/2024  XR ABDOMEN KUB Date of Exam: 8/1/2024 2:50 AM EDT Indication: abdominal distension Comparison: None available. Findings: There are numerous dilated loops of small bowel measuring up to 47 mm diameter. Appearance is concerning for small bowel obstruction. There is mild gaseous distention of the stomach. The colon is empty. Lung bases are clear. No acute osseous abnormality.  No pathologic abdominal calcifications.     Impression: Dilated loops of small bowel concerning for small bowel obstruction. Electronically Signed: Tony Infante MD  8/1/2024 3:38 AM EDT  Workstation ID: [id number]    CT Head Without Contrast    Result Date: 7/30/2024  CT HEAD WO CONTRAST Date of Exam: 7/30/2024 11:43 PM EDT Indication: Stroke follow up. Comparison: 7/29/2024. Technique: Axial CT images were obtained of the head without contrast administration.  Automated exposure control and iterative construction methods were used. Findings: There is redemonstration of the right basal ganglia/right temporal lobe intraparenchymal hemorrhage. The hyperdense portion of the hematoma measures 53 x 30 mm, not significantly changed. There is a surrounding region of  cerebral edema that extends into the right temporal lobe, right basal ganglia and right frontoparietal lobes. There is stable mass effect with up to 4 mm leftward shift of midline. There is partial effacement of the right lateral ventricle. No acute hydrocephalus or trapped ventricle. No new hemorrhage. Mild stable patchy white matter hypoattenuation otherwise. The cerebellum is unremarkable. Brainstem appears intact. Orbits are within normal limits. The calvarium appears intact. The sinuses and mastoids appear clear.     Impression: 1.Stable right basal ganglia/right temporal lobe intraparenchymal hemorrhage with surrounding cerebral edema and mass effect. No new hemorrhage. 2.Mild chronic small vessel ischemic change. Electronically Signed: Tony Infante MD  7/30/2024 11:53 PM EDT  Workstation ID: [id number]    CT Head Without Contrast    Result Date: 7/29/2024  CT HEAD WO CONTRAST Date of Exam: 7/29/2024 4:28 AM EDT Indication: stability. Intracranial hemorrhage Comparison: None available. Technique: Axial CT images were obtained of the head without contrast administration.  Automated exposure control and iterative construction methods were used. Findings: Stable right basal ganglia hemorrhage measuring 51 x 29 mm in the axial plane. Stable surrounding region of cerebral edema. There is persistent mass effect on the right cerebral hemisphere with 4 mm leftward shift of midline structures. Stable patchy white matter hypoattenuation otherwise. No new hemorrhage. No new hypoattenuating lesions. Stable chronic lacunar infarct in the right central liz. Orbits, calvarium, paranasal sinuses, mastoids and superficial soft tissues appear unremarkable.     Impression: 1.Stable right basal ganglia hemorrhage with surrounding edema and mass effect. 2.Stable mild chronic small vessel ischemic change and chronic lacunar infarct in the right central liz. Electronically Signed: Tony Infante MD  7/29/2024 5:34 AM EDT   Workstation ID: [id number]    CT Head Without Contrast    Result Date: 7/28/2024  CT HEAD WO CONTRAST Date of Exam: 7/28/2024 5:00 AM EDT Indication: Intraparenchymal hemorrhage. Comparison: 7/26/2024. Technique: Axial CT images were obtained of the head without contrast administration.  Automated exposure control and iterative construction methods were used. Findings: There is a stable intraparenchymal hemorrhage in the lateral aspect of the right basal ganglia measuring up to 4.9 x 3.2 cm in the axial plane with surrounding vasogenic edema. The degree of edema appears increased from comparison CT. There is mass effect with leftward shift of midline structures up to 4 mm, previously 2 mm. There is a chronic lacunar infarct in the left basal ganglia extending to the corona radiata. There is stable underlying mild patchy white matter hypoattenuation otherwise. No new hemorrhage. No new hypoattenuating lesions in the brain. Orbits are unremarkable. The calvarium is intact. Superficial soft tissues are unremarkable. The sinuses and mastoids are clear.     Impression: 1.Stable size of right basal ganglia intraparenchymal hemorrhage with increased surrounding vasogenic edema and slightly increased mass effect with 4 mm leftward shift of midline structures. 2.Stable mild chronic small vessel ischemic change and chronic lacunar infarct in the left basal ganglia. Electronically Signed: Tony Infante MD  7/28/2024 7:52 AM EDT  Workstation ID: [id number]    MRI Brain Without Contrast    Result Date: 7/27/2024  MRI BRAIN WO CONTRAST Date of Exam: 7/27/2024 4:48 PM EDT Indication: right basal ganglia hemorrhage.  Comparison: 7/26/2024 Technique:  Routine multiplanar/multisequence sequence images of the brain were obtained without contrast administration. Findings: Stable right basal ganglia hemorrhage with surrounding edema. No underlying infarct. No evidence of a mass but this could be obscured by blood and edema. The hemorrhage  measures 4.7 cm x 3 cm which is essentially unchanged compared with the prior study. Localized surrounding mass effect with effacement of the right lateral ventricle and third ventricle but no midline shift. Extensive chronic small vessel/microangiopathic ischemic changes. Old pontine infarct. No extra-axial mass or collection. Orbital and parable soft tissues are normal. The paranasal sinuses are clear. The mastoid air cells are aerated.     Stable right basal ganglia hemorrhage. Electronically Signed: Roberto Mackey MD  7/27/2024 5:13 PM EDT  Workstation ID: [id number]    Adult Transthoracic Echo Complete W/ Cont if Necessary Per Protocol (With Agitated Saline)    Result Date: 7/27/2024    Left ventricular systolic function is normal. Estimated left ventricular EF = 65%   The cardiac valves are anatomically and functionally normal.   Saline test results are negative.     CT Head Without Contrast    Result Date: 7/26/2024  CT HEAD WO CONTRAST Date of Exam: 7/26/2024 10:52 PM EDT Indication: Stroke, follow up ICH 6 hour post scan. Comparison: CT same day Technique: Axial CT images were obtained of the head without contrast administration.  Automated exposure control and iterative construction methods were used. Findings: Study is limited by motion. Interval increase in size of parenchymal hemorrhage measuring approximately 2.5 x 4.5 cm (image 35 axial series) and 4 cm (image 54 coronal imaging). Previous measurements on similar slice selection measured 2.0 x 3.6 x 3.2 cm. Associated vasogenic edema  and sulcal effacement noted. Mass effect on the adjacent lateral ventricles noted. No definite additional areas of acute hemorrhage noted. There is some calcification within the left basal ganglia. Approximately 4 mm right to left midline shift. Mild mass effect on the basilar and suprasellar cisterns. White matter changes compatible small vessel ischemic disease in this age group noted Bones and soft tissues appear stable.  Sinuses are patent.     Impression: Interval increase in intraparenchymal hemorrhage centered in the right basal ganglia with mild increase in mass effect on the adjacent lateral ventricles and minimal change of the basilar and suprasellar cisterns. Findings called to the stroke coordinator at the time of interpretation Electronically Signed: Saman Momin MD  7/26/2024 11:05 PM EDT  Workstation ID: [id number]    XR Chest 1 View    Result Date: 7/26/2024  XR CHEST 1 VW Date of Exam: 7/26/2024 5:29 PM EDT Indication: Acute Stroke Protocol (onset < 12 hrs) Comparison: None available. Findings: Incomplete inspiration. Heart size and pulmonary vasculature within normal limits. Lungs clear. Costophrenic angles sharp     Impression: No active cardiopulmonary disease Electronically Signed: Bird Rubio  7/26/2024 5:51 PM EDT  Workstation ID: [id number]    CT Angiogram Head w AI Analysis of LVO    Result Date: 7/26/2024  CT ANGIOGRAM NECK, CT ANGIOGRAM HEAD W AI ANALYSIS OF LVO Date of Exam: 7/26/2024 4:56 PM EDT Indication: Neuro Deficit, acute, Stroke suspected Neuro deficit, acute stroke suspected. Comparison: None available. Technique: CTA of the neck was performed before and after the uneventful intravenous administration of 75 cc Isovue-370. Reconstructed coronal and sagittal images were also obtained. In addition, a 3-D volume rendered image was created for interpretation. Automated exposure control and iterative reconstruction methods were used. Findings: Vascular: Normal arch anatomy. Right common carotid artery patent without stenosis. Cervical and intracranial right internal carotid artery patent without significant stenosis or dissection. Mostly noncalcified plaque present in the distal left common and proximal left internal carotid artery, containing a fairly large plaque ulcer. Plaque results in less than 50% luminal diameter stenosis of the origin of the internal carotid artery. Remainder of the cervical and  intracranial left internal carotid artery are patent without significant stenosis or dissection. Both vertebral arteries have subclavian origins. Both vertebral arteries are patent without significant stenosis or dissection. V4 segment of the right vertebral artery distal to the PICA takeoff is relatively small, representing anatomic variation. Proximal anterior, middle, and posterior cerebral arteries are patent. Peripheral cerebral branches are grossly symmetric. No aneurysms are demonstrated Extravascular: No contrast-enhancing intracranial abnormality. Right basal ganglia and periventricular white matter parenchymal hematoma noted, with mass effect as discussed in the report for head CT performed earlier. No acute soft tissue neck abnormality. No acute abnormality of the upper thorax     Impression: 1. No evidence of intracranial large vessel arterial occlusion or aneurysm 2. Ulcerated plaque in the left distal common carotid and proximal internal carotid arteries that results in less than 50% luminal diameter stenosis 3. Patent right carotid and bilateral vertebral arteries without significant stenosis or dissection Electronically Signed: Bird Rubio  7/26/2024 5:39 PM EDT  Workstation ID: [id number]    CT Angiogram Neck    Result Date: 7/26/2024  CT ANGIOGRAM NECK, CT ANGIOGRAM HEAD W AI ANALYSIS OF LVO Date of Exam: 7/26/2024 4:56 PM EDT Indication: Neuro Deficit, acute, Stroke suspected Neuro deficit, acute stroke suspected. Comparison: None available. Technique: CTA of the neck was performed before and after the uneventful intravenous administration of 75 cc Isovue-370. Reconstructed coronal and sagittal images were also obtained. In addition, a 3-D volume rendered image was created for interpretation. Automated exposure control and iterative reconstruction methods were used. Findings: Vascular: Normal arch anatomy. Right common carotid artery patent without stenosis. Cervical and intracranial right  internal carotid artery patent without significant stenosis or dissection. Mostly noncalcified plaque present in the distal left common and proximal left internal carotid artery, containing a fairly large plaque ulcer. Plaque results in less than 50% luminal diameter stenosis of the origin of the internal carotid artery. Remainder of the cervical and intracranial left internal carotid artery are patent without significant stenosis or dissection. Both vertebral arteries have subclavian origins. Both vertebral arteries are patent without significant stenosis or dissection. V4 segment of the right vertebral artery distal to the PICA takeoff is relatively small, representing anatomic variation. Proximal anterior, middle, and posterior cerebral arteries are patent. Peripheral cerebral branches are grossly symmetric. No aneurysms are demonstrated Extravascular: No contrast-enhancing intracranial abnormality. Right basal ganglia and periventricular white matter parenchymal hematoma noted, with mass effect as discussed in the report for head CT performed earlier. No acute soft tissue neck abnormality. No acute abnormality of the upper thorax     Impression: 1. No evidence of intracranial large vessel arterial occlusion or aneurysm 2. Ulcerated plaque in the left distal common carotid and proximal internal carotid arteries that results in less than 50% luminal diameter stenosis 3. Patent right carotid and bilateral vertebral arteries without significant stenosis or dissection Electronically Signed: Bird Rubio  7/26/2024 5:39 PM EDT  Workstation ID: [id number]    CT Head Without Contrast Stroke Protocol    Result Date: 7/26/2024  CT HEAD WO CONTRAST STROKE PROTOCOL Date of Exam: 7/26/2024 4:50 PM EDT Indication: Neuro deficit, acute, stroke suspected Neuro Deficit, acute, Stroke suspected. Comparison: None available. Technique: Axial CT images were obtained of the head without contrast administration.  Reconstructed coronal  images were also obtained. Automated exposure control and iterative construction methods were used. Scan Time: 4:55 p.m. Results discussed with the stroke team navigator at 5:04 p.m. Findings: There is an acute intraparenchymal hematoma in the right basal ganglia and periventricular white matter. The hematoma measures 4 x 3 cm in axial dimension and 3.5 cm in craniocaudal dimension, and is associated with mild vasogenic edema. There is some mass effect on the right lateral ventricle, and there is 4 mm right to left shift. There are scattered low-attenuation foci in the supratentorial white matter and basal ganglia that are likely chronic microvascular ischemic changes. There is an old pontine lacunar infarct. There is no loss of gray-white differentiation.     Impression: 1. 4 x 3 x 3.5 cm right basal ganglia and periventricular white matter parenchymal hematoma with mass effect as described 2. No evidence of major vascular territory brain ischemia 3. Chronic small vessel ischemic changes Electronically Signed: Bird Rubio  7/26/2024 5:04 PM EDT  Workstation ID: [id number]             Results for orders placed during the hospital encounter of 07/26/24    Adult Transthoracic Echo Complete W/ Cont if Necessary Per Protocol (With Agitated Saline)    Interpretation Summary    Left ventricular systolic function is normal. Estimated left ventricular EF = 65%    The cardiac valves are anatomically and functionally normal.    Saline test results are negative.      Plan for Follow-up of Pending Labs/Results: Reviewed    Discharge Details        Discharge Medications        New Medications        Instructions Start Date   amLODIPine 10 MG tablet  Commonly known as: NORVASC   10 mg, Oral, Every 24 Hours Scheduled      aspirin 81 MG EC tablet   81 mg, Oral, Daily   Start Date: August 9, 2024     atorvastatin 80 MG tablet  Commonly known as: LIPITOR   80 mg, Oral, Nightly      cloNIDine 0.3 MG tablet  Commonly known as:  CATAPRES   0.3 mg, Oral, Every 12 Hours Scheduled      famotidine 20 MG tablet  Commonly known as: PEPCID   20 mg, Oral, 2 Times Daily Before Meals      hydrALAZINE 100 MG tablet  Commonly known as: APRESOLINE   100 mg, Oral, Every 8 Hours Scheduled      lactobacillus acidophilus capsule capsule   1 capsule, Oral, Daily      lisinopril 40 MG tablet  Commonly known as: PRINIVIL,ZESTRIL   40 mg, Oral, Every 24 Hours Scheduled      Metoprolol Tartrate 75 MG tablet   75 mg, Oral, Every 12 Hours Scheduled             Stop These Medications      aspirin-acetaminophen-caffeine 250-250-65 MG per tablet  Commonly known as: EXCEDRIN MIGRAINE     ibuprofen 200 MG tablet  Commonly known as: ADVIL,MOTRIN              No Known Allergies      Discharge Disposition:  Rehab Facility or Unit (DC - External)    Diet:  Hospital:  Diet Order   Procedures    Diet: Cardiac; Healthy Heart (2-3 Na+); Texture: Soft to Chew (NDD 3); Soft to Chew: Whole Meat; Fluid Consistency: Thin (IDDSI 0)            Activity:      Restrictions or Other Recommendations:         CODE STATUS:    Code Status and Medical Interventions: CPR (Attempt to Resuscitate); Full Support; discussed with wife; patient lacked capacity at time of discussion of code status on 8/1   Ordered at: 08/01/24 1203     Level Of Support Discussed With:    Next of Kin (If No Surrogate)     Code Status (Patient has no pulse and is not breathing):    CPR (Attempt to Resuscitate)     Medical Interventions (Patient has pulse or is breathing):    Full Support     Comments:    discussed with wife; patient lacked capacity at time of discussion of code status on 8/1       Future Appointments   Date Time Provider Department [place]   8/30/2024 10:30 AM [facility] NEURO STROKE [facility] Valentine MD  08/06/24      Time Spent on Discharge:  I spent  32  minutes on this discharge activity which included: face-to-face encounter with the patient, reviewing the data in the  system, coordination of the care with the nursing staff as well as consultants, documentation, and entering orders.

## 2024-08-06 NOTE — CASE MANAGEMENT/SOCIAL WORK
Case Management Discharge Note    Final Note: Mr. Cam is being discharged today, 8/6/24. He will be going to [facility] Stroke Unit. Nurse to call report to [phone] CM faxed the discharge summary to [phone]. [facility] ambulance will transport him at 1430.             Selected Continued Care - Admitted Since 7/26/2024       Destination Coordination complete.      Service Provider Selected Services Address Phone Fax Patient Preferred    [facility] [address] [facility] [id number] [phone] --              Durable Medical Equipment    No services have been selected for the patient.                Dialysis/Infusion    No services have been selected for the patient.                Home Medical Care    No services have been selected for the patient.                Therapy    No services have been selected for the patient.                Community Resources    No services have been selected for the patient.                Community & DME    No services have been selected for the patient.                    Transportation Services  Ambulance: [facility]    Final Discharge Disposition Code: 62 - inpatient rehab facility

## 2024-08-30 ENCOUNTER — OFFICE VISIT (OUTPATIENT)
Dept: NEUROLOGY | Facility: CLINIC | Age: 54
End: 2024-08-30
Payer: COMMERCIAL

## 2024-08-30 VITALS
SYSTOLIC BLOOD PRESSURE: 110 MMHG | HEART RATE: 77 BPM | TEMPERATURE: 98.5 F | DIASTOLIC BLOOD PRESSURE: 70 MMHG | HEIGHT: 72 IN | OXYGEN SATURATION: 98 % | BODY MASS INDEX: 36.44 KG/M2

## 2024-08-30 DIAGNOSIS — I61.0 NONTRAUMATIC SUBCORTICAL HEMORRHAGE OF RIGHT CEREBRAL HEMISPHERE: Primary | ICD-10-CM

## 2024-08-30 DIAGNOSIS — E78.49 OTHER HYPERLIPIDEMIA: ICD-10-CM

## 2024-08-30 DIAGNOSIS — I10 PRIMARY HYPERTENSION: ICD-10-CM

## 2024-08-30 RX ORDER — AMLODIPINE BESYLATE 5 MG/1
5 TABLET ORAL DAILY
COMMUNITY

## 2024-08-30 RX ORDER — TAMSULOSIN HYDROCHLORIDE 0.4 MG/1
1 CAPSULE ORAL DAILY
COMMUNITY

## 2024-08-30 RX ORDER — CARVEDILOL 12.5 MG/1
12.5 TABLET ORAL 2 TIMES DAILY WITH MEALS
COMMUNITY

## 2024-08-30 RX ORDER — GABAPENTIN 100 MG/1
100 CAPSULE ORAL 3 TIMES DAILY
COMMUNITY

## 2024-08-30 RX ORDER — CYCLOBENZAPRINE HCL 5 MG
5 TABLET ORAL 3 TIMES DAILY PRN
COMMUNITY

## 2024-08-30 RX ORDER — PANTOPRAZOLE SODIUM 40 MG/1
40 TABLET, DELAYED RELEASE ORAL DAILY
COMMUNITY

## 2024-08-30 NOTE — PROGRESS NOTES
New Patient Office Visit      Encounter Date: 2024   Patient Name: Ron Cam  : 1970   MRN: [id number]   PCP: Provider, No Known    Chief Complaint:    Chief Complaint   Patient presents with    Follow-up       History of Present Illness: Ron Cma is a 53 y.o. male who is here today to establish care.  Medical history includes migraines.  Patient was admitted to [facility] 2024 with left-sided weakness, left-sided sensory loss, dysarthria, and visual disturbance.  Initial NIHSS 17.  CT head revealed 4 x 3 x 3.5 right basal ganglia hemorrhage.  Initial /112.  Patient not on any anticoagulants or antiplatelets, he had no prior diagnosis or treatment of hypertension.  Vessel imaging showed ulcerated plaque in the left distal common carotid.  Patient was not a surgical candidate and was initially monitored in the neuro ICU and treated with hypertonic saline to prevent cerebral edema.  He had an MRI that showed the right basal ganglia hemorrhage as well as a chronic appearing pontine infarct of which she was previously unaware.  He did well over the course of his hospitalization.  NIHSS had improved to a 12 before discharge.  Etiology of his hemorrhage was attributed to hypertension which was previously untreated.  Patient was discharged on aspirin plan to start on  (to treat extracranial atherosclerosis) [facility] with plan to continue with outpatient PT/OT and evaluation by neuro-ophthalmology before considering driving.      Clinic visit 2024: Patient presents today for stroke follow-up, he is accompanied by his wife and brother.  He is in a wheelchair after coming home from [facility]; is able to stand and pivot and walks with a walker.  He has no spontaneous movement of his left arm, but states that he was able to move his fingers when a vibration tool was utilized on his forearm.  He is supposed to be beginning outpatient PT, OT, and SLP next week.  He is able to extend  and flex his left lower extremity from the hip and knee.  He has less control of the ankle and foot.  Endorses decrease sensation to his left side.  Has a persistent left facial droop though this is not as pronounced as it was in the hospital.  His speech is improving, tolerating a regular diet.  He feels that his left visual field cut is essentially unchanged.  Patient has not been seen by neuro-ophthalmology, it does not appear that he has an appointment scheduled.  Patient has been checking his blood pressures at home daily reports systolics 120s to 140s.  Several of his antihypertensives have been decreased, his blood pressure is well-controlled today.  He has been compliant with all medications including aspirin and Lipitor.  We did discuss the possibility of repeating an MRI brain to evaluate for any underlying ischemia especially given his prior pontine stroke.  We plan to get this set up today as well    Stroke Risk Factors: hyperlipidemia and hypertension      Subjective      Review of Systems:   Review of Systems   Constitutional:  Negative for chills and fever.   HENT:  Negative for trouble swallowing.    Eyes:  Positive for visual disturbance.   Respiratory:  Negative for cough and shortness of breath.    Cardiovascular:  Negative for chest pain.   Musculoskeletal:  Positive for gait problem.   Neurological:  Positive for facial asymmetry, speech difficulty, weakness and numbness. Negative for dizziness and headache.       Past Medical History:   Past Medical History:   Diagnosis Date    Difficulty walking July 26 2024    From Stroke    Hypertension July 26 2024    From Stroke    Migraine 1988    Weightlifting injury 1988    Stroke July 26 2024    Hemorrhagic Stroke       Past Surgical History: History reviewed. No pertinent surgical history.    Family History:   Family History   Problem Relation Age of Onset    Stroke Paternal Grandfather     Diabetes Paternal Grandfather     Parkinsonism Maternal  Grandmother        Social History:   Social History     Socioeconomic History    Marital status:    Tobacco Use    Smoking status: Never     Passive exposure: Never    Smokeless tobacco: Never   Vaping Use    Vaping status: Never Used   Substance and Sexual Activity    Alcohol use: Not Currently     Alcohol/week: 4.0 standard drinks of alcohol     Types: 4 Cans of beer per week    Drug use: Never    Sexual activity: Yes     Partners: Female     Birth control/protection: Condom       Medications:     Current Outpatient Medications:     amLODIPine (NORVASC) 5 MG tablet, Take 1 tablet by mouth Daily., Disp: , Rfl:     aspirin 81 MG EC tablet, Take 1 tablet by mouth Daily., Disp: 30 tablet, Rfl: 0    atorvastatin (LIPITOR) 80 MG tablet, Take 1 tablet by mouth Every Night., Disp: 90 tablet, Rfl: 0    carvedilol (COREG) 12.5 MG tablet, Take 1 tablet by mouth 2 (Two) Times a Day With Meals., Disp: , Rfl:     cloNIDine (CATAPRES) 0.3 MG tablet, Take 1 tablet by mouth Every 12 (Twelve) Hours. (Patient taking differently: Take 0.1 mg by mouth Every 12 (Twelve) Hours.), Disp: 30 tablet, Rfl: 2    cyclobenzaprine (FLEXERIL) 5 MG tablet, Take 1 tablet by mouth 3 (Three) Times a Day As Needed for Muscle Spasms., Disp: , Rfl:     gabapentin (NEURONTIN) 100 MG capsule, Take 1 capsule by mouth 3 (Three) Times a Day., Disp: , Rfl:     hydrALAZINE (APRESOLINE) 100 MG tablet, Take 1 tablet by mouth Every 8 (Eight) Hours for 30 days., Disp: 90 tablet, Rfl: 0    lisinopril (PRINIVIL,ZESTRIL) 40 MG tablet, Take 1 tablet by mouth Daily for 30 days. (Patient taking differently: Take 0.5 tablets by mouth Daily.), Disp: 30 tablet, Rfl: 0    pantoprazole (PROTONIX) 40 MG EC tablet, Take 1 tablet by mouth Daily., Disp: , Rfl:     tamsulosin (FLOMAX) 0.4 MG capsule 24 hr capsule, Take 1 capsule by mouth Daily., Disp: , Rfl:     amLODIPine (NORVASC) 10 MG tablet, Take 1 tablet by mouth Daily. (Patient taking differently: Take 0.5 tablets  "by mouth Daily.), Disp: 30 tablet, Rfl: 2    famotidine (PEPCID) 20 MG tablet, Take 1 tablet by mouth 2 (Two) Times a Day Before Meals. (Patient not taking: Reported on 8/30/2024), Disp: 30 tablet, Rfl: 2    Metoprolol Tartrate 75 MG tablet, Take 75 mg by mouth Every 12 (Twelve) Hours for 30 days. (Patient not taking: Reported on 8/30/2024), Disp: 60 tablet, Rfl: 2    Allergies:   No Known Allergies    Objective     Physical Exam:  Vital Signs:   Vitals:    08/30/24 1032   BP: 110/70   Pulse: 77   Temp: 98.5 °F (36.9 °C)   SpO2: 98%   Height: 182.9 cm (72.01\")     Body mass index is 36.44 kg/m².     Physical Exam  Vitals reviewed.   Constitutional:       Appearance: Normal appearance.   HENT:      Head: Normocephalic and atraumatic.   Eyes:      General: Lids are normal.      Extraocular Movements: Extraocular movements intact.      Pupils: Pupils are equal, round, and reactive to light.   Cardiovascular:      Rate and Rhythm: Normal rate.   Pulmonary:      Effort: Pulmonary effort is normal. No respiratory distress.   Musculoskeletal:         General: No swelling. Normal range of motion.      Cervical back: Normal range of motion.   Skin:     General: Skin is warm and dry.   Neurological:      Mental Status: He is oriented to person, place, and time.      Cranial Nerves: Cranial nerve deficit and dysarthria present.      Sensory: Sensory deficit present.      Motor: Weakness present.   Psychiatric:         Mood and Affect: Mood normal.         Behavior: Behavior normal.       Neurological Exam  Mental Status  Awake, alert and oriented to person, place and time. Oriented to person, place, and time. Mild dysarthria present. Language is fluent with no aphasia. Attention and concentration are normal.    Cranial Nerves  CN II: Left superior quadrantanopsia.  CN III, IV, VI: Extraocular movements intact bilaterally. Normal lids and orbits bilaterally. Pupils equal round and reactive to light bilaterally.  CN V:  Left: " Diminished sensation of the entire left side of the face.  CN VII:  Right: There is no facial weakness.  Left: There is central facial weakness.  CN XI:  Left: Sternocleidomastoid strength is weak.  CN XII: Tongue deviates to the left.    Motor  Normal muscle bulk throughout. No abnormal involuntary movements. Strength is 5/5 in all four extremities except as noted.  LUE 0/5, LLE 3/5.    Sensory  Light touch abnormality:   Decreased sensation involving the left side.    Coordination    No dysmetria out of proportion to weakness.    Gait    WC.         NIH Stroke Scale  Time: 12:40 EDT  Person Administering Scale: REENA Dinh    1a  Level of consciousness: 0=alert; keenly responsive   1b. LOC questions:  0=Answers both questions correctly   1c. LOC commands: 0=Performs both tasks correctly   2.  Best Gaze: 0=normal   3.  Visual: 1=Partial hemianopia   4. Facial Palsy: 1=Minor paralysis (flattened nasolabial fold, asymmetric on smiling)   5a.  Motor left arm: 4=No movement   5b.  Motor right arm: 0=No drift, limb holds 90 (or 45) degrees for full 10 seconds   6a. motor left le=Drift, limb holds 90 (or 45) degrees but drifts down before full 10 seconds: does not hit bed   6b  Motor right le=No drift, limb holds 90 (or 45) degrees for full 10 seconds   7. Limb Ataxia: 0=Absent   8.  Sensory: 1=Mild to moderate sensory loss; patient feels pinprick is less sharp or is dull on the affected side; there is a loss of superficial pain with pinprick but patient is aware He is being touched   9. Best Language:  0=No aphasia, normal   10. Dysarthria: 1=Mild to moderate, patient slurs at least some words and at worst, can be understood with some difficulty   11. Extinction and Inattention: 1=Visual, tactile, auditory, spatial or personal inattention or extinction to bilateral simultaneous stimulation in one of the sensory modalities    Total:   10       Modified Fany Score:     MODIFIED FANY SCALE (to be  "assessed for each patient having history of stroke) []Stroke history but not assessed  []0: No symptoms at all  []1: No significant disability despite symptoms  []2: Slight disability  [x]3: Moderate disability  []4: Moderately severe disability  []5: Severe disability  []6: Death     PHQ-2 Depression Screening  Little interest or pleasure in doing things? 0-->not at all   Feeling down, depressed, or hopeless? 0-->not at all   PHQ-2 Total Score 0     STOP-Bang Questionnaire :  STOP-Bang Score  Have you been diagnosed with Sleep Apnea?: no  Snoring?: no  Tired?: no  Observed?: no  Pressure?: yes  Stop Score: 1  Body Mass Index more than 35 kg/m2?: no  Age older than 50 year old?: yes  Neck large? \">17\"/43cm-M, >16\"/41cm-F: no  Gender=Male?: yes  Total Stop-Bang Score: 3       STEADI Fall Risk Clinician Key Questions   Have you fallen in the past year?: No  Do you feel unsteady with walking?: Yes  Are you worried about falling?: No       Results Reviewed:     H&H 12.8/37.8  Platelets 213  Sodium 133  Creatinine 0.63  BUN 19  A1c 5.5    Tox screen negative      CT head (8/1/2024) stable appearance of right basal ganglia hemorrhage measuring 5 x 3 cm with associated vasogenic edema and partial effacement of the right lateral ventricle.  Stable right to left midline shift estimated at 4 mm.  No new or or enlarging intracranial hemorrhage there is evidence of a chronic pontine infarct.    CTA head and neck (7/26/2024) shows ulcerated plaque in the left distal common carotid and proximal ICA resulting in <50% stenosis    MRI brain without (7/27/2024: Right basal ganglia hemorrhage with surrounding edema, no evidence of underlying infarct or mass.  It is noted that this could be obscured by blood and edema.  There is effacement of the right lateral ventricle and third ventricle.  Old pontine infarct is identified    TTE shows EF of 65%, normal left atrium, negative bubble study    Assessment / Plan  "     Assessment/Plan:   Diagnoses and all orders for this visit:    1. Nontraumatic subcortical hemorrhage of right cerebral hemisphere       Chronic pontine stroke  -Repeat MRI brain without in 2 months to evaluate for any underlying mass or infarct.  (Patient does have a chronic pontine infarct of which she was previously unaware)  -Will refer to neuro optho  -Continue aspirin 81 mg due to extracranial atherosclerosis and history of prior ischemic infarct  -Continue PT/OT  -Follow-up in 3 months    2. Primary hypertension  -Goal BP <130/80  -Continue to monitor BP at home    3. Other hyperlipidemia  -, goal <70  -Continue atorvastatin 80 mg daily     Discussed the importance of medication compliance Aspirin 81mg daily and Atorvastatin 80mg nightly and lifestyle modifications adequate control of blood pressure, adequate control of cholesterol (goal LDL <70), increased physical activity, and implementation of healthy diet to help reduce the risk of future cerebrovascular events.  Also discussed the signs symptoms that would warrant the patient return back to the emergency department including unilateral weakness, unilateral numbness, visual disturbances, loss of balance, speech difficulties, and/or a sudden severe headache.  Patient verbalized understanding.      Follow Up:   Return in about 3 months (around 11/30/2024).    REENA Dinh  [facility] Neuro Stroke     > 60 minutes spent in chart and imaging review as well as face-to-face time with the patient

## 2024-09-04 ENCOUNTER — LAB (OUTPATIENT)
Age: 54
End: 2024-09-04
Payer: COMMERCIAL

## 2024-09-04 ENCOUNTER — OFFICE VISIT (OUTPATIENT)
Age: 54
End: 2024-09-04
Payer: COMMERCIAL

## 2024-09-04 VITALS — SYSTOLIC BLOOD PRESSURE: 128 MMHG | HEART RATE: 85 BPM | OXYGEN SATURATION: 97 % | DIASTOLIC BLOOD PRESSURE: 84 MMHG

## 2024-09-04 DIAGNOSIS — I10 PRIMARY HYPERTENSION: ICD-10-CM

## 2024-09-04 DIAGNOSIS — M62.838 MUSCLE SPASM: ICD-10-CM

## 2024-09-04 DIAGNOSIS — I61.0 NONTRAUMATIC SUBCORTICAL HEMORRHAGE OF RIGHT CEREBRAL HEMISPHERE: Primary | ICD-10-CM

## 2024-09-04 DIAGNOSIS — K59.00 CONSTIPATION, UNSPECIFIED CONSTIPATION TYPE: ICD-10-CM

## 2024-09-04 DIAGNOSIS — K59.00 CONSTIPATION, UNSPECIFIED CONSTIPATION TYPE: Primary | ICD-10-CM

## 2024-09-04 PROCEDURE — 80048 BASIC METABOLIC PNL TOTAL CA: CPT

## 2024-09-04 RX ORDER — HYDRALAZINE HYDROCHLORIDE 100 MG/1
100 TABLET, FILM COATED ORAL EVERY 8 HOURS SCHEDULED
Qty: 90 TABLET | Refills: 0 | Status: SHIPPED | OUTPATIENT
Start: 2024-09-04 | End: 2024-10-04

## 2024-09-04 RX ORDER — CARVEDILOL 12.5 MG/1
12.5 TABLET ORAL 2 TIMES DAILY WITH MEALS
Qty: 30 TABLET | Refills: 5 | Status: SHIPPED | OUTPATIENT
Start: 2024-09-04

## 2024-09-04 RX ORDER — CYCLOBENZAPRINE HCL 5 MG
5 TABLET ORAL 3 TIMES DAILY PRN
Qty: 30 TABLET | Refills: 2 | Status: SHIPPED | OUTPATIENT
Start: 2024-09-04 | End: 2024-09-18

## 2024-09-04 RX ORDER — POLYETHYLENE GLYCOL 3350 17 G/17G
17 POWDER, FOR SOLUTION ORAL DAILY
Qty: 30 PACKET | Refills: 3 | Status: SHIPPED | OUTPATIENT
Start: 2024-09-04 | End: 2025-01-02

## 2024-09-04 RX ORDER — ASPIRIN 81 MG/1
81 TABLET ORAL DAILY
Qty: 30 TABLET | Refills: 11 | Status: SHIPPED | OUTPATIENT
Start: 2024-09-04

## 2024-09-04 RX ORDER — ATORVASTATIN CALCIUM 80 MG/1
80 TABLET, FILM COATED ORAL NIGHTLY
Qty: 90 TABLET | Refills: 11 | Status: SHIPPED | OUTPATIENT
Start: 2024-09-04

## 2024-09-04 NOTE — PROGRESS NOTES
Office Note     Name: Ron Cam    : 1970     MRN: [id number]     Chief Complaint  Transitional Care Management ([facility], had a stroke right side affected the left side )    Subjective     History of Present Illness:  Ron Cam is a 53 y.o. male who presents today for establishing care.      to August the 30th and was slowly weaned the catapril.       HTN   -Taking 5mg amlodipine, lisinopril 20mg   -Recommended     Post stroke Neuropathy   - 100mg 3 times a day, reports that is taking it as needed     Hemorrhagic stroke   Left sided weakness   -Patient presented on 2024 with left-sided weakness, dysarthria and found to have right basal ganglia hemorrhage.  Blood pressure on presentation was 196/112.  Imaging showed an ulcerated plaque in the left distal common carotid but patient was not a surgical candidate.  Following discharge patient was admitted to [facility] from  to  and slowly weaned off medications including clonidine from 0.3 mg daily to 0.1 mg daily, amlodipine from 10 mg daily to 5 mg daily.  Patient reports that he is doing PT, OT and speech therapy.  He reports that dysarthria and dysphagia have improved.      Speech and memory is improving. She has speech therapy at home until Friday. He does not have any difficulty with swallowing.           Past Medical History:   Past Medical History:   Diagnosis Date    Difficulty walking 2024    From Stroke    Hypertension 2024    From Stroke    Migraine     Weightlifting injury     Stroke 2024    Hemorrhagic Stroke       Past Surgical History: History reviewed. No pertinent surgical history.    Immunizations:   Immunization History   Administered Date(s) Administered    COVID-19 (PFIZER) Purple Cap Monovalent 2021, 2021        Medications:     Current Outpatient Medications:     amLODIPine (NORVASC) 10 MG tablet, Take 1 tablet by mouth Daily. (Patient taking  differently: Take 0.5 tablets by mouth Daily.), Disp: 30 tablet, Rfl: 2    aspirin 81 MG EC tablet, Take 1 tablet by mouth Daily., Disp: 30 tablet, Rfl: 11    atorvastatin (LIPITOR) 80 MG tablet, Take 1 tablet by mouth Every Night., Disp: 90 tablet, Rfl: 11    carvedilol (COREG) 12.5 MG tablet, Take 1 tablet by mouth 2 (Two) Times a Day With Meals., Disp: 30 tablet, Rfl: 5    cloNIDine (CATAPRES) 0.3 MG tablet, Take 1 tablet by mouth Every 12 (Twelve) Hours. (Patient taking differently: Take 0.1 mg by mouth Every 12 (Twelve) Hours.), Disp: 30 tablet, Rfl: 2    cyclobenzaprine (FLEXERIL) 5 MG tablet, Take 1 tablet by mouth 3 (Three) Times a Day As Needed for Muscle Spasms for up to 14 days., Disp: 30 tablet, Rfl: 2    gabapentin (NEURONTIN) 100 MG capsule, Take 1 capsule by mouth 3 (Three) Times a Day., Disp: , Rfl:     hydrALAZINE (APRESOLINE) 100 MG tablet, Take 1 tablet by mouth Every 8 (Eight) Hours for 30 days., Disp: 90 tablet, Rfl: 0    lisinopril (PRINIVIL,ZESTRIL) 40 MG tablet, Take 1 tablet by mouth Daily for 30 days. (Patient taking differently: Take 0.5 tablets by mouth Daily.), Disp: 30 tablet, Rfl: 0    pantoprazole (PROTONIX) 40 MG EC tablet, Take 1 tablet by mouth Daily., Disp: , Rfl:     tamsulosin (FLOMAX) 0.4 MG capsule 24 hr capsule, Take 1 capsule by mouth Daily., Disp: , Rfl:     amLODIPine (NORVASC) 5 MG tablet, Take 1 tablet by mouth Daily. (Patient not taking: Reported on 9/4/2024), Disp: , Rfl:     polyethylene glycol (MIRALAX) 17 g packet, Take 17 g by mouth Daily for 120 days., Disp: 30 packet, Rfl: 3    Allergies:   No Known Allergies    Family History:   Family History   Problem Relation Age of Onset    Stroke Paternal Grandfather     Diabetes Paternal Grandfather     Parkinsonism Maternal Grandmother        Social History:   Social History     Socioeconomic History    Marital status:    Tobacco Use    Smoking status: Never     Passive exposure: Never    Smokeless tobacco: Never  "  Vaping Use    Vaping status: Never Used   Substance and Sexual Activity    Alcohol use: Not Currently     Alcohol/week: 4.0 standard drinks of alcohol     Types: 4 Cans of beer per week    Drug use: Never    Sexual activity: Yes     Partners: Female     Birth control/protection: Condom         Objective     Vital Signs  /84 (BP Location: Left arm, Patient Position: Sitting, Cuff Size: Large Adult)   Pulse 85   SpO2 97%   Estimated body mass index is 36.44 kg/m² as calculated from the following:    Height as of 8/30/24: 182.9 cm (72.01\").    Weight as of 7/31/24: 122 kg (268 lb 11.9 oz).      Physical Exam  Constitutional:       Appearance: Normal appearance.   HENT:      Head: Normocephalic and atraumatic.      Nose: No congestion or rhinorrhea.      Mouth/Throat:      Pharynx: No oropharyngeal exudate or posterior oropharyngeal erythema.   Eyes:      General:         Right eye: No discharge.         Left eye: No discharge.      Extraocular Movements: Extraocular movements intact.      Pupils: Pupils are equal, round, and reactive to light.   Cardiovascular:      Rate and Rhythm: Normal rate and regular rhythm.      Heart sounds: No murmur heard.     No friction rub. No gallop.   Pulmonary:      Effort: Pulmonary effort is normal.      Breath sounds: Normal breath sounds.   Abdominal:      General: Abdomen is flat. Bowel sounds are normal. There is no distension.      Palpations: Abdomen is soft.      Tenderness: There is no abdominal tenderness. There is no guarding or rebound.   Musculoskeletal:         General: Normal range of motion.      Cervical back: Normal range of motion.      Right lower leg: No edema.      Left lower leg: No edema.   Skin:     General: Skin is warm.      Capillary Refill: Capillary refill takes less than 2 seconds.      Findings: No rash.   Neurological:      General: No focal deficit present.      Mental Status: He is alert and oriented to person, place, and time.      Cranial " Nerves: Facial asymmetry present.      Motor: Weakness present.      Comments: Weakness on the upper arm and leg -left side   Left sided drop      Psychiatric:         Mood and Affect: Mood normal.          Assessment and Plan       Diagnoses and all orders for this visit:    1. Nontraumatic subcortical hemorrhage of right cerebral hemisphere (Primary)  Comments:  Follow-up as scheduled with MRI head per neurology  Will attempt to wean off clonidine 0.1 mg after a week while increasing lisinopril from 20 to 40 mg daily  Orders:  -     Ambulatory Referral to Physical Therapy for Evaluation & Treatment  -     Ambulatory Referral to Occupational Therapy for Evaluation & Treatment  -     Ambulatory Referral to Speech Therapy for Evaluation & Treatment    2. Primary hypertension  -     aspirin 81 MG EC tablet; Take 1 tablet by mouth Daily.  Dispense: 30 tablet; Refill: 11  -     atorvastatin (LIPITOR) 80 MG tablet; Take 1 tablet by mouth Every Night.  Dispense: 90 tablet; Refill: 11  -     carvedilol (COREG) 12.5 MG tablet; Take 1 tablet by mouth 2 (Two) Times a Day With Meals.  Dispense: 30 tablet; Refill: 5  -     hydrALAZINE (APRESOLINE) 100 MG tablet; Take 1 tablet by mouth Every 8 (Eight) Hours for 30 days.  Dispense: 90 tablet; Refill: 0    3. Muscle spasm  -     cyclobenzaprine (FLEXERIL) 5 MG tablet; Take 1 tablet by mouth 3 (Three) Times a Day As Needed for Muscle Spasms for up to 14 days.  Dispense: 30 tablet; Refill: 2    4. Constipation, unspecified constipation type  -     Basic metabolic panel; Future    Other orders  -     polyethylene glycol (MIRALAX) 17 g packet; Take 17 g by mouth Daily for 120 days.  Dispense: 30 packet; Refill: 3         Follow Up  Return in about 4 weeks (around 10/2/2024) for for Blood pressure .      Dwain Mckeon MD   [facility]  [address] [address] [phone]

## 2024-09-05 LAB
ANION GAP SERPL CALCULATED.3IONS-SCNC: 13 MMOL/L (ref 5–15)
BUN SERPL-MCNC: 13 MG/DL (ref 6–20)
BUN/CREAT SERPL: 12.9 (ref 7–25)
CALCIUM SPEC-SCNC: 9.8 MG/DL (ref 8.6–10.5)
CHLORIDE SERPL-SCNC: 99 MMOL/L (ref 98–107)
CO2 SERPL-SCNC: 24 MMOL/L (ref 22–29)
CREAT SERPL-MCNC: 1.01 MG/DL (ref 0.76–1.27)
EGFRCR SERPLBLD CKD-EPI 2021: 88.9 ML/MIN/1.73
GLUCOSE SERPL-MCNC: 115 MG/DL (ref 65–99)
POTASSIUM SERPL-SCNC: 4.4 MMOL/L (ref 3.5–5.2)
SODIUM SERPL-SCNC: 136 MMOL/L (ref 136–145)

## 2024-09-13 RX ORDER — LISINOPRIL 40 MG/1
20 TABLET ORAL
Qty: 15 TABLET | Refills: 5 | Status: SHIPPED | OUTPATIENT
Start: 2024-09-13 | End: 2024-10-13

## 2024-09-13 NOTE — TELEPHONE ENCOUNTER
Caller: Robbins,Collette    Relationship: Emergency Contact    Best call back number: [phone]     Requested Prescriptions:   Requested Prescriptions     Pending Prescriptions Disp Refills    lisinopril (PRINIVIL,ZESTRIL) 40 MG tablet 30 tablet 0     Sig: Take 1 tablet by mouth Daily for 30 days.        Pharmacy where request should be sent: [id number] [address] [facility] [address] [facility] [address] [place] [address] - [phone] [facility] [phone] FX     Last office visit with prescribing clinician: 9/4/2024   Last telemedicine visit with prescribing clinician: Visit date not found   Next office visit with prescribing clinician: 10/2/2024     Additional details provided by patient: PATIENT'S WIFE IS CALLING IN REGUARS TO THE SCRIPT FOR THE LISINOPRIL 20 MG TAB.    PATIENT WAS INSTRUCTED TO TAKE 40 MG BUT THE PATIENT ONLY HAS 20 MG TABLETS ON HAND.    PLEASE CALL PATIENT TO DISCUSS     Does the patient have less than a 3 day supply:  [] Yes  [x] No    Would you like a call back once the refill request has been completed: [] Yes [] No    If the office needs to give you a call back, can they leave a voicemail: [] Yes [] No    Miladis Alvarez Rep   09/13/24 14:59 EDT

## 2024-09-27 DIAGNOSIS — I10 PRIMARY HYPERTENSION: ICD-10-CM

## 2024-09-27 RX ORDER — HYDRALAZINE HYDROCHLORIDE 100 MG/1
100 TABLET, FILM COATED ORAL EVERY 8 HOURS
Qty: 270 TABLET | Refills: 1 | Status: SHIPPED | OUTPATIENT
Start: 2024-09-27

## 2024-09-27 RX ORDER — CARVEDILOL 12.5 MG/1
12.5 TABLET ORAL 2 TIMES DAILY WITH MEALS
Qty: 180 TABLET | Refills: 1 | Status: SHIPPED | OUTPATIENT
Start: 2024-09-27

## 2024-09-30 RX ORDER — TAMSULOSIN HYDROCHLORIDE 0.4 MG/1
1 CAPSULE ORAL DAILY
Qty: 90 CAPSULE | Refills: 1 | Status: SHIPPED | OUTPATIENT
Start: 2024-09-30

## 2024-10-02 ENCOUNTER — OFFICE VISIT (OUTPATIENT)
Age: 54
End: 2024-10-02
Payer: COMMERCIAL

## 2024-10-02 VITALS
HEIGHT: 72 IN | DIASTOLIC BLOOD PRESSURE: 74 MMHG | RESPIRATION RATE: 18 BRPM | SYSTOLIC BLOOD PRESSURE: 118 MMHG | OXYGEN SATURATION: 98 % | HEART RATE: 74 BPM | BODY MASS INDEX: 36.44 KG/M2

## 2024-10-02 DIAGNOSIS — Z00.00 PREVENTATIVE HEALTH CARE: ICD-10-CM

## 2024-10-02 DIAGNOSIS — I10 PRIMARY HYPERTENSION: ICD-10-CM

## 2024-10-02 DIAGNOSIS — Z12.11 SCREENING FOR COLON CANCER: Primary | ICD-10-CM

## 2024-10-02 PROCEDURE — 90656 IIV3 VACC NO PRSV 0.5 ML IM: CPT

## 2024-10-02 PROCEDURE — 99214 OFFICE O/P EST MOD 30 MIN: CPT

## 2024-10-02 PROCEDURE — 90471 IMMUNIZATION ADMIN: CPT

## 2024-10-02 PROCEDURE — 90715 TDAP VACCINE 7 YRS/> IM: CPT

## 2024-10-02 PROCEDURE — 90472 IMMUNIZATION ADMIN EACH ADD: CPT

## 2024-10-02 RX ORDER — HYDRALAZINE HYDROCHLORIDE 100 MG/1
100 TABLET, FILM COATED ORAL 2 TIMES DAILY
Qty: 270 TABLET | Refills: 2 | Status: SHIPPED | OUTPATIENT
Start: 2024-10-02

## 2024-10-02 RX ORDER — CYCLOBENZAPRINE HCL 5 MG
5 TABLET ORAL 3 TIMES DAILY PRN
COMMUNITY

## 2024-10-02 NOTE — LETTER
[facility]  Vaccine Consent Form    Patient Name:  Ron Cam  Patient :  1970     Vaccine(s) Ordered    Tdap Vaccine Greater Than or Equal To 8yo IM        Screening Checklist  The following questions should be completed prior to vaccination. If you answer “yes” to any question, it does not necessarily mean you should not be vaccinated. It just means we may need to clarify or ask more questions. If a question is unclear, please ask your healthcare provider to explain it.    Yes No   Any fever or moderate to severe illness today (mild illness and/or antibiotic treatment are not contraindications)?     Do you have a history of a serious reaction to any previous vaccinations, such as anaphylaxis, encephalopathy within 7 days, Guillain-Brooklyn syndrome within 6 weeks, seizure?     Have you received any live vaccine(s) (e.g MMR, AMINAH) or any other vaccines in the last month (to ensure duplicate doses aren't given)?     Do you have an anaphylactic allergy to latex (DTaP, DTaP-IPV, Hep A, Hep B, MenB, RV, Td, Tdap), baker’s yeast (Hep B, HPV), polysorbates (RSV, nirsevimab, PCV 20, Rotavirrus, Tdap, Shingrix), or gelatin (AMINAH, MMR)?     Do you have an anaphylactic allergy to neomycin (Rabies, AMINAH, MMR, IPV, Hep A), polymyxin B (IPV), or streptomycin (IPV)?      Any cancer, leukemia, AIDS, or other immune system disorder? (AMINAH, MMR, RV)     Do you have a parent, brother, or sister with an immune system problem (if immune competence of vaccine recipient clinically verified, can proceed)? (MMR, AMINAH)     Any recent steroid treatments for >2 weeks, chemotherapy, or radiation treatment? (AMINAH, MMR)     Have you received antibody-containing blood transfusions or IVIG in the past 11 months (recommended interval is dependent on product)? (MMR, AMINAH)     Have you taken antiviral drugs (acyclovir, famciclovir, valacyclovir for AMINAH) in the last 24 or 48 hours, respectively?      Are you pregnant or planning to become  "pregnant within 1 month? (AMINAH, MMR, HPV, IPV, MenB, Abrexvy; For Hep B- refer to Engerix-B; For RSV - Abrysvo is indicated for 32-36 weeks of pregnancy from September to January)     For infants, have you ever been told your child has had intussusception or a medical emergency involving obstruction of the intestine (Rotavirus)? If not for an infant, can skip this question.         *Ordering Physicians/APC should be consulted if \"yes\" is checked by the patient or guardian above.  I have received, read, and understand the Vaccine Information Statement (VIS) for each vaccine ordered.  I have considered my or my child's health status as well as the health status of my close contacts.  I have taken the opportunity to discuss my vaccine questions with my or my child's health care provider.   I have requested that the ordered vaccine(s) be given to me or my child.  I understand the benefits and risks of the vaccines.  I understand that I should remain in the clinic for 15 minutes after receiving the vaccine(s).  _________________________________________________________  Signature of Patient or Parent/Legal Guardian ____________________  Date     "

## 2024-10-02 NOTE — PROGRESS NOTES
Follow Up Office Visit      Date: 10/02/2024   Patient Name: Ron Cam  : 1970   MRN: [id number]     Chief Complaint:    Chief Complaint   Patient presents with    Hypertension       History of Present Illness  The patient is a 53-year-old male who presents for a follow-up on blood pressure. He is accompanied by an his wife.     He has discontinued clonidine and amlodipine. His current regimen includes aspirin, atorvastatin, carvedilol 12.5 mg, hydralazine 100 mg every 8 hours, and lisinopril 20 mg daily.    He also takes Flexeril as needed, which he reports is effective.    He is currently undergoing physical therapy and speech therapy and notes improvement in his leg strength.    He has not yet been screened for colon cancer.    He has a history of chickenpox but has not had shingles.    FAMILY HISTORY  There is no family history of colon cancer.      Subjective      Review of Systems:   Review of Systems    I have reviewed the patients family history, social history, past medical history, past surgical history and have updated it as appropriate.     Medications:     Current Outpatient Medications:     amLODIPine (NORVASC) 5 MG tablet, Take 1 tablet by mouth Daily., Disp: , Rfl:     aspirin 81 MG EC tablet, Take 1 tablet by mouth Daily., Disp: 30 tablet, Rfl: 11    atorvastatin (LIPITOR) 80 MG tablet, Take 1 tablet by mouth Every Night., Disp: 90 tablet, Rfl: 11    carvedilol (COREG) 12.5 MG tablet, TAKE 1 TABLET BY MOUTH TWICE DAILY WITH MEALS, Disp: 180 tablet, Rfl: 1    gabapentin (NEURONTIN) 100 MG capsule, Take 1 capsule by mouth 3 (Three) Times a Day., Disp: , Rfl:     hydrALAZINE (APRESOLINE) 100 MG tablet, Take 1 tablet by mouth 2 (Two) Times a Day., Disp: 270 tablet, Rfl: 2    lisinopril (PRINIVIL,ZESTRIL) 40 MG tablet, Take 0.5 tablets by mouth Daily for 30 days., Disp: 15 tablet, Rfl: 5    polyethylene glycol (MIRALAX) 17 g packet, Take 17 g by mouth Daily for 120 days., Disp: 30 packet,  "Rfl: 3    tamsulosin (FLOMAX) 0.4 MG capsule 24 hr capsule, Take 1 capsule by mouth Daily., Disp: 90 capsule, Rfl: 1    cyclobenzaprine (FLEXERIL) 5 MG tablet, Take 1 tablet by mouth 3 (Three) Times a Day As Needed for Muscle Spasms., Disp: , Rfl:     pantoprazole (PROTONIX) 40 MG EC tablet, Take 1 tablet by mouth Daily. (Patient not taking: Reported on 10/2/2024), Disp: , Rfl:     Allergies:   No Known Allergies    Objective     Physical Exam: Please see above  Vital Signs:   Vitals:    10/02/24 1329   BP: 118/74   BP Location: Left arm   Patient Position: Sitting   Cuff Size: Adult   Pulse: 74   Resp: 18   SpO2: 98%   Weight: Comment: unable to get wt today   Height: 182.9 cm (72.01\")     Body mass index is 36.44 kg/m².          Physical Exam  Constitutional:       Appearance: Normal appearance.   HENT:      Head: Normocephalic and atraumatic.      Nose: No congestion or rhinorrhea.      Mouth/Throat:      Pharynx: No oropharyngeal exudate or posterior oropharyngeal erythema.   Eyes:      General:         Right eye: No discharge.         Left eye: No discharge.      Extraocular Movements: Extraocular movements intact.      Pupils: Pupils are equal, round, and reactive to light.   Cardiovascular:      Rate and Rhythm: Normal rate and regular rhythm.      Heart sounds: No murmur heard.     No friction rub. No gallop.   Pulmonary:      Effort: Pulmonary effort is normal.      Breath sounds: Normal breath sounds.   Abdominal:      General: Abdomen is flat. Bowel sounds are normal. There is no distension.      Palpations: Abdomen is soft.      Tenderness: There is no abdominal tenderness. There is no guarding or rebound.   Musculoskeletal:         General: Normal range of motion.      Cervical back: Normal range of motion.      Right lower leg: No edema.      Left lower leg: No edema.   Skin:     General: Skin is warm.      Capillary Refill: Capillary refill takes less than 2 seconds.      Findings: No rash. "   Neurological:      General: No focal deficit present.      Mental Status: He is alert and oriented to person, place, and time.      Cranial Nerves: Facial asymmetry present.      Motor: Weakness present.      Comments: Weakness on the upper arm and leg -left side   Left sided drop      Psychiatric:         Mood and Affect: Mood normal.       Procedures    Results:   Results      Labs:   Hemoglobin A1C   Date Value Ref Range Status   07/27/2024 5.50 4.80 - 5.60 % Final        Imaging:   No valid procedures specified.     Assessment / Plan      Assessment/Plan:   Problem List Items Addressed This Visit          Cardiac and Vasculature    Primary hypertension    Relevant Medications    hydrALAZINE (APRESOLINE) 100 MG tablet     Other Visit Diagnoses       Screening for colon cancer    -  Primary    Relevant Orders    Cologuard - Stool, Per Rectum    Preventative health care        Relevant Orders    Fluzone >6mos ([id number])    Tdap Vaccine Greater Than or Equal To 8yo IM              Assessment & Plan  1. Hypertension.  His blood pressure readings are within the normal range today. The dosage of hydralazine will be reduced from 100 mg every 8 hours to twice daily. Continuation of Coreg (carvedilol) 12.5 mg, atorvastatin, aspirin, and lisinopril 20 mg is advised. He is instructed to notify if there are any changes or if blood pressure increases.    2. Recent hemorrhagic Stroke.  He continues to experience residual symptoms such as tingling and neuropathy on the left side. Gabapentin will be continued for neuropathy. He is undergoing physical and speech therapy, which he reports is helping, and he feels his leg is getting stronger. He is advised to continue with these therapies.    3. Medication Management.  He requested a refill for Flomax, which he has already requested via [facility]. He has enough Flexeril and gabapentin for now. He is instructed to notify if he starts running out of any medications.    4. Health  Maintenance.  A Cologuard test will be ordered for colon cancer screening. Administration of Tdap and influenza vaccines is planned for today. He is advised to consider getting the shingles vaccine at a local pharmacy.      Follow Up:   Return in about 3 months (around 1/2/2025) for htn and 6 month annual .    Patient or patient representative verbalized consent for the use of Ambient Listening during the visit with  Dwain Mckeon MD for chart documentation. 10/2/2024  13:56 EDT    Dwain Mckeon  [facility]

## 2024-10-16 ENCOUNTER — TELEPHONE (OUTPATIENT)
Age: 54
End: 2024-10-16
Payer: COMMERCIAL

## 2024-10-16 NOTE — TELEPHONE ENCOUNTER
Attempted to call patient. No answer, left message.    RELAY: PCP spoke with neurology and they advise for you to wait until January to have a colonoscopy done.

## 2024-10-16 NOTE — TELEPHONE ENCOUNTER
----- Message from Dwain Mckeon sent at 10/16/2024  7:53 AM EDT -----  Mr. Cam,     I know that we already discussed the results on the phone and we talked about the next step which is colonoscopy.I talked to neurology who would like to wait until after the December appointment to have you scheduled for colonoscopy. I will put in an order for January at this time.       Dwain Mckeon MD

## 2024-10-17 NOTE — TELEPHONE ENCOUNTER
CALLED AND RELAYED MESSAGE PER PCP TO PT'S WIFE (OK VERBAL RELEASE). She verbalized understanding. No further questions.

## 2024-10-22 RX ORDER — PANTOPRAZOLE SODIUM 40 MG/1
40 TABLET, DELAYED RELEASE ORAL DAILY
Qty: 30 TABLET | Refills: 3 | Status: SHIPPED | OUTPATIENT
Start: 2024-10-22

## 2024-10-22 NOTE — TELEPHONE ENCOUNTER
Rx Refill Note  Requested Prescriptions     Pending Prescriptions Disp Refills    pantoprazole (PROTONIX) 40 MG EC tablet       Sig: Take 1 tablet by mouth Daily.      Last office visit with prescribing clinician: 10/2/2024   Last telemedicine visit with prescribing clinician: Visit date not found   Next office visit with prescribing clinician: 1/6/2025     Miladis Abraham Rep  10/22/24, 07:41 EDT    You have not prescribed this for this pt

## 2024-10-30 ENCOUNTER — HOSPITAL ENCOUNTER (OUTPATIENT)
Dept: MRI IMAGING | Facility: HOSPITAL | Age: 54
Discharge: HOME | End: 2024-10-30
Admitting: NURSE PRACTITIONER
Payer: COMMERCIAL

## 2024-10-30 DIAGNOSIS — I61.0 NONTRAUMATIC SUBCORTICAL HEMORRHAGE OF RIGHT CEREBRAL HEMISPHERE: ICD-10-CM

## 2024-10-30 PROCEDURE — 70551 MRI BRAIN STEM W/O DYE: CPT

## 2024-12-02 ENCOUNTER — OFFICE VISIT (OUTPATIENT)
Dept: NEUROLOGY | Facility: CLINIC | Age: 54
End: 2024-12-02
Payer: COMMERCIAL

## 2024-12-02 VITALS
DIASTOLIC BLOOD PRESSURE: 76 MMHG | HEIGHT: 72 IN | TEMPERATURE: 99.1 F | HEART RATE: 84 BPM | OXYGEN SATURATION: 97 % | BODY MASS INDEX: 36.44 KG/M2 | SYSTOLIC BLOOD PRESSURE: 128 MMHG

## 2024-12-02 DIAGNOSIS — E78.49 OTHER HYPERLIPIDEMIA: Primary | ICD-10-CM

## 2024-12-02 DIAGNOSIS — Z86.73 HISTORY OF STROKE: ICD-10-CM

## 2024-12-02 PROCEDURE — 99214 OFFICE O/P EST MOD 30 MIN: CPT | Performed by: NURSE PRACTITIONER

## 2024-12-02 NOTE — PROGRESS NOTES
Stroke Clinic Office Visit     Encounter Date: 2024   Patient Name: Ron Cam  : 1970  MRN: [id number]   PCP: Dwain Mckeon MD  Referring Provider: No ref. provider found     Chief Complaint Follow-up  History of Present Illness  Ron Cam is a 54 y.o. right handed male here for three month follow up.    The patient has a past medical history of Migraine.    His stroke history is significant for acute onset of left-sided weakness, left-sided sensory loss, dysarthria, and left visual field cut 2024.   The patient's stroke workup revealed 4 x 3 x 3.5 right basal ganglia hemorrhage, ulcerated plaque in the left distal common carotid artery, and a chronic appearing pontine infarct..   PTA, the patient was not on any anticoagulation or antiplatelet therapy.    Vessel imaging showed ulcerated plaque in the left distal common carotid, MRI that showed the right basal ganglia hemorrhage as well as a chronic appearing pontine infarct of which she was previously unaware.   Patient's neurological symptoms slowly improved during his hospitalization.    Etiology of his hemorrhage was attributed to uncontrolled hypertension.    He will need evaluation by neuro-ophthalmology before considering driving.   His LOV was 2024, He continued to struggle with left hemiparesis with sensory loss, speech noted to be improving and the patient is able to tolerate oral meals.    Aspirin and lipitor started for secondary stroke prevention and the patient has an upcoming appointment with neuro-ohpthamology.    We reviewed his MRI completed 10/30/2024, which demonstrates improved appearance of the previously described large right basal ganglia hematoma, with decreased size and resolving surrounding edema. Focal volume loss underlying the hemorrhage is present without additional unexpected ischemia, new hemorrhage or other unexpected acute change.    The patient presents for evaluation of left-sided weakness,  sensory loss, difficulty speaking, and some left visual field cut. He is accompanied by his wife.  The patient continues to monitor his stroke risk factors with his primary care provider and he is checking his blood pressure at home, which is usually 120/80.  The patient is taking 81 mg of aspirin and 80 mg of atorvastatin for secondary stroke prevention.  The patient denies any unusual/prolonged bleeding with taking aspirin or muscle pain/fatigue or cramping while taking a statin.  He maintains a healthy diet and exercises at home. He monitors his blood pressure at home, which has been normal. He experiences nerve pain in his big toe and thigh, for which he takes gabapentin and uses a TENS unit. He also applies a heating pad for relief. He has a good support system in place. Patient denies any other neurological symptoms, including: headache, vision changes, dysesthesias, loss of consciousness, seizure or new areas of motor weakness.     Subjective     Past Medical History:   Diagnosis Date    Difficulty walking July 26 2024    From Stroke    Hypertension July 26 2024    From Stroke    Migraine 1988    Weightlifting injury 1988    Stroke July 26 2024    Hemorrhagic Stroke      History reviewed. No pertinent surgical history.  Family History   Problem Relation Age of Onset    Stroke Paternal Grandfather     Diabetes Paternal Grandfather     Parkinsonism Maternal Grandmother       Social History     Socioeconomic History    Marital status:    Tobacco Use    Smoking status: Never     Passive exposure: Never    Smokeless tobacco: Never   Vaping Use    Vaping status: Never Used   Substance and Sexual Activity    Alcohol use: Not Currently     Alcohol/week: 4.0 standard drinks of alcohol     Types: 4 Cans of beer per week    Drug use: Never    Sexual activity: Yes     Partners: Female     Birth control/protection: Condom       Current Outpatient Medications:     aspirin 81 MG EC tablet, Take 1 tablet by mouth  "Daily., Disp: 30 tablet, Rfl: 11    atorvastatin (LIPITOR) 80 MG tablet, Take 1 tablet by mouth Every Night., Disp: 90 tablet, Rfl: 11    carvedilol (COREG) 12.5 MG tablet, TAKE 1 TABLET BY MOUTH TWICE DAILY WITH MEALS, Disp: 180 tablet, Rfl: 1    cyclobenzaprine (FLEXERIL) 5 MG tablet, Take 1 tablet by mouth 3 (Three) Times a Day As Needed for Muscle Spasms., Disp: , Rfl:     gabapentin (NEURONTIN) 100 MG capsule, Take 1 capsule by mouth 3 (Three) Times a Day., Disp: , Rfl:     hydrALAZINE (APRESOLINE) 100 MG tablet, Take 1 tablet by mouth 2 (Two) Times a Day., Disp: 270 tablet, Rfl: 2    lisinopril (PRINIVIL,ZESTRIL) 40 MG tablet, Take 0.5 tablets by mouth Daily for 30 days., Disp: 15 tablet, Rfl: 5    pantoprazole (PROTONIX) 40 MG EC tablet, Take 1 tablet by mouth Daily., Disp: 30 tablet, Rfl: 3    polyethylene glycol (MIRALAX) 17 g packet, Take 17 g by mouth Daily for 120 days., Disp: 30 packet, Rfl: 3    tamsulosin (FLOMAX) 0.4 MG capsule 24 hr capsule, Take 1 capsule by mouth Daily., Disp: 90 capsule, Rfl: 1   No Known Allergies     Objective     Physical Exam:  Vitals:    12/02/24 1136   BP: 128/76   Pulse: 84   Temp: 99.1 °F (37.3 °C)   SpO2: 97%   Height: 182.9 cm (72.01\")      Body mass index is 36.44 kg/m².     Physical Exam:  General Appearance: Alert  Eyes: Anicteric sclera  HEENT: no scleral injection   Lungs: respirations appear comfortable, no obvious increased work of breathing  Extremities: No cyanosis or fingernail clubbing   Skin: No rashes in exposed skin areas     Neurological Examination:   Mental status: Alert and oriented to person, place, and time. Speech with no dysarthria, able to name and repeat with no difficulty.    Cranial Nerves: Visual fields intact. Extraocular movements are intact with no nystagmus. Facial sensation intact. Face asymmetrical. Hearing grossly intact. Palate movement is symmetric. Full shoulder shrug bilaterally. Tongue protrudes midline.   Sensory: Sensory exam to " light touch in all four extremities distally is normal. Double simultaneous sensory stimulation shows no extinction  Motor: Normal tone throughout. Normal bulk. Pronator drift is absent.  Left upper extremity: 2/5 deltoid, tricep, bicep, interosseous, hand .   Right upper extremity: 5/5 deltoid, tricep, bicep, interosseous, hand .   Left lower extremity: 5/5 iliopsoas, knee extension/flexion, foot dorsi/plantarflexion.  Right lower extremity: 5/5 iliopsoas, knee extension/flexion, foot dorsi/plantarflexion.  Cerebellar: Right finger to nose no dysmetria, unable to determine with LUE.   Gait: deferred  NIHSS:     1a  Level of consciousness: 0=alert; keenly responsive   1b. LOC questions:  0=Performs both tasks correctly   1c. LOC commands: 0=Answers both questions correctly   2.  Best Gaze: 0=normal   3.  Visual: 0=No visual loss   4. Facial Palsy: 1=Minor paralysis (flattened nasolabial fold, asymmetric on smiling)   5a.  Motor left arm: 1=Drift, limb holds 90 (or 45) degrees but drifts down before full 10 seconds: does not hit bed   5b.  Motor right arm: 0=No drift, limb holds 90 (or 45) degrees for full 10 seconds   6a. motor left le=No drift, limb holds 90 (or 45) degrees for full 10 seconds   6b  Motor right le=No drift, limb holds 90 (or 45) degrees for full 10 seconds   7. Limb Ataxia: 1=Present in one limb   8.  Sensory: 1=Mild to moderate sensory loss; patient feels pinprick is less sharp or is dull on the affected side; there is a loss of superficial pain with pinprick but patient is aware He is being touched   9. Best Language:  0=No aphasia, normal   10. Dysarthria: 1=Mild to moderate, patient slurs at least some words and at worst, can be understood with some difficulty   11. Extinction and Inattention: 1=Visual, tactile, auditory, spatial or personal inattention or extinction to bilateral simultaneous stimulation in one of the sensory modalities     Modified E[place] Score based on  in-office assessment of alertness, orientation, and physical mobility. Further assessment with neurocognitive testing may be needed to elucidate full extent of cognitive abilities: 2 = Slight disability (Able to look after own affairs without assistance, but unable to carry out all previous activities )2       PHQ-9 Depression Screening  Little interest or pleasure in doing things? Not at all   Feeling down, depressed, or hopeless? Not at all   PHQ-2 Total Score 0   Trouble falling or staying asleep, or sleeping too much?     Feeling tired or having little energy?     Poor appetite or overeating?     Feeling bad about yourself - or that you are a failure or have let yourself or your family down?     Trouble concentrating on things, such as reading the newspaper or watching television?     Moving or speaking so slowly that other people could have noticed? Or the opposite - being so fidgety or restless that you have been moving around a lot more than usual?     Thoughts that you would be better off dead, or of hurting yourself in some way?     PHQ-9 Total Score     If you checked off any problems, how difficult have these problems made it for you to do your work, take care of things at home, or get along with other people?          NAZANIN Fall Risk Clinician Key Questions   Have you fallen in the past year?: No  Do you feel unsteady with walking?: Yes (only walks with therapy)  Are you worried about falling?: No  Stay Idependant Patient Questions   Patient Fall Risk Assessment Score : 0  Fall Risk Category  Fall Risk Category: High    Imaging Reviewed:   Encounter Date    10/30/24    MRI Brain Without Contrast [[id number]] (Order [id number])  Order  Status: Final result     Study Notes     Quincy Blakely on 10/30/2024  4:23 PM EDT   F/u right bg hemorrhage and chronic pontine infarct     Appointment Information    PACS Images     Radiology Images  Study Result    Narrative & Impression   MRI BRAIN WO CONTRAST     Date  of Exam: 10/30/2024 3:18 PM EDT     Indication: F/u right bg hemorrhage and chronic pontine infarct to eval underlying ischemia.     Comparison: CT 8/1/2024.     Technique:  Routine multiplanar/multisequence sequence images of the brain were obtained without contrast administration.        Findings:  No acute infarct is present on diffusion weighted sequences. Midline structures appear normal and the craniocervical junction is satisfactory. Expected evolution is noted with regard to the previously described area of intraparenchymal hemorrhage within   the right basal ganglia, demonstrating significant decrease in size, currently measuring around 28 x 8 mm, previously up to 64 x 34 mm. There is improved surrounding cerebral edema. No evidence of new or interval hemorrhage. Chronic changes are again   noted including mild generalized volume loss and scattered areas of chronic lacunar infarct, including within the central liz just right of midline. There is no evidence of unexpected mass or mass effect. The ventricles are normal in size and   configuration. The orbits are normal. The paranasal sinuses are clear. Intracranial arterial flow voids appear maintained.     IMPRESSION:  Impression:  Expected evolution and improved appearance of the previously described large right basal ganglia hematoma, with decreased size and resolving surrounding edema. Focal volume loss underlying the hemorrhage is present without additional unexpected ischemia,   new hemorrhage or other unexpected acute change.     Stable chronic findings including areas of old small lacunar infarct, such as within the central liz just right of midline.           Electronically Signed: Diego Venegas MD    10/31/2024 8:55 AM EDT    Workstation ID: [id number]       Laboratory Results:   Hemoglobin   Date Value Ref Range Status   08/03/2024 12.8 (L) 13.0 - 17.7 g/dL Final     Hematocrit   Date Value Ref Range Status   08/03/2024 37.8 37.5 - 51.0 % Final      Platelets   Date Value Ref Range Status   08/03/2024 213 140 - 450 10*3/mm3 Final     Hemoglobin A1C   Date Value Ref Range Status   07/27/2024 5.50 4.80 - 5.60 % Final     LDL Cholesterol    Date Value Ref Range Status   07/27/2024 140 (H) 0 - 100 mg/dL Final     AST (SGOT)   Date Value Ref Range Status   08/01/2024 16 1 - 40 U/L Final     ALT (SGPT)   Date Value Ref Range Status   08/01/2024 28 1 - 41 U/L Final           Assessment / Plan      Assessment & Plan  1. Left-sided weakness.  He is experiencing improvement in sensation and mobility in his arm. He is advised to continue his exercise regimen, including working on his leg and arm movements. He is also using a TENS unit and heating pad to manage nerve pain.     2. Sensory loss.  He is experiencing nerve pain in his big toe and little toe, which he manages with gabapentin and stretching exercises. He is advised to continue these treatments and be cautious with the TENS unit around his neck.    3. Difficulty speaking.  He is advised to continue speech therapy to improve his speaking abilities.    4. Left visual field cut.  He has an appointment with neuro-ophthalmology scheduled for February.    5. Right basal ganglia hemorrhage.  The hemorrhage is likely attributed to uncontrolled hypertension. He is currently on 81 mg aspirin and 80 mg atorvastatin.     6. Uncontrolled hypertension.  He was not aware of having high blood pressure before the hemorrhage. He is advised to monitor his blood pressure at home, which has been normal (120/80 or lower).    7. Hyperlipidemia.  His total cholesterol level was recorded as 227 four months ago, with an LDL level of 140. The goal is to reduce this significantly to below 70. A lipid panel will be conducted in six months, requiring him to fast for 8 to 10 hours prior. He is advised to continue taking atorvastatin 80 mg.    1. Other hyperlipidemia (Primary)  -     Lipid Panel; Future    8. Extracranial atherosclerosis.    "    1. No evidence of intracranial large vessel arterial occlusion or aneurysm       2. Ulcerated plaque in the left distal common carotid and proximal internal carotid arteries that results in less than 50% luminal diameter stenosis       3. Patent right carotid and bilateral vertebral arteries without significant stenosis or dissection      9. Health Maintenance.  He has a colonoscopy scheduled for February.    10 History of ischemic stroke  -Continue 81 mg of aspirin   -Continue 80 mg of Atorvastatin  -Chair Yoga exercise 30 minutes 3 - 4 times a day  -Heart and Brain Healthy diet, Consider a Mediterranean Diet.   -Continue tight control of blood sugars  -Journal BP at home and call PCP with persistent BP >130/80  -Follow up in   -The patient was advised to follow up with his primary care physician for ongoing management and screening for hypertension, hypercholesterolemia, and diabetes.   -The patient was counseled on long-term blood pressure goal less than 120/80, long-term LDL goal less than 70, and long-term hemoglobin A1c goal less than 7.0% for stroke prevention. This was also printed for the patient in the after visit summary.  -The patient was counseled he experiences symptoms of acute onset unilateral arm, leg, or face weakness/numbness/tingling, unilateral facial droop, slurred speech/word finding difficulty, visual disturbance (\"curtain falling\" or visual field loss), or severe headache he should call 911 and present to the nearest emergency department immediately.    I spent 30 minutes caring for Ron on this date of service. This time includes time spent by me in the following activities:reviewing tests, performing a medically appropriate examination and/or evaluation , counseling and educating the patient/family/caregiver, ordering medications, tests, or procedures, and documenting information in the medical record    Patient or patient representative verbalized consent for the use of Ambient " Listening during the visit with  REENA Alvarez for chart documentation. 12/2/2024  13:58 EST    12/2/2024  00:06 EST        REENA Alvarez  [facility] [place]     Part of this note may be an electronic transcription/translation of spoken language to printed text using the Dragon Dictation System.

## 2024-12-02 NOTE — PATIENT INSTRUCTIONS
Stroke:   Etiology:  PMHx:  Imaging:  Medication:   Patient states:       -Continue 81 mg of aspirin   -Continue 80 mg of Atorvastatin, once daily.    -Do not take aspirin as an analgesic/pain relief while taking a daily low-dose aspirin for primary stroke prevention.    -Consider taking Tylenol, if appropriate.    -Consider contacting your primary care physician regarding pain.  -Exercise 30 minutes 3 - 4 times a day  -Heart and Brain Healthy diet, Consider a Mediterranean Diet.   -Continue tight control of blood sugars  -Journal BP at home and call PCP with persistent BP >140/90  -Follow up in 3 months

## 2024-12-31 RX ORDER — TAMSULOSIN HYDROCHLORIDE 0.4 MG/1
1 CAPSULE ORAL DAILY
Qty: 90 CAPSULE | Refills: 1 | Status: SHIPPED | OUTPATIENT
Start: 2024-12-31

## 2024-12-31 NOTE — TELEPHONE ENCOUNTER
Rx Refill Note  Requested Prescriptions     Pending Prescriptions Disp Refills    tamsulosin (FLOMAX) 0.4 MG capsule 24 hr capsule [Pharmacy Med Name: TAMSULOSIN 0.4MG CAPSULES] 90 capsule 1     Sig: TAKE 1 CAPSULE BY MOUTH DAILY      Last office visit with prescribing clinician: 10/2/2024   Last telemedicine visit with prescribing clinician: Visit date not found   Next office visit with prescribing clinician: 1/6/2025                         Would you like a call back once the refill request has been completed: [] Yes [] No    If the office needs to give you a call back, can they leave a voicemail: [] Yes [] No    Suleman Chiu MA  12/31/24, 08:25 EST  
[facility]: Preventive Care And Screening: Influenza Immunization: Influenza Immunization Administered during Influenza season
Detail Level: Detailed

## 2025-01-06 RX ORDER — CYCLOBENZAPRINE HCL 5 MG
TABLET ORAL
Qty: 30 TABLET | Refills: 5 | Status: SHIPPED | OUTPATIENT
Start: 2025-01-06

## 2025-01-06 NOTE — TELEPHONE ENCOUNTER
Rx Refill Note  Requested Prescriptions     Pending Prescriptions Disp Refills    cyclobenzaprine (FLEXERIL) 5 MG tablet [Pharmacy Med Name: CYCLOBENZAPRINE 5MG TABLETS] 30 tablet      Sig: TAKE 1 TABLET BY MOUTH THREE TIMES DAILY FOR UP TO 14 DAYS AS NEEDED FOR MUSCLE SPASMS      Last office visit with prescribing clinician: 10/2/2024   Last telemedicine visit with prescribing clinician: Visit date not found   Next office visit with prescribing clinician: 4/2/2025                         Would you like a call back once the refill request has been completed: [] Yes [] No    If the office needs to give you a call back, can they leave a voicemail: [] Yes [] No    Isabella Griffiths MA  01/06/25, 12:46 EST

## 2025-01-14 RX ORDER — GABAPENTIN 100 MG/1
100 CAPSULE ORAL 3 TIMES DAILY
Status: CANCELLED | OUTPATIENT
Start: 2025-01-14

## 2025-01-15 NOTE — TELEPHONE ENCOUNTER
Rx Refill Note  Requested Prescriptions     Pending Prescriptions Disp Refills    gabapentin (NEURONTIN) 100 MG capsule       Sig: Take 1 capsule by mouth 3 (Three) Times a Day.      Last office visit with prescribing clinician: 10/2/2024   Last telemedicine visit with prescribing clinician: Visit date not found   Next office visit with prescribing clinician: 1/16/2025   {JAQUELINE Griffiths MA  01/15/25, 13:23 EST        HOLD UNTIL TOMORROW?

## 2025-01-16 ENCOUNTER — OFFICE VISIT (OUTPATIENT)
Age: 55
End: 2025-01-16
Payer: COMMERCIAL

## 2025-01-16 VITALS — DIASTOLIC BLOOD PRESSURE: 90 MMHG | SYSTOLIC BLOOD PRESSURE: 136 MMHG | BODY MASS INDEX: 36.44 KG/M2 | HEIGHT: 72 IN

## 2025-01-16 DIAGNOSIS — G89.29 CHRONIC PAIN OF RIGHT KNEE: ICD-10-CM

## 2025-01-16 DIAGNOSIS — G62.9 NEUROPATHY: ICD-10-CM

## 2025-01-16 DIAGNOSIS — I10 PRIMARY HYPERTENSION: ICD-10-CM

## 2025-01-16 DIAGNOSIS — I61.0 NONTRAUMATIC SUBCORTICAL HEMORRHAGE OF RIGHT CEREBRAL HEMISPHERE: Primary | ICD-10-CM

## 2025-01-16 DIAGNOSIS — M25.561 CHRONIC PAIN OF RIGHT KNEE: ICD-10-CM

## 2025-01-16 DIAGNOSIS — J30.2 SEASONAL ALLERGIES: ICD-10-CM

## 2025-01-16 PROCEDURE — 99214 OFFICE O/P EST MOD 30 MIN: CPT

## 2025-01-16 RX ORDER — CETIRIZINE HYDROCHLORIDE 10 MG/1
10 TABLET ORAL DAILY
Qty: 30 TABLET | Refills: 0 | Status: SHIPPED | OUTPATIENT
Start: 2025-01-16

## 2025-01-16 RX ORDER — POLYETHYLENE GLYCOL 3350 17 G/17G
POWDER, FOR SOLUTION ORAL
COMMUNITY
Start: 2025-01-03

## 2025-01-16 RX ORDER — GABAPENTIN 100 MG/1
100 CAPSULE ORAL 3 TIMES DAILY
Qty: 270 CAPSULE | Refills: 0 | Status: SHIPPED | OUTPATIENT
Start: 2025-01-16 | End: 2025-04-16

## 2025-01-16 RX ORDER — FLUTICASONE PROPIONATE 50 MCG
2 SPRAY, SUSPENSION (ML) NASAL DAILY
Qty: 11.1 G | Refills: 3 | Status: SHIPPED | OUTPATIENT
Start: 2025-01-16

## 2025-01-16 RX ORDER — HYDRALAZINE HYDROCHLORIDE 50 MG/1
50 TABLET, FILM COATED ORAL 2 TIMES DAILY
Qty: 180 TABLET | Refills: 3 | Status: SHIPPED | OUTPATIENT
Start: 2025-01-16 | End: 2025-04-16

## 2025-01-16 NOTE — PROGRESS NOTES
Follow Up Office Visit      Date: 2025   Patient Name: Ron Cam  : 1970   MRN: [id number]     Chief Complaint:    Chief Complaint   Patient presents with    Hypertension     BP been running 90s/50s at home    Disability Paperwork       History of Present Illness: Ron Cam is a 54 y.o. male who is here today to follow up with regarding blood pressure.     History of Present Illness  The patient presents for evaluation of stroke, hypertension, constipation, sinus congestion, knee pain, and elevated blood pressure.    He reports experiencing fatigue and recurrent leg pain, which he attributes to nerve issues. Despite these symptoms, he notes an overall improvement in his condition. He has been actively participating in physical therapy sessions three times a week, which have contributed to his increasing strength. He has also initiated short-distance walking at home using a forearm crutch. He describes persistent numbness in certain areas of his leg, but acknowledges a gradual improvement in sensation. He is currently seeking disability papers for work due to his inability to continue employment. He reports an improvement in his speech, with less slurring, and has been able to chew food without difficulty. He has been experiencing itching on one side of his body. He has been making efforts to improve his mobility, including walking to the bathroom, but finds these activities exhausting. He reports no chest pain or shortness of breath.    He has been experiencing low blood pressure readings in the mornings, with systolic values in the 90s and diastolic values in the 50s. His current medication regimen includes aspirin, atorvastatin, carvedilol 12.5 mg twice daily, hydralazine 100 mg twice daily, and lisinopril half a tablet twice daily. He reports that his blood pressure remains stable, except for slight elevations during episodes of leg pain.    He has been managing his constipation with  MiraLAX, taking occasional breaks of one to two days. He reports regular bowel movements for the past seven days.    He experiences occasional sinus congestion and is seeking advice on suitable medications. He has previously taken Claritin-D and is considering its use again.    He has been attempting to increase his leg exercises, but reports knee pain due to bone-on-bone contact. He is interested in exploring treatment options for this issue. He has found some relief from wearing a strap on his knee, which aids in walking. He also reports difficulty in transferring from a chair to a couch due to the pain.    He plans to reschedule his colonoscopy once his mobility improves. He has a history of knee replacement surgery, with the kneecap being reattached using screws and staples.    MEDICATIONS  Aspirin, atorvastatin, carvedilol, hydralazine, lisinopril, gabapentin, MiraLAX.      Subjective      Review of Systems:   Review of Systems    I have reviewed the patients family history, social history, past medical history, past surgical history and have updated it as appropriate.     Medications:     Current Outpatient Medications:     aspirin 81 MG EC tablet, Take 1 tablet by mouth Daily., Disp: 30 tablet, Rfl: 11    atorvastatin (LIPITOR) 80 MG tablet, Take 1 tablet by mouth Every Night., Disp: 90 tablet, Rfl: 11    carvedilol (COREG) 12.5 MG tablet, TAKE 1 TABLET BY MOUTH TWICE DAILY WITH MEALS, Disp: 180 tablet, Rfl: 1    cyclobenzaprine (FLEXERIL) 5 MG tablet, TAKE 1 TABLET BY MOUTH THREE TIMES DAILY FOR UP TO 14 DAYS AS NEEDED FOR MUSCLE SPASMS, Disp: 30 tablet, Rfl: 5    gabapentin (NEURONTIN) 100 MG capsule, Take 1 capsule by mouth 3 (Three) Times a Day for 90 days., Disp: 270 capsule, Rfl: 0    hydrALAZINE (APRESOLINE) 50 MG tablet, Take 1 tablet by mouth 2 (Two) Times a Day for 90 days., Disp: 180 tablet, Rfl: 3    pantoprazole (PROTONIX) 40 MG EC tablet, Take 1 tablet by mouth Daily., Disp: 30 tablet, Rfl: 3     "polyethylene glycol (MIRALAX) 17 g packet, MIX 1 PACKET AS DIRECTED AND TAKE ONCE DAILY, Disp: , Rfl:     tamsulosin (FLOMAX) 0.4 MG capsule 24 hr capsule, TAKE 1 CAPSULE BY MOUTH DAILY, Disp: 90 capsule, Rfl: 1    cetirizine (zyrTEC) 10 MG tablet, Take 1 tablet by mouth Daily., Disp: 30 tablet, Rfl: 0    Diclofenac Sodium (VOLTAREN) 1 % gel gel, Apply 4 g topically to the appropriate area as directed 4 (Four) Times a Day As Needed (joint pain)., Disp: 150 g, Rfl: 3    fluticasone (FLONASE) 50 MCG/ACT nasal spray, Administer 2 sprays into the nostril(s) as directed by provider Daily., Disp: 11.1 g, Rfl: 3    lisinopril (PRINIVIL,ZESTRIL) 40 MG tablet, Take 0.5 tablets by mouth Daily for 30 days., Disp: 15 tablet, Rfl: 5    Allergies:   No Known Allergies    Objective     Physical Exam: Please see above  Vital Signs:   Vitals:    01/16/25 1514   BP: 136/90   BP Location: Right arm   Patient Position: Sitting   Cuff Size: Adult   Height: 182.9 cm (72.01\")     Body mass index is 36.44 kg/m².          Physical Exam  Constitutional:       Appearance: Normal appearance.   HENT:      Head: Normocephalic and atraumatic.      Nose: No congestion or rhinorrhea.      Mouth/Throat:      Pharynx: No oropharyngeal exudate or posterior oropharyngeal erythema.   Eyes:      General:         Right eye: No discharge.         Left eye: No discharge.      Extraocular Movements: Extraocular movements intact.      Pupils: Pupils are equal, round, and reactive to light.   Cardiovascular:      Rate and Rhythm: Normal rate and regular rhythm.      Heart sounds: No murmur heard.     No friction rub. No gallop.   Pulmonary:      Effort: Pulmonary effort is normal.      Breath sounds: Normal breath sounds.   Abdominal:      General: Abdomen is flat. Bowel sounds are normal. There is no distension.      Palpations: Abdomen is soft.      Tenderness: There is no abdominal tenderness. There is no guarding or rebound.   Musculoskeletal:         " General: Normal range of motion.      Left hand: Normal strength.      Cervical back: Normal range of motion.      Right lower leg: No edema.      Left lower leg: No edema.   Skin:     General: Skin is warm.      Capillary Refill: Capillary refill takes less than 2 seconds.      Findings: No rash.   Neurological:      Mental Status: He is alert.      Cranial Nerves: Dysarthria present.      Motor: Weakness present.      Gait: Gait abnormal.   Psychiatric:         Mood and Affect: Mood normal.         Procedures    Results:   Results      Labs:   Hemoglobin A1C   Date Value Ref Range Status   07/27/2024 5.50 4.80 - 5.60 % Final        Imaging:   No valid procedures specified.     Assessment / Plan      Assessment/Plan:      Diagnoses and all orders for this visit:    1. Nontraumatic subcortical hemorrhage of right cerebral hemisphere (Primary)  -     gabapentin (NEURONTIN) 100 MG capsule; Take 1 capsule by mouth 3 (Three) Times a Day for 90 days.  Dispense: 270 capsule; Refill: 0  -     Drug Analysis,Comp,Oral Fluid - Saliva,; Future    2. Primary hypertension  -     hydrALAZINE (APRESOLINE) 50 MG tablet; Take 1 tablet by mouth 2 (Two) Times a Day for 90 days.  Dispense: 180 tablet; Refill: 3    3. Neuropathy  -     Drug Analysis,Comp,Oral Fluid - Saliva,; Future    4. Chronic pain of right knee  -     Diclofenac Sodium (VOLTAREN) 1 % gel gel; Apply 4 g topically to the appropriate area as directed 4 (Four) Times a Day As Needed (joint pain).  Dispense: 150 g; Refill: 3    5. Seasonal allergies  -     fluticasone (FLONASE) 50 MCG/ACT nasal spray; Administer 2 sprays into the nostril(s) as directed by provider Daily.  Dispense: 11.1 g; Refill: 3  -     cetirizine (zyrTEC) 10 MG tablet; Take 1 tablet by mouth Daily.  Dispense: 30 tablet; Refill: 0         Assessment & Plan  1. Hemorrhagic stroke   He reports improvement in strength and mobility with physical therapy, walking at home with a forearm crutch, and  experiencing less numbness in his leg. Speech has improved, and he is chewing well. He will continue physical therapy. A disability form will be completed for a duration of 6 months. He is advised to maintain hydration to prevent gum dryness.A refill for gabapentin has been provided. Pain contract signed and the drug screen completed today.     2. Hypertension.  His blood pressure readings have been low in the mornings, around 90s/50s. The hydralazine dosage will be reduced from 100 mg twice daily to 50 mg twice daily. He is instructed to monitor his blood pressure daily and report any instances of hypotension within a week. The goal is to eventually discontinue hydralazine.     3. Constipation.  He is advised to continue taking MiraLAX to manage constipation, which can affect blood pressure regulation.    4. Sinus Congestion.  He is advised to take Claritin and Flonase for sinus congestion. Flonase may take about a week to work, and if it does not help, other nasal sprays will be considered. The main side effect of Flonase is nasal dryness, which can sometimes lead to nosebleeds.    5. Right knee OA   He is advised against taking Advil due to its potential interaction with aspirin, increasing the risk of gastrointestinal bleeding and stomach ulcers. Voltaren gel has been recommended for topical application on the knee. If Voltaren gel proves ineffective, a steroid injection into the knee joint will be considered. He can also take Tylenol for pain management.    6. Health Maintenance.  He is advised to reschedule the colonoscopy once he gains more mobility. The previous Cologuard test was positive, but it can sometimes yield false positives.    Follow-up  The patient will follow up in 6 months for an annual physical examination.        Follow Up:   No follow-ups on file.        Patient or patient representative verbalized consent for the use of Ambient Listening during the visit with  Dwain Mckeon MD for chart  documentation. 1/16/2025  15:44 EST    Dwain Mckeon  [facility] [place]

## 2025-01-20 LAB
6MAM SAL CFM-MCNC: NOT DETECTED NG/ML
6MAM SAL QL CFM: NEGATIVE
ALPRAZ SAL CFM-MCNC: NOT DETECTED NG/ML
AMPHET SAL CFM-MCNC: NOT DETECTED NG/ML
AMPHET SAL QL CFM: NEGATIVE
ANTICONVULSANTS SAL QL CFM: NEGATIVE
BENZODIAZ SAL QL CFM: NEGATIVE
BUPRENORPHINE SAL CFM-MCNC: NOT DETECTED NG/ML
BUPRENORPHINE SAL QL CFM: NEGATIVE
BZE SAL CFM-MCNC: NOT DETECTED NG/ML
CANNABINOIDS SAL QL SCN: NEGATIVE
CARBOXYTHC SAL CFM-MCNC: NOT DETECTED NG/ML
CARISOPRODOL SAL CFM-MCNC: NOT DETECTED NG/ML
CLONAZEPAM SAL CFM-MCNC: NOT DETECTED NG/ML
COC+MET SAL QL CFM: NEGATIVE
COCAINE SAL CFM-MCNC: NOT DETECTED NG/ML
CODEINE SAL CFM-MCNC: NOT DETECTED NG/ML
COTININE SAL CFM-MCNC: NOT DETECTED NG/ML
COTININE SAL QL CFM: NEGATIVE
CYCLOBENZAPRINE SAL CFM-MCNC: 2 NG/ML
DHC SAL CFM-MCNC: NOT DETECTED NG/ML
DIAZEPAM SAL CFM-MCNC: NOT DETECTED NG/ML
DULOXETINE SAL CFM-MCNC: NOT DETECTED NG/ML
DULOXETINE SAL QL CFM: NEGATIVE
EDDP SAL QL CFM: NOT DETECTED NG/ML
FENTANYL SAL CFM-MCNC: NEGATIVE NG/ML
FENTANYL SAL QL SCN: NOT DETECTED NG/ML
FLUNITRAZEPAM SAL CFM-MCNC: NOT DETECTED NG/ML
FLURAZEPAM SAL CFM-MCNC: NOT DETECTED NG/ML
GABAPENTIN SAL CFM-MCNC: NOT DETECTED UG/ML
HYDROCODONE SAL CFM-MCNC: NOT DETECTED NG/ML
HYDROMORPHONE SAL CFM-MCNC: NOT DETECTED NG/ML
HYPNOTICS SAL QL CFM: NEGATIVE
LORAZEPAM SAL-MCNC: NOT DETECTED NG/ML
MDMA SAL CFM-MCNC: NOT DETECTED NG/ML
MEPERIDINE SAL CFM-MCNC: NOT DETECTED NG/ML
MEPROBAMATE SAL CFM-MCNC: NOT DETECTED NG/ML
METHADONE SAL CFM-MCNC: NOT DETECTED NG/ML
METHADONE SAL QL CFM: NEGATIVE
METHAMPHET SAL CFM-MCNC: NOT DETECTED NG/ML
MIDAZOLAM SAL CFM-MCNC: NOT DETECTED NG/ML
MORPHINE SAL CFM-MCNC: NOT DETECTED NG/ML
MUSCLE RELAXANTS: ABNORMAL
NARCOTICS SAL QL CFM: NEGATIVE
NORBUPRENORPHINE SAL CFM-MCNC: NOT DETECTED NG/ML
NORDIAZEPAM SAL-MCNC: NOT DETECTED NG/ML
NORHYDROCODONE SAL CFM-MCNC: NOT DETECTED NG/ML
NOROXYCODONE SAL CFM-MCNC: NOT DETECTED NG/ML
OPIATES SAL QL CFM: NEGATIVE
OXAZEPAM SAL CFM-MCNC: NOT DETECTED NG/ML
OXYCODONE SAL CFM-MCNC: NOT DETECTED NG/ML
OXYCODONE SAL QL CFM: NEGATIVE
OXYMORPHONE SAL CFM-MCNC: NOT DETECTED NG/ML
PCP SAL CFM-MCNC: NOT DETECTED NG/ML
PCP SAL QL CFM: NEGATIVE
PREGABALIN SAL CFM-MCNC: NOT DETECTED UG/ML
PROPOXYPH SAL CFM-MCNC: NOT DETECTED NG/ML
TAPENTADOL SAL CFM-MCNC: NOT DETECTED NG/ML
TAPENTADOL SAL QL SCN: NEGATIVE
TEMAZEPAM SAL CFM-MCNC: NOT DETECTED NG/ML
TRAMADOL SAL CFM-MCNC: NOT DETECTED NG/ML
TRIAZOLAM SAL CFM-MCNC: NOT DETECTED NG/ML
ZOLPIDEM SAL CFM-MCNC: NOT DETECTED NG/ML

## 2025-01-21 RX ORDER — PANTOPRAZOLE SODIUM 40 MG/1
40 TABLET, DELAYED RELEASE ORAL DAILY
Qty: 90 TABLET | Refills: 1 | Status: SHIPPED | OUTPATIENT
Start: 2025-01-21

## 2025-01-21 NOTE — TELEPHONE ENCOUNTER
Rx Refill Note    Requested Prescriptions     Pending Prescriptions Disp Refills    pantoprazole (PROTONIX) 40 MG EC tablet [Pharmacy Med Name: PANTOPRAZOLE 40MG TABLETS] 30 tablet 3     Sig: TAKE 1 TABLET BY MOUTH DAILY      Last office visit with prescribing clinician: 1/16/2025   Last telemedicine visit with prescribing clinician: Visit date not found   Next office visit with prescribing clinician: 7/16/2025     Suleman Chiu MA  01/21/25, 08:17 EST

## 2025-01-31 RX ORDER — POLYETHYLENE GLYCOL 3350 17 G/17G
17 POWDER, FOR SOLUTION ORAL DAILY PRN
Qty: 100 PACKET | Refills: 5 | Status: SHIPPED | OUTPATIENT
Start: 2025-01-31

## 2025-01-31 NOTE — TELEPHONE ENCOUNTER
Rx Refill Note    Requested Prescriptions     Pending Prescriptions Disp Refills    polyethylene glycol (MIRALAX) 17 g packet        Last office visit with prescribing clinician: 1/16/2025   Last telemedicine visit with prescribing clinician: Visit date not found   Next office visit with prescribing clinician: 7/16/2025     Suleman Chiu MA  01/31/25, 10:18 [facility] Refill Note    Requested Prescriptions     Pending Prescriptions Disp Refills    polyethylene glycol (MIRALAX) 17 g packet        Last office visit with prescribing clinician: 1/16/2025   Last telemedicine visit with prescribing clinician: Visit date not found   Next office visit with prescribing clinician: 7/16/2025     Suleman Chiu MA  01/31/25, 10:18 [facility] Refill Note    Requested Prescriptions     Pending Prescriptions Disp Refills    polyethylene glycol (MIRALAX) 17 g packet        Last office visit with prescribing clinician: 1/16/2025   Last telemedicine visit with prescribing clinician: Visit date not found   Next office visit with prescribing clinician: 7/16/2025     Suleman Chiu MA  01/31/25, 10:18 EST

## 2025-02-04 ENCOUNTER — PRIOR AUTHORIZATION (OUTPATIENT)
Age: 55
End: 2025-02-04
Payer: COMMERCIAL

## 2025-02-04 NOTE — TELEPHONE ENCOUNTER
Key: [id number]    Drug:  Polyethylene Glycol 3350 17GM packets    Called [facility] and this medication is not covered under plan.  Phone: [phone]

## 2025-02-17 RX ORDER — LISINOPRIL 40 MG/1
20 TABLET ORAL DAILY
Qty: 15 TABLET | Refills: 5 | Status: SHIPPED | OUTPATIENT
Start: 2025-02-17

## 2025-02-17 NOTE — TELEPHONE ENCOUNTER
Rx Refill Note  Requested Prescriptions     Pending Prescriptions Disp Refills    lisinopril (PRINIVIL,ZESTRIL) 40 MG tablet [Pharmacy Med Name: LISINOPRIL 40MG TABLETS] 15 tablet 5     Sig: TAKE 1/2 TABLET BY MOUTH DAILY      Last office visit with prescribing clinician: 1/16/2025   Last telemedicine visit with prescribing clinician: Visit date not found   Next office visit with prescribing clinician: 7/16/2025     Merary Lackey MA  02/17/25, 12:24 EST    Rx sent

## 2025-02-26 NOTE — THERAPY TREATMENT NOTE
Patient Name: Ron Cam  : 1970    MRN: [id number]                              Today's Date: 2024       Admit Date: 2024    Visit Dx:     ICD-10-CM ICD-9-CM   1. Intraparenchymal hemorrhage of brain  I61.9 431   2. Dysarthria  R47.1 784.51   3. Acute left-sided weakness  R53.1 728.87   4. Left sided numbness  R20.0 782.0   5. Hemineglect  R41.4 781.8   6. Hypertensive emergency  I16.1 401.9   7. Nontraumatic subcortical hemorrhage of right cerebral hemisphere  I61.0 431     Patient Active Problem List   Diagnosis    Nontraumatic intracerebral hemorrhage    Hypertensive crisis     History reviewed. No pertinent past medical history.  History reviewed. No pertinent surgical history.   General Information       Row Name 24 1016          Physical Therapy Time and Intention    Document Type therapy note (daily note)  -KR (r) THERESA (t) KR (c)     Mode of Treatment physical therapy  -KR (r) THERESA (t) KR (c)       Row Name 24 1016          General Information    Patient Profile Reviewed yes  -KR (r) THERESA (t) KR (c)     Existing Precautions/Restrictions fall;other (see comments)  L sided perceptual deficits, L sided inattention, L sided weakness  -KR (r) THERESA (t) KR (c)     Barriers to Rehab medically complex;cognitive status  -KR (r) THERESA (t) KR (c)       Row Name 24 1016          Cognition    Orientation Status (Cognition) oriented x 3  -KR (r) THERESA (t) KR (c)       Row Name 24 1016          Safety Issues, Functional Mobility    Safety Issues Affecting Function (Mobility) awareness of need for assistance;insight into deficits/self-awareness;safety precaution awareness;safety precautions follow-through/compliance;sequencing abilities  -KR (r) THERESA (t) KR (c)     Impairments Affecting Function (Mobility) balance;coordination;endurance/activity tolerance;muscle tone abnormal;postural/trunk control;sensation/sensory awareness;strength  -KR (r) THERESA (t) KR (c)     Cognitive Impairments, Mobility  Safety/Performance awareness, need for assistance;insight into deficits/self-awareness;safety precaution awareness;safety precaution follow-through;sequencing abilities  -KR (r) THERESA (t) KR (c)               User Key  (r) = Recorded By, (t) = Taken By, (c) = Cosigned By      Initials Name Provider Type    Mariposa Villeda, PT Physical Therapist    Adalgisa Denny, PT Student PT Student                   Mobility       Row Name 08/01/24 1019          Bed Mobility    Bed Mobility rolling left;rolling right  -KR (r) THERESA (t) KR (c)     Rolling Left [place] (Bed Mobility) maximum assist (25% patient effort);2 person assist;verbal cues  -KR (r) THERESA (t) KR (c)     Rolling Right [place] (Bed Mobility) maximum assist (25% patient effort);2 person assist;verbal cues  -KR (r) THERESA (t) KR (c)     Assistive Device (Bed Mobility) lift device;bed rails  -KR (r) THERESA (t) KR (c)     Comment, (Bed Mobility) Pt able to aid in rolling towards L with use of RUE on bed rail. Performed rolling for sling placement.  -KR (r) THERESA (t) KR (c)       Row Name 08/01/24 1019          Bed-Chair Transfer    Bed-Chair [place] (Transfers) dependent (less than 25% patient effort);2 person assist  -KR (r) THERESA (t) KR (c)     Assistive Device (Bed-Chair Transfers) lift device  -KR (r) THERESA (t) KR (c)     Comment, (Bed-Chair Transfer) Utilized kaitlyn lift for bed-chair transfer.  -KR (r) THERESA (t) KR (c)       Row Name 08/01/24 1019          Sit-Stand Transfer    Sit-Stand [place] (Transfers) maximum assist (25% patient effort);2 person assist;verbal cues  -KR (r) THERESA (t) KR (c)     Assistive Device (Sit-Stand Transfers) other (see comments)  BUE support  -KR (r) THERESA (t) KR (c)     Comment, (Sit-Stand Transfer) 2x STS from chair. Performed blocking at roberto knees to prevent buckling. No lateral lean observed once standing. Tolerated standing for approx. 10 seconds.  -KR (r) THERESA (t) KR (c)       Row Name 08/01/24 1019          Gait/Stairs (Locomotion)     [place] Level (Gait) unable to assess  -KR (r) THERESA (t) KR (c)     [place] Level (Stairs) unable to assess  -KR (r) THERESA (t) KR (c)     Comment, (Gait/Stairs) Deferred 2/2 generalized weakness and safety concerns; not appropriate.  -KR (r) THERESA (t) KR (c)               User Key  (r) = Recorded By, (t) = Taken By, (c) = Cosigned By      Initials Name Provider Type    Mariposa Villeda, PT Physical Therapist    Adalgisa Denny, PT Student PT Student                   Obj/Interventions       Row Name 08/01/24 1024          Balance    Balance Assessment sitting static balance;sit to stand dynamic balance;standing static balance  -KR (r) THERESA (t) KR (c)     Static Sitting Balance moderate assist;1-person assist;verbal cues  -KR (r) THERESA (t) KR (c)     Position, Sitting Balance unsupported;sitting in chair  -KR (r) THERESA (t) KR (c)     Sit to Stand Dynamic Balance maximum assist;2-person assist;verbal cues  -KR (r) THERESA (t) KR (c)     Static Standing Balance maximum assist;2-person assist;verbal cues  -KR (r) THERESA (t) KR (c)     Position/Device Used, Standing Balance supported;other (see comments)  BUE support  -KR (r) THERESA (t) KR (c)     Balance Interventions sitting;standing;sit to stand;supported;static  -KR (r) THERESA (t) KR (c)     Comment, Balance Pt demonstrate L posterolateral lean while sitting. Performed 10x anterior weight shifts while sitting to obtain midline orientation.  -KR (r) THERESA (t) KR (c)               User Key  (r) = Recorded By, (t) = Taken By, (c) = Cosigned By      Initials Name Provider Type    Mariposa Villeda, PT Physical Therapist    Adalgisa Denny, PT Student PT Student                   Goals/Plan    No documentation.                  Clinical Impression       Row Name 08/01/24 1026          Pain    Additional Documentation Pain Scale: FACES Pre/Post-Treatment (Group)  -KR (r) THERESA (t) KR (c)       Row Name 08/01/24 1026          Pain Scale: FACES Pre/Post-Treatment    Pain: FACES Scale, Pretreatment  0-->no hurt  -KR (r) THERESA (t) KR (c)     Posttreatment Pain Rating 0-->no hurt  -KR (r) THERESA (t) KR (c)       Row Name 08/01/24 1026          Plan of Care Review    Plan of Care Reviewed With patient;spouse  -KR (r) THERESA (t) KR (c)     Progress improving  -KR (r) THERESA (t) KR (c)     Outcome Evaluation Pt demonstrated improved static sitting balance compared to last therapy session. Continues to require MaxAx2 with blocking at roberto knees to perform STS. Continues to demonstrate BLE strength, balance, coordination, postural control and endurance below baseline requiring assist with functional mobility. PT recommended to address functional limitations and return to PLOF.  -KR (r) THERESA (t) KR (c)       Row Name 08/01/24 1026          Vital Signs    Pre Systolic BP Rehab 120  -KR (r) THERESA (t) KR (c)     Pre Treatment Diastolic BP 68  -KR (r) THERESA (t) KR (c)     Post Systolic BP Rehab 148  -KR (r) THERESA (t) KR (c)     Post Treatment Diastolic BP 81  -KR (r) THERESA (t) KR (c)     O2 Delivery Pre Treatment nasal cannula  -KR (r) THERESA (t) KR (c)     O2 Delivery Intra Treatment room air  -KR (r) THERESA (t) KR (c)     O2 Delivery Post Treatment room air  -KR (r) THERESA (t) KR (c)     Pre Patient Position Supine  -KR (r) THERESA (t) KR (c)     Intra Patient Position Standing  -KR (r) THERESA (t) KR (c)     Post Patient Position Sitting  -KR (r) THERESA (t) KR (c)       Row Name 08/01/24 1026          Positioning and Restraints    Pre-Treatment Position in bed  -KR (r) THERESA (t) KR (c)     Post Treatment Position chair  -KR (r) THERESA (t) KR (c)     In Chair reclined;call light within reach;encouraged to call for assist;exit alarm on;with family/caregiver;with nsg  Pillow under L UE  -KR (r) THERESA (t) KR (c)               User Key  (r) = Recorded By, (t) = Taken By, (c) = Cosigned By      Initials Name Provider Type    Mariposa Villeda, PT Physical Therapist    Adalgisa Denny, PT Student PT Student                   Outcome Measures       Row Name 08/01/24 1029          How much  help from another person do you currently need...    Turning from your back to your side while in flat bed without using bedrails? 2  -KR (r) THERESA (t) KR (c)     Moving from lying on back to sitting on the side of a flat bed without bedrails? 2  -KR (r) THERESA (t) KR (c)     Moving to and from a bed to a chair (including a wheelchair)? 1  -KR (r) THERESA (t) KR (c)     Standing up from a chair using your arms (e.g., wheelchair, bedside chair)? 2  -KR (r) THERESA (t) KR (c)     Climbing 3-5 steps with a railing? 1  -KR (r) THERESA (t) KR (c)     To walk in hospital room? 1  -KR (r) THERESA (t) KR (c)     AM-PAC 6 Clicks Score (PT) 9  -KR (r) THERESA (t)     Highest Level of Mobility Goal 3 --> Sit at edge of bed  -KR (r) THERESA (t)       Row Name 08/01/24 1029          Functional Assessment    Outcome Measure Options AM-PAC 6 Clicks Basic Mobility (PT)  -KR (r) THERESA (t) KR (c)               User Key  (r) = Recorded By, (t) = Taken By, (c) = Cosigned By      Initials Name Provider Type    Mariposa Villeda, PT Physical Therapist    Adalgisa Denny, PT Student PT Student                                 Physical Therapy Education       Title: PT OT SLP Therapies (In Progress)       Topic: Physical Therapy (In Progress)       Point: Mobility training (In Progress)       Learning Progress Summary             Patient Acceptance, E, NR by THERESA at 8/1/2024 1029    Acceptance, E, VU,NR by CHEY at 7/29/2024 1453    Acceptance, E,D, VU,NR by REECE at 7/27/2024 1005   Family Acceptance, E, VU,NR by CHEY at 7/29/2024 1453   Significant Other Acceptance, E,D, VU,NR by REECE at 7/27/2024 1005                         Point: Home exercise program (Done)       Learning Progress Summary             Patient Acceptance, E, VU,NR by CHEY at 7/29/2024 1453   Family Acceptance, E, VU,NR by CHEY at 7/29/2024 1453                         Point: Body mechanics (In Progress)       Learning Progress Summary             Patient Acceptance, E, NR by THERESA at 8/1/2024 1029    Acceptance, E, VU,NR by  AC at 7/29/2024 1453    Acceptance, E,D, VU,NR by  at 7/27/2024 1005   Family Acceptance, E, VU,NR by AC at 7/29/2024 1453   Significant Other Acceptance, E,D, VU,NR by LS at 7/27/2024 1005                         Point: Precautions (In Progress)       Learning Progress Summary             Patient Acceptance, E, NR by THERESA at 8/1/2024 1029    Acceptance, E, VU,NR by AC at 7/29/2024 1453    Acceptance, E,D, VU,NR by LS at 7/27/2024 1005   Family Acceptance, E, VU,NR by AC at 7/29/2024 1453   Significant Other Acceptance, E,D, VU,NR by  at 7/27/2024 1005                                         User Key       Initials Effective Dates Name Provider Type Discipline    LS 02/03/23 -  Yakelin Araujo, PT Physical Therapist PT     05/07/24 -  Adalgisa Zarco, PT Student PT Student PT    AC 07/11/24 -  Bel Gates, PT Physical Therapist PT                  PT Recommendation and Plan     Plan of Care Reviewed With: patient, spouse  Progress: improving  Outcome Evaluation: Pt demonstrated improved static sitting balance compared to last therapy session. Continues to require MaxAx2 with blocking at roberto knees to perform STS. Continues to demonstrate BLE strength, balance, coordination, postural control and endurance below baseline requiring assist with functional mobility. PT recommended to address functional limitations and return to PLOF.     Time Calculation:         PT Charges       Row Name 08/01/24 1030             Time Calculation    Start Time 0945  -KR (r) THERESA (t) KR (c)      PT Received On 08/01/24  -KR (r) THERESA (t) KR (c)         Timed Charges    55003 -  PT Neuromuscular Reeducation Minutes 5  -KR (r) THERESA (t) KR (c)      14978 - PT Therapeutic Activity Minutes 25  -KR (r) THERESA (t) KR (c)         Total Minutes    Timed Charges Total Minutes 30  -KR (r) THERESA (t)       Total Minutes 30  -KR (r) THERESA (t)                User Key  (r) = Recorded By, (t) = Taken By, (c) = Cosigned By      Initials Name Provider Type    MELISSA Landa  Mariposa, PT Physical Therapist    Adalgisa Denny, PT Student PT Student                  Therapy Charges for Today       Code Description Service Date Service Provider Modifiers Qty    67111340048 HC PT THERAPEUTIC ACT EA 15 MIN 8/1/2024 Adalgisa Zarco, PT Student GP 2    70556628061 HC PT THER SUPP EA 15 MIN 8/1/2024 Adalgisa Zarco, PT Student GP 2            PT G-Codes  Outcome Measure Options: AM-PAC 6 Clicks Basic Mobility (PT)  AM-PAC 6 Clicks Score (PT): 9  AM-PAC 6 Clicks Score (OT): 10  Modified [place] Scale: 4 - Moderately severe disability.  Unable to walk without assistance, and unable to attend to own bodily needs without assistance.       Adalgisa Zarco, PT Student  8/1/2024     Martha Montalvo PA-C  Symone Urbina PA-C

## 2025-04-23 ENCOUNTER — TELEMEDICINE (OUTPATIENT)
Age: 55
End: 2025-04-23
Payer: COMMERCIAL

## 2025-04-23 DIAGNOSIS — I10 PRIMARY HYPERTENSION: Primary | ICD-10-CM

## 2025-04-23 DIAGNOSIS — I61.0 NONTRAUMATIC SUBCORTICAL HEMORRHAGE OF RIGHT CEREBRAL HEMISPHERE: ICD-10-CM

## 2025-04-23 RX ORDER — LISINOPRIL 20 MG/1
20 TABLET ORAL DAILY
Qty: 30 TABLET | Refills: 3 | Status: SHIPPED | OUTPATIENT
Start: 2025-04-23 | End: 2025-04-24 | Stop reason: SDUPTHER

## 2025-04-23 NOTE — PROGRESS NOTES
Follow Up Office Visit      Date: 2025   Patient Name: Ron Cam  : 1970   MRN: [id number]     Chief Complaint:  No chief complaint on file.      History of Present Illness: Ron Cam is a 54 y.o. male who is here today to follow up with     History of Present Illness    Mode of Visit: Video  Location of patient: Home  Location of Provider: [facility]  Patient has chosen to receive care through a telehealth visit.  Does the patient consent to use a video/audio device for their medical care today: Yes  The visit included audio and video interaction: Yes      Patient reports that blood pressure remains low at 82/49 even after discontinuation of hydralazine.  Patient is currently on lisinopril 40 mg and carvedilol 12.5 mg twice daily.  He reports dizziness but denies any episodes of falls.    Subjective      Review of Systems:   Review of Systems    I have reviewed the patients family history, social history, past medical history, past surgical history and have updated it as appropriate.     Medications:     Current Outpatient Medications:     aspirin 81 MG EC tablet, Take 1 tablet by mouth Daily., Disp: 30 tablet, Rfl: 11    atorvastatin (LIPITOR) 80 MG tablet, Take 1 tablet by mouth Every Night., Disp: 90 tablet, Rfl: 11    cetirizine (zyrTEC) 10 MG tablet, Take 1 tablet by mouth Daily., Disp: 30 tablet, Rfl: 0    cyclobenzaprine (FLEXERIL) 5 MG tablet, TAKE 1 TABLET BY MOUTH THREE TIMES DAILY FOR UP TO 14 DAYS AS NEEDED FOR MUSCLE SPASMS, Disp: 30 tablet, Rfl: 5    Diclofenac Sodium (VOLTAREN) 1 % gel gel, Apply 4 g topically to the appropriate area as directed 4 (Four) Times a Day As Needed (joint pain)., Disp: 150 g, Rfl: 3    fluticasone (FLONASE) 50 MCG/ACT nasal spray, Administer 2 sprays into the nostril(s) as directed by provider Daily., Disp: 11.1 g, Rfl: 3    gabapentin (NEURONTIN) 100 MG capsule, Take 1 capsule by mouth 3 (Three) Times a Day for 90 days., Disp: 270  capsule, Rfl: 0    lisinopril (PRINIVIL,ZESTRIL) 20 MG tablet, Take 1 tablet by mouth Daily., Disp: 30 tablet, Rfl: 3    pantoprazole (PROTONIX) 40 MG EC tablet, TAKE 1 TABLET BY MOUTH DAILY, Disp: 90 tablet, Rfl: 1    polyethylene glycol (MIRALAX) 17 g packet, Take 17 g by mouth Daily As Needed (Constipation)., Disp: 100 packet, Rfl: 5    tamsulosin (FLOMAX) 0.4 MG capsule 24 hr capsule, TAKE 1 CAPSULE BY MOUTH DAILY, Disp: 90 capsule, Rfl: 1    Allergies:   No Known Allergies    Objective     Physical Exam: Please see above  Vital Signs: There were no vitals filed for this visit.  There is no height or weight on file to calculate BMI.          Physical Exam  HENT:      Head: Normocephalic and atraumatic.      Nose: Nose normal.   Eyes:      Extraocular Movements: Extraocular movements intact.   Pulmonary:      Effort: Pulmonary effort is normal.   Neurological:      Mental Status: He is alert. Mental status is at baseline.         Procedures    Results:   Results      Labs:   Hemoglobin A1C   Date Value Ref Range Status   07/27/2024 5.50 4.80 - 5.60 % Final        Imaging:   No valid procedures specified.     Assessment / Plan      Assessment/Plan:   Diagnoses and all orders for this visit:    1. Primary hypertension (Primary)    Other orders  -     lisinopril (PRINIVIL,ZESTRIL) 20 MG tablet; Take 1 tablet by mouth Daily.  Dispense: 30 tablet; Refill: 3     Htn   Patient reports that blood pressure has been running below as low as 82/49 with a heart rate between 60-70.  Previously stopped hydralazine  Recommended discontinuation of 12.5 mg carvedilol  Will decrease lisinopril from 40 mg to 20 mg.  \Recommended continued monitoring of blood pressure at home and if remains low will decrease from 20 to 10 mg of lisinopril.     Stroke   Continues PT and OT and now is able to walk with some assistance   Continues to need help when going to the bathroom       Positive colo guard   Recommended colonoscopy, but patient  elects to wait a little longer until strength is gained to schedule       Assessment & Plan      Follow Up:   No follow-ups on file.          Dwain Mckeon  [facility] [place]

## 2025-04-24 RX ORDER — LISINOPRIL 20 MG/1
20 TABLET ORAL DAILY
Qty: 30 TABLET | Refills: 3 | Status: SHIPPED | OUTPATIENT
Start: 2025-04-24

## 2025-05-19 RX ORDER — PANTOPRAZOLE SODIUM 40 MG/1
40 TABLET, DELAYED RELEASE ORAL DAILY
Qty: 90 TABLET | Refills: 1 | Status: SHIPPED | OUTPATIENT
Start: 2025-05-19

## 2025-06-14 DIAGNOSIS — J30.2 SEASONAL ALLERGIES: ICD-10-CM

## 2025-06-16 RX ORDER — CETIRIZINE HYDROCHLORIDE 10 MG/1
10 TABLET ORAL DAILY
Qty: 30 TABLET | Refills: 0 | Status: SHIPPED | OUTPATIENT
Start: 2025-06-16

## 2025-06-24 ENCOUNTER — TELEMEDICINE (OUTPATIENT)
Age: 55
End: 2025-06-24
Payer: COMMERCIAL

## 2025-06-24 DIAGNOSIS — Z12.11 SCREENING FOR COLON CANCER: ICD-10-CM

## 2025-06-24 DIAGNOSIS — R19.5 POSITIVE COLORECTAL CANCER SCREENING USING COLOGUARD TEST: ICD-10-CM

## 2025-06-24 DIAGNOSIS — I10 PRIMARY HYPERTENSION: Primary | ICD-10-CM

## 2025-06-24 PROCEDURE — 99214 OFFICE O/P EST MOD 30 MIN: CPT

## 2025-06-24 RX ORDER — LISINOPRIL 10 MG/1
10 TABLET ORAL DAILY
Qty: 30 TABLET | Refills: 2 | Status: SHIPPED | OUTPATIENT
Start: 2025-06-24

## 2025-06-24 NOTE — PROGRESS NOTES
Follow Up Office Visit      Date: 2025   Patient Name: Ron Cam  : 1970   MRN: [id number]     Chief Complaint:  No chief complaint on file.      History of Present Illness: Ron Cam is a 54 y.o. male who is here today to follow up with HTN.       Mode of Visit: Video  Location of patient: Home  Location of Provider: [facility]  Patient has chosen to receive care through a telehealth visit.  Does the patient consent to use a video/audio device for their medical care today: Yes  The visit included audio and video interaction: Yes  History of Present Illness     is on TH with wife.   Reports that BP has been running low again. This morning BP was 109/69 and pulse is 78, with a range of of 119/ 69. He reports that he has been taking the 20mg lisinopril, but makes his dizzy. Discussed that he has been taking some gabapentin, but not daily, and only when neuropathy is severe.   He reports that is continues to do PT. He is walking better, he is moving legs and arms better. He reports that he is not using the wheelchair. He is using the the corn crutch to walk to avoid falling and some assistance. He reports that he is currently 1-2 twice a week. He is continues OT for speech.     Subjective      Review of Systems:   Review of Systems    I have reviewed the patients family history, social history, past medical history, past surgical history and have updated it as appropriate.     Medications:     Current Outpatient Medications:   •  aspirin 81 MG EC tablet, Take 1 tablet by mouth Daily., Disp: 30 tablet, Rfl: 11  •  atorvastatin (LIPITOR) 80 MG tablet, Take 1 tablet by mouth Every Night., Disp: 90 tablet, Rfl: 11  •  cetirizine (zyrTEC) 10 MG tablet, TAKE 1 TABLET BY MOUTH EVERY DAY, Disp: 30 tablet, Rfl: 0  •  cyclobenzaprine (FLEXERIL) 5 MG tablet, TAKE 1 TABLET BY MOUTH THREE TIMES DAILY FOR UP TO 14 DAYS AS NEEDED FOR MUSCLE SPASMS, Disp: 30 tablet, Rfl: 5  •   Diclofenac Sodium (VOLTAREN) 1 % gel gel, Apply 4 g topically to the appropriate area as directed 4 (Four) Times a Day As Needed (joint pain)., Disp: 150 g, Rfl: 3  •  fluticasone (FLONASE) 50 MCG/ACT nasal spray, Administer 2 sprays into the nostril(s) as directed by provider Daily., Disp: 11.1 g, Rfl: 3  •  gabapentin (NEURONTIN) 100 MG capsule, Take 1 capsule by mouth 3 (Three) Times a Day for 90 days., Disp: 270 capsule, Rfl: 0  •  lisinopril (PRINIVIL,ZESTRIL) 20 MG tablet, Take 1 tablet by mouth Daily., Disp: 30 tablet, Rfl: 3  •  pantoprazole (PROTONIX) 40 MG EC tablet, TAKE 1 TABLET BY MOUTH DAILY, Disp: 90 tablet, Rfl: 1  •  polyethylene glycol (MIRALAX) 17 g packet, Take 17 g by mouth Daily As Needed (Constipation)., Disp: 100 packet, Rfl: 5  •  tamsulosin (FLOMAX) 0.4 MG capsule 24 hr capsule, TAKE 1 CAPSULE BY MOUTH DAILY, Disp: 90 capsule, Rfl: 1    Allergies:   No Known Allergies    Objective     Physical Exam: Please see above  Vital Signs: There were no vitals filed for this visit.  There is no height or weight on file to calculate BMI.          Physical Exam  HENT:      Head: Normocephalic and atraumatic.      Nose: Nose normal.   Eyes:      Extraocular Movements: Extraocular movements intact.   Pulmonary:      Effort: Pulmonary effort is normal.   Neurological:      Mental Status: He is alert. Mental status is at baseline.      Comments: Dysarthria          Procedures    Results:   Results      Labs:   Hemoglobin A1C   Date Value Ref Range Status   07/27/2024 5.50 4.80 - 5.60 % Final        Imaging:   No valid procedures specified.     Assessment / Plan      Assessment/Plan:   Diagnoses and all orders for this visit:    1. Primary hypertension (Primary)    2. Screening for colon cancer  -     Ambulatory Referral For Screening Colonoscopy    3. Positive colorectal cancer screening using Cologuard test  -     Ambulatory Referral For Screening Colonoscopy    Other orders  -     lisinopril  (PRINIVIL,ZESTRIL) 10 MG tablet; Take 1 tablet by mouth Daily.  Dispense: 30 tablet; Refill: 2               Assessment & Plan  HTN   Patient reports that blood pressure has been running below as low as 100/ 70  with a heart rate between 60-70.  Previously stopped hydralazine, carvedilol, clonopin   Plan:   Will decrease lisinopril from 20mg to 10mg     Nontraumatic subcortical hemorrhage of right cerebral hemisphere ( 8/2024)   -stroke work up showed  4 x 3 x 3.5 right basal ganglia hemorrhage, ulcerated plaque in the left distal common carotid artery, and a chronic appearing pontine infarct - etiology uncontrolled HTN   Speech has significantly improved , and now able to ambulate with corn crutch, but remains weaker in left side and has dysarthria   Continues PT and OT and now is able to walk with some assistance   Continues to need help when going to the bathroom   Continues gabapentin as needed for neuropathy      Positive  cologuard   Now able to ambulate with assistance and will order a colonoscopy   Will refer to a GI clinic in [place] given proximity to his house     Right knee OA   -Continue PT     Follow Up:   No follow-ups on file.        Patient or patient representative verbalized consent for the use of Ambient Listening during the visit with  Dwain Mckeon MD for chart documentation. 6/24/2025  10:01 EDT    Dwain Mckeon  [facility]

## 2025-06-30 DIAGNOSIS — I10 PRIMARY HYPERTENSION: ICD-10-CM

## 2025-06-30 RX ORDER — CARVEDILOL 12.5 MG/1
12.5 TABLET ORAL 2 TIMES DAILY WITH MEALS
Qty: 180 TABLET | Refills: 1 | OUTPATIENT
Start: 2025-06-30

## 2025-06-30 NOTE — TELEPHONE ENCOUNTER
Rx Refill Note    Requested Prescriptions     Pending Prescriptions Disp Refills    carvedilol (COREG) 12.5 MG tablet [Pharmacy Med Name: CARVEDILOL 12.5MG TABLETS] 180 tablet 1     Sig: TAKE 1 TABLET BY MOUTH TWICE DAILY WITH MEALS      Last office visit with prescribing clinician: 1/16/2025   Last telemedicine visit with prescribing clinician: 6/24/2025   Next office visit with prescribing clinician: 7/21/2025

## 2025-07-01 ENCOUNTER — PATIENT MESSAGE (OUTPATIENT)
Age: 55
End: 2025-07-01
Payer: COMMERCIAL

## 2025-07-02 ENCOUNTER — TELEPHONE (OUTPATIENT)
Age: 55
End: 2025-07-02

## 2025-07-02 ENCOUNTER — TELEMEDICINE (OUTPATIENT)
Age: 55
End: 2025-07-02
Payer: COMMERCIAL

## 2025-07-02 DIAGNOSIS — I61.0 NONTRAUMATIC SUBCORTICAL HEMORRHAGE OF RIGHT CEREBRAL HEMISPHERE: ICD-10-CM

## 2025-07-02 DIAGNOSIS — Z12.11 SCREENING FOR COLON CANCER: ICD-10-CM

## 2025-07-02 DIAGNOSIS — R42 DIZZINESS: Primary | ICD-10-CM

## 2025-07-02 NOTE — PROGRESS NOTES
Acute Office Visit      Date: 2025   Patient Name: Ron Cam  : 1970   MRN: [id number]     Chief Complaint:  No chief complaint on file.      History of Present Illness: Ron Cam is a 54 y.o. male.        Mode of Visit: Video  Location of patient: Home  Location of Provider[place] [facility]  Patient has chosen to receive care through a telehealth visit.  Does the patient consent to use a video/audio device for their medical care today: Yes  The visit included audio and video interaction: Yes  History of Present Illness  The patient presents via virtual visit for evaluation of dizziness and blood pressure management.    He reports experiencing dizziness, which had subsided for about 4 days but has since returned. The dizziness is severe enough that he had to cancel a therapy session yesterday. He describes the dizziness as a sensation of impending fainting, not vertigo. The dizziness typically occurs approximately 30 minutes after taking his medication. He has not taken any pain medication for the past 2 weeks. He is not currently on gabapentin or any other medication that could potentially cause dizziness.    His blood pressure readings have been fluctuating, with a systolic reading of 112 and a diastolic reading of 87, and a pulse rate of 85 at 3:00 PM today. He did not take his blood pressure medication this morning due to concerns about potential side effects. He reports no changes in his diet or lifestyle that could explain these fluctuations. He has not yet scheduled a follow-up appointment with neurology. The last neurology visit was in 2024.    He has not yet scheduled a colonoscopy. He reports no presence of dark stools or blood in his stool. He also reports no rectal bleeding. He mentions that he only needs to use the bathroom once every day or two, which he attributes to his medication.    Subjective      Review of Systems:   Review of Systems    I have reviewed the  patients family history, social history, past medical history, past surgical history and have updated it as appropriate.     Medications:     Current Outpatient Medications:     aspirin 81 MG EC tablet, Take 1 tablet by mouth Daily., Disp: 30 tablet, Rfl: 11    atorvastatin (LIPITOR) 80 MG tablet, Take 1 tablet by mouth Every Night., Disp: 90 tablet, Rfl: 11    cetirizine (zyrTEC) 10 MG tablet, TAKE 1 TABLET BY MOUTH EVERY DAY, Disp: 30 tablet, Rfl: 0    cyclobenzaprine (FLEXERIL) 5 MG tablet, TAKE 1 TABLET BY MOUTH THREE TIMES DAILY FOR UP TO 14 DAYS AS NEEDED FOR MUSCLE SPASMS, Disp: 30 tablet, Rfl: 5    Diclofenac Sodium (VOLTAREN) 1 % gel gel, Apply 4 g topically to the appropriate area as directed 4 (Four) Times a Day As Needed (joint pain)., Disp: 150 g, Rfl: 3    fluticasone (FLONASE) 50 MCG/ACT nasal spray, Administer 2 sprays into the nostril(s) as directed by provider Daily., Disp: 11.1 g, Rfl: 3    gabapentin (NEURONTIN) 100 MG capsule, Take 1 capsule by mouth 3 (Three) Times a Day for 90 days., Disp: 270 capsule, Rfl: 0    lisinopril (PRINIVIL,ZESTRIL) 10 MG tablet, Take 1 tablet by mouth Daily., Disp: 30 tablet, Rfl: 2    pantoprazole (PROTONIX) 40 MG EC tablet, TAKE 1 TABLET BY MOUTH DAILY, Disp: 90 tablet, Rfl: 1    polyethylene glycol (MIRALAX) 17 g packet, Take 17 g by mouth Daily As Needed (Constipation)., Disp: 100 packet, Rfl: 5    tamsulosin (FLOMAX) 0.4 MG capsule 24 hr capsule, TAKE 1 CAPSULE BY MOUTH DAILY, Disp: 90 capsule, Rfl: 1    Allergies:   No Known Allergies    Objective     Physical Exam: Please see above  Vital Signs: There were no vitals filed for this visit.  There is no height or weight on file to calculate BMI.    Physical Exam  HENT:      Head: Normocephalic and atraumatic.      Nose: Nose normal.   Eyes:      Extraocular Movements: Extraocular movements intact.   Pulmonary:      Effort: Pulmonary effort is normal.   Neurological:      Mental Status: He is alert. Mental status  is at baseline.         Procedures    Results:   Labs:   Hemoglobin A1C   Date Value Ref Range Status   07/27/2024 5.50 4.80 - 5.60 % Final        Imaging:   No valid procedures specified.     Assessment / Plan      Assessment/Plan:     There are no diagnoses linked to this encounter.     Assessment & Plan  1. Dizziness  -Previously stopped hydralazine, carvedilol, clonopin   - Reports dizziness, particularly after taking blood pressure medication, described as feeling like he is about to pass out, not vertigo.  - Recent blood pressure readings include 112/87 mmHg with a pulse of 85 bpm.  - Advised to discontinue current blood pressure medication and monitor blood pressure daily. Follow-up with neurology recommended to ensure no underlying issues; if no response from neurology, inform the office for further communication.  - If blood pressure increases, consider a low dose of lisinopril 5 mg.       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 ( 8/2024)   -stroke work up showed  4 x 3 x 3.5 right basal ganglia hemorrhage, ulcerated plaque in the left distal common carotid artery, and a chronic appearing pontine infarct - etiology uncontrolled HTN   Speech has significantly improved , and now able to ambulate with corn crutch, but remains weaker in left side and has dysarthria   Continues PT and OT and now is able to walk with some assistance   Continues to need help when going to the bathroom   Continues gabapentin as needed for neuropathy   Recommended contacting neurology as BP medications were discontinued today      Positive  cologuard   Now able to ambulate with assistance and will order a colonoscopy   Pending contact from GI clinic in [place] to schedule      Right knee OA   -Continue PT          Follow Up:   No follow-ups on file.    Patient or patient representative verbalized consent for the use of Ambient Listening during the visit with  Dwain Mckeon MD for chart documentation.  7/2/2025  16:23 EDT      Dwain Mckeon MD   [facility] [place]

## 2025-07-14 ENCOUNTER — OFFICE VISIT (OUTPATIENT)
Dept: NEUROLOGY | Facility: CLINIC | Age: 55
End: 2025-07-14
Payer: COMMERCIAL

## 2025-07-14 VITALS
HEART RATE: 97 BPM | BODY MASS INDEX: 36.44 KG/M2 | DIASTOLIC BLOOD PRESSURE: 92 MMHG | HEIGHT: 72 IN | TEMPERATURE: 99.1 F | SYSTOLIC BLOOD PRESSURE: 140 MMHG | OXYGEN SATURATION: 97 %

## 2025-07-14 DIAGNOSIS — E78.49 OTHER HYPERLIPIDEMIA: ICD-10-CM

## 2025-07-14 DIAGNOSIS — I61.0 NONTRAUMATIC SUBCORTICAL HEMORRHAGE OF RIGHT CEREBRAL HEMISPHERE: Primary | ICD-10-CM

## 2025-07-14 DIAGNOSIS — I10 PRIMARY HYPERTENSION: ICD-10-CM

## 2025-07-14 PROCEDURE — 99214 OFFICE O/P EST MOD 30 MIN: CPT | Performed by: NURSE PRACTITIONER

## 2025-07-14 NOTE — PATIENT INSTRUCTIONS
-Continue aspirin 81 mg daily   -Continue lipitor 80 mg nightly   -BP goal 100-130/60-80   -Continue to work with PT/OT  -Heart healthy diet   -911 for any new stroke like symptoms

## 2025-07-14 NOTE — PROGRESS NOTES
Follow Up Office Visit      Encounter Date: 2025   Patient Name: Ron Cam  : 1970   MRN: [id number]   PCP: Dwain Mckeon MD    Chief Complaint:    Chief Complaint   Patient presents with    Follow-up       History of Present Illness: Ron Cam is a 53 y.o. male who is here today to establish care.  Medical history includes migraines.  Patient was admitted to [facility] 2024 with left-sided weakness, left-sided sensory loss, dysarthria, and visual disturbance.  Initial NIHSS 17.  CT head revealed 4 x 3 x 3.5 right basal ganglia hemorrhage.  Initial /112.  Patient not on any anticoagulants or antiplatelets, he had no prior diagnosis or treatment of hypertension.  Vessel imaging showed ulcerated plaque in the left distal common carotid.  Patient was not a surgical candidate and was initially monitored in the neuro ICU and treated with hypertonic saline to prevent cerebral edema.  He had an MRI that showed the right basal ganglia hemorrhage as well as a chronic appearing pontine infarct of which she was previously unaware.  He did well over the course of his hospitalization.  NIHSS had improved to a 12 before discharge.  Etiology of his hemorrhage was attributed to hypertension which was previously untreated.  Patient was discharged on aspirin plan to start on  (to treat extracranial atherosclerosis) [facility] with plan to continue with outpatient PT/OT and evaluation by neuro-ophthalmology before considering driving.        Clinic visit 2024: Patient presents today for stroke follow-up, he is accompanied by his wife and brother.  He is in a wheelchair after coming home from [facility]; is able to stand and pivot and walks with a walker.  He has no spontaneous movement of his left arm, but states that he was able to move his fingers when a vibration tool was utilized on his forearm.  He is supposed to be beginning outpatient PT, OT, and SLP next week.  He is able  to extend and flex his left lower extremity from the hip and knee.  He has less control of the ankle and foot.  Endorses decrease sensation to his left side.  Has a persistent left facial droop though this is not as pronounced as it was in the hospital.  His speech is improving, tolerating a regular diet.  He feels that his left visual field cut is essentially unchanged.  Patient has not been seen by neuro-ophthalmology, it does not appear that he has an appointment scheduled.  Patient has been checking his blood pressures at home daily reports systolics 120s to 140s.  Several of his antihypertensives have been decreased, his blood pressure is well-controlled today.  He has been compliant with all medications including aspirin and Lipitor.  We did discuss the possibility of repeating an MRI brain to evaluate for any underlying ischemia especially given his prior pontine stroke.  We plan to get this set up today as well    Clinic visit 7/14/2025: Patient presents to clinic today for routine follow-up.  He is accompanied by his wife.  He arrives in a wheelchair due to the distance from the car.  He reports that he has been doing very well overall.  He has been making big improvements with physical therapy.  He is walking with the assistance of a crutch.  He denies any new strokelike symptoms.  He has not had any falls.  He does report some neuropathic pain in the left leg.  He has been taking muscle relaxers and as needed gabapentin to alleviate this.  He is not interested in Botox injections at this time.  He updates me that he has had some blood pressure fluctuations recently and his primary care provider is working diligently to keep his blood pressure within normal range.  Today his blood pressure is 140.    Subjective        I have reviewed and the following portions of the patient's history were updated as appropriate: past family history, past medical history, past social history, past surgical history and  "problem list.    Medications:     Current Outpatient Medications:     aspirin 81 MG EC tablet, Take 1 tablet by mouth Daily., Disp: 30 tablet, Rfl: 11    atorvastatin (LIPITOR) 80 MG tablet, Take 1 tablet by mouth Every Night., Disp: 90 tablet, Rfl: 11    cetirizine (zyrTEC) 10 MG tablet, TAKE 1 TABLET BY MOUTH EVERY DAY, Disp: 30 tablet, Rfl: 0    cyclobenzaprine (FLEXERIL) 5 MG tablet, TAKE 1 TABLET BY MOUTH THREE TIMES DAILY FOR UP TO 14 DAYS AS NEEDED FOR MUSCLE SPASMS, Disp: 30 tablet, Rfl: 5    Diclofenac Sodium (VOLTAREN) 1 % gel gel, Apply 4 g topically to the appropriate area as directed 4 (Four) Times a Day As Needed (joint pain)., Disp: 150 g, Rfl: 3    fluticasone (FLONASE) 50 MCG/ACT nasal spray, Administer 2 sprays into the nostril(s) as directed by provider Daily., Disp: 11.1 g, Rfl: 3    gabapentin (NEURONTIN) 100 MG capsule, Take 1 capsule by mouth 3 (Three) Times a Day for 90 days., Disp: 270 capsule, Rfl: 0    lisinopril (PRINIVIL,ZESTRIL) 10 MG tablet, Take 1 tablet by mouth Daily., Disp: 30 tablet, Rfl: 2    pantoprazole (PROTONIX) 40 MG EC tablet, TAKE 1 TABLET BY MOUTH DAILY, Disp: 90 tablet, Rfl: 1    polyethylene glycol (MIRALAX) 17 g packet, Take 17 g by mouth Daily As Needed (Constipation)., Disp: 100 packet, Rfl: 5    tamsulosin (FLOMAX) 0.4 MG capsule 24 hr capsule, TAKE 1 CAPSULE BY MOUTH DAILY, Disp: 90 capsule, Rfl: 1    Allergies:   No Known Allergies    Objective     Physical Exam:   Vital Signs:   Vitals:    07/14/25 1334   BP: 140/92   Pulse: 97   Temp: 99.1 °F (37.3 °C)   SpO2: 97%   Height: 182.9 cm (72.01\")     Body mass index is 36.44 kg/m².    Physical Exam  Vitals and nursing note reviewed.   Constitutional:       General: He is awake. He is not in acute distress.     Appearance: Normal appearance. He is normal weight. He is not ill-appearing.      Comments: 54-year-old  male   HENT:      Head: Normocephalic and atraumatic.      Nose: Nose normal.      Mouth/Throat: "      Mouth: Mucous membranes are moist.   Eyes:      General: Lids are normal.      Extraocular Movements: Extraocular movements intact.      Pupils: Pupils are equal, round, and reactive to light.   Cardiovascular:      Rate and Rhythm: Normal rate and regular rhythm.      Pulses: Normal pulses.   Pulmonary:      Effort: Pulmonary effort is normal. No respiratory distress.   Skin:     General: Skin is warm and dry.   Neurological:      Mental Status: He is alert and oriented to person, place, and time.      Cranial Nerves: Cranial nerve deficit present.      Sensory: No sensory deficit.      Motor: Weakness present.      Coordination: Coordination normal.      Gait: Gait abnormal.   Psychiatric:         Mood and Affect: Mood normal.         Speech: Speech normal.         Behavior: Behavior normal.       Neurological Exam  Mental Status  Awake and alert. Oriented to person, place, time and situation. Oriented to person, place, and time. Speech is normal. Language is fluent with no aphasia. Attention and concentration are normal. Fund of knowledge is appropriate for level of education.    Cranial Nerves  CN II: Right visual acuity: Counts fingers. Left visual acuity: Counts fingers. Left inferior quadrantanopsia.  CN III, IV, VI: Extraocular movements intact bilaterally. Normal lids and orbits bilaterally. Pupils equal round and reactive to light bilaterally.  CN V: Facial sensation is normal.  CN VII:  Left: There is central facial weakness.  CN VIII: Hearing is normal to speech .  CN XI: Shoulder shrug strength is normal.  CN XII: Tongue midline without atrophy or fasciculations.    Motor  Normal muscle bulk throughout. No fasciculations present. Normal muscle tone. Strength is 5/5 in all four extremities except as noted. Left hemiparesis.  LUE 2/5. LLE 4/5.    Sensory  Light touch is normal in upper and lower extremities.     Coordination  Right: Finger-to-nose normal.  No obvious dysmetria .    Gait   Abnormal  gait.Casual gait:       Modified [place] Score: 3        0  No Symptoms    1 No significant disability. Able to carry out all usual activities, despite some symptoms.    2 Slight disability. Able to look after own affairs without assistance, but unable to carry out all previous activities.    3 Moderate disability. Requires some help, but able to walk unassisted.    4 Moderately severe disability. Unable to attend to own bodily needs without assistance, and unable to walk unassisted.    5 Severe disability. Requires constant nursing care and attention, bedridden, incontinent.    6 Dead      PHQ-9 Depression Screening  Little interest or pleasure in doing things? Not at all   Feeling down, depressed, or hopeless? Not at all   PHQ-2 Total Score 0   Trouble falling or staying asleep, or sleeping too much?     Feeling tired or having little energy?     Poor appetite or overeating?     Feeling bad about yourself - or that you are a failure or have let yourself or your family down?     Trouble concentrating on things, such as reading the newspaper or watching television?     Moving or speaking so slowly that other people could have noticed? Or the opposite - being so fidgety or restless that you have been moving around a lot more than usual?       Thoughts that you would be better off dead, or of hurting yourself in some way?     PHQ-9 Total Score     If you checked off any problems, how difficult have these problems made it for you to do your work, take care of things at home, or get along with other people?             NAZANIN Fall Risk Clinician Key Questions   Have you fallen in the past year?: No  Do you feel unsteady with walking?: Yes      Hemoglobin   Date Value Ref Range Status   08/03/2024 12.8 (L) 13.0 - 17.7 g/dL Final     Hematocrit   Date Value Ref Range Status   08/03/2024 37.8 37.5 - 51.0 % Final     Platelets   Date Value Ref Range Status   08/03/2024 213 140 - 450 10*3/mm3 Final     Hemoglobin A1C   Date  Value Ref Range Status   07/27/2024 5.50 4.80 - 5.60 % Final     LDL Cholesterol    Date Value Ref Range Status   07/27/2024 140 (H) 0 - 100 mg/dL Final     AST (SGOT)   Date Value Ref Range Status   08/01/2024 16 1 - 40 U/L Final     ALT (SGPT)   Date Value Ref Range Status   08/01/2024 28 1 - 41 U/L Final        Assessment / Plan      Assessment/Plan:   # Nontraumatic subcortical hemorrhage of right cerebral hemisphere   # Chronic pontine ischemic stroke  # HTN  # HLD  -Repeat MRI brain completed in October 2024 was stable with no evidence of underlying mass or infarct.   -Continue aspirin 81 mg due to extracranial atherosclerosis and history of prior ischemic infarct  -Continue Lipitor 80 mg nightly. , goal <70.   -BP goal <130/80. Patient should check blood pressure daily and keep a log for PCP.   -Continue PT/OT  -Return to ED for any new stroke like symptoms   -Follow-up in stroke clinic in one year         Discussed the importance of medication compliance Aspirin 81mg daily and Atorvastatin 80mg nightly and lifestyle modifications adequate control of blood pressure, adequate control of cholesterol (goal LDL <70), increased physical activity, and implementation of healthy diet to help reduce the risk of future cerebrovascular events.  Also discussed the signs symptoms that would warrant the patient return back to the emergency department including unilateral weakness, unilateral numbness, visual disturbances, loss of balance, speech difficulties, and/or a sudden severe headache.  Patient verbalized understanding.    Follow Up:   Return in about 1 year (around 7/14/2026).        REENA Mathur  [facility] [place] [facility]

## 2025-07-15 DIAGNOSIS — J30.2 SEASONAL ALLERGIES: ICD-10-CM

## 2025-07-15 RX ORDER — LISINOPRIL 5 MG/1
5 TABLET ORAL DAILY
Qty: 90 TABLET | Refills: 1 | Status: SHIPPED | OUTPATIENT
Start: 2025-07-15 | End: 2025-07-21

## 2025-07-15 RX ORDER — FLUTICASONE PROPIONATE 50 MCG
SPRAY, SUSPENSION (ML) NASAL
Qty: 16 G | Refills: 2 | Status: SHIPPED | OUTPATIENT
Start: 2025-07-15

## 2025-07-21 ENCOUNTER — OFFICE VISIT (OUTPATIENT)
Age: 55
End: 2025-07-21
Payer: COMMERCIAL

## 2025-07-21 ENCOUNTER — LAB (OUTPATIENT)
Age: 55
End: 2025-07-21
Payer: COMMERCIAL

## 2025-07-21 VITALS
DIASTOLIC BLOOD PRESSURE: 82 MMHG | WEIGHT: 268 LBS | HEART RATE: 115 BPM | BODY MASS INDEX: 36.3 KG/M2 | HEIGHT: 72 IN | SYSTOLIC BLOOD PRESSURE: 130 MMHG | OXYGEN SATURATION: 96 %

## 2025-07-21 DIAGNOSIS — Z12.5 SCREENING PSA (PROSTATE SPECIFIC ANTIGEN): ICD-10-CM

## 2025-07-21 DIAGNOSIS — Z00.00 PREVENTATIVE HEALTH CARE: ICD-10-CM

## 2025-07-21 DIAGNOSIS — Z11.59 NEED FOR HEPATITIS C SCREENING TEST: ICD-10-CM

## 2025-07-21 DIAGNOSIS — I10 PRIMARY HYPERTENSION: ICD-10-CM

## 2025-07-21 DIAGNOSIS — M17.31 POST-TRAUMATIC OSTEOARTHRITIS OF RIGHT KNEE: Primary | ICD-10-CM

## 2025-07-21 LAB
DEPRECATED RDW RBC AUTO: 42.2 FL (ref 37–54)
ERYTHROCYTE [DISTWIDTH] IN BLOOD BY AUTOMATED COUNT: 12.6 % (ref 12.3–15.4)
HBA1C MFR BLD: 5.7 % (ref 4.8–5.6)
HCT VFR BLD AUTO: 46.2 % (ref 37.5–51)
HCV AB SER QL: NORMAL
HGB BLD-MCNC: 15.6 G/DL (ref 13–17.7)
MCH RBC QN AUTO: 31.1 PG (ref 26.6–33)
MCHC RBC AUTO-ENTMCNC: 33.8 G/DL (ref 31.5–35.7)
MCV RBC AUTO: 92 FL (ref 79–97)
PLATELET # BLD AUTO: 212 10*3/MM3 (ref 140–450)
PMV BLD AUTO: 9.2 FL (ref 6–12)
RBC # BLD AUTO: 5.02 10*6/MM3 (ref 4.14–5.8)
WBC NRBC COR # BLD AUTO: 5.37 10*3/MM3 (ref 3.4–10.8)

## 2025-07-21 PROCEDURE — 20610 DRAIN/INJ JOINT/BURSA W/O US: CPT

## 2025-07-21 PROCEDURE — 83036 HEMOGLOBIN GLYCOSYLATED A1C: CPT

## 2025-07-21 PROCEDURE — 36415 COLL VENOUS BLD VENIPUNCTURE: CPT

## 2025-07-21 PROCEDURE — 99213 OFFICE O/P EST LOW 20 MIN: CPT

## 2025-07-21 PROCEDURE — 99396 PREV VISIT EST AGE 40-64: CPT

## 2025-07-21 PROCEDURE — 82570 ASSAY OF URINE CREATININE: CPT

## 2025-07-21 PROCEDURE — G0103 PSA SCREENING: HCPCS

## 2025-07-21 PROCEDURE — 80061 LIPID PANEL: CPT

## 2025-07-21 PROCEDURE — 80050 GENERAL HEALTH PANEL: CPT

## 2025-07-21 PROCEDURE — 82043 UR ALBUMIN QUANTITATIVE: CPT

## 2025-07-21 PROCEDURE — 86803 HEPATITIS C AB TEST: CPT

## 2025-07-21 RX ORDER — TRIAMCINOLONE ACETONIDE 40 MG/ML
40 INJECTION, SUSPENSION INTRA-ARTICULAR; INTRAMUSCULAR
Status: COMPLETED | OUTPATIENT
Start: 2025-07-21 | End: 2025-07-21

## 2025-07-21 RX ORDER — TRIAMCINOLONE ACETONIDE 40 MG/ML
40 INJECTION, SUSPENSION INTRA-ARTICULAR; INTRAMUSCULAR ONCE
Status: COMPLETED | OUTPATIENT
Start: 2025-07-21 | End: 2025-07-21

## 2025-07-21 RX ORDER — LISINOPRIL 2.5 MG/1
2.5 TABLET ORAL DAILY
Qty: 90 TABLET | Refills: 1 | Status: SHIPPED | OUTPATIENT
Start: 2025-07-21

## 2025-07-21 RX ADMIN — TRIAMCINOLONE ACETONIDE 40 MG: 40 INJECTION, SUSPENSION INTRA-ARTICULAR; INTRAMUSCULAR at 14:56

## 2025-07-21 RX ADMIN — TRIAMCINOLONE ACETONIDE 40 MG: 40 INJECTION, SUSPENSION INTRA-ARTICULAR; INTRAMUSCULAR at 14:52

## 2025-07-21 NOTE — PROGRESS NOTES
Male Physical Note      Date: 2025   Patient Name: Ron Cam  : 1970   MRN: [id number]     Chief Complaint:    Chief Complaint   Patient presents with    Annual Exam    Knee Pain     Right knee pain       History of Present Illness: Ron Cam is a 54 y.o. male who is here today for their annual health maintenance and physical.    History of Present Illness  The patient presents for an annual exam and evaluation of low blood pressure, left hip pain, and skin issues.    He reports that his blood pressure tends to rise when he experiences nerve or muscle pain. He took his medication at 11:00 AM today and felt lightheaded about 30 minutes later. He also mentions that the medication causes dizziness. He has not yet taken the 2.5 mg dose of his medication. He is on lisinopril 5 mg.    He has been experiencing hip pain, which he describes as tolerable. He recalls an incident in August or September where he fell and injured his gluteal muscles, resulting in persistent soreness. He has been engaging in home exercises such as walking, squats, and stretching, which have improved his condition. His therapist recommended squats to strengthen his muscles. He can now walk without a cane and has regained coordination between his left and right legs. He reports no tenderness in his hip unless he is kneeling or exercising. He has never received knee injections but had fluid drained from his knee approximately 10 to 20 years ago. He has two screws and staples in his knee from a previous surgery.    He maintains a healthy diet, including whole grain cereal, coffee with MiraLAX, bananas, peanut butter, honey, oatmeal, cream of wheat, sandwiches, and bars. He also consumes coconut oil on toast and includes chicken, fish, and vegetables in his diet. He has a consultation scheduled for a colonoscopy on 2025. He has not yet had a dental check-up. He reports no family history of prostate cancer. He  reports no emotional distress and feels mentally stable. He is due for pneumonia and shingles vaccines.    He has noticed some skin changes when exposed to the sun for extended periods, but these resolve once he is indoors. This issue has not recurred recently.    He reports that his bowel movements are improving, although they are temporarily disrupted when he takes muscle relaxers.    Social History:  Hobbies: Gardening  Diet: Whole grain cereal, coffee with MiraLAX, bananas, peanut butter, honey, oatmeal, cream of wheat, sandwiches, bars, coconut oil on toast, chicken, fish, vegetables  Tobacco: He does not smoke cigarettes  Coffee/Tea/Caffeine-containing Drinks: Coffee    PAST SURGICAL HISTORY:  Two screws and staples in the knee from previous surgery  Fluid drained from the knee approximately 10 to 20 years ago    FAMILY HISTORY  He reports no family history of prostate cancer.      Subjective      Review of Systems:   Review of Systems    Past Medical History, Social History, Family History and Care Team were all reviewed with patient and updated as appropriate.     Medications:     Current Outpatient Medications:     aspirin 81 MG EC tablet, Take 1 tablet by mouth Daily., Disp: 30 tablet, Rfl: 11    atorvastatin (LIPITOR) 80 MG tablet, Take 1 tablet by mouth Every Night., Disp: 90 tablet, Rfl: 11    cetirizine (zyrTEC) 10 MG tablet, TAKE 1 TABLET BY MOUTH EVERY DAY, Disp: 30 tablet, Rfl: 0    cyclobenzaprine (FLEXERIL) 5 MG tablet, TAKE 1 TABLET BY MOUTH THREE TIMES DAILY FOR UP TO 14 DAYS AS NEEDED FOR MUSCLE SPASMS, Disp: 30 tablet, Rfl: 5    Diclofenac Sodium (VOLTAREN) 1 % gel gel, Apply 4 g topically to the appropriate area as directed 4 (Four) Times a Day As Needed (joint pain)., Disp: 150 g, Rfl: 3    fluticasone (FLONASE) 50 MCG/ACT nasal spray, INSTILL 2 SPRAYS INTO NOSTRIL EVERY DAY, Disp: 16 g, Rfl: 2    gabapentin (NEURONTIN) 100 MG capsule, Take 1 capsule by mouth 3 (Three) Times a Day for 90  days., Disp: 270 capsule, Rfl: 0    lisinopril (PRINIVIL,ZESTRIL) 2.5 MG tablet, Take 1 tablet by mouth Daily., Disp: 90 tablet, Rfl: 1    pantoprazole (PROTONIX) 40 MG EC tablet, TAKE 1 TABLET BY MOUTH DAILY, Disp: 90 tablet, Rfl: 1    polyethylene glycol (MIRALAX) 17 g packet, Take 17 g by mouth Daily As Needed (Constipation)., Disp: 100 packet, Rfl: 5    tamsulosin (FLOMAX) 0.4 MG capsule 24 hr capsule, TAKE 1 CAPSULE BY MOUTH DAILY, Disp: 90 capsule, Rfl: 1    Allergies:   No Known Allergies    Immunizations:  Immunization History   Administered Date(s) Administered    COVID-19 (PFIZER) Purple Cap Monovalent 03/09/2021, 04/06/2021, 01/17/2022    Fluzone  >6mos 10/02/2024    Fluzone (or Fluarix & Flulaval for VFC) >6mos 10/16/2017    Influenza Injectable Mdck Pf Quad 01/17/2022    Tdap 10/02/2024        Colorectal Screening: Up-to-date  Last Completed Colonoscopy            Upcoming       COLORECTAL CANCER SCREENING (COLOGUARD - Every 3 Years) Next due on 10/8/2027      06/24/2025  Order placed for Ambulatory Referral For Screening Colonoscopy by Dwain Mckeon MD    10/08/2024  Cologuard component of Cologuard - Stool, Per Rectum                          CT for Smoker (Age 50-80, 20pk yr within last 15 years): Up-to-date  Bone Density/DEXA (high risk): Not applicable  Hep C (Age 18-79 once): Previously negative  HIV (Age 15-65 once) : Previously negative  PSA (Over age 50, C Level Recommendation): Did not order  US Aorta (For male smokers, age 65): Not applicable  A1c: Ordered  Lipid panel: Ordered      Dermatology: Established  Ophthalmologist: Established  Dentist: Established    Tobacco Use: Low Risk  (7/14/2025)    Patient History     Smoking Tobacco Use: Never     Smokeless Tobacco Use: Never     Passive Exposure: Never       Social History     Substance and Sexual Activity   Alcohol Use Not Currently    Alcohol/week: 4.0 standard drinks of alcohol        Social History     Substance and Sexual  "Activity   Drug Use Never        Diet/Physical activity: Counseled on 07/21/25    Sexual health: No concerns, contraception used    PHQ-2 Depression Screening  PHQ-9 Total Score:        Intimate partner violence: (Screen on initial visit, older adult with injury or evidence of neglect): No concerns  Violence can be a problem in many people's lives, so I now ask every patient about trauma or abuse they may have experienced in a relationship.  Stress/Safety - Do you feel safe in your relationship?  Afraid/Abused - Have you ever been in a relationship where you were threatened, hurt, or afraid?  Friend/Family - Are your friends aware you have been hurt?  Emergency Plan - Do you have a safe place to go and the resources you need in an emergency?    Osteoporosis: No concerns  Men: history of low trauma fracture, androgen deprivation therapy for prostate cancer, hypogonadism, primary hyperparathyroidism, intestinal disorders.     Objective     Physical Exam:  Vital Signs:   Vitals:    07/21/25 1406   Weight: 122 kg (268 lb)   Height: 182.9 cm (72.01\")     Body mass index is 36.34 kg/m².     Physical Exam  Constitutional:       Appearance: Normal appearance.   HENT:      Head: Normocephalic and atraumatic.      Nose: No congestion or rhinorrhea.      Mouth/Throat:      Pharynx: No oropharyngeal exudate or posterior oropharyngeal erythema.   Eyes:      General:         Right eye: No discharge.         Left eye: No discharge.      Extraocular Movements: Extraocular movements intact.      Pupils: Pupils are equal, round, and reactive to light.   Cardiovascular:      Rate and Rhythm: Normal rate and regular rhythm.      Heart sounds: No murmur heard.     No friction rub. No gallop.   Pulmonary:      Effort: Pulmonary effort is normal.      Breath sounds: Normal breath sounds.   Abdominal:      General: Abdomen is flat. Bowel sounds are normal. There is no distension.      Palpations: Abdomen is soft.      Tenderness: There is " no abdominal tenderness. There is no guarding or rebound.   Musculoskeletal:         General: Normal range of motion.      Cervical back: Normal range of motion.      Right lower leg: No edema.      Left lower leg: No edema.   Skin:     General: Skin is warm.      Capillary Refill: Capillary refill takes less than 2 seconds.      Findings: No rash.   Neurological:      General: No focal deficit present.      Mental Status: He is alert.   Psychiatric:         Mood and Affect: Mood normal.       Arthrocentesis    Date/Time: 7/21/2025 2:52 PM    Performed by: Dwain Mckeon MD  Authorized by: Dwain Mckeon MD  Indications: pain   Body area: knee  Joint: right knee  Local anesthesia used: yes  Anesthesia: see MAR for details    Anesthesia:  Local anesthesia used: yes  Local Anesthetic: lidocaine 1% with epinephrine  Anesthetic total: 1 mL  Needle size: 22 G  Ultrasound guidance: no  Approach: lateral  Meds administered: 40 mg triamcinolone acetonide 40 MG/ML  Patient tolerance: patient tolerated the procedure well with no immediate complications          Assessment / Plan      Assessment/Plan:   Problem List Items Addressed This Visit    None  Visit Diagnoses         Post-traumatic osteoarthritis of right knee    -  Primary    Relevant Orders    XR Knee 3 View Right      Preventative health care        Relevant Orders    Lipid Panel    Hemoglobin A1c    Comprehensive Metabolic Panel    CBC (No Diff)    TSH Rfx On Abnormal To Free T4    PSA SCREENING      Screening PSA (prostate specific antigen)        Relevant Orders    PSA SCREENING          The ASCVD Risk score (Dustin CORTES, et al., 2019) failed to calculate for the following reasons:    Risk score cannot be calculated because patient has a medical history suggesting prior/existing ASCVD    Assessment & Plan  1.HTN  - Blood pressure remains low despite being on a reduced dose of lisinopril (2.5 mg).  - Reported dizziness after taking the 2.5 mg dose.  - Plan  to continue monitoring response to this dosage; consider switching to metoprolol at a low dose if necessary.  - Blood pressure will be checked prior to any procedure to ensure it is not elevated.    2. Left hip pain.  - Experiencing left hip pain, which has improved with physical therapy and exercises.  - Injection will be administered today to alleviate the pain.  - Potential risks associated with the procedure, including infection and bleeding, have been discussed.  - Advised to report any signs of redness or other changes promptly.    3. Health maintenance.  - Due for pneumonia and shingles vaccines.  - PSA test will be conducted today as part of routine health maintenance.  - Laboratory tests will be ordered today to monitor cholesterol levels and other health parameters.  - No history of prostate cancer in the family.             Healthcare Maintenance:  Counseling provided based on age appropriate USPSTF guidelines.       Ron Cam voices understanding and acceptance of this advice and will call back with any further questions or concerns. AVS with preventive healthcare tips printed for patient.         Follow Up:   No follow-ups on file.    Patient or patient representative verbalized consent for the use of Ambient Listening during the visit with  Dwain Mckeon MD for chart documentation. 7/21/2025  15:10 EDT    Dwain Mckeon MD

## 2025-07-22 LAB
ALBUMIN SERPL-MCNC: 4.4 G/DL (ref 3.5–5.2)
ALBUMIN UR-MCNC: <1.2 MG/DL
ALBUMIN/GLOB SERPL: 1.7 G/DL
ALP SERPL-CCNC: 84 U/L (ref 39–117)
ALT SERPL W P-5'-P-CCNC: 20 U/L (ref 1–41)
ANION GAP SERPL CALCULATED.3IONS-SCNC: 12.1 MMOL/L (ref 5–15)
AST SERPL-CCNC: 18 U/L (ref 1–40)
BILIRUB SERPL-MCNC: 0.3 MG/DL (ref 0–1.2)
BUN SERPL-MCNC: 17 MG/DL (ref 6–20)
BUN/CREAT SERPL: 15.5 (ref 7–25)
CALCIUM SPEC-SCNC: 9.8 MG/DL (ref 8.6–10.5)
CHLORIDE SERPL-SCNC: 102 MMOL/L (ref 98–107)
CHOLEST SERPL-MCNC: 144 MG/DL (ref 0–200)
CO2 SERPL-SCNC: 25.9 MMOL/L (ref 22–29)
CREAT SERPL-MCNC: 1.1 MG/DL (ref 0.76–1.27)
CREAT UR-MCNC: 59.2 MG/DL
EGFRCR SERPLBLD CKD-EPI 2021: 79.8 ML/MIN/1.73
GLOBULIN UR ELPH-MCNC: 2.6 GM/DL
GLUCOSE SERPL-MCNC: 89 MG/DL (ref 65–99)
HDLC SERPL-MCNC: 45 MG/DL (ref 40–60)
LDLC SERPL CALC-MCNC: 72 MG/DL (ref 0–100)
LDLC/HDLC SERPL: 1.51 {RATIO}
MICROALBUMIN/CREAT UR: NORMAL MG/G{CREAT}
POTASSIUM SERPL-SCNC: 4.5 MMOL/L (ref 3.5–5.2)
PROT SERPL-MCNC: 7 G/DL (ref 6–8.5)
PSA SERPL-MCNC: 0.39 NG/ML (ref 0–4)
SODIUM SERPL-SCNC: 140 MMOL/L (ref 136–145)
TRIGL SERPL-MCNC: 156 MG/DL (ref 0–150)
TSH SERPL DL<=0.05 MIU/L-ACNC: 1.63 UIU/ML (ref 0.27–4.2)
VLDLC SERPL-MCNC: 27 MG/DL (ref 5–40)